# Patient Record
Sex: MALE | Race: WHITE | NOT HISPANIC OR LATINO | Employment: OTHER | URBAN - NONMETROPOLITAN AREA
[De-identification: names, ages, dates, MRNs, and addresses within clinical notes are randomized per-mention and may not be internally consistent; named-entity substitution may affect disease eponyms.]

---

## 2017-01-26 DIAGNOSIS — M15.9 PRIMARY OSTEOARTHRITIS INVOLVING MULTIPLE JOINTS: Chronic | ICD-10-CM

## 2017-01-26 RX ORDER — HYDROCODONE BITARTRATE AND ACETAMINOPHEN 7.5; 325 MG/1; MG/1
1 TABLET ORAL 2 TIMES DAILY
Qty: 60 TABLET | Refills: 0 | Status: SHIPPED | OUTPATIENT
Start: 2017-01-26 | End: 2017-02-24 | Stop reason: SDUPTHER

## 2017-01-27 ENCOUNTER — OFFICE VISIT (OUTPATIENT)
Dept: FAMILY MEDICINE CLINIC | Facility: CLINIC | Age: 57
End: 2017-01-27

## 2017-01-27 VITALS
HEIGHT: 70 IN | TEMPERATURE: 97.7 F | BODY MASS INDEX: 20.62 KG/M2 | SYSTOLIC BLOOD PRESSURE: 120 MMHG | HEART RATE: 74 BPM | WEIGHT: 144 LBS | DIASTOLIC BLOOD PRESSURE: 60 MMHG

## 2017-01-27 DIAGNOSIS — K40.90 LEFT INGUINAL HERNIA: Primary | ICD-10-CM

## 2017-01-27 PROCEDURE — 99213 OFFICE O/P EST LOW 20 MIN: CPT | Performed by: INTERNAL MEDICINE

## 2017-01-27 NOTE — PROGRESS NOTES
Nguyễn Rinaldi is a 56 y.o. male who presents to the office complaining of pain and swelling in the left inguinal area.  He recently had a cold and was coughing quite a bit.  He noticed the herniation approximately 3 weeks ago.  It has been reducible, but has been causing quite a bit of discomfort.  History of Present Illness     The following portions of the patient's history were reviewed and updated as appropriate: allergies, current medications, past family history, past medical history, past social history, past surgical history and problem list.    Review of Systems   Constitutional: Negative for chills, fatigue and fever.   HENT: Negative for congestion, sneezing, sore throat and trouble swallowing.    Eyes: Negative for visual disturbance.   Respiratory: Negative for cough, chest tightness, shortness of breath and wheezing.    Cardiovascular: Negative for chest pain, palpitations and leg swelling.   Gastrointestinal: Negative for abdominal pain, constipation, diarrhea, nausea and vomiting.   Genitourinary: Negative for dysuria, frequency and urgency.   Musculoskeletal: Positive for arthralgias and back pain. Negative for neck pain.   Skin: Negative for rash.   Neurological: Positive for numbness. Negative for dizziness, weakness and headaches.   Psychiatric/Behavioral:        Patient denies any feelings of depression and has not felt down, hopeless or lost interest in any activities.       Objective   Physical Exam   Constitutional: He is oriented to person, place, and time. He appears well-developed and well-nourished. No distress.   HENT:   Head: Normocephalic and atraumatic.   Nose: Nose normal.   Mouth/Throat: Oropharynx is clear and moist. No oropharyngeal exudate.   Eyes: Conjunctivae and EOM are normal. Pupils are equal, round, and reactive to light. No scleral icterus.   Neck: Normal range of motion. Neck supple.   Cardiovascular: Normal rate, regular rhythm and normal heart sounds.  Exam  reveals no gallop and no friction rub.    No murmur heard.  Pulmonary/Chest: Effort normal and breath sounds normal. No respiratory distress. He has no wheezes. He has no rales.   Abdominal: Soft. Bowel sounds are normal. He exhibits no distension. There is no tenderness. There is no rebound and no guarding. A hernia is present. Hernia confirmed positive in the left inguinal area.   Genitourinary:         Musculoskeletal: He exhibits no edema.        Lumbar back: He exhibits decreased range of motion and pain.   Lymphadenopathy:     He has no cervical adenopathy.   Neurological: He is alert and oriented to person, place, and time. No cranial nerve deficit.   Skin: Skin is warm and dry. No rash noted.   Psychiatric: He has a normal mood and affect. His behavior is normal. Judgment and thought content normal.   Nursing note and vitals reviewed.      Assessment/Plan   Mitch was seen today for req referral for hernia left groin.    Diagnoses and all orders for this visit:    Left inguinal hernia  Comments:  New Onset - 3 weeks ago while coughing  Orders:  -     Ambulatory Referral to General Surgery      I will refer him for surgical evaluation for treatment of the herniation.  He will avoid any lifting until the appointment.  If he has worsening of pain or if the hernia becomes unable to be reduced, he will seek more immediate attention.       PHQ-9 Depression Screening 1/27/2017   Little interest or pleasure in doing things 0   Feeling down, depressed, or hopeless 0   Trouble falling or staying asleep, or sleeping too much 0   Feeling tired or having little energy 0   Poor appetite or overeating 0   Feeling bad about yourself - or that you are a failure or have let yourself or your family down 0   Trouble concentrating on things, such as reading the newspaper or watching television 0   Moving or speaking so slowly that other people could have noticed. Or the opposite - being so fidgety or restless that you have been  moving around a lot more than usual 0   Thoughts that you would be better off dead, or of hurting yourself in some way 0   PHQ-9 Total Score 0

## 2017-01-27 NOTE — MR AVS SNAPSHOT
Mitch Rinaldi   1/27/2017 2:00 PM   Office Visit    Dept Phone:  [phone]   Encounter #:  [id number]    Provider:  Carmelo Farmer MD   Department:  [facility]                Your Full Care Plan              Your Updated Medication List          This list is accurate as of: 1/27/17  3:03 PM.  Always use your most recent med list.                aspirin 81 MG chewable tablet       cetirizine 10 MG tablet   Commonly known as:  zyrTEC       citalopram 10 MG tablet   Commonly known as:  CeleXA   Take 1 tablet by mouth Daily.       esomeprazole 20 MG capsule   Commonly known as:  nexIUM       gabapentin 300 MG capsule   Commonly known as:  NEURONTIN       HYDROcodone-acetaminophen 7.5-325 MG per tablet   Commonly known as:  NORCO   Take 1 tablet by mouth 2 (Two) Times a Day.       ibuprofen 200 MG tablet   Commonly known as:  ADVIL,MOTRIN       promethazine 25 MG tablet   Commonly known as:  PHENERGAN   Take 1 tablet by mouth Every 6 (Six) Hours As Needed for nausea or vomiting.       tiZANidine 4 MG tablet   Commonly known as:  ZANAFLEX               We Performed the Following     Ambulatory Referral to General Surgery       You Were Diagnosed With        Codes Comments    Left inguinal hernia    -  Primary ICD-10-CM: K40.90  ICD-9-CM: 550.90 New Onset - 3 weeks ago while coughing      Instructions     None    Patient Instructions History      Upcoming Appointments     Visit Type Date Time Department    OFFICE VISIT 1/27/2017  2:00 PM [facility]    FOLLOW UP 2/24/2017  8:45 AM [facility]      [employer] Signup     Our records indicate that you have an active [employer] account.    You can view your After Visit Summary by going to [employer] and logging in with your [employer] username and password.  If you don't have a [employer] username and password but a parent or guardian has access to your record, the parent or guardian  "should login with their own [employer] username and password and access your record to view the After Visit Summary.    If you have questions, you can email [email or web address] or call [phone] to talk to our [employer] staff.  Remember, [employer] is NOT to be used for urgent needs.  For medical emergencies, dial 911.               Other Info from Your Visit           Your Appointments     Feb 24, 2017  8:45 AM CST   Follow Up with Carmelo Farmer MD   [facility] (--)    [address] [place] [address] [address] [place] [address] [phone]           Arrive 15 minutes prior to appointment.              Allergies     Other      darvocet-n 100      Reason for Visit     req referral for hernia left groin           Vital Signs     Blood Pressure Pulse Temperature Height Weight Body Mass Index    120/60 (BP Location: Left arm, Patient Position: Sitting, Cuff Size: Adult) 74 97.7 °F (36.5 °C) (Oral) 70\" (177.8 cm) 144 lb (65.3 kg) 20.66 kg/m2    Smoking Status                   Current Every Day Smoker           Problems and Diagnoses Noted     Left inguinal hernia    -  Primary        "

## 2017-02-02 ENCOUNTER — CONSULT (OUTPATIENT)
Dept: SURGERY | Facility: CLINIC | Age: 57
End: 2017-02-02

## 2017-02-02 VITALS
DIASTOLIC BLOOD PRESSURE: 74 MMHG | SYSTOLIC BLOOD PRESSURE: 132 MMHG | BODY MASS INDEX: 20.33 KG/M2 | WEIGHT: 142 LBS | HEIGHT: 70 IN

## 2017-02-02 DIAGNOSIS — K40.90 UNILATERAL INGUINAL HERNIA WITHOUT OBSTRUCTION OR GANGRENE, RECURRENCE NOT SPECIFIED: Primary | ICD-10-CM

## 2017-02-02 PROCEDURE — 99203 OFFICE O/P NEW LOW 30 MIN: CPT | Performed by: SURGERY

## 2017-02-02 RX ORDER — SODIUM CHLORIDE 9 MG/ML
100 INJECTION, SOLUTION INTRAVENOUS CONTINUOUS
Status: CANCELLED | OUTPATIENT
Start: 2017-02-02

## 2017-02-02 RX ORDER — SODIUM CHLORIDE 0.9 % (FLUSH) 0.9 %
1-10 SYRINGE (ML) INJECTION AS NEEDED
Status: CANCELLED | OUTPATIENT
Start: 2017-02-02

## 2017-02-02 NOTE — H&P
CHIEF COMPLAINT:    Left Groin bulge with pain    HISTORY OF PRESENT ILLNESS:    Mitch Rinaldi is a 56 y.o. male who presents with worsening left groin pain and an enlarging bulge which has been present since he had significant coughing with a URI in January.  He had previously noticed a small bulge in the region which is now larger and associated with burning pain which becomes sharp with straining.  There is no radiation of the pain.  He has no other symptoms associated with the hernia.  He has had no prior hernia repairs.    Past Medical History   Diagnosis Date   • Acute gastroenteritis    • Diverticulitis of colon    • Emphysema lung    • Gastroesophageal reflux disease    • Generalized anxiety disorder    • Hyperlipidemia    • Impaired glucose tolerance    • Pain, lumbar region      Pain radiating to lumbar region of back      • Peripheral nervous system disease      Disorder of the peripheral nervous system      • Pleuritic chest pain    • Primary osteoarthritis involving multiple joints 8/29/2016   • Shoulder pain, left        Past Surgical History   Procedure Laterality Date   • Cholecystectomy     • Endoscopy and colonoscopy     • Injection of medication       Depo Medrol (Methylprednisone) 80mg (8)      09/01/2015       • Injection of medication       Kenalog (1)      12/11/2012       • Lung surgery        Pneumothorax 1980       • Epididymis surgery Right      cyst         Current Outpatient Prescriptions:   •  aspirin 81 MG chewable tablet, Chew 81 mg daily., Disp: , Rfl:   •  cetirizine (ZyrTEC) 10 MG tablet, Take 10 mg by mouth daily as needed for allergies., Disp: , Rfl:   •  citalopram (CeleXA) 10 MG tablet, Take 1 tablet by mouth Daily., Disp: 30 tablet, Rfl: 5  •  esomeprazole (NexIUM) 20 MG capsule, Take 20 mg by mouth daily., Disp: , Rfl:   •  HYDROcodone-acetaminophen (NORCO) 7.5-325 MG per tablet, Take 1 tablet by mouth 2 (Two) Times a Day., Disp: 60 tablet, Rfl: 0  •  tiZANidine (ZANAFLEX) 4 MG  tablet, Take 4 mg by mouth every 8 (eight) hours as needed for muscle spasms., Disp: , Rfl:   •  gabapentin (NEURONTIN) 300 MG capsule, Take 300 mg by mouth daily as needed., Disp: , Rfl:   •  ibuprofen (ADVIL,MOTRIN) 200 MG tablet, Take 400 mg by mouth every 6 (six) hours as needed for mild pain (1-3)., Disp: , Rfl:   •  promethazine (PHENERGAN) 25 MG tablet, Take 1 tablet by mouth Every 6 (Six) Hours As Needed for nausea or vomiting., Disp: 30 tablet, Rfl: 2    Allergies   Allergen Reactions   • Other      darvocet-n 100       Family History   Problem Relation Age of Onset   • Dementia Mother    • Hypertension Mother    • Thyroid disease Mother    • Diabetes Father    • Stomach cancer Father    • Thyroid disease Brother    • Down syndrome Brother        Social History     Social History   • Marital status:      Spouse name: N/A   • Number of children: N/A   • Years of education: N/A     Occupational History   • Not on file.     Social History Main Topics   • Smoking status: Current Every Day Smoker     Packs/day: 1.00     Types: Cigarettes   • Smokeless tobacco: Former User     Types: Snuff     Quit date: 2014      Comment: has tried chantix    • Alcohol use No   • Drug use: No   • Sexual activity: Defer     Other Topics Concern   • Not on file     Social History Narrative   • No narrative on file       Review of Systems   Constitutional: Negative for activity change, appetite change, chills and fever.   HENT: Negative for hearing loss, nosebleeds and trouble swallowing.    Respiratory: Positive for choking and wheezing.    Cardiovascular: Negative for chest pain, palpitations and leg swelling.   Gastrointestinal: Positive for abdominal pain. Negative for abdominal distention, anal bleeding, blood in stool, constipation, diarrhea, nausea, rectal pain and vomiting.   Endocrine: Negative for cold intolerance, heat intolerance, polydipsia and polyuria.   Genitourinary: Negative for decreased urine volume,  "difficulty urinating, dysuria, enuresis, frequency, hematuria and urgency.   Musculoskeletal: Positive for arthralgias and back pain. Negative for gait problem, myalgias and neck pain.   Skin: Negative for pallor, rash and wound.   Allergic/Immunologic: Negative for immunocompromised state.   Neurological: Negative for dizziness, seizures, weakness, light-headedness, numbness and headaches.   Psychiatric/Behavioral: Negative for agitation and behavioral problems. The patient is not nervous/anxious.        Objective     Visit Vitals   • /74   • Ht 70\" (177.8 cm)   • Wt 142 lb (64.4 kg)   • BMI 20.37 kg/m2       Physical Exam   Constitutional: He is oriented to person, place, and time. He appears well-developed and well-nourished.   HENT:   Head: Normocephalic and atraumatic.   Nose: Nose normal.   Eyes: Conjunctivae and EOM are normal. Right eye exhibits no discharge. Left eye exhibits no discharge.   Neck: Trachea normal, normal range of motion and phonation normal. Neck supple. No JVD present. No tracheal deviation and no edema present. No thyromegaly present.   Cardiovascular: Normal rate, regular rhythm and normal heart sounds.  Exam reveals no gallop and no friction rub.    No murmur heard.  Pulmonary/Chest: Effort normal and breath sounds normal. No accessory muscle usage. No respiratory distress. He has no decreased breath sounds. He has no wheezes. He has no rales. He exhibits no tenderness.   Abdominal: Soft. He exhibits no distension, no fluid wave, no ascites, no pulsatile midline mass and no mass. There is no tenderness. There is no rebound and no guarding. A hernia is present. Hernia confirmed positive in the left inguinal area.       Musculoskeletal: Normal range of motion. He exhibits no edema, tenderness or deformity.   Lymphadenopathy:     He has no cervical adenopathy.        Left: No supraclavicular adenopathy present.   Neurological: He is alert and oriented to person, place, and time. He " has normal strength. No cranial nerve deficit.   Skin: Skin is warm and dry. No rash noted. He is not diaphoretic. No erythema. No pallor.   Psychiatric: He has a normal mood and affect. His speech is normal and behavior is normal. Judgment and thought content normal. Cognition and memory are normal.   Vitals reviewed.      DIAGNOSTIC DATA:    Preop labs ordered    ASSESSMENT:    Left Inguinal Hernia    PLAN:    Risks and benefits of open left inguinal hernia repair with mesh were discussed and he is agreeable with proceeding.  Will plan for OR 2/6/17.            This document has been electronically signed by Miles Gregorio MD on February 2, 2017 2:51 PM

## 2017-02-03 ENCOUNTER — APPOINTMENT (OUTPATIENT)
Dept: PREADMISSION TESTING | Facility: HOSPITAL | Age: 57
End: 2017-02-03

## 2017-02-03 ENCOUNTER — ANESTHESIA EVENT (OUTPATIENT)
Dept: PERIOP | Facility: HOSPITAL | Age: 57
End: 2017-02-03

## 2017-02-03 VITALS
HEART RATE: 97 BPM | WEIGHT: 141 LBS | BODY MASS INDEX: 20.19 KG/M2 | SYSTOLIC BLOOD PRESSURE: 128 MMHG | DIASTOLIC BLOOD PRESSURE: 82 MMHG | HEIGHT: 70 IN | RESPIRATION RATE: 16 BRPM | OXYGEN SATURATION: 99 %

## 2017-02-03 DIAGNOSIS — K40.90 UNILATERAL INGUINAL HERNIA WITHOUT OBSTRUCTION OR GANGRENE, RECURRENCE NOT SPECIFIED: ICD-10-CM

## 2017-02-03 LAB
ANION GAP SERPL CALCULATED.3IONS-SCNC: 11 MMOL/L (ref 5–15)
BUN BLD-MCNC: 14 MG/DL (ref 7–21)
BUN/CREAT SERPL: 15.6 (ref 7–25)
CALCIUM SPEC-SCNC: 9.6 MG/DL (ref 8.4–10.2)
CHLORIDE SERPL-SCNC: 99 MMOL/L (ref 95–110)
CO2 SERPL-SCNC: 28 MMOL/L (ref 22–31)
CREAT BLD-MCNC: 0.9 MG/DL (ref 0.7–1.3)
DEPRECATED RDW RBC AUTO: 41.3 FL (ref 35.1–43.9)
ERYTHROCYTE [DISTWIDTH] IN BLOOD BY AUTOMATED COUNT: 12.7 % (ref 11.5–14.5)
GFR SERPL CREATININE-BSD FRML MDRD: 87 ML/MIN/1.73 (ref 60–130)
GLUCOSE BLD-MCNC: 108 MG/DL (ref 60–100)
HCT VFR BLD AUTO: 44.9 % (ref 39–49)
HGB BLD-MCNC: 16.3 G/DL (ref 13.7–17.3)
MCH RBC QN AUTO: 31.8 PG (ref 26.5–34)
MCHC RBC AUTO-ENTMCNC: 36.3 G/DL (ref 31.5–36.3)
MCV RBC AUTO: 87.7 FL (ref 80–98)
MRSA DNA SPEC QL NAA+PROBE: NEGATIVE
PLATELET # BLD AUTO: 153 10*3/MM3 (ref 150–450)
PMV BLD AUTO: 11.3 FL (ref 8–12)
POTASSIUM BLD-SCNC: 4.3 MMOL/L (ref 3.5–5.1)
RBC # BLD AUTO: 5.12 10*6/MM3 (ref 4.37–5.74)
SODIUM BLD-SCNC: 138 MMOL/L (ref 137–145)
WBC NRBC COR # BLD: 10.11 10*3/MM3 (ref 3.2–9.8)

## 2017-02-03 PROCEDURE — 87641 MR-STAPH DNA AMP PROBE: CPT | Performed by: SURGERY

## 2017-02-03 PROCEDURE — 80048 BASIC METABOLIC PNL TOTAL CA: CPT | Performed by: SURGERY

## 2017-02-03 PROCEDURE — 85027 COMPLETE CBC AUTOMATED: CPT | Performed by: SURGERY

## 2017-02-03 PROCEDURE — 36415 COLL VENOUS BLD VENIPUNCTURE: CPT

## 2017-02-03 RX ORDER — SODIUM CHLORIDE, SODIUM GLUCONATE, SODIUM ACETATE, POTASSIUM CHLORIDE, AND MAGNESIUM CHLORIDE 526; 502; 368; 37; 30 MG/100ML; MG/100ML; MG/100ML; MG/100ML; MG/100ML
1000 INJECTION, SOLUTION INTRAVENOUS CONTINUOUS PRN
Status: CANCELLED | OUTPATIENT
Start: 2017-02-03

## 2017-02-06 ENCOUNTER — ANESTHESIA (OUTPATIENT)
Dept: PERIOP | Facility: HOSPITAL | Age: 57
End: 2017-02-06

## 2017-02-06 ENCOUNTER — HOSPITAL ENCOUNTER (OUTPATIENT)
Facility: HOSPITAL | Age: 57
Setting detail: HOSPITAL OUTPATIENT SURGERY
Discharge: HOME | End: 2017-02-06
Attending: SURGERY | Admitting: SURGERY

## 2017-02-06 VITALS
DIASTOLIC BLOOD PRESSURE: 71 MMHG | WEIGHT: 139.99 LBS | BODY MASS INDEX: 20.04 KG/M2 | SYSTOLIC BLOOD PRESSURE: 154 MMHG | OXYGEN SATURATION: 100 % | HEIGHT: 70 IN | RESPIRATION RATE: 20 BRPM | HEART RATE: 97 BPM | TEMPERATURE: 97.7 F

## 2017-02-06 DIAGNOSIS — K40.90 UNILATERAL INGUINAL HERNIA WITHOUT OBSTRUCTION OR GANGRENE, RECURRENCE NOT SPECIFIED: ICD-10-CM

## 2017-02-06 PROCEDURE — 25010000002 MIDAZOLAM PER 1 MG: Performed by: NURSE ANESTHETIST, CERTIFIED REGISTERED

## 2017-02-06 PROCEDURE — 49505 PRP I/HERN INIT REDUC >5 YR: CPT | Performed by: SURGERY

## 2017-02-06 PROCEDURE — 25010000002 FENTANYL CITRATE (PF) 100 MCG/2ML SOLUTION: Performed by: NURSE ANESTHETIST, CERTIFIED REGISTERED

## 2017-02-06 PROCEDURE — 25010000002 KETOROLAC TROMETHAMINE PER 15 MG: Performed by: NURSE ANESTHETIST, CERTIFIED REGISTERED

## 2017-02-06 PROCEDURE — 25010000002 HYDROMORPHONE PER 4 MG: Performed by: NURSE ANESTHETIST, CERTIFIED REGISTERED

## 2017-02-06 PROCEDURE — 25010000002 ONDANSETRON PER 1 MG: Performed by: NURSE ANESTHETIST, CERTIFIED REGISTERED

## 2017-02-06 PROCEDURE — 25010000002 PROPOFOL 10 MG/ML EMULSION: Performed by: NURSE ANESTHETIST, CERTIFIED REGISTERED

## 2017-02-06 PROCEDURE — C1781 MESH (IMPLANTABLE): HCPCS | Performed by: SURGERY

## 2017-02-06 PROCEDURE — 25010000003 CEFAZOLIN PER 500 MG: Performed by: SURGERY

## 2017-02-06 DEVICE — MESH FLUT SHT 3X6IN: Type: IMPLANTABLE DEVICE | Site: GROIN | Status: FUNCTIONAL

## 2017-02-06 RX ORDER — ONDANSETRON 2 MG/ML
INJECTION INTRAMUSCULAR; INTRAVENOUS AS NEEDED
Status: DISCONTINUED | OUTPATIENT
Start: 2017-02-06 | End: 2017-02-06 | Stop reason: SURG

## 2017-02-06 RX ORDER — BUPIVACAINE HYDROCHLORIDE AND EPINEPHRINE 5; 5 MG/ML; UG/ML
INJECTION, SOLUTION EPIDURAL; INTRACAUDAL; PERINEURAL AS NEEDED
Status: DISCONTINUED | OUTPATIENT
Start: 2017-02-06 | End: 2017-02-06 | Stop reason: HOSPADM

## 2017-02-06 RX ORDER — HYDROCODONE BITARTRATE AND ACETAMINOPHEN 7.5; 325 MG/1; MG/1
1 TABLET ORAL EVERY 6 HOURS PRN
Status: DISCONTINUED | OUTPATIENT
Start: 2017-02-06 | End: 2017-02-06 | Stop reason: HOSPADM

## 2017-02-06 RX ORDER — FENTANYL CITRATE 50 UG/ML
INJECTION, SOLUTION INTRAMUSCULAR; INTRAVENOUS AS NEEDED
Status: DISCONTINUED | OUTPATIENT
Start: 2017-02-06 | End: 2017-02-06 | Stop reason: SURG

## 2017-02-06 RX ORDER — SODIUM CHLORIDE, SODIUM GLUCONATE, SODIUM ACETATE, POTASSIUM CHLORIDE, AND MAGNESIUM CHLORIDE 526; 502; 368; 37; 30 MG/100ML; MG/100ML; MG/100ML; MG/100ML; MG/100ML
1000 INJECTION, SOLUTION INTRAVENOUS CONTINUOUS PRN
Status: DISCONTINUED | OUTPATIENT
Start: 2017-02-06 | End: 2017-02-06 | Stop reason: HOSPADM

## 2017-02-06 RX ORDER — HYDROCODONE BITARTRATE AND ACETAMINOPHEN 7.5; 325 MG/1; MG/1
1-2 TABLET ORAL EVERY 4 HOURS PRN
Qty: 30 TABLET | Refills: 0 | Status: SHIPPED | OUTPATIENT
Start: 2017-02-06 | End: 2017-02-24 | Stop reason: SDUPTHER

## 2017-02-06 RX ORDER — PROPOFOL 10 MG/ML
VIAL (ML) INTRAVENOUS AS NEEDED
Status: DISCONTINUED | OUTPATIENT
Start: 2017-02-06 | End: 2017-02-06 | Stop reason: SURG

## 2017-02-06 RX ORDER — KETOROLAC TROMETHAMINE 30 MG/ML
INJECTION, SOLUTION INTRAMUSCULAR; INTRAVENOUS AS NEEDED
Status: DISCONTINUED | OUTPATIENT
Start: 2017-02-06 | End: 2017-02-06 | Stop reason: SURG

## 2017-02-06 RX ORDER — SODIUM CHLORIDE 0.9 % (FLUSH) 0.9 %
1-10 SYRINGE (ML) INJECTION AS NEEDED
Status: DISCONTINUED | OUTPATIENT
Start: 2017-02-06 | End: 2017-02-06 | Stop reason: HOSPADM

## 2017-02-06 RX ORDER — BUPIVACAINE HYDROCHLORIDE AND EPINEPHRINE 5; 5 MG/ML; UG/ML
INJECTION, SOLUTION EPIDURAL; INTRACAUDAL; PERINEURAL
Status: DISCONTINUED
Start: 2017-02-06 | End: 2017-02-06 | Stop reason: HOSPADM

## 2017-02-06 RX ORDER — SODIUM CHLORIDE 9 MG/ML
100 INJECTION, SOLUTION INTRAVENOUS CONTINUOUS
Status: DISCONTINUED | OUTPATIENT
Start: 2017-02-06 | End: 2017-02-06 | Stop reason: HOSPADM

## 2017-02-06 RX ORDER — MIDAZOLAM HYDROCHLORIDE 1 MG/ML
INJECTION INTRAMUSCULAR; INTRAVENOUS AS NEEDED
Status: DISCONTINUED | OUTPATIENT
Start: 2017-02-06 | End: 2017-02-06 | Stop reason: SURG

## 2017-02-06 RX ORDER — LIDOCAINE HYDROCHLORIDE 20 MG/ML
INJECTION, SOLUTION INFILTRATION; PERINEURAL AS NEEDED
Status: DISCONTINUED | OUTPATIENT
Start: 2017-02-06 | End: 2017-02-06 | Stop reason: SURG

## 2017-02-06 RX ADMIN — HYDROMORPHONE HYDROCHLORIDE 0.5 MG: 1 INJECTION, SOLUTION INTRAMUSCULAR; INTRAVENOUS; SUBCUTANEOUS at 15:55

## 2017-02-06 RX ADMIN — ONDANSETRON 4 MG: 2 INJECTION INTRAMUSCULAR; INTRAVENOUS at 14:59

## 2017-02-06 RX ADMIN — LIDOCAINE HYDROCHLORIDE 60 MG: 20 INJECTION, SOLUTION INFILTRATION; PERINEURAL at 14:48

## 2017-02-06 RX ADMIN — SODIUM CHLORIDE, SODIUM GLUCONATE, SODIUM ACETATE, POTASSIUM CHLORIDE, AND MAGNESIUM CHLORIDE 1000 ML: 526; 502; 368; 37; 30 INJECTION, SOLUTION INTRAVENOUS at 13:32

## 2017-02-06 RX ADMIN — KETOROLAC TROMETHAMINE 30 MG: 30 INJECTION, SOLUTION INTRAMUSCULAR; INTRAVENOUS at 15:31

## 2017-02-06 RX ADMIN — CEFAZOLIN SODIUM 2 G: 1 INJECTION, POWDER, FOR SOLUTION INTRAMUSCULAR; INTRAVENOUS at 14:44

## 2017-02-06 RX ADMIN — FENTANYL CITRATE 50 MCG: 50 INJECTION, SOLUTION INTRAMUSCULAR; INTRAVENOUS at 15:15

## 2017-02-06 RX ADMIN — HYDROCODONE BITARTRATE AND ACETAMINOPHEN 1 TABLET: 7.5; 325 TABLET ORAL at 16:49

## 2017-02-06 RX ADMIN — HYDROMORPHONE HYDROCHLORIDE 0.5 MG: 1 INJECTION, SOLUTION INTRAMUSCULAR; INTRAVENOUS; SUBCUTANEOUS at 16:05

## 2017-02-06 RX ADMIN — MIDAZOLAM 2 MG: 1 INJECTION INTRAMUSCULAR; INTRAVENOUS at 14:38

## 2017-02-06 RX ADMIN — FENTANYL CITRATE 50 MCG: 50 INJECTION, SOLUTION INTRAMUSCULAR; INTRAVENOUS at 14:47

## 2017-02-06 RX ADMIN — PROPOFOL 150 MG: 10 INJECTION, EMULSION INTRAVENOUS at 14:48

## 2017-02-06 NOTE — INTERVAL H&P NOTE
H&P reviewed. The patient was examined and there are no changes to the H&P.           This document has been electronically signed by Miles Gregorio MD on February 6, 2017 1:38 PM

## 2017-02-06 NOTE — PLAN OF CARE
Problem: Patient Care Overview (Adult)  Goal: Plan of Care Review  Outcome: Ongoing (interventions implemented as appropriate)    02/06/17 1610   Coping/Psychosocial Response Interventions   Plan Of Care Reviewed With patient   Patient Care Overview   Progress improving   Outcome Evaluation   Outcome Summary/Follow up Plan all criteria met, pt ready for sds         Problem: Perioperative Period (Adult)  Goal: Signs and Symptoms of Listed Potential Problems Will be Absent or Manageable (Perioperative Period)  Outcome: Ongoing (interventions implemented as appropriate)

## 2017-02-06 NOTE — OP NOTE
INGUINAL HERNIA REPAIR  Procedure Note    Mitch Rinaldi  2/6/2017    Pre-op Diagnosis:   Unilateral inguinal hernia without obstruction or gangrene, recurrence not specified [K40.90]    Post-op Diagnosis:     Post-Op Diagnosis Codes:     * Unilateral inguinal hernia without obstruction or gangrene, recurrence not specified [K40.90]    Procedure/CPT® Codes:      Procedure(s):  OPEN LEFT INGUINAL HERNIA REPAIR WITH MESH    Surgeon(s):  Miles Gregorio MD    Anesthesia: General    Staff:   Circulator: Josephine Chavez RN  Scrub Person: Kymberly Meadows  Assistant: Adri Pate    Estimated Blood Loss: 5 mL    Specimens:                * No specimens in log *      Drains:         IVF: 400cc  Findings: small direct hernia, left sided    Complications: none    Indication: See H and P    Operative Note:    Patient was seen, marked and consented in same day surgery.  He was then transferred to the OR and general anesthetic was administered.  He was orotracheally intubated without incident.  A preop briefing was performed and the left groin prepped and draped in normal sterile fashion.  Following this a timeout was performed.    Following timeout the right ASIS and pubic tubercle were palpated and marked.  A line approximating the inguinal ligament was drawn on the skin.  Local anesthetic was injected and a skin incision made, carried down through the subcutaneous tissues with electrocautery. A single vein was encountered and clamped, ligated and tied.    Farhan's fascia was then identified and opened for the full length of the incision.  The external oblique fascia was identified and cleared down to the external ring.  A scalpel was used to make a small incision in this fascia parallel to its fibers.  Metzenbaum scissors were then used to clear the posterior aspect of the fascia which was then opened for the full length of the incision.  Once this was done the cord structures were isolated and bluntly  dissected from the surrounding structures at the level of the pubic tubercle.  A small reducible direct hernia was seen.    A penrose was place around the spermatic cord structures elevating it upwards into the wound.  There was no evidence of indirect hernia.  The muscle fibers overlying the cord were stripped away and divided.  A small cord lipoma was identified and dissected away from the cord structures which were identified and verified to be intact.  The lipoma was excised and its base ligated.  It was not sent as specimen.     A polypropylene mesh was brought onto the field and trimmed to size and shape, with a keyhole incision made for passage of the cord.  It was secured in interrupted fashion to the pubic tubercle, shelving edge of the inguinal ligament and the internal oblique fascia using 0 PDS.  The two tails were brought around the cord structures and secured to themselves and the muscular fascia.  Once the mesh was verified to be in good position the wound was irrigated.  The counts were verified to be correct.  Additional local was then injected deeply in the wound.  The external oblique fascia was closed down to the level of the external ring using 2-0 vicryl.  Farhan's fascia was closed using 3-0 vicryl, skin was closed with 4-0 vicryl.  Skin affix was placed over the wound.  The patient was awakened and returned to recovery in good condition.          This document has been electronically signed by Miles Gregorio MD on February 6, 2017 4:50 PM        Miles Gregorio MD     Date: 2/6/2017  Time: 4:48 PM

## 2017-02-06 NOTE — ANESTHESIA POSTPROCEDURE EVALUATION
Patient: Mitch Rinaldi    Procedure Summary     Date Anesthesia Start Anesthesia Stop Room / Location    02/06/17 1443 1545  [facility] OR [facility] / [facility]       Procedure Diagnosis Surgeon Provider    OPEN LEFT INGUINAL HERNIA REPAIR WITH MESH (Left Abdomen) Unilateral inguinal hernia without obstruction or gangrene, recurrence not specified  (Unilateral inguinal hernia without obstruction or gangrene, recurrence not specified [K40.90]) MD Bry Cyr MD          Anesthesia Type: general  Last vitals  BP      Temp      Pulse     Resp      SpO2        Post Anesthesia Care and Evaluation    Patient location during evaluation: PACU  Patient participation: complete - patient participated  Level of consciousness: awake and alert  Pain management: adequate  Airway patency: patent  Anesthetic complications: No anesthetic complications  PONV Status: none  Cardiovascular status: acceptable  Respiratory status: acceptable  Hydration status: acceptable

## 2017-02-06 NOTE — ANESTHESIA PREPROCEDURE EVALUATION
Anesthesia Evaluation     Patient summary reviewed    No history of anesthetic complications   Airway   no difficulty expected  Dental    (+) upper dentures    Pulmonary - normal exam   (+) hx of smoking, COPD,   (-) asthma, sleep apnea  Cardiovascular - negative cardio ROS and normal exam  (-) hypertension, valvular problems/murmurs, past MI, CAD, dysrhythmias, angina, BUSBY    Neuro/Psych  (+) psychiatric history Anxiety,    (-) CVA, syncope  GI/Hepatic/Renal/Endo    (+)  GERD,   (-) renal disease    Musculoskeletal     Abdominal    Substance History   (-) drug use     OB/GYN          Other       (-) blood dyscrasia                        Anesthesia Plan    ASA 3     general     Anesthetic plan and risks discussed with patient.

## 2017-02-06 NOTE — ANESTHESIA PROCEDURE NOTES
Airway  Urgency: elective    Airway not difficult    General Information and Staff    Patient location during procedure: OR  CRNA: EDUARDO MARAVILLA    Indications and Patient Condition  Indications for airway management: airway protection    Preoxygenated: yes  Mask difficulty assessment: 0 - not attempted    Final Airway Details  Final airway type: supraglottic airway      Successful airway: classic  Size 4    Number of attempts at approach: 1

## 2017-02-06 NOTE — ADDENDUM NOTE
Addendum  created 02/06/17 7498 by Lamine Osullivan, MARY    Anesthesia Event edited, Anesthesia Intra Flowsheets edited, Anesthesia Intra Meds edited, Flowsheet data copied forward, Order sets accessed, Patient device removed, Procedure Event Log accessed, Sign clinical note

## 2017-02-23 ENCOUNTER — OFFICE VISIT (OUTPATIENT)
Dept: SURGERY | Facility: CLINIC | Age: 57
End: 2017-02-23

## 2017-02-23 VITALS
WEIGHT: 139 LBS | BODY MASS INDEX: 19.9 KG/M2 | SYSTOLIC BLOOD PRESSURE: 124 MMHG | HEIGHT: 70 IN | DIASTOLIC BLOOD PRESSURE: 70 MMHG

## 2017-02-23 DIAGNOSIS — Z09 FOLLOW UP: Primary | ICD-10-CM

## 2017-02-23 PROCEDURE — 99024 POSTOP FOLLOW-UP VISIT: CPT | Performed by: SURGERY

## 2017-02-24 ENCOUNTER — OFFICE VISIT (OUTPATIENT)
Dept: FAMILY MEDICINE CLINIC | Facility: CLINIC | Age: 57
End: 2017-02-24

## 2017-02-24 VITALS
DIASTOLIC BLOOD PRESSURE: 64 MMHG | HEART RATE: 80 BPM | BODY MASS INDEX: 20.19 KG/M2 | HEIGHT: 70 IN | SYSTOLIC BLOOD PRESSURE: 112 MMHG | WEIGHT: 141 LBS | TEMPERATURE: 97.6 F

## 2017-02-24 DIAGNOSIS — F41.1 GENERALIZED ANXIETY DISORDER: Chronic | ICD-10-CM

## 2017-02-24 DIAGNOSIS — M15.9 PRIMARY OSTEOARTHRITIS INVOLVING MULTIPLE JOINTS: Primary | Chronic | ICD-10-CM

## 2017-02-24 DIAGNOSIS — G59 MONONEUROPATHY DUE TO UNDERLYING DISEASE: Chronic | ICD-10-CM

## 2017-02-24 PROCEDURE — 99214 OFFICE O/P EST MOD 30 MIN: CPT | Performed by: INTERNAL MEDICINE

## 2017-02-24 RX ORDER — HYDROCODONE BITARTRATE AND ACETAMINOPHEN 7.5; 325 MG/1; MG/1
1 TABLET ORAL 2 TIMES DAILY
Qty: 60 TABLET | Refills: 0 | Status: SHIPPED | OUTPATIENT
Start: 2017-02-24 | End: 2017-03-23 | Stop reason: SDUPTHER

## 2017-02-24 NOTE — PROGRESS NOTES
[facility]#[id number].

## 2017-02-24 NOTE — PATIENT INSTRUCTIONS
Steps to Quit Smoking   Smoking tobacco can be harmful to your health and can affect almost every organ in your body. Smoking puts you, and those around you, at risk for developing many serious chronic diseases. Quitting smoking is difficult, but it is one of the best things that you can do for your health. It is never too late to quit.  WHAT ARE THE BENEFITS OF QUITTING SMOKING?  When you quit smoking, you lower your risk of developing serious diseases and conditions, such as:  · Lung cancer or lung disease, such as COPD.  · Heart disease.  · Stroke.  · Heart attack.  · Infertility.  · Osteoporosis and bone fractures.  Additionally, symptoms such as coughing, wheezing, and shortness of breath may get better when you quit. You may also find that you get sick less often because your body is stronger at fighting off colds and infections. If you are pregnant, quitting smoking can help to reduce your chances of having a baby of low birth weight.  HOW DO I GET READY TO QUIT?  When you decide to quit smoking, create a plan to make sure that you are successful. Before you quit:  · Pick a date to quit. Set a date within the next two weeks to give you time to prepare.  · Write down the reasons why you are quitting. Keep this list in places where you will see it often, such as on your bathroom mirror or in your car or wallet.  · Identify the people, places, things, and activities that make you want to smoke (triggers) and avoid them. Make sure to take these actions:    Throw away all cigarettes at home, at work, and in your car.    Throw away smoking accessories, such as ashtrays and lighters.    Clean your car and make sure to empty the ashtray.    Clean your home, including curtains and carpets.  · Tell your family, friends, and coworkers that you are quitting. Support from your loved ones can make quitting easier.  · Talk with your health care provider about your options for quitting smoking.  · Find out what treatment  "options are covered by your health insurance.  WHAT STRATEGIES CAN I USE TO QUIT SMOKING?   Talk with your healthcare provider about different strategies to quit smoking. Some strategies include:  · Quitting smoking altogether instead of gradually lessening how much you smoke over a period of time. Research shows that quitting \"cold turkey\" is more successful than gradually quitting.  · Attending in-person counseling to help you build problem-solving skills. You are more likely to have success in quitting if you attend several counseling sessions. Even short sessions of 10 minutes can be effective.  · Finding resources and support systems that can help you to quit smoking and remain smoke-free after you quit. These resources are most helpful when you use them often. They can include:    Online chats with a counselor.    Telephone quitlines.    Printed self-help materials.    Support groups or group counseling.    Text messaging programs.    Mobile phone applications.  · Taking medicines to help you quit smoking. (If you are pregnant or breastfeeding, talk with your health care provider first.) Some medicines contain nicotine and some do not. Both types of medicines help with cravings, but the medicines that include nicotine help to relieve withdrawal symptoms. Your health care provider may recommend:    Nicotine patches, gum, or lozenges.    Nicotine inhalers or sprays.    Non-nicotine medicine that is taken by mouth.  Talk with your health care provider about combining strategies, such as taking medicines while you are also receiving in-person counseling. Using these two strategies together makes you more likely to succeed in quitting than if you used either strategy on its own.  If you are pregnant or breastfeeding, talk with your health care provider about finding counseling or other support strategies to quit smoking. Do not take medicine to help you quit smoking unless told to do so by your health care " provider.  WHAT THINGS CAN I DO TO MAKE IT EASIER TO QUIT?  Quitting smoking might feel overwhelming at first, but there is a lot that you can do to make it easier. Take these important actions:  · Reach out to your family and friends and ask that they support and encourage you during this time. Call telephone quitlines, reach out to support groups, or work with a counselor for support.  · Ask people who smoke to avoid smoking around you.  · Avoid places that trigger you to smoke, such as bars, parties, or smoke-break areas at work.  · Spend time around people who do not smoke.  · Lessen stress in your life, because stress can be a smoking trigger for some people. To lessen stress, try:    Exercising regularly.    Deep-breathing exercises.    Yoga.    Meditating.    Performing a body scan. This involves closing your eyes, scanning your body from head to toe, and noticing which parts of your body are particularly tense. Purposefully relax the muscles in those areas.  · Download or purchase mobile phone or tablet apps (applications) that can help you stick to your quit plan by providing reminders, tips, and encouragement. There are many free apps, such as QuitGuide from the CDC (Centers for Disease Control and Prevention). You can find other support for quitting smoking (smoking cessation) through smokefree.gov and other websites.  HOW WILL I FEEL WHEN I QUIT SMOKING?  Within the first 24 hours of quitting smoking, you may start to feel some withdrawal symptoms. These symptoms are usually most noticeable 2-3 days after quitting, but they usually do not last beyond 2-3 weeks. Changes or symptoms that you might experience include:  · Mood swings.  · Restlessness, anxiety, or irritation.  · Difficulty concentrating.  · Dizziness.  · Strong cravings for sugary foods in addition to nicotine.  · Mild weight gain.  · Constipation.  · Nausea.  · Coughing or a sore throat.  · Changes in how your medicines work in your  body.  · A depressed mood.  · Difficulty sleeping (insomnia).  After the first 2-3 weeks of quitting, you may start to notice more positive results, such as:  · Improved sense of smell and taste.  · Decreased coughing and sore throat.  · Slower heart rate.  · Lower blood pressure.  · Clearer skin.  · The ability to breathe more easily.  · Fewer sick days.  Quitting smoking is very challenging for most people. Do not get discouraged if you are not successful the first time. Some people need to make many attempts to quit before they achieve long-term success. Do your best to stick to your quit plan, and talk with your health care provider if you have any questions or concerns.     This information is not intended to replace advice given to you by your health care provider. Make sure you discuss any questions you have with your health care provider.     Document Released: 12/12/2002 Document Revised: 05/03/2016 Document Reviewed: 05/03/2016  [employer] Interactive Patient Education ©2016 [employer]

## 2017-02-24 NOTE — PROGRESS NOTES
Nguyễn Rinaldi is a 56 y.o. male who presents to the office for follow-up. He has osteoarthritis which causes chronic back and joint pain.  This mainly affects his low back.  He takes Norco for treatment of the back pain, and this is keeping it at a tolerable level.  He has a history of neuropathic pain, and has taken gabapentin in the past.  He discontinued the gabapentin and is currently doing okay without it.  He also has muscle spasm in his back and takes Zanaflex as needed.  He takes Celexa for treatment of anxiety, and his anxiety has been doing well.      He recently had surgery for inguinal hernia repair.  He is recovering well from that and has been released from surgical care.        History of Present Illness     The following portions of the patient's history were reviewed and updated as appropriate: allergies, current medications, past family history, past medical history, past social history, past surgical history and problem list.    Review of Systems   Constitutional: Negative for chills, fatigue and fever.   HENT: Negative for congestion, sneezing, sore throat and trouble swallowing.    Eyes: Negative for visual disturbance.   Respiratory: Negative for cough, chest tightness, shortness of breath and wheezing.    Cardiovascular: Negative for chest pain, palpitations and leg swelling.   Gastrointestinal: Negative for abdominal pain, constipation, diarrhea, nausea and vomiting.   Genitourinary: Negative for dysuria, frequency and urgency.   Musculoskeletal: Positive for arthralgias and back pain. Negative for neck pain.   Skin: Negative for rash.   Neurological: Negative for dizziness, weakness and headaches.   Psychiatric/Behavioral:        Patient denies any feelings of depression and has not felt down, hopeless or lost interest in any activities.       Objective   Physical Exam   Constitutional: He is oriented to person, place, and time. He appears well-developed and well-nourished. No  distress.   HENT:   Head: Normocephalic and atraumatic.   Nose: Nose normal.   Mouth/Throat: Oropharynx is clear and moist. No oropharyngeal exudate.   Eyes: Conjunctivae and EOM are normal. Pupils are equal, round, and reactive to light. No scleral icterus.   Neck: Normal range of motion. Neck supple.   Cardiovascular: Normal rate, regular rhythm and normal heart sounds.  Exam reveals no gallop and no friction rub.    No murmur heard.  Pulmonary/Chest: Effort normal and breath sounds normal. No respiratory distress. He has no wheezes. He has no rales.   Abdominal: Soft. Bowel sounds are normal. He exhibits no distension. There is no tenderness. There is no rebound and no guarding.   Musculoskeletal: He exhibits no edema.        Lumbar back: He exhibits decreased range of motion and pain.   Lymphadenopathy:     He has no cervical adenopathy.   Neurological: He is alert and oriented to person, place, and time. No cranial nerve deficit.   Skin: Skin is warm and dry. No rash noted.   Psychiatric: He has a normal mood and affect. His behavior is normal. Judgment and thought content normal.   Nursing note and vitals reviewed.      Assessment/Plan   Mitch was seen today for pain.    Diagnoses and all orders for this visit:    Primary osteoarthritis involving multiple joints  -     HYDROcodone-acetaminophen (NORCO) 7.5-325 MG per tablet; Take 1 tablet by mouth 2 (Two) Times a Day.    Mononeuropathy due to underlying disease    Generalized anxiety disorder         He will continue with Celexa for treatment of the anxiety. He will continue to use ibuprofen for the arthritic inflammation, and the Zanaflex as needed for the muscle spasm.  He will continue with Norco twice a day for treatment of the musculoskeletal pain.    Patient understands the risks associated with this controlled medication, including tolerance and addiction.  Patient also agrees to only obtain this medication from me, and not from a another provider,  unless that provider is covering for me in my absence.  Patient also agrees to be compliant in dosing, and not self adjust the dose of medication.  A signed controlled substance agreement is on file, and the patient has received a controlled substance education sheet at this a previous visit.  The patient has also signed a consent for treatment with a controlled substance as per [facility] policy. SOFI was obtained.    I advised the patient of the risks in continuing to use tobacco, and I provided this patient with smoking cessation educational materials.  I also discussed how to quit smoking and the patient has expressed the willingness to quit.      PHQ-9 Depression Screening 2/24/2017   Little interest or pleasure in doing things 0   Feeling down, depressed, or hopeless 0   Trouble falling or staying asleep, or sleeping too much 0   Feeling tired or having little energy 0   Poor appetite or overeating 0   Feeling bad about yourself - or that you are a failure or have let yourself or your family down 0   Trouble concentrating on things, such as reading the newspaper or watching television 0   Moving or speaking so slowly that other people could have noticed. Or the opposite - being so fidgety or restless that you have been moving around a lot more than usual 0   Thoughts that you would be better off dead, or of hurting yourself in some way 0   PHQ-9 Total Score 0

## 2017-02-27 NOTE — PROGRESS NOTES
CHIEF COMPLAINT:    Chief Complaint   Patient presents with   • Post-op     Open left inguinal hernia repair with mesh on 2/6/17.       HISTORY OF PRESENT ILLNESS:    Mitch Rinaldi is a 56 y.o. male who underwent open LIHR on 2/6/17.  He returns for follow up today.  He has no complaints today.      EXAM:  Vitals:    02/23/17 1338   BP: 124/70         Well healed left groin incision. No palpable hernia     ASSESSMENT:    S/p LIHR    PLAN:    Overall doing well.  Will see back prn.  Gradually increase activity.  No lifting more than 15 pounds until at least one month post op.          This document has been electronically signed by Miles Gregorio MD on February 27, 2017 2:52 PM

## 2017-03-23 DIAGNOSIS — M15.9 PRIMARY OSTEOARTHRITIS INVOLVING MULTIPLE JOINTS: Chronic | ICD-10-CM

## 2017-03-23 RX ORDER — HYDROCODONE BITARTRATE AND ACETAMINOPHEN 7.5; 325 MG/1; MG/1
1 TABLET ORAL 2 TIMES DAILY
Qty: 60 TABLET | Refills: 0 | Status: SHIPPED | OUTPATIENT
Start: 2017-03-23 | End: 2017-04-20 | Stop reason: SDUPTHER

## 2017-03-23 NOTE — TELEPHONE ENCOUNTER
Patient is up to date on controlled medication consent, Long report, and apt from 02/24/2017. Picking up Friday.

## 2017-04-20 DIAGNOSIS — M15.9 PRIMARY OSTEOARTHRITIS INVOLVING MULTIPLE JOINTS: Chronic | ICD-10-CM

## 2017-04-20 RX ORDER — HYDROCODONE BITARTRATE AND ACETAMINOPHEN 7.5; 325 MG/1; MG/1
1 TABLET ORAL 2 TIMES DAILY
Qty: 60 TABLET | Refills: 0 | Status: SHIPPED | OUTPATIENT
Start: 2017-04-20 | End: 2017-05-23 | Stop reason: SDUPTHER

## 2017-04-20 NOTE — TELEPHONE ENCOUNTER
Patient is up to date on controlled medication consent, Long report, and apt from 02/24/2017. Picking up Friday at 3pm.

## 2017-04-21 DIAGNOSIS — F41.1 GENERALIZED ANXIETY DISORDER: Chronic | ICD-10-CM

## 2017-04-21 RX ORDER — CITALOPRAM 10 MG/1
TABLET ORAL
Qty: 30 TABLET | Refills: 5 | Status: SHIPPED | OUTPATIENT
Start: 2017-04-21 | End: 2017-05-23

## 2017-05-19 ENCOUNTER — LAB (OUTPATIENT)
Dept: LAB | Facility: OTHER | Age: 57
End: 2017-05-19

## 2017-05-19 DIAGNOSIS — Z79.899 DRUG THERAPY: ICD-10-CM

## 2017-05-19 DIAGNOSIS — E78.2 MIXED HYPERLIPIDEMIA: ICD-10-CM

## 2017-05-19 DIAGNOSIS — R73.02 IMPAIRED GLUCOSE TOLERANCE: Chronic | ICD-10-CM

## 2017-05-19 LAB
ALBUMIN SERPL-MCNC: 4.5 G/DL (ref 3.2–5.5)
ALBUMIN/GLOB SERPL: 1.6 G/DL (ref 1–3)
ALP SERPL-CCNC: 79 U/L (ref 15–121)
ALT SERPL W P-5'-P-CCNC: 12 U/L (ref 10–60)
ANION GAP SERPL CALCULATED.3IONS-SCNC: 9 MMOL/L (ref 5–15)
AST SERPL-CCNC: 19 U/L (ref 10–60)
BACTERIA UR QL AUTO: ABNORMAL /HPF
BASOPHILS # BLD AUTO: 0.05 10*3/MM3 (ref 0–0.2)
BASOPHILS NFR BLD AUTO: 0.7 % (ref 0–2)
BILIRUB SERPL-MCNC: 0.9 MG/DL (ref 0.2–1)
BILIRUB UR QL STRIP: NEGATIVE
BUN BLD-MCNC: 18 MG/DL (ref 8–25)
BUN/CREAT SERPL: 18 (ref 7–25)
CALCIUM SPEC-SCNC: 9.2 MG/DL (ref 8.4–10.8)
CHLORIDE SERPL-SCNC: 102 MMOL/L (ref 100–112)
CHOLEST SERPL-MCNC: 184 MG/DL (ref 150–200)
CLARITY UR: ABNORMAL
CO2 SERPL-SCNC: 28 MMOL/L (ref 20–32)
COLOR UR: ABNORMAL
CREAT BLD-MCNC: 1 MG/DL (ref 0.4–1.3)
DEPRECATED RDW RBC AUTO: 43.9 FL (ref 35.1–43.9)
EOSINOPHIL # BLD AUTO: 0.17 10*3/MM3 (ref 0–0.7)
EOSINOPHIL NFR BLD AUTO: 2.4 % (ref 0–7)
ERYTHROCYTE [DISTWIDTH] IN BLOOD BY AUTOMATED COUNT: 13.5 % (ref 11.5–14.5)
GFR SERPL CREATININE-BSD FRML MDRD: 77 ML/MIN/1.73 (ref 56–130)
GLOBULIN UR ELPH-MCNC: 2.9 GM/DL (ref 2.5–4.6)
GLUCOSE BLD-MCNC: 118 MG/DL (ref 70–100)
GLUCOSE UR STRIP-MCNC: NEGATIVE MG/DL
HCT VFR BLD AUTO: 45.9 % (ref 39–49)
HDLC SERPL-MCNC: 38 MG/DL (ref 35–100)
HGB BLD-MCNC: 16 G/DL (ref 13.7–17.3)
HGB UR QL STRIP.AUTO: NEGATIVE
HYALINE CASTS UR QL AUTO: ABNORMAL /LPF
KETONES UR QL STRIP: NEGATIVE
LDLC SERPL CALC-MCNC: 127 MG/DL
LDLC/HDLC SERPL: 3.33 {RATIO}
LEUKOCYTE ESTERASE UR QL STRIP.AUTO: NEGATIVE
LYMPHOCYTES # BLD AUTO: 2.29 10*3/MM3 (ref 0.6–4.2)
LYMPHOCYTES NFR BLD AUTO: 32.6 % (ref 10–50)
MCH RBC QN AUTO: 31.3 PG (ref 26.5–34)
MCHC RBC AUTO-ENTMCNC: 34.9 G/DL (ref 31.5–36.3)
MCV RBC AUTO: 89.8 FL (ref 80–98)
MONOCYTES # BLD AUTO: 0.5 10*3/MM3 (ref 0–0.9)
MONOCYTES NFR BLD AUTO: 7.1 % (ref 0–12)
MUCOUS THREADS URNS QL MICRO: ABNORMAL /HPF
NEUTROPHILS # BLD AUTO: 4.02 10*3/MM3 (ref 2–8.6)
NEUTROPHILS NFR BLD AUTO: 57.2 % (ref 37–80)
NITRITE UR QL STRIP: NEGATIVE
PH UR STRIP.AUTO: 6 [PH] (ref 5.5–8)
PLATELET # BLD AUTO: 164 10*3/MM3 (ref 150–450)
PMV BLD AUTO: 11.1 FL (ref 8–12)
POTASSIUM BLD-SCNC: 3.9 MMOL/L (ref 3.4–5.4)
PROT SERPL-MCNC: 7.4 G/DL (ref 6.7–8.2)
PROT UR QL STRIP: ABNORMAL
RBC # BLD AUTO: 5.11 10*6/MM3 (ref 4.37–5.74)
RBC # UR: ABNORMAL /HPF
REF LAB TEST METHOD: ABNORMAL
SODIUM BLD-SCNC: 139 MMOL/L (ref 134–146)
SP GR UR STRIP: 1.02 (ref 1–1.03)
SQUAMOUS #/AREA URNS HPF: ABNORMAL /HPF
T4 FREE SERPL-MCNC: 1.07 NG/DL (ref 0.78–2.19)
TRIGL SERPL-MCNC: 97 MG/DL (ref 35–160)
TSH SERPL DL<=0.05 MIU/L-ACNC: 1.83 MIU/ML (ref 0.46–4.68)
UROBILINOGEN UR QL STRIP: ABNORMAL
VLDLC SERPL-MCNC: 19.4 MG/DL
WBC NRBC COR # BLD: 7.03 10*3/MM3 (ref 3.2–9.8)
WBC UR QL AUTO: ABNORMAL /HPF

## 2017-05-19 PROCEDURE — 80307 DRUG TEST PRSMV CHEM ANLYZR: CPT | Performed by: INTERNAL MEDICINE

## 2017-05-19 PROCEDURE — 36415 COLL VENOUS BLD VENIPUNCTURE: CPT | Performed by: INTERNAL MEDICINE

## 2017-05-19 PROCEDURE — 80053 COMPREHEN METABOLIC PANEL: CPT | Performed by: INTERNAL MEDICINE

## 2017-05-19 PROCEDURE — G0480 DRUG TEST DEF 1-7 CLASSES: HCPCS

## 2017-05-19 PROCEDURE — 85025 COMPLETE CBC W/AUTO DIFF WBC: CPT | Performed by: INTERNAL MEDICINE

## 2017-05-19 PROCEDURE — 84443 ASSAY THYROID STIM HORMONE: CPT | Performed by: INTERNAL MEDICINE

## 2017-05-19 PROCEDURE — 81001 URINALYSIS AUTO W/SCOPE: CPT | Performed by: INTERNAL MEDICINE

## 2017-05-19 PROCEDURE — 80061 LIPID PANEL: CPT | Performed by: INTERNAL MEDICINE

## 2017-05-19 PROCEDURE — 84439 ASSAY OF FREE THYROXINE: CPT | Performed by: INTERNAL MEDICINE

## 2017-05-23 ENCOUNTER — OFFICE VISIT (OUTPATIENT)
Dept: FAMILY MEDICINE CLINIC | Facility: CLINIC | Age: 57
End: 2017-05-23

## 2017-05-23 VITALS
DIASTOLIC BLOOD PRESSURE: 60 MMHG | SYSTOLIC BLOOD PRESSURE: 114 MMHG | HEART RATE: 76 BPM | HEIGHT: 70 IN | WEIGHT: 144 LBS | BODY MASS INDEX: 20.62 KG/M2

## 2017-05-23 DIAGNOSIS — M15.9 PRIMARY OSTEOARTHRITIS INVOLVING MULTIPLE JOINTS: Chronic | ICD-10-CM

## 2017-05-23 DIAGNOSIS — E78.2 MIXED HYPERLIPIDEMIA: Chronic | ICD-10-CM

## 2017-05-23 DIAGNOSIS — F41.1 GENERALIZED ANXIETY DISORDER: Chronic | ICD-10-CM

## 2017-05-23 DIAGNOSIS — Z11.59 NEED FOR HEPATITIS C SCREENING TEST: ICD-10-CM

## 2017-05-23 DIAGNOSIS — R73.02 IMPAIRED GLUCOSE TOLERANCE: Primary | Chronic | ICD-10-CM

## 2017-05-23 DIAGNOSIS — Z12.5 SCREENING FOR PROSTATE CANCER: ICD-10-CM

## 2017-05-23 PROCEDURE — 99214 OFFICE O/P EST MOD 30 MIN: CPT | Performed by: INTERNAL MEDICINE

## 2017-05-23 RX ORDER — HYDROCODONE BITARTRATE AND ACETAMINOPHEN 7.5; 325 MG/1; MG/1
1 TABLET ORAL 2 TIMES DAILY
Qty: 60 TABLET | Refills: 0 | Status: SHIPPED | OUTPATIENT
Start: 2017-05-23 | End: 2017-06-20 | Stop reason: SDUPTHER

## 2017-05-26 LAB
AMPHETAMINES UR QL SCN: NEGATIVE NG/ML
BARBITURATES UR QL SCN: NEGATIVE NG/ML
BENZODIAZ UR QL SCN: NEGATIVE NG/ML
BZE UR QL SCN: NEGATIVE NG/ML
CANNABINOIDS UR QL SCN: NEGATIVE NG/ML
CREAT 24H UR-MCNC: 224.8 MG/DL (ref 20–300)
FENTANYL+NORFENTANYL UR QL SCN: NEGATIVE PG/ML
Lab: ABNORMAL
MEPERIDINE UR CFM-MCNC: NEGATIVE NG/ML
METHADONE UR QL SCN: NEGATIVE NG/ML
OPIATES TESTED UR SCN: POSITIVE NG/ML
OXYCODONE/OXYMORPHONE, URINE: NEGATIVE NG/ML
PCP UR QL: NEGATIVE NG/ML
PH UR STRIP.AUTO: 6 [PH] (ref 4.5–8.9)
PROPOXYPH UR QL SCN: NEGATIVE NG/ML
SP GR UR: 1.02
TRAMADOL UR QL SCN: NEGATIVE NG/ML

## 2017-06-20 DIAGNOSIS — M15.9 PRIMARY OSTEOARTHRITIS INVOLVING MULTIPLE JOINTS: Chronic | ICD-10-CM

## 2017-06-20 RX ORDER — HYDROCODONE BITARTRATE AND ACETAMINOPHEN 7.5; 325 MG/1; MG/1
1 TABLET ORAL 2 TIMES DAILY
Qty: 60 TABLET | Refills: 0 | Status: SHIPPED | OUTPATIENT
Start: 2017-06-20 | End: 2017-07-20 | Stop reason: SDUPTHER

## 2017-06-20 NOTE — TELEPHONE ENCOUNTER
Patient is up to date on controlled medication consent, Long report, and apt from 05/23/2017. Picking up tomorrow at the Front Information Window.

## 2017-07-20 DIAGNOSIS — M15.9 PRIMARY OSTEOARTHRITIS INVOLVING MULTIPLE JOINTS: Chronic | ICD-10-CM

## 2017-07-20 NOTE — TELEPHONE ENCOUNTER
Patient is up to date on controlled medication consent, Long report, and apt from 05/23/20017. Picking up Monday(24th) Morning@Dr. Farmer's office.

## 2017-07-24 RX ORDER — HYDROCODONE BITARTRATE AND ACETAMINOPHEN 7.5; 325 MG/1; MG/1
1 TABLET ORAL 2 TIMES DAILY
Qty: 60 TABLET | Refills: 0 | Status: SHIPPED | OUTPATIENT
Start: 2017-07-24 | End: 2017-08-18 | Stop reason: SDUPTHER

## 2017-08-18 ENCOUNTER — OFFICE VISIT (OUTPATIENT)
Dept: FAMILY MEDICINE CLINIC | Facility: CLINIC | Age: 57
End: 2017-08-18

## 2017-08-18 VITALS
HEIGHT: 70 IN | BODY MASS INDEX: 21.26 KG/M2 | SYSTOLIC BLOOD PRESSURE: 130 MMHG | DIASTOLIC BLOOD PRESSURE: 60 MMHG | WEIGHT: 148.5 LBS | HEART RATE: 88 BPM

## 2017-08-18 DIAGNOSIS — F41.1 GENERALIZED ANXIETY DISORDER: Chronic | ICD-10-CM

## 2017-08-18 DIAGNOSIS — M15.9 PRIMARY OSTEOARTHRITIS INVOLVING MULTIPLE JOINTS: Primary | Chronic | ICD-10-CM

## 2017-08-18 DIAGNOSIS — K21.9 GASTROESOPHAGEAL REFLUX DISEASE WITHOUT ESOPHAGITIS: Chronic | ICD-10-CM

## 2017-08-18 DIAGNOSIS — G59 MONONEUROPATHY DUE TO UNDERLYING DISEASE: Chronic | ICD-10-CM

## 2017-08-18 PROCEDURE — 99214 OFFICE O/P EST MOD 30 MIN: CPT | Performed by: INTERNAL MEDICINE

## 2017-08-18 RX ORDER — HYDROCODONE BITARTRATE AND ACETAMINOPHEN 7.5; 325 MG/1; MG/1
1 TABLET ORAL 2 TIMES DAILY
Qty: 60 TABLET | Refills: 0 | Status: SHIPPED | OUTPATIENT
Start: 2017-08-18 | End: 2017-09-19 | Stop reason: SDUPTHER

## 2017-08-18 NOTE — PROGRESS NOTES
Nguyễn Rinaldi is a 57 y.o. male who presents to the office for follow-up. He has osteoarthritis which causes chronic back and joint pain.  This mainly affects his low back.  He takes Norco for treatment of the back pain, and this is keeping it at a tolerable level.  He has a history of neuropathic pain, but does not currently require medication for this.  He also has muscle spasm in his back and takes Zanaflex as needed.  He has anxiety and discontinued Celexa at the last visit.  He reports that his anxiety has been doing well without the medication.  He has GERD and takes over-the-counter Nexium as needed.  He does not take this on a daily basis.  History of Present Illness     The following portions of the patient's history were reviewed and updated as appropriate: allergies, current medications, past family history, past medical history, past social history, past surgical history and problem list.    Review of Systems   Constitutional: Negative for chills, fatigue and fever.   HENT: Negative for congestion, sneezing, sore throat and trouble swallowing.    Eyes: Negative for visual disturbance.   Respiratory: Negative for cough, chest tightness, shortness of breath and wheezing.    Cardiovascular: Negative for chest pain, palpitations and leg swelling.   Gastrointestinal: Negative for abdominal pain, constipation, diarrhea, nausea and vomiting.   Genitourinary: Negative for dysuria, frequency and urgency.   Musculoskeletal: Positive for arthralgias and back pain. Negative for neck pain.   Skin: Negative for rash.   Neurological: Negative for dizziness, weakness and headaches.   Psychiatric/Behavioral:        Patient denies any feelings of depression and has not felt down, hopeless or lost interest in any activities.       Objective   Physical Exam   Constitutional: He is oriented to person, place, and time. He appears well-developed and well-nourished. No distress.   HENT:   Head: Normocephalic and  atraumatic.   Nose: Nose normal.   Mouth/Throat: Oropharynx is clear and moist. No oropharyngeal exudate.   Eyes: Conjunctivae and EOM are normal. Pupils are equal, round, and reactive to light. No scleral icterus.   Neck: Normal range of motion. Neck supple.   Cardiovascular: Normal rate, regular rhythm and normal heart sounds.  Exam reveals no gallop and no friction rub.    No murmur heard.  Pulmonary/Chest: Effort normal and breath sounds normal. No respiratory distress. He has no wheezes. He has no rales.   Abdominal: Soft. Bowel sounds are normal. He exhibits no distension. There is no tenderness. There is no rebound and no guarding.   Musculoskeletal: He exhibits no edema.        Lumbar back: He exhibits decreased range of motion and pain.   Lymphadenopathy:     He has no cervical adenopathy.   Neurological: He is alert and oriented to person, place, and time. No cranial nerve deficit.   Skin: Skin is warm and dry. No rash noted.   Psychiatric: He has a normal mood and affect. His behavior is normal. Judgment and thought content normal.   Nursing note and vitals reviewed.      Assessment/Plan   Mitch was seen today for pain.    Diagnoses and all orders for this visit:    Primary osteoarthritis involving multiple joints  -     HYDROcodone-acetaminophen (NORCO) 7.5-325 MG per tablet; Take 1 tablet by mouth 2 (Two) Times a Day.    Mononeuropathy due to underlying disease    Generalized anxiety disorder    Gastroesophageal reflux disease without esophagitis           He will continue to use ibuprofen for the arthritic inflammation, and the Zanaflex as needed for the muscle spasm.  He will continue with Norco twice a day for treatment of the musculoskeletal pain.    Patient understands the risks associated with this controlled medication, including tolerance and addiction.  Patient also agrees to only obtain this medication from me, and not from a another provider, unless that provider is covering for me in my  absence.  Patient also agrees to be compliant in dosing, and not self adjust the dose of medication.  A signed controlled substance agreement is on file, and the patient has received a controlled substance education sheet at this a previous visit.  The patient has also signed a consent for treatment with a controlled substance as per [facility] policy. SOFI was obtained.    PHQ-9 Depression Screening 8/18/2017   Little interest or pleasure in doing things 0   Feeling down, depressed, or hopeless 0   Trouble falling or staying asleep, or sleeping too much 0   Feeling tired or having little energy 0   Poor appetite or overeating 0   Feeling bad about yourself - or that you are a failure or have let yourself or your family down 0   Trouble concentrating on things, such as reading the newspaper or watching television 0   Moving or speaking so slowly that other people could have noticed. Or the opposite - being so fidgety or restless that you have been moving around a lot more than usual 0   Thoughts that you would be better off dead, or of hurting yourself in some way 0   PHQ-9 Total Score 0

## 2017-08-18 NOTE — PROGRESS NOTES
[facility]#[id number]                           .

## 2017-09-19 DIAGNOSIS — M15.9 PRIMARY OSTEOARTHRITIS INVOLVING MULTIPLE JOINTS: Chronic | ICD-10-CM

## 2017-09-19 RX ORDER — HYDROCODONE BITARTRATE AND ACETAMINOPHEN 7.5; 325 MG/1; MG/1
1 TABLET ORAL 2 TIMES DAILY
Qty: 60 TABLET | Refills: 0 | Status: SHIPPED | OUTPATIENT
Start: 2017-09-19 | End: 2017-10-17 | Stop reason: SDUPTHER

## 2017-09-19 NOTE — TELEPHONE ENCOUNTER
Patient is up to date on controlled medication consent, Long report, and appt from 08/18/2017. Picking up today at 3pm.

## 2017-10-17 DIAGNOSIS — M15.9 PRIMARY OSTEOARTHRITIS INVOLVING MULTIPLE JOINTS: Chronic | ICD-10-CM

## 2017-10-17 RX ORDER — HYDROCODONE BITARTRATE AND ACETAMINOPHEN 7.5; 325 MG/1; MG/1
1 TABLET ORAL 2 TIMES DAILY
Qty: 60 TABLET | Refills: 0 | Status: SHIPPED | OUTPATIENT
Start: 2017-10-17 | End: 2017-11-17 | Stop reason: SDUPTHER

## 2017-10-17 NOTE — TELEPHONE ENCOUNTER
Patient is up to date on controlled medication consent, Long report, and appt from 08/18/2017. Picking up Thursday morning.

## 2017-11-09 ENCOUNTER — LAB (OUTPATIENT)
Dept: LAB | Facility: OTHER | Age: 57
End: 2017-11-09

## 2017-11-09 DIAGNOSIS — R73.02 IMPAIRED GLUCOSE TOLERANCE: Chronic | ICD-10-CM

## 2017-11-09 DIAGNOSIS — E78.2 MIXED HYPERLIPIDEMIA: ICD-10-CM

## 2017-11-09 DIAGNOSIS — Z11.59 NEED FOR HEPATITIS C SCREENING TEST: ICD-10-CM

## 2017-11-09 LAB
ALBUMIN SERPL-MCNC: 4.6 G/DL (ref 3.2–5.5)
ALBUMIN/GLOB SERPL: 1.5 G/DL (ref 1–3)
ALP SERPL-CCNC: 78 U/L (ref 15–121)
ALT SERPL W P-5'-P-CCNC: 13 U/L (ref 10–60)
ANION GAP SERPL CALCULATED.3IONS-SCNC: 10 MMOL/L (ref 5–15)
AST SERPL-CCNC: 20 U/L (ref 10–60)
BASOPHILS # BLD AUTO: 0.09 10*3/MM3 (ref 0–0.2)
BASOPHILS NFR BLD AUTO: 1.3 % (ref 0–2)
BILIRUB SERPL-MCNC: 0.8 MG/DL (ref 0.2–1)
BILIRUB UR QL STRIP: NEGATIVE
BUN BLD-MCNC: 16 MG/DL (ref 8–25)
BUN/CREAT SERPL: 14.5 (ref 7–25)
CALCIUM SPEC-SCNC: 9.2 MG/DL (ref 8.4–10.8)
CHLORIDE SERPL-SCNC: 100 MMOL/L (ref 100–112)
CHOLEST SERPL-MCNC: 183 MG/DL (ref 150–200)
CLARITY UR: CLEAR
CO2 SERPL-SCNC: 27 MMOL/L (ref 20–32)
COLOR UR: YELLOW
CREAT BLD-MCNC: 1.1 MG/DL (ref 0.4–1.3)
DEPRECATED RDW RBC AUTO: 41.8 FL (ref 35.1–43.9)
EOSINOPHIL # BLD AUTO: 0.26 10*3/MM3 (ref 0–0.7)
EOSINOPHIL NFR BLD AUTO: 3.8 % (ref 0–7)
ERYTHROCYTE [DISTWIDTH] IN BLOOD BY AUTOMATED COUNT: 12.7 % (ref 11.5–14.5)
GFR SERPL CREATININE-BSD FRML MDRD: 69 ML/MIN/1.73 (ref 56–130)
GLOBULIN UR ELPH-MCNC: 3.1 GM/DL (ref 2.5–4.6)
GLUCOSE BLD-MCNC: 110 MG/DL (ref 70–100)
GLUCOSE UR STRIP-MCNC: NEGATIVE MG/DL
HBA1C MFR BLD: 5.6 % (ref 4–5.6)
HCT VFR BLD AUTO: 46.9 % (ref 39–49)
HDLC SERPL-MCNC: 33 MG/DL (ref 35–100)
HGB BLD-MCNC: 16.1 G/DL (ref 13.7–17.3)
HGB UR QL STRIP.AUTO: NEGATIVE
KETONES UR QL STRIP: NEGATIVE
LDLC SERPL CALC-MCNC: 126 MG/DL
LDLC/HDLC SERPL: 3.81 {RATIO}
LEUKOCYTE ESTERASE UR QL STRIP.AUTO: NEGATIVE
LYMPHOCYTES # BLD AUTO: 2.17 10*3/MM3 (ref 0.6–4.2)
LYMPHOCYTES NFR BLD AUTO: 32 % (ref 10–50)
MCH RBC QN AUTO: 30.8 PG (ref 26.5–34)
MCHC RBC AUTO-ENTMCNC: 34.3 G/DL (ref 31.5–36.3)
MCV RBC AUTO: 89.7 FL (ref 80–98)
MONOCYTES # BLD AUTO: 0.47 10*3/MM3 (ref 0–0.9)
MONOCYTES NFR BLD AUTO: 6.9 % (ref 0–12)
NEUTROPHILS # BLD AUTO: 3.79 10*3/MM3 (ref 2–8.6)
NEUTROPHILS NFR BLD AUTO: 56 % (ref 37–80)
NITRITE UR QL STRIP: NEGATIVE
PH UR STRIP.AUTO: 5.5 [PH] (ref 5.5–8)
PLATELET # BLD AUTO: 193 10*3/MM3 (ref 150–450)
PMV BLD AUTO: 10.5 FL (ref 8–12)
POTASSIUM BLD-SCNC: 3.7 MMOL/L (ref 3.4–5.4)
PROT SERPL-MCNC: 7.7 G/DL (ref 6.7–8.2)
PROT UR QL STRIP: NEGATIVE
RBC # BLD AUTO: 5.23 10*6/MM3 (ref 4.37–5.74)
SODIUM BLD-SCNC: 137 MMOL/L (ref 134–146)
SP GR UR STRIP: 1.01 (ref 1–1.03)
T4 FREE SERPL-MCNC: 1.34 NG/DL (ref 0.78–2.19)
TRIGL SERPL-MCNC: 122 MG/DL (ref 35–160)
TSH SERPL DL<=0.05 MIU/L-ACNC: 2.21 MIU/ML (ref 0.46–4.68)
UROBILINOGEN UR QL STRIP: NORMAL
VLDLC SERPL-MCNC: 24.4 MG/DL
WBC NRBC COR # BLD: 6.78 10*3/MM3 (ref 3.2–9.8)

## 2017-11-09 PROCEDURE — 80074 ACUTE HEPATITIS PANEL: CPT | Performed by: INTERNAL MEDICINE

## 2017-11-09 PROCEDURE — 81003 URINALYSIS AUTO W/O SCOPE: CPT | Performed by: INTERNAL MEDICINE

## 2017-11-09 PROCEDURE — 80061 LIPID PANEL: CPT | Performed by: INTERNAL MEDICINE

## 2017-11-09 PROCEDURE — 84443 ASSAY THYROID STIM HORMONE: CPT | Performed by: INTERNAL MEDICINE

## 2017-11-09 PROCEDURE — 84439 ASSAY OF FREE THYROXINE: CPT | Performed by: INTERNAL MEDICINE

## 2017-11-09 PROCEDURE — 85025 COMPLETE CBC W/AUTO DIFF WBC: CPT | Performed by: INTERNAL MEDICINE

## 2017-11-09 PROCEDURE — 80053 COMPREHEN METABOLIC PANEL: CPT | Performed by: INTERNAL MEDICINE

## 2017-11-09 PROCEDURE — 83036 HEMOGLOBIN GLYCOSYLATED A1C: CPT | Performed by: INTERNAL MEDICINE

## 2017-11-09 PROCEDURE — 36415 COLL VENOUS BLD VENIPUNCTURE: CPT | Performed by: INTERNAL MEDICINE

## 2017-11-10 LAB
HAV IGM SERPL QL IA: NEGATIVE
HBV CORE IGM SERPL QL IA: NEGATIVE
HBV SURFACE AG SERPL QL IA: NEGATIVE
HCV AB SER DONR QL: NEGATIVE

## 2017-11-17 ENCOUNTER — OFFICE VISIT (OUTPATIENT)
Dept: FAMILY MEDICINE CLINIC | Facility: CLINIC | Age: 57
End: 2017-11-17

## 2017-11-17 VITALS
WEIGHT: 152 LBS | DIASTOLIC BLOOD PRESSURE: 58 MMHG | HEART RATE: 74 BPM | HEIGHT: 70 IN | BODY MASS INDEX: 21.76 KG/M2 | SYSTOLIC BLOOD PRESSURE: 108 MMHG

## 2017-11-17 DIAGNOSIS — Z72.0 TOBACCO ABUSE: ICD-10-CM

## 2017-11-17 DIAGNOSIS — E78.2 MIXED HYPERLIPIDEMIA: Chronic | ICD-10-CM

## 2017-11-17 DIAGNOSIS — M15.9 PRIMARY OSTEOARTHRITIS INVOLVING MULTIPLE JOINTS: Chronic | ICD-10-CM

## 2017-11-17 DIAGNOSIS — Z87.891 PERSONAL HISTORY OF TOBACCO USE, PRESENTING HAZARDS TO HEALTH: ICD-10-CM

## 2017-11-17 DIAGNOSIS — Z79.899 DRUG THERAPY: ICD-10-CM

## 2017-11-17 DIAGNOSIS — R73.02 IMPAIRED GLUCOSE TOLERANCE: Primary | Chronic | ICD-10-CM

## 2017-11-17 DIAGNOSIS — K21.9 GASTROESOPHAGEAL REFLUX DISEASE WITHOUT ESOPHAGITIS: Chronic | ICD-10-CM

## 2017-11-17 PROCEDURE — 99214 OFFICE O/P EST MOD 30 MIN: CPT | Performed by: INTERNAL MEDICINE

## 2017-11-17 RX ORDER — HYDROCODONE BITARTRATE AND ACETAMINOPHEN 7.5; 325 MG/1; MG/1
1 TABLET ORAL 2 TIMES DAILY
Qty: 60 TABLET | Refills: 0 | Status: SHIPPED | OUTPATIENT
Start: 2017-11-17 | End: 2017-12-08 | Stop reason: SDUPTHER

## 2017-11-17 RX ORDER — PROMETHAZINE HYDROCHLORIDE 25 MG/1
TABLET ORAL
Qty: 30 TABLET | Refills: 2 | Status: SHIPPED | OUTPATIENT
Start: 2017-11-17 | End: 2019-04-19 | Stop reason: SDUPTHER

## 2017-11-17 NOTE — PROGRESS NOTES
Chief Complaint   Patient presents with   • Hyperlipidemia   • Pain     chronic     Subjective   Mitch Rinaldi is a 57 y.o. male who presents to the office for follow-up and review of labs. He has impaired glucose tolerance and has been monitoring his dietary intake of sugars and carbohydrates.  He has hyperlipidemia and has been controlling this with diet.  He has GERD which is well controlled with Nexium.  He has osteoarthritis which causes chronic back and joint pain.  He takes Norco as needed for the pain.  He also takes ibuprofen to help with inflammation.  He uses Zanaflex as needed for muscle spasm.    He also received information about low dose CT screening for lung cancer.  He is interested in having this done.  He has smoked one pack of cigarettes per day for 40 years.  Overall his breathing has been doing well.  He has not had any symptoms.  He has received tobacco cessation counseling in the past.  He has tried Chantix in the past with no success.  There is a strong family history of lung cancers.    Hyperlipidemia   Associated symptoms include leg pain. Pertinent negatives include no chest pain or shortness of breath.   Pain   Associated symptoms include arthralgias. Pertinent negatives include no abdominal pain, chest pain, chills, congestion, coughing, fatigue, fever, headaches, nausea, neck pain, rash, sore throat, vomiting or weakness.   Back Pain   This is a chronic problem. The current episode started more than 1 year ago. The problem occurs daily. The problem has been waxing and waning since onset. The pain is present in the lumbar spine. The quality of the pain is described as aching, burning, shooting and stabbing. The pain radiates to the right thigh. The pain is at a severity of 6/10. The pain is worse during the night. The symptoms are aggravated by bending, position, standing and twisting. Stiffness is present at night. Associated symptoms include leg pain. Pertinent negatives include no  "abdominal pain, bowel incontinence, chest pain, dysuria, fever, headaches, paresis, paresthesias, pelvic pain, perianal numbness, tingling, weakness or weight loss.     The following portions of the patient's history were reviewed and updated as appropriate: allergies, current medications, past family history, past medical history, past social history, past surgical history and problem list.    Review of Systems   Constitutional: Negative for chills, fatigue, fever and weight loss.   HENT: Negative for congestion, sneezing, sore throat and trouble swallowing.    Eyes: Negative for visual disturbance.   Respiratory: Negative for cough, chest tightness, shortness of breath and wheezing.    Cardiovascular: Negative for chest pain, palpitations and leg swelling.   Gastrointestinal: Negative for abdominal pain, bowel incontinence, constipation, diarrhea, nausea and vomiting.   Genitourinary: Negative for dysuria, frequency, pelvic pain and urgency.   Musculoskeletal: Positive for arthralgias and back pain. Negative for neck pain.   Skin: Negative for rash.   Neurological: Negative for dizziness, tingling, weakness, headaches and paresthesias.   Psychiatric/Behavioral:        Patient denies any feelings of depression and has not felt down, hopeless or lost interest in any activities.       Objective   Vitals:    11/17/17 0849   BP: 108/58   BP Location: Right arm   Patient Position: Sitting   Cuff Size: Adult   Pulse: 74   Weight: 152 lb (68.9 kg)   Height: 70\" (177.8 cm)   PainSc:   5   PainLoc: Back     Physical Exam   Constitutional: He is oriented to person, place, and time. He appears well-developed and well-nourished. No distress.   HENT:   Head: Normocephalic and atraumatic.   Nose: Nose normal.   Mouth/Throat: Oropharynx is clear and moist. No oropharyngeal exudate.   Eyes: Conjunctivae and EOM are normal. Pupils are equal, round, and reactive to light. No scleral icterus.   Neck: Normal range of motion. Neck " supple.   Cardiovascular: Normal rate, regular rhythm and normal heart sounds.  Exam reveals no gallop and no friction rub.    No murmur heard.  Pulmonary/Chest: Effort normal and breath sounds normal. No respiratory distress. He has no wheezes. He has no rales.   Abdominal: Soft. Bowel sounds are normal. He exhibits no distension. There is no tenderness. There is no rebound and no guarding.   Musculoskeletal: He exhibits no edema.        Lumbar back: He exhibits decreased range of motion and pain.   Lymphadenopathy:     He has no cervical adenopathy.   Neurological: He is alert and oriented to person, place, and time. No cranial nerve deficit.   Skin: Skin is warm and dry. No rash noted.   Psychiatric: He has a normal mood and affect. His behavior is normal. Judgment and thought content normal.   Nursing note and vitals reviewed.      Assessment/Plan   Mitch was seen today for hyperlipidemia and pain.    Diagnoses and all orders for this visit:    Impaired glucose tolerance    Mixed hyperlipidemia  -     CBC & Differential; Future  -     Comprehensive Metabolic Panel; Future  -     Lipid Panel; Future  -     T4, Free; Future  -     TSH; Future  -     Urinalysis With / Culture If Indicated - Urine, Clean Catch; Future    Gastroesophageal reflux disease without esophagitis    Primary osteoarthritis involving multiple joints  -     HYDROcodone-acetaminophen (NORCO) 7.5-325 MG per tablet; Take 1 tablet by mouth 2 (Two) Times a Day.    Drug therapy  -     ToxASSURE Select 13 Discrete -; Future    Tobacco abuse  -     CT Chest Low Dose Wo; Future    Personal history of tobacco use, presenting hazards to health  -     CT Chest Low Dose Wo; Future         Labs are reviewed with patient. Patient's glucose is slightly elevated.  Patient will continue to watch diet to help maintain control of the glucose.  Patient understands that there is an increased risk of developing diabetes in the future.  His lipids are controlled and  he will continue with dietary management of the hyperlipidemia.  He will continue with Nexium for treatment of GERD.  He will continue with ibuprofen for treatment of the arthritic pain.  He will also continue with Norco up to twice a day as needed for flareups in the pain.    Patient understands the risks associated with this controlled medication, including tolerance and addiction.  Patient also agrees to only obtain this medication from me, and not from a another provider, unless that provider is covering for me in my absence.  Patient also agrees to be compliant in dosing, and not self adjust the dose of medication.  A signed controlled substance agreement is on file, and the patient has received a controlled substance education sheet at this a previous visit.  The patient has also signed a consent for treatment with a controlled substance as per [facility] policy. SOFI was obtained.    Low-Dose CT: Lung Cancer Screening  Criteria:  • Age 55-77 years of age (CMS) , 55-80 years of age (Commercial) and;  o Patient Age: 57 y.o.  • 30 pack-year smoking history (# yrs smoked X avg #ppd = pack/yrs); if  pt has quit smoking it must be within <15yrs and;  • Asymptomatic for lung cancer  ICD-10 Codes:  • History of smoking  • Tobacco abuse/addiction  ? Z72.0 (current smoker)  ? Z87.891 (personal history of smoking/nicotine dependence) use with CMS   • Patient Smoking History  History   Smoking Status   • Current Every Day Smoker   • Packs/day: 1.00   • Years: 40.00   • Types: Cigarettes     Comment: has tried chantix -side effects     CPT Codes:  • 25462 low dose (must say low dose otherwise is CT without contrast)  / (for patients with [facility] and CMS)    PHQ-2/PHQ-9 Depression Screening 11/17/2017   Little interest or pleasure in doing things 0   Feeling down, depressed, or hopeless 0   Trouble falling or staying asleep, or sleeping too much 0   Feeling tired or having little energy 0   Poor  appetite or overeating 0   Feeling bad about yourself - or that you are a failure or have let yourself or your family down 0   Trouble concentrating on things, such as reading the newspaper or watching television 0   Moving or speaking so slowly that other people could have noticed. Or the opposite - being so fidgety or restless that you have been moving around a lot more than usual 0   Thoughts that you would be better off dead, or of hurting yourself in some way 0   Total Score 0         Lab on 11/09/2017   Component Date Value Ref Range Status   • Glucose 11/09/2017 110* 70 - 100 mg/dL Final   • BUN 11/09/2017 16  8 - 25 mg/dL Final   • Creatinine 11/09/2017 1.10  0.40 - 1.30 mg/dL Final   • Sodium 11/09/2017 137  134 - 146 mmol/L Final   • Potassium 11/09/2017 3.7  3.4 - 5.4 mmol/L Final   • Chloride 11/09/2017 100  100 - 112 mmol/L Final   • CO2 11/09/2017 27.0  20.0 - 32.0 mmol/L Final   • Calcium 11/09/2017 9.2  8.4 - 10.8 mg/dL Final   • Total Protein 11/09/2017 7.7  6.7 - 8.2 g/dL Final   • Albumin 11/09/2017 4.60  3.20 - 5.50 g/dL Final   • ALT (SGPT) 11/09/2017 13  10 - 60 U/L Final   • AST (SGOT) 11/09/2017 20  10 - 60 U/L Final   • Alkaline Phosphatase 11/09/2017 78  15 - 121 U/L Final   • Total Bilirubin 11/09/2017 0.8  0.2 - 1.0 mg/dL Final   • eGFR Non African Amer 11/09/2017 69  56 - 130 mL/min/1.73 Final   • Globulin 11/09/2017 3.1  2.5 - 4.6 gm/dL Final   • A/G Ratio 11/09/2017 1.5  1.0 - 3.0 g/dL Final   • BUN/Creatinine Ratio 11/09/2017 14.5  7.0 - 25.0 Final   • Anion Gap 11/09/2017 10.0  5.0 - 15.0 mmol/L Final   • Total Cholesterol 11/09/2017 183  150 - 200 mg/dL Final   • Triglycerides 11/09/2017 122  35 - 160 mg/dL Final   • HDL Cholesterol 11/09/2017 33* 35 - 100 mg/dL Final   • LDL Cholesterol  11/09/2017 126  mg/dL Final   • VLDL Cholesterol 11/09/2017 24.4  mg/dL Final   • LDL/HDL Ratio 11/09/2017 3.81   Final   • Free T4 11/09/2017 1.34  0.78 - 2.19 ng/dL Final   • TSH 11/09/2017 2.210   0.460 - 4.680 mIU/mL Final   • Color, UA 11/09/2017 Yellow  Yellow, Straw Final   • Appearance, UA 11/09/2017 Clear  Clear Final   • pH, UA 11/09/2017 5.5  5.5 - 8.0 Final   • Specific Gravity, UA 11/09/2017 1.010  1.005 - 1.030 Final   • Glucose, UA 11/09/2017 Negative  Negative Final   • Ketones, UA 11/09/2017 Negative  Negative Final   • Bilirubin, UA 11/09/2017 Negative  Negative Final   • Blood, UA 11/09/2017 Negative  Negative Final   • Protein, UA 11/09/2017 Negative  Negative Final   • Leuk Esterase, UA 11/09/2017 Negative  Negative Final   • Nitrite, UA 11/09/2017 Negative  Negative Final   • Urobilinogen, UA 11/09/2017 0.2 E.U./dL  0.2 - 1.0 E.U./dL Final   • Hepatitis C Ab 11/09/2017 Negative  Negative Final   • Hep A IgM 11/09/2017 Negative  Negative Final   • Hep B C IgM 11/09/2017 Negative  Negative Final   • Hepatitis B Surface Ag 11/09/2017 Negative  Negative Final   • Hemoglobin A1C 11/09/2017 5.6  4 - 5.6 % Final   • WBC 11/09/2017 6.78  3.20 - 9.80 10*3/mm3 Final   • RBC 11/09/2017 5.23  4.37 - 5.74 10*6/mm3 Final   • Hemoglobin 11/09/2017 16.1  13.7 - 17.3 g/dL Final   • Hematocrit 11/09/2017 46.9  39.0 - 49.0 % Final   • MCV 11/09/2017 89.7  80.0 - 98.0 fL Final   • MCH 11/09/2017 30.8  26.5 - 34.0 pg Final   • MCHC 11/09/2017 34.3  31.5 - 36.3 g/dL Final   • RDW 11/09/2017 12.7  11.5 - 14.5 % Final   • RDW-SD 11/09/2017 41.8  35.1 - 43.9 fl Final   • MPV 11/09/2017 10.5  8.0 - 12.0 fL Final   • Platelets 11/09/2017 193  150 - 450 10*3/mm3 Final   • Neutrophil % 11/09/2017 56.0  37.0 - 80.0 % Final   • Lymphocyte % 11/09/2017 32.0  10.0 - 50.0 % Final   • Monocyte % 11/09/2017 6.9  0.0 - 12.0 % Final   • Eosinophil % 11/09/2017 3.8  0.0 - 7.0 % Final   • Basophil % 11/09/2017 1.3  0.0 - 2.0 % Final   • Neutrophils, Absolute 11/09/2017 3.79  2.00 - 8.60 10*3/mm3 Final   • Lymphocytes, Absolute 11/09/2017 2.17  0.60 - 4.20 10*3/mm3 Final   • Monocytes, Absolute 11/09/2017 0.47  0.00 - 0.90  10*3/mm3 Final   • Eosinophils, Absolute 11/09/2017 0.26  0.00 - 0.70 10*3/mm3 Final   • Basophils, Absolute 11/09/2017 0.09  0.00 - 0.20 10*3/mm3 Final   ]

## 2017-11-17 NOTE — PROGRESS NOTES
SOFI#[id number]                           .

## 2017-11-30 ENCOUNTER — LAB (OUTPATIENT)
Dept: LAB | Facility: OTHER | Age: 57
End: 2017-11-30

## 2017-11-30 DIAGNOSIS — Z12.5 SCREENING FOR PROSTATE CANCER: ICD-10-CM

## 2017-11-30 PROCEDURE — 36415 COLL VENOUS BLD VENIPUNCTURE: CPT | Performed by: INTERNAL MEDICINE

## 2017-11-30 PROCEDURE — G0103 PSA SCREENING: HCPCS | Performed by: INTERNAL MEDICINE

## 2017-12-01 LAB — PSA SERPL-MCNC: 0.36 NG/ML (ref 0–4)

## 2017-12-08 ENCOUNTER — OFFICE VISIT (OUTPATIENT)
Dept: FAMILY MEDICINE CLINIC | Facility: CLINIC | Age: 57
End: 2017-12-08

## 2017-12-08 VITALS
SYSTOLIC BLOOD PRESSURE: 120 MMHG | WEIGHT: 150 LBS | DIASTOLIC BLOOD PRESSURE: 70 MMHG | HEIGHT: 70 IN | BODY MASS INDEX: 21.47 KG/M2 | TEMPERATURE: 97.8 F | HEART RATE: 88 BPM

## 2017-12-08 DIAGNOSIS — M15.9 PRIMARY OSTEOARTHRITIS INVOLVING MULTIPLE JOINTS: Chronic | ICD-10-CM

## 2017-12-08 DIAGNOSIS — N50.3 CYST OF EPIDIDYMIS DETERMINED BY ULTRASOUND: ICD-10-CM

## 2017-12-08 DIAGNOSIS — N50.89 SCROTAL MASS: Primary | ICD-10-CM

## 2017-12-08 DIAGNOSIS — N50.82 SCROTAL PAIN: ICD-10-CM

## 2017-12-08 LAB
BILIRUB UR QL STRIP: NEGATIVE
CLARITY UR: CLEAR
COLOR UR: YELLOW
GLUCOSE UR STRIP-MCNC: NEGATIVE MG/DL
HGB UR QL STRIP.AUTO: NEGATIVE
KETONES UR QL STRIP: NEGATIVE
LEUKOCYTE ESTERASE UR QL STRIP.AUTO: NEGATIVE
NITRITE UR QL STRIP: NEGATIVE
PH UR STRIP.AUTO: 6 [PH] (ref 5.5–8)
PROT UR QL STRIP: NEGATIVE
SP GR UR STRIP: 1.02 (ref 1–1.03)
UROBILINOGEN UR QL STRIP: NORMAL

## 2017-12-08 PROCEDURE — 81003 URINALYSIS AUTO W/O SCOPE: CPT | Performed by: INTERNAL MEDICINE

## 2017-12-08 PROCEDURE — 99214 OFFICE O/P EST MOD 30 MIN: CPT | Performed by: INTERNAL MEDICINE

## 2017-12-08 RX ORDER — CIPROFLOXACIN 500 MG/1
500 TABLET, FILM COATED ORAL 2 TIMES DAILY
Qty: 20 TABLET | Refills: 0 | Status: SHIPPED | OUTPATIENT
Start: 2017-12-08 | End: 2017-12-18

## 2017-12-08 RX ORDER — HYDROCODONE BITARTRATE AND ACETAMINOPHEN 7.5; 325 MG/1; MG/1
1 TABLET ORAL 2 TIMES DAILY
Qty: 60 TABLET | Refills: 0 | Status: SHIPPED | OUTPATIENT
Start: 2017-12-08 | End: 2018-01-11 | Stop reason: SDUPTHER

## 2017-12-08 NOTE — PROGRESS NOTES
Chief Complaint   Patient presents with   • knot left scrotum     Subjective   Mitch Rinaldi is a 57 y.o. male who presents to the office complaining of A mass and pain on the left side of his scrotum.  He had a vasectomy years ago, and he has always noticed a small spot on the top of his left testicle, however this has never bothered him.  One week ago, he was having sexual intercourse and at the time of ejaculation he felt something pop on the left side.  He states that it feels as if the fluid went into the scrotal area.  He has been having some pain and discomfort since that time.  However, over the past 24 hours he reports that the knot has doubled in size and has become extremely painful.  He rates the pain as 5 on a scale of 1-10.  Over the past week the pain has only been at a 2 or 3.  He states that the scrotum is extremely tender to touch.  He denies any dysuria or hematuria.    The following portions of the patient's history were reviewed and updated as appropriate: allergies, current medications, past family history, past medical history, past social history, past surgical history and problem list.    Review of Systems   Constitutional: Negative for chills, fatigue and fever.   HENT: Negative for congestion, sneezing, sore throat and trouble swallowing.    Eyes: Negative for visual disturbance.   Respiratory: Negative for cough, chest tightness, shortness of breath and wheezing.    Cardiovascular: Negative for chest pain, palpitations and leg swelling.   Gastrointestinal: Negative for abdominal pain, constipation, diarrhea, nausea and vomiting.   Genitourinary: Positive for scrotal swelling and testicular pain. Negative for dysuria, frequency and urgency.   Musculoskeletal: Positive for arthralgias and back pain. Negative for neck pain.   Skin: Negative for rash.   Neurological: Negative for dizziness, weakness and headaches.   Psychiatric/Behavioral:        Patient denies any feelings of depression and has  "not felt down, hopeless or lost interest in any activities.       Objective   Vitals:    12/08/17 0820   BP: 120/70   BP Location: Left arm   Patient Position: Sitting   Cuff Size: Adult   Pulse: 88   Temp: 97.8 °F (36.6 °C)   TempSrc: Oral   Weight: 68 kg (150 lb)   Height: 177.8 cm (70\")   PainSc:   5   PainLoc: Scrotum     Physical Exam   Constitutional: He is oriented to person, place, and time. He appears well-developed and well-nourished. No distress.   HENT:   Head: Normocephalic and atraumatic.   Nose: Nose normal.   Mouth/Throat: Oropharynx is clear and moist. No oropharyngeal exudate.   Eyes: Conjunctivae and EOM are normal. Pupils are equal, round, and reactive to light. No scleral icterus.   Neck: Normal range of motion. Neck supple.   Cardiovascular: Normal rate, regular rhythm and normal heart sounds.  Exam reveals no gallop and no friction rub.    No murmur heard.  Pulmonary/Chest: Effort normal and breath sounds normal. No respiratory distress. He has no wheezes. He has no rales.   Abdominal: Soft. Bowel sounds are normal. He exhibits no distension. There is no tenderness. There is no rebound and no guarding.   Genitourinary: Left testis shows mass, swelling and tenderness.   Genitourinary Comments: The skin temperature is normal, and there is no increased warmth noted.  There is a palpable pea-sized lesion present within the scrotum, and this is superior to the left testicle.  It is tender to palpation.   Musculoskeletal: He exhibits no edema.        Lumbar back: He exhibits decreased range of motion and pain.   Lymphadenopathy:     He has no cervical adenopathy.   Neurological: He is alert and oriented to person, place, and time. No cranial nerve deficit.   Skin: Skin is warm and dry. No rash noted.   Psychiatric: He has a normal mood and affect. His behavior is normal. Judgment and thought content normal.   Nursing note and vitals reviewed.      Assessment/Plan   Mitch was seen today for knot left " scrotum.    Diagnoses and all orders for this visit:    Scrotal mass  -     US Scrotum & Testicles  -     US Testicular or Ovarian Vascular Limited    Scrotal pain  -     US Scrotum & Testicles  -     US Testicular or Ovarian Vascular Limited  -     Urinalysis With / Culture If Indicated - Urine, Clean Catch    Cyst of epididymis determined by ultrasound  -     ciprofloxacin (CIPRO) 500 MG tablet; Take 1 tablet by mouth 2 (Two) Times a Day for 10 days.    Primary osteoarthritis involving multiple joints  -     HYDROcodone-acetaminophen (NORCO) 7.5-325 MG per tablet; Take 1 tablet by mouth 2 (Two) Times a Day.    I will obtain a urinalysis and ultrasound of the scrotum and testicles for further evaluation of his symptoms.    ADDENDUM: I saw him back after completion of the ultrasound.  The ultrasound shows the presence of multiple left epididymal cysts.  He has most likely had one of the cysts to rupture which is causing the pain and discomfort.  He is given Cipro for treatment.  If he has not noticed any improvement within a couple of days, he will let me know and I will then refer him to urology.         PHQ-2/PHQ-9 Depression Screening 12/8/2017   Little interest or pleasure in doing things 0   Feeling down, depressed, or hopeless 0   Trouble falling or staying asleep, or sleeping too much 0   Feeling tired or having little energy 0   Poor appetite or overeating 0   Feeling bad about yourself - or that you are a failure or have let yourself or your family down 0   Trouble concentrating on things, such as reading the newspaper or watching television 0   Moving or speaking so slowly that other people could have noticed. Or the opposite - being so fidgety or restless that you have been moving around a lot more than usual 0   Thoughts that you would be better off dead, or of hurting yourself in some way 0   Total Score 0

## 2018-01-04 ENCOUNTER — TELEPHONE (OUTPATIENT)
Dept: FAMILY MEDICINE CLINIC | Facility: CLINIC | Age: 58
End: 2018-01-04

## 2018-01-04 DIAGNOSIS — N50.82 SCROTAL PAIN: ICD-10-CM

## 2018-01-04 DIAGNOSIS — N50.3 CYST OF EPIDIDYMIS DETERMINED BY ULTRASOUND: Primary | ICD-10-CM

## 2018-01-04 DIAGNOSIS — N50.89 SCROTAL MASS: ICD-10-CM

## 2018-01-04 NOTE — TELEPHONE ENCOUNTER
Referral has been placed, Referral form, office note/imaging, and patient demographics have been faxed to Dr. Dewitt's office, they will review and call patient to schedule appt. Patient is aware.

## 2018-01-04 NOTE — TELEPHONE ENCOUNTER
----- Message from Lenora Leos sent at 1/4/2018  8:46 AM CST -----  Regarding: NEEDS REFERRAL  Contact: [phone]   Wants a referral to Dr Groves, (Urologist) due to scrotal pain/discomfort not improving from office visit from 12/08/2017.

## 2018-01-11 DIAGNOSIS — M15.9 PRIMARY OSTEOARTHRITIS INVOLVING MULTIPLE JOINTS: Chronic | ICD-10-CM

## 2018-01-11 RX ORDER — HYDROCODONE BITARTRATE AND ACETAMINOPHEN 7.5; 325 MG/1; MG/1
1 TABLET ORAL
Qty: 60 TABLET | Refills: 0 | Status: SHIPPED | OUTPATIENT
Start: 2018-01-11 | End: 2018-02-13 | Stop reason: SDUPTHER

## 2018-01-11 NOTE — TELEPHONE ENCOUNTER
Patient is up to date on controlled medication consent, Long report, and appt from 12/08/2017. Picking up today at 3pm.

## 2018-02-13 ENCOUNTER — OFFICE VISIT (OUTPATIENT)
Dept: FAMILY MEDICINE CLINIC | Facility: CLINIC | Age: 58
End: 2018-02-13

## 2018-02-13 VITALS
BODY MASS INDEX: 21.9 KG/M2 | HEIGHT: 70 IN | HEART RATE: 86 BPM | WEIGHT: 153 LBS | DIASTOLIC BLOOD PRESSURE: 64 MMHG | SYSTOLIC BLOOD PRESSURE: 120 MMHG | TEMPERATURE: 97.6 F

## 2018-02-13 DIAGNOSIS — F41.1 GENERALIZED ANXIETY DISORDER: Chronic | ICD-10-CM

## 2018-02-13 DIAGNOSIS — M15.9 PRIMARY OSTEOARTHRITIS INVOLVING MULTIPLE JOINTS: Primary | Chronic | ICD-10-CM

## 2018-02-13 DIAGNOSIS — J06.9 ACUTE URI: ICD-10-CM

## 2018-02-13 DIAGNOSIS — G59 MONONEUROPATHY DUE TO UNDERLYING DISEASE: Chronic | ICD-10-CM

## 2018-02-13 PROCEDURE — 99214 OFFICE O/P EST MOD 30 MIN: CPT | Performed by: INTERNAL MEDICINE

## 2018-02-13 PROCEDURE — 96372 THER/PROPH/DIAG INJ SC/IM: CPT | Performed by: INTERNAL MEDICINE

## 2018-02-13 RX ORDER — HYDROCODONE BITARTRATE AND ACETAMINOPHEN 7.5; 325 MG/1; MG/1
1 TABLET ORAL
Qty: 60 TABLET | Refills: 0 | Status: SHIPPED | OUTPATIENT
Start: 2018-02-13 | End: 2018-03-15 | Stop reason: SDUPTHER

## 2018-02-13 RX ORDER — METHYLPREDNISOLONE ACETATE 80 MG/ML
80 INJECTION, SUSPENSION INTRA-ARTICULAR; INTRALESIONAL; INTRAMUSCULAR; SOFT TISSUE ONCE
Status: COMPLETED | OUTPATIENT
Start: 2018-02-13 | End: 2018-02-13

## 2018-02-13 RX ADMIN — METHYLPREDNISOLONE ACETATE 80 MG: 80 INJECTION, SUSPENSION INTRA-ARTICULAR; INTRALESIONAL; INTRAMUSCULAR; SOFT TISSUE at 09:33

## 2018-02-13 NOTE — PROGRESS NOTES
Chief Complaint   Patient presents with   • Pain     chronic   • Cough   • URI     Subjective   Mitch Rinaldi is a 57 y.o. male who presents to the office for follow-up. He has osteoarthritis which causes chronic back and joint pain.  This mainly affects his low back.  He takes Norco for treatment of the back pain, and this is keeping it at a tolerable level.  He has a history of neuropathic pain, but does not currently require medication for this.  He also has muscle spasm in his back and takes Zanaflex as needed.  He has anxiety and Has been doing well since discontinuing Celexa approximately 6 months ago.    He complains of nasal congestion, sore throat, and sneezing which has been present for the past several days. He has also been having some episodes of ringing in his ears for the past couple of weeks.    The following portions of the patient's history were reviewed and updated as appropriate: allergies, current medications, past family history, past medical history, past social history, past surgical history and problem list.    Review of Systems   Constitutional: Negative for chills, fatigue and fever.   HENT: Positive for congestion, sinus pressure, sneezing, sore throat and tinnitus. Negative for trouble swallowing.    Eyes: Negative for visual disturbance.   Respiratory: Negative for cough, chest tightness, shortness of breath and wheezing.    Cardiovascular: Negative for chest pain, palpitations and leg swelling.   Gastrointestinal: Negative for abdominal pain, constipation, diarrhea, nausea and vomiting.   Genitourinary: Negative for dysuria, frequency and urgency.   Musculoskeletal: Positive for arthralgias and back pain. Negative for neck pain.   Skin: Negative for rash.   Neurological: Negative for dizziness, weakness and headaches.   Psychiatric/Behavioral:        Patient denies any feelings of depression and has not felt down, hopeless or lost interest in any activities.   All other systems reviewed  "and are negative.      Objective   Vitals:    02/13/18 0843   BP: 120/64   BP Location: Left arm   Patient Position: Sitting   Cuff Size: Adult   Pulse: 86   Temp: 97.6 °F (36.4 °C)   TempSrc: Oral   Weight: 69.4 kg (153 lb)   Height: 177.8 cm (70\")   PainSc:   4   PainLoc: Back     Physical Exam   Constitutional: He is oriented to person, place, and time. He appears well-developed and well-nourished. No distress.   HENT:   Head: Normocephalic and atraumatic.   Nose: Nose normal.   Mouth/Throat: Posterior oropharyngeal erythema present. No oropharyngeal exudate.   Nose is congested.  Oropharynx erythematous with posterior drainage.   Eyes: Conjunctivae and EOM are normal. Pupils are equal, round, and reactive to light. No scleral icterus.   Neck: Normal range of motion. Neck supple.   Cardiovascular: Normal rate, regular rhythm and normal heart sounds.  Exam reveals no gallop and no friction rub.    No murmur heard.  Pulmonary/Chest: Effort normal and breath sounds normal. No respiratory distress. He has no wheezes. He has no rales.   Abdominal: Soft. Bowel sounds are normal. He exhibits no distension. There is no tenderness. There is no rebound and no guarding.   Musculoskeletal: He exhibits no edema.        Lumbar back: He exhibits decreased range of motion and pain.   Lymphadenopathy:     He has no cervical adenopathy.   Neurological: He is alert and oriented to person, place, and time. No cranial nerve deficit.   Skin: Skin is warm and dry. No rash noted.   Psychiatric: He has a normal mood and affect. His behavior is normal. Judgment and thought content normal.   Nursing note and vitals reviewed.      Assessment/Plan   Mitch was seen today for pain, cough and uri.    Diagnoses and all orders for this visit:    Primary osteoarthritis involving multiple joints  -     HYDROcodone-acetaminophen (NORCO) 7.5-325 MG per tablet; Take 1 tablet by mouth 2 (Two) Times a Day.    Generalized anxiety " disorder    Mononeuropathy due to underlying disease    Acute URI  -     methylPREDNISolone acetate (DEPO-medrol) injection 80 mg; Inject 1 mL into the shoulder, thigh, or buttocks 1 (One) Time.         He will continue to use ibuprofen for the arthritic inflammation, and the Zanaflex as needed for the muscle spasm.  He will continue with Norco twice a day for treatment of the musculoskeletal pain.    He will continue with Zyrtec for treatment of the allergy symptoms.  He is given Depo-Medrol 80 mg IM for the upper respiratory symptoms.  No antibiotics are currently indicated.  He will let me know symptoms worsen.    Patient understands the risks associated with this controlled medication, including tolerance and addiction.  Patient also agrees to only obtain this medication from me, and not from a another provider, unless that provider is covering for me in my absence.  Patient also agrees to be compliant in dosing, and not self adjust the dose of medication.  A signed controlled substance agreement is on file, and the patient has received a controlled substance education sheet at this a previous visit.  The patient has also signed a consent for treatment with a controlled substance as per [facility] policy. SOFI was obtained.    PHQ-2/PHQ-9 Depression Screening 2/13/2018   Little interest or pleasure in doing things 0   Feeling down, depressed, or hopeless 0   Trouble falling or staying asleep, or sleeping too much 0   Feeling tired or having little energy 0   Poor appetite or overeating 0   Feeling bad about yourself - or that you are a failure or have let yourself or your family down 0   Trouble concentrating on things, such as reading the newspaper or watching television 0   Moving or speaking so slowly that other people could have noticed. Or the opposite - being so fidgety or restless that you have been moving around a lot more than usual 0   Thoughts that you would be better off dead, or of hurting  yourself in some way 0   Total Score 0

## 2018-02-13 NOTE — PROGRESS NOTES
SOFI# [id number].                        .

## 2018-03-13 DIAGNOSIS — M15.9 PRIMARY OSTEOARTHRITIS INVOLVING MULTIPLE JOINTS: Chronic | ICD-10-CM

## 2018-03-13 NOTE — TELEPHONE ENCOUNTER
Patient is up to date on controlled medication consent, Long report, and appt from 02/13/2018. Refill will be sent to [facility] in [place] per patient request. For Thursday .

## 2018-03-15 RX ORDER — HYDROCODONE BITARTRATE AND ACETAMINOPHEN 7.5; 325 MG/1; MG/1
1 TABLET ORAL
Qty: 60 TABLET | Refills: 0 | Status: SHIPPED | OUTPATIENT
Start: 2018-03-15 | End: 2018-04-12 | Stop reason: SDUPTHER

## 2018-04-12 DIAGNOSIS — M15.9 PRIMARY OSTEOARTHRITIS INVOLVING MULTIPLE JOINTS: Chronic | ICD-10-CM

## 2018-04-12 RX ORDER — HYDROCODONE BITARTRATE AND ACETAMINOPHEN 7.5; 325 MG/1; MG/1
1 TABLET ORAL
Qty: 60 TABLET | Refills: 0 | Status: SHIPPED | OUTPATIENT
Start: 2018-04-12 | End: 2018-05-11 | Stop reason: SDUPTHER

## 2018-04-12 NOTE — TELEPHONE ENCOUNTER
Patient is up to date on controlled medication consent, Long report, and appt from 02/13/2018. Will pick Rector 7.5/325mg Friday at the [facility] in [place], patient verbalized understanding.

## 2018-04-25 ENCOUNTER — PREP FOR SURGERY (OUTPATIENT)
Dept: OTHER | Facility: HOSPITAL | Age: 58
End: 2018-04-25

## 2018-04-25 DIAGNOSIS — N43.40 SPERMATOCELE: Primary | ICD-10-CM

## 2018-04-25 RX ORDER — LEVOFLOXACIN 5 MG/ML
500 INJECTION, SOLUTION INTRAVENOUS ONCE
Status: CANCELLED | OUTPATIENT
Start: 2018-05-11 | End: 2018-05-11

## 2018-05-03 ENCOUNTER — LAB (OUTPATIENT)
Dept: LAB | Facility: OTHER | Age: 58
End: 2018-05-03

## 2018-05-03 DIAGNOSIS — Z79.899 DRUG THERAPY: ICD-10-CM

## 2018-05-03 DIAGNOSIS — E78.2 MIXED HYPERLIPIDEMIA: ICD-10-CM

## 2018-05-03 LAB
ALBUMIN SERPL-MCNC: 4.4 G/DL (ref 3.2–5.5)
ALBUMIN/GLOB SERPL: 1.4 G/DL (ref 1–3)
ALP SERPL-CCNC: 76 U/L (ref 15–121)
ALT SERPL W P-5'-P-CCNC: 11 U/L (ref 10–60)
ANION GAP SERPL CALCULATED.3IONS-SCNC: 8 MMOL/L (ref 5–15)
AST SERPL-CCNC: 18 U/L (ref 10–60)
BASOPHILS # BLD AUTO: 0.05 10*3/MM3 (ref 0–0.2)
BASOPHILS NFR BLD AUTO: 0.8 % (ref 0–2)
BILIRUB SERPL-MCNC: 0.7 MG/DL (ref 0.2–1)
BILIRUB UR QL STRIP: NEGATIVE
BUN BLD-MCNC: 19 MG/DL (ref 8–25)
BUN/CREAT SERPL: 21.1 (ref 7–25)
CALCIUM SPEC-SCNC: 9 MG/DL (ref 8.4–10.8)
CHLORIDE SERPL-SCNC: 101 MMOL/L (ref 100–112)
CHOLEST SERPL-MCNC: 190 MG/DL (ref 150–200)
CLARITY UR: CLEAR
CO2 SERPL-SCNC: 28 MMOL/L (ref 20–32)
COLOR UR: YELLOW
CREAT BLD-MCNC: 0.9 MG/DL (ref 0.4–1.3)
DEPRECATED RDW RBC AUTO: 42.9 FL (ref 35.1–43.9)
EOSINOPHIL # BLD AUTO: 0.22 10*3/MM3 (ref 0–0.7)
EOSINOPHIL NFR BLD AUTO: 3.4 % (ref 0–7)
ERYTHROCYTE [DISTWIDTH] IN BLOOD BY AUTOMATED COUNT: 13.2 % (ref 11.5–14.5)
GFR SERPL CREATININE-BSD FRML MDRD: 86 ML/MIN/1.73 (ref 56–130)
GLOBULIN UR ELPH-MCNC: 3.1 GM/DL (ref 2.5–4.6)
GLUCOSE BLD-MCNC: 108 MG/DL (ref 70–100)
GLUCOSE UR STRIP-MCNC: NEGATIVE MG/DL
HCT VFR BLD AUTO: 46.1 % (ref 39–49)
HDLC SERPL-MCNC: 37 MG/DL (ref 35–100)
HGB BLD-MCNC: 15.6 G/DL (ref 13.7–17.3)
HGB UR QL STRIP.AUTO: NEGATIVE
KETONES UR QL STRIP: NEGATIVE
LDLC SERPL CALC-MCNC: 133 MG/DL
LDLC/HDLC SERPL: 3.6 {RATIO}
LEUKOCYTE ESTERASE UR QL STRIP.AUTO: NEGATIVE
LYMPHOCYTES # BLD AUTO: 2.17 10*3/MM3 (ref 0.6–4.2)
LYMPHOCYTES NFR BLD AUTO: 33.2 % (ref 10–50)
MCH RBC QN AUTO: 30.7 PG (ref 26.5–34)
MCHC RBC AUTO-ENTMCNC: 33.8 G/DL (ref 31.5–36.3)
MCV RBC AUTO: 90.7 FL (ref 80–98)
MONOCYTES # BLD AUTO: 0.54 10*3/MM3 (ref 0–0.9)
MONOCYTES NFR BLD AUTO: 8.3 % (ref 0–12)
NEUTROPHILS # BLD AUTO: 3.55 10*3/MM3 (ref 2–8.6)
NEUTROPHILS NFR BLD AUTO: 54.3 % (ref 37–80)
NITRITE UR QL STRIP: NEGATIVE
PH UR STRIP.AUTO: 5.5 [PH] (ref 5.5–8)
PLATELET # BLD AUTO: 185 10*3/MM3 (ref 150–450)
PMV BLD AUTO: 10.6 FL (ref 8–12)
POTASSIUM BLD-SCNC: 3.6 MMOL/L (ref 3.4–5.4)
PROT SERPL-MCNC: 7.5 G/DL (ref 6.7–8.2)
PROT UR QL STRIP: NEGATIVE
RBC # BLD AUTO: 5.08 10*6/MM3 (ref 4.37–5.74)
SODIUM BLD-SCNC: 137 MMOL/L (ref 134–146)
SP GR UR STRIP: 1.02 (ref 1–1.03)
T4 FREE SERPL-MCNC: 1.18 NG/DL (ref 0.78–2.19)
TRIGL SERPL-MCNC: 99 MG/DL (ref 35–160)
TSH SERPL DL<=0.05 MIU/L-ACNC: 1.92 MIU/ML (ref 0.46–4.68)
UROBILINOGEN UR QL STRIP: NORMAL
VLDLC SERPL-MCNC: 19.8 MG/DL
WBC NRBC COR # BLD: 6.53 10*3/MM3 (ref 3.2–9.8)

## 2018-05-03 PROCEDURE — 84439 ASSAY OF FREE THYROXINE: CPT | Performed by: INTERNAL MEDICINE

## 2018-05-03 PROCEDURE — 85025 COMPLETE CBC W/AUTO DIFF WBC: CPT | Performed by: INTERNAL MEDICINE

## 2018-05-03 PROCEDURE — G0481 DRUG TEST DEF 8-14 CLASSES: HCPCS | Performed by: INTERNAL MEDICINE

## 2018-05-03 PROCEDURE — 80307 DRUG TEST PRSMV CHEM ANLYZR: CPT | Performed by: INTERNAL MEDICINE

## 2018-05-03 PROCEDURE — 36415 COLL VENOUS BLD VENIPUNCTURE: CPT | Performed by: INTERNAL MEDICINE

## 2018-05-03 PROCEDURE — 81003 URINALYSIS AUTO W/O SCOPE: CPT | Performed by: INTERNAL MEDICINE

## 2018-05-03 PROCEDURE — 80053 COMPREHEN METABOLIC PANEL: CPT | Performed by: INTERNAL MEDICINE

## 2018-05-03 PROCEDURE — 80061 LIPID PANEL: CPT | Performed by: INTERNAL MEDICINE

## 2018-05-03 PROCEDURE — 84443 ASSAY THYROID STIM HORMONE: CPT | Performed by: INTERNAL MEDICINE

## 2018-05-04 ENCOUNTER — ANESTHESIA EVENT (OUTPATIENT)
Dept: PERIOP | Facility: HOSPITAL | Age: 58
End: 2018-05-04

## 2018-05-07 ENCOUNTER — APPOINTMENT (OUTPATIENT)
Dept: PREADMISSION TESTING | Facility: HOSPITAL | Age: 58
End: 2018-05-07

## 2018-05-07 VITALS
OXYGEN SATURATION: 98 % | RESPIRATION RATE: 12 BRPM | DIASTOLIC BLOOD PRESSURE: 64 MMHG | BODY MASS INDEX: 22.12 KG/M2 | WEIGHT: 154.5 LBS | HEIGHT: 70 IN | SYSTOLIC BLOOD PRESSURE: 126 MMHG | HEART RATE: 86 BPM

## 2018-05-07 RX ORDER — SODIUM CHLORIDE, SODIUM GLUCONATE, SODIUM ACETATE, POTASSIUM CHLORIDE, AND MAGNESIUM CHLORIDE 526; 502; 368; 37; 30 MG/100ML; MG/100ML; MG/100ML; MG/100ML; MG/100ML
1000 INJECTION, SOLUTION INTRAVENOUS CONTINUOUS
Status: CANCELLED | OUTPATIENT
Start: 2018-05-11

## 2018-05-08 LAB
7-AMINOCLONAZEPAM UR: NOT DETECTED NG/MG CREAT
A-OH ALPRAZ/CREAT UR: NOT DETECTED NG/MG CREAT
ALFENTANIL UR QL: NOT DETECTED NG/MG CREAT
ALPHA-HYDROXYTRIAZOLAM, URINE: NOT DETECTED NG/MG CREAT
AMOBARBITAL UR: NOT DETECTED
AMPHET UR QL CFM: NOT DETECTED NG/MG CREAT
AMPHETAMINES UR QL SCN: NEGATIVE
BARBITAL UR QL CFM: NOT DETECTED
BARBITURATES UR QL SCN: NEGATIVE
BENZODIAZ UR QL SCN: NEGATIVE
BENZOYLECGONINE UR: NOT DETECTED NG/MG CREAT
BUPRENORPHINE UR QL: NEGATIVE
BUPRENORPHINE UR QL: NOT DETECTED NG/MG CREAT
BUTABARBITAL UR QL: NOT DETECTED
BUTALBITAL UR QL: NOT DETECTED
CANNABINOIDS UR QL CFM: NEGATIVE
CLONAZEPAM UR QL: NOT DETECTED NG/MG CREAT
COCAETHYLENE UR QL CFM: NOT DETECTED NG/MG CREAT
COCAINE UR QL CFM: NEGATIVE
CODEINE UR QL: NOT DETECTED NG/MG CREAT
CONV COCAINE, UR: NOT DETECTED NG/MG CREAT
CONV REPORT SUMMARY: NORMAL
CREAT 24H UR-MCNC: 105 MG/DL
DESALKYLFLURAZ/CREAT UR: NOT DETECTED NG/MG CREAT
DIAZEPAM UR-MCNC: NOT DETECTED NG/MG CREAT
DIHYDROCODEINE UR: 49 NG/MG CREAT
EDDP SERPL QL: NOT DETECTED NG/MG CREAT
ETHANOL SCREEN URINE (REF): NEGATIVE
ETHANOL UR-MCNC: NOT DETECTED G/DL
FENTANYL UR QL: NEGATIVE
FENTANYL+NORFENTANYL UR QL SCN: NOT DETECTED NG/MG CREAT
HX OF MEDICATION USE: NORMAL
HYDROCODONE UR QL: 232 NG/MG CREAT
HYDROMORPHONE UR QL: 69 NG/MG CREAT
LEVEL OF DETECTION:: NORMAL
LORAZEPAM UR-MCNC: NOT DETECTED NG/MG CREAT
LORAZEPAM/CREAT UR: NOT DETECTED NG/MG CREAT
MDA SERPLBLD-MCNC: NOT DETECTED NG/MG CREAT
MDMA UR QL SCN: NOT DETECTED NG/MG CREAT
MEPHOBARBITAL UR QL CFM: NOT DETECTED
METHADONE BLD QL SCN: NEGATIVE
METHADONE, URINE: NOT DETECTED NG/MG CREAT
METHAMPHETAMINE UR: NOT DETECTED NG/MG CREAT
MIDAZOLAM UR-MCNC: NOT DETECTED NG/MG CREAT
MORPHINE UR QL: NOT DETECTED NG/MG CREAT
N-DESMETHYLTRAMADOL, U: NOT DETECTED
NARCOTICS UR: NEGATIVE
NORBUPRENORPHINE SERPLBLD-MCNC: NOT DETECTED NG/MG CREAT
NORCODEINE UR-MCNC: NOT DETECTED NG/MG CREAT
NORDIAZEPAM SERPL CFM-MCNC: NOT DETECTED NG/MG CREAT
NORFENTANYL UR: NOT DETECTED NG/MG CREAT
NOROXYMORPHONE: NOT DETECTED NG/MG CREAT
O-DESMETHYLTRAMADOL, UR: NOT DETECTED
OPIATES UR QL CFM: NORMAL
OPIATES, URINE, NORHYDROCODONE: 348 NG/MG CREAT
OPIATES, URINE, NOROXYCODONE: NOT DETECTED NG/MG CREAT
OXAZEPAM UR QL: NOT DETECTED NG/MG CREAT
OXYCODONE UR QL: NEGATIVE
OXYCODONE UR: NOT DETECTED NG/MG CREAT
OXYMORPHONE UR: NOT DETECTED NG/MG CREAT
PENTOBARBITAL UR: NOT DETECTED
PHENOBARB UR QL: NOT DETECTED
SECOBARBITAL UR QL: NOT DETECTED
SUFENTANIL UR QL: NOT DETECTED NG/MG CREAT
TAPENTADOL SERPLBLD-MCNC: NEGATIVE NG/ML
TAPENTADOL UR-MCNC: NOT DETECTED NG/MG CREAT
TEMAZEPAM UR QL CFM: NOT DETECTED NG/MG CREAT
THC UR CFM-MCNC: NOT DETECTED NG/MG CREAT
THIOPENTAL UR QL CFM: NOT DETECTED
TRAMADOL UR: NOT DETECTED

## 2018-05-09 NOTE — H&P
UROLOGY [facility] History and Physical  HIRA VERDIN  57-year-old; :July 3, 1960;single;male  [address] ;[place] [address]  Ins: 1) [facility] [employer] AND [facility] FUNDS  Employer: [employer]  MRN:[id number]  JOSSY PAZ MD  UROLOGY [facility]  2018 10:15 AM  Allergies  No Known Drug Allergies Unknown  Current Medications  tiZANidine 4 mg oral capsule  Norco 7.5 mg-325 mg oral tablet  ZyrTEC 10 mg oral tablet  Nexium 40 mg Cap 1 Select Frequency Oral  * Medications Reconciled  Problem List  Spermatocele  Medical History  None  Patient reports having had a colonoscopy within  the past 9 years.  Surgical History  Cyst removed  HERNIA REPAIR  Psychiatric History  Patient is generally satisfied with life. No  depression, anxiety or thoughts of suicide.  Family History  FH: Stomach cancer  Essential hypertension  Mother  Father  Brother  Alive Brother Alive  Social History  Pt currently somkes a pack of cigarettes a day  and doesn't drink. He is single.  Imm/Inj/Office Meds History Comments  Patient has had influenza vaccination for this season.  Has not had pneumonia vaccine.  Chief Complaint  LEFT SPERMATOCELE  History of Present Illness  HIRA VERDIN is a 57-year-old male here for evaluation. He has a history of LEFT  SPERMATOCELE. PATIENT STATES THAT PAIN IS WORSE THAN BEFORE.  Location: SCROTUM.  Severity: MILD-MODERATE.  Onset / Duration: > 6 MONTHS.  Associated signs and symptoms: NO FEVER. VOIDS OK.  Review of Systems  Eyes: ; Denies: blurry vision, pain in the eyes and double vision  ENMT: ; Denies: ear pain, sore throat and sinus problems  Cardiovascular: ; Denies: chest pain, varicose veins, angina and syncope  Respiratory: Reports: shortness of breath; Denies: wheezing and frequent cough  Gastrointestinal: ; Denies: abdominal pain, nausea, vomiting, indigestion and  heartburn  Genitourinary: ; Denies: other symptoms (see HPI)  Musculoskeletal: ; Denies:  joint pain, neck pain and back pain  Integumentary: ; Denies: skin rash, boils and persistent itch  Neurological: ; Denies: tremors, dizzy spells and numbness / tingling  Psychiatric: Reports: generally satisfied with life; Denies: depression, suicidal  ideation and anxiety  Endocrine: ; Denies: Excessive thirst, cold intolerance, heat intolerance and  tired/sluggish  Hematologic/Lymphatic: ; Denies: swollen glands and blood clotting problem  Allergic/Immunologic: ; Denies: hayfever  Constitutional: ; Denies: fever, chills and weight loss  Vital Signs  4/25/2018 10:15:00 AM  BP: 110/81 (mmHg) Heart Rate: 94 (/min)  Weight: 152 (lb) Resp Rate: 16 (/min)  Height: 70 (in) BMI: 21.81  Current every day smoker  Exam  General appearance: The patient appears well developed and well nourished, in no  apparent acute distress.  Examination of pupils and irises: No redness or drainage noted.  Assessment of hearing: Responds to verbal command.  Examination of neck: Neck appears supple. No masses noted.  Assessment of respiratory effort: Respiratory effort appears normal. No apparent  distress.  Examination of the scrotal contents: LEFT SPERMATOCELE.  Examination of gait and station: Normal gait and stature.  Head and neck (Assessment of range of motion): Adequate ROM noted in all  extremities.  Head and neck (Assessment of muscle strength and tone): Muscle strength and tone  normal for age.  Inspection of skin and subcutaneous tissue: Exam of the skin is within normal limits.  The color and skin turgor are normal. No lesions, bruises, rashes, or jaundice.  Orientation to time, place and person: Oriented to person, place, and time.  Mood and affect: Normal mood and affect.  Data Review  Medications and chart reviewed. History and physical form/ROS reviewed and no  changes noted. Previous encounter reviewed. Last form done 01/30/18. UA  ORDERED AND REVIEWED BY PROVIDER.  Assessment - Spermatocele  DX:  Spermatocele  SNO:  [id number], ICD-9: 608.1, ICD-10: N43.40  Assessment Notes:  LEFT SPERMATOCELE  Plan  Plan Notes:  Left spermatocelectomy.  Education:  Testicular Disorders  Discussion:  Discussed my findings and plan of action and reasoning behind decision making. All  questions were answered. FINDINGS WERE DISCUSSED WITH PATIENT. RISKS  AND BENEFITS EXPLAINED. PATIENT WISHES TO PROCEED.  Instructions:  Patient is instructed to call with any problems. Patient is instructed to call if the  condition worsens. Patient is instructed to call with any changes in condition.

## 2018-05-11 ENCOUNTER — HOSPITAL ENCOUNTER (OUTPATIENT)
Facility: HOSPITAL | Age: 58
Setting detail: HOSPITAL OUTPATIENT SURGERY
Discharge: HOME | End: 2018-05-11
Attending: UROLOGY | Admitting: UROLOGY

## 2018-05-11 ENCOUNTER — OFFICE VISIT (OUTPATIENT)
Dept: FAMILY MEDICINE CLINIC | Facility: CLINIC | Age: 58
End: 2018-05-11

## 2018-05-11 ENCOUNTER — ANESTHESIA (OUTPATIENT)
Dept: PERIOP | Facility: HOSPITAL | Age: 58
End: 2018-05-11

## 2018-05-11 VITALS
HEIGHT: 70 IN | RESPIRATION RATE: 18 BRPM | WEIGHT: 148.15 LBS | HEART RATE: 73 BPM | OXYGEN SATURATION: 100 % | DIASTOLIC BLOOD PRESSURE: 88 MMHG | TEMPERATURE: 97.4 F | BODY MASS INDEX: 21.21 KG/M2 | SYSTOLIC BLOOD PRESSURE: 186 MMHG

## 2018-05-11 VITALS
WEIGHT: 152 LBS | BODY MASS INDEX: 21.76 KG/M2 | SYSTOLIC BLOOD PRESSURE: 118 MMHG | DIASTOLIC BLOOD PRESSURE: 72 MMHG | HEART RATE: 78 BPM | HEIGHT: 70 IN

## 2018-05-11 DIAGNOSIS — E78.2 MIXED HYPERLIPIDEMIA: Chronic | ICD-10-CM

## 2018-05-11 DIAGNOSIS — K21.9 GASTROESOPHAGEAL REFLUX DISEASE WITHOUT ESOPHAGITIS: Chronic | ICD-10-CM

## 2018-05-11 DIAGNOSIS — R73.02 IMPAIRED GLUCOSE TOLERANCE: Primary | Chronic | ICD-10-CM

## 2018-05-11 DIAGNOSIS — N43.40 SPERMATOCELE: ICD-10-CM

## 2018-05-11 DIAGNOSIS — M15.9 PRIMARY OSTEOARTHRITIS INVOLVING MULTIPLE JOINTS: Chronic | ICD-10-CM

## 2018-05-11 DIAGNOSIS — Z12.5 SCREENING FOR PROSTATE CANCER: ICD-10-CM

## 2018-05-11 PROCEDURE — 25010000002 MIDAZOLAM PER 1 MG: Performed by: NURSE ANESTHETIST, CERTIFIED REGISTERED

## 2018-05-11 PROCEDURE — 25010000002 FENTANYL CITRATE (PF) 100 MCG/2ML SOLUTION: Performed by: NURSE ANESTHETIST, CERTIFIED REGISTERED

## 2018-05-11 PROCEDURE — 25010000002 DEXAMETHASONE PER 1 MG: Performed by: NURSE ANESTHETIST, CERTIFIED REGISTERED

## 2018-05-11 PROCEDURE — 88304 TISSUE EXAM BY PATHOLOGIST: CPT | Performed by: UROLOGY

## 2018-05-11 PROCEDURE — 88304 TISSUE EXAM BY PATHOLOGIST: CPT | Performed by: PATHOLOGY

## 2018-05-11 PROCEDURE — 25010000002 LEVOFLOXACIN PER 250 MG: Performed by: UROLOGY

## 2018-05-11 PROCEDURE — 99214 OFFICE O/P EST MOD 30 MIN: CPT | Performed by: INTERNAL MEDICINE

## 2018-05-11 PROCEDURE — 25010000002 ONDANSETRON PER 1 MG: Performed by: NURSE ANESTHETIST, CERTIFIED REGISTERED

## 2018-05-11 PROCEDURE — 25010000002 HYDROMORPHONE PER 4 MG: Performed by: NURSE ANESTHETIST, CERTIFIED REGISTERED

## 2018-05-11 PROCEDURE — 25010000002 PROPOFOL 10 MG/ML EMULSION: Performed by: NURSE ANESTHETIST, CERTIFIED REGISTERED

## 2018-05-11 RX ORDER — ONDANSETRON 2 MG/ML
INJECTION INTRAMUSCULAR; INTRAVENOUS AS NEEDED
Status: DISCONTINUED | OUTPATIENT
Start: 2018-05-11 | End: 2018-05-11 | Stop reason: SURG

## 2018-05-11 RX ORDER — ONDANSETRON 2 MG/ML
4 INJECTION INTRAMUSCULAR; INTRAVENOUS ONCE AS NEEDED
Status: DISCONTINUED | OUTPATIENT
Start: 2018-05-11 | End: 2018-05-11 | Stop reason: HOSPADM

## 2018-05-11 RX ORDER — MEPERIDINE HYDROCHLORIDE 50 MG/ML
12.5 INJECTION INTRAMUSCULAR; INTRAVENOUS; SUBCUTANEOUS
Status: DISCONTINUED | OUTPATIENT
Start: 2018-05-11 | End: 2018-05-11 | Stop reason: HOSPADM

## 2018-05-11 RX ORDER — FLUMAZENIL 0.1 MG/ML
0.2 INJECTION INTRAVENOUS AS NEEDED
Status: DISCONTINUED | OUTPATIENT
Start: 2018-05-11 | End: 2018-05-11 | Stop reason: HOSPADM

## 2018-05-11 RX ORDER — SODIUM CHLORIDE, SODIUM GLUCONATE, SODIUM ACETATE, POTASSIUM CHLORIDE, AND MAGNESIUM CHLORIDE 526; 502; 368; 37; 30 MG/100ML; MG/100ML; MG/100ML; MG/100ML; MG/100ML
1000 INJECTION, SOLUTION INTRAVENOUS CONTINUOUS
Status: DISCONTINUED | OUTPATIENT
Start: 2018-05-11 | End: 2018-05-11 | Stop reason: HOSPADM

## 2018-05-11 RX ORDER — LEVOFLOXACIN 5 MG/ML
500 INJECTION, SOLUTION INTRAVENOUS ONCE
Status: COMPLETED | OUTPATIENT
Start: 2018-05-11 | End: 2018-05-11

## 2018-05-11 RX ORDER — PROPOFOL 10 MG/ML
VIAL (ML) INTRAVENOUS AS NEEDED
Status: DISCONTINUED | OUTPATIENT
Start: 2018-05-11 | End: 2018-05-11 | Stop reason: SURG

## 2018-05-11 RX ORDER — DEXAMETHASONE SODIUM PHOSPHATE 4 MG/ML
INJECTION, SOLUTION INTRA-ARTICULAR; INTRALESIONAL; INTRAMUSCULAR; INTRAVENOUS; SOFT TISSUE AS NEEDED
Status: DISCONTINUED | OUTPATIENT
Start: 2018-05-11 | End: 2018-05-11 | Stop reason: SURG

## 2018-05-11 RX ORDER — EPHEDRINE SULFATE 50 MG/ML
5 INJECTION, SOLUTION INTRAVENOUS ONCE AS NEEDED
Status: DISCONTINUED | OUTPATIENT
Start: 2018-05-11 | End: 2018-05-11 | Stop reason: HOSPADM

## 2018-05-11 RX ORDER — HYDROCODONE BITARTRATE AND ACETAMINOPHEN 7.5; 325 MG/1; MG/1
1 TABLET ORAL 2 TIMES DAILY PRN
Qty: 60 TABLET | Refills: 0 | Status: SHIPPED | OUTPATIENT
Start: 2018-05-11 | End: 2018-06-07 | Stop reason: SDUPTHER

## 2018-05-11 RX ORDER — MIDAZOLAM HYDROCHLORIDE 1 MG/ML
INJECTION INTRAMUSCULAR; INTRAVENOUS AS NEEDED
Status: DISCONTINUED | OUTPATIENT
Start: 2018-05-11 | End: 2018-05-11 | Stop reason: SURG

## 2018-05-11 RX ORDER — DIPHENHYDRAMINE HYDROCHLORIDE 50 MG/ML
12.5 INJECTION INTRAMUSCULAR; INTRAVENOUS
Status: DISCONTINUED | OUTPATIENT
Start: 2018-05-11 | End: 2018-05-11 | Stop reason: HOSPADM

## 2018-05-11 RX ORDER — ACETAMINOPHEN 650 MG/1
650 SUPPOSITORY RECTAL ONCE AS NEEDED
Status: DISCONTINUED | OUTPATIENT
Start: 2018-05-11 | End: 2018-05-11 | Stop reason: HOSPADM

## 2018-05-11 RX ORDER — LIDOCAINE HYDROCHLORIDE 20 MG/ML
INJECTION, SOLUTION INFILTRATION; PERINEURAL AS NEEDED
Status: DISCONTINUED | OUTPATIENT
Start: 2018-05-11 | End: 2018-05-11 | Stop reason: SURG

## 2018-05-11 RX ORDER — FENTANYL CITRATE 50 UG/ML
INJECTION, SOLUTION INTRAMUSCULAR; INTRAVENOUS AS NEEDED
Status: DISCONTINUED | OUTPATIENT
Start: 2018-05-11 | End: 2018-05-11 | Stop reason: SURG

## 2018-05-11 RX ORDER — NALOXONE HCL 0.4 MG/ML
0.2 VIAL (ML) INJECTION AS NEEDED
Status: DISCONTINUED | OUTPATIENT
Start: 2018-05-11 | End: 2018-05-11 | Stop reason: HOSPADM

## 2018-05-11 RX ORDER — OXYCODONE AND ACETAMINOPHEN 7.5; 325 MG/1; MG/1
1-2 TABLET ORAL EVERY 4 HOURS PRN
Qty: 10 TABLET | Refills: 0 | Status: SHIPPED | OUTPATIENT
Start: 2018-05-11 | End: 2018-06-07 | Stop reason: SINTOL

## 2018-05-11 RX ORDER — LABETALOL HYDROCHLORIDE 5 MG/ML
5 INJECTION, SOLUTION INTRAVENOUS
Status: DISCONTINUED | OUTPATIENT
Start: 2018-05-11 | End: 2018-05-11 | Stop reason: HOSPADM

## 2018-05-11 RX ORDER — ACETAMINOPHEN 325 MG/1
650 TABLET ORAL ONCE AS NEEDED
Status: DISCONTINUED | OUTPATIENT
Start: 2018-05-11 | End: 2018-05-11 | Stop reason: HOSPADM

## 2018-05-11 RX ADMIN — FENTANYL CITRATE 25 MCG: 50 INJECTION, SOLUTION INTRAMUSCULAR; INTRAVENOUS at 17:18

## 2018-05-11 RX ADMIN — FENTANYL CITRATE 25 MCG: 50 INJECTION, SOLUTION INTRAMUSCULAR; INTRAVENOUS at 17:40

## 2018-05-11 RX ADMIN — SODIUM CHLORIDE, SODIUM GLUCONATE, SODIUM ACETATE, POTASSIUM CHLORIDE, AND MAGNESIUM CHLORIDE 1000 ML: 526; 502; 368; 37; 30 INJECTION, SOLUTION INTRAVENOUS at 14:14

## 2018-05-11 RX ADMIN — DEXAMETHASONE SODIUM PHOSPHATE 4 MG: 4 INJECTION, SOLUTION INTRAMUSCULAR; INTRAVENOUS at 17:40

## 2018-05-11 RX ADMIN — FENTANYL CITRATE 25 MCG: 50 INJECTION, SOLUTION INTRAMUSCULAR; INTRAVENOUS at 17:36

## 2018-05-11 RX ADMIN — PROPOFOL 150 MG: 10 INJECTION, EMULSION INTRAVENOUS at 17:13

## 2018-05-11 RX ADMIN — HYDROMORPHONE HYDROCHLORIDE 0.5 MG: 1 INJECTION, SOLUTION INTRAMUSCULAR; INTRAVENOUS; SUBCUTANEOUS at 18:17

## 2018-05-11 RX ADMIN — LEVOFLOXACIN 500 MG: 5 INJECTION, SOLUTION INTRAVENOUS at 17:16

## 2018-05-11 RX ADMIN — HYDROMORPHONE HYDROCHLORIDE 0.5 MG: 1 INJECTION, SOLUTION INTRAMUSCULAR; INTRAVENOUS; SUBCUTANEOUS at 18:11

## 2018-05-11 RX ADMIN — MIDAZOLAM 2 MG: 1 INJECTION INTRAMUSCULAR; INTRAVENOUS at 17:06

## 2018-05-11 RX ADMIN — ONDANSETRON 4 MG: 2 INJECTION INTRAMUSCULAR; INTRAVENOUS at 17:40

## 2018-05-11 RX ADMIN — LIDOCAINE HYDROCHLORIDE 80 MG: 20 INJECTION, SOLUTION INFILTRATION; PERINEURAL at 17:13

## 2018-05-11 NOTE — ANESTHESIA PREPROCEDURE EVALUATION
Anesthesia Evaluation     Patient summary reviewed and Nursing notes reviewed   NPO Solid Status: > 8 hours  NPO Liquid Status: > 8 hours           Airway   Mallampati: II  TM distance: >3 FB  Neck ROM: full  possible difficult intubation  Dental    (+) poor dentation    Pulmonary - normal exam    breath sounds clear to auscultation  (+) a smoker Current Smoked day of surgery, COPD severe, asthma, decreased breath sounds,   Cardiovascular - normal exam    Rhythm: regular  Rate: normal    (+) hyperlipidemia,   (-) murmur, carotid bruits      Neuro/Psych  (+) psychiatric history Anxiety,     GI/Hepatic/Renal/Endo    (+)  GERD well controlled,      Musculoskeletal     Abdominal    Substance History - negative use     OB/GYN negative ob/gyn ROS         Other   (+) arthritis                     Anesthesia Plan    ASA 3     general     intravenous induction   Anesthetic plan and risks discussed with patient and spouse/significant other.

## 2018-05-11 NOTE — PROGRESS NOTES
[facility]# [id number].

## 2018-05-11 NOTE — PROGRESS NOTES
Chief Complaint   Patient presents with   • Hyperlipidemia   • Osteoarthritis     Subjective   Mitch Rinaldi is a 57 y.o. male who presents to the office for follow-up and review of labs. He has impaired glucose tolerance and has been monitoring his dietary intake of sugars and carbohydrates.  He has hyperlipidemia and has been controlling this with diet.  He has GERD which is well controlled with Nexium.  He has osteoarthritis which causes chronic back and joint pain.  He takes Norco as needed for the pain.  He also takes ibuprofen to help with inflammation.  He uses Zanaflex as needed for muscle spasm.    At a previous visit I referred him to urology for a scrotal mass.  He was diagnosed with a spermatocele.  He is having this removed this afternoon by Dr. Groves.      Hyperlipidemia   Associated symptoms include leg pain. Pertinent negatives include no chest pain or shortness of breath.   Pain   Associated symptoms include arthralgias. Pertinent negatives include no abdominal pain, chest pain, chills, congestion, coughing, fatigue, fever, headaches, nausea, neck pain, rash, sore throat, vomiting or weakness.   Back Pain   This is a chronic problem. The current episode started more than 1 year ago. The problem occurs daily. The problem has been waxing and waning since onset. The pain is present in the lumbar spine. The quality of the pain is described as aching, burning, shooting and stabbing. The pain radiates to the right thigh. The pain is at a severity of 6/10. The pain is worse during the night. The symptoms are aggravated by bending, position, standing and twisting. Stiffness is present at night. Associated symptoms include leg pain. Pertinent negatives include no abdominal pain, bowel incontinence, chest pain, dysuria, fever, headaches, paresis, paresthesias, pelvic pain, perianal numbness, tingling, weakness or weight loss.     The following portions of the patient's history were reviewed and updated  "as appropriate: allergies, current medications, past family history, past medical history, past social history, past surgical history and problem list.    Review of Systems   Constitutional: Negative for chills, fatigue, fever and weight loss.   HENT: Negative for congestion, sneezing, sore throat and trouble swallowing.    Eyes: Negative for visual disturbance.   Respiratory: Negative for cough, chest tightness, shortness of breath and wheezing.    Cardiovascular: Negative for chest pain, palpitations and leg swelling.   Gastrointestinal: Negative for abdominal pain, bowel incontinence, constipation, diarrhea, nausea and vomiting.   Genitourinary: Negative for dysuria, frequency, pelvic pain and urgency.   Musculoskeletal: Positive for arthralgias and back pain. Negative for neck pain.   Skin: Negative for rash.   Neurological: Negative for dizziness, tingling, weakness, headaches and paresthesias.   Psychiatric/Behavioral:        Patient denies any feelings of depression and has not felt down, hopeless or lost interest in any activities.       Objective   Vitals:    05/11/18 0842   BP: 118/72   BP Location: Left arm   Patient Position: Sitting   Cuff Size: Adult   Pulse: 78   Weight: 68.9 kg (152 lb)   Height: 177.8 cm (70\")   PainSc:   4   PainLoc: Back     Physical Exam   Constitutional: He is oriented to person, place, and time. He appears well-developed and well-nourished. No distress.   HENT:   Head: Normocephalic and atraumatic.   Nose: Nose normal.   Mouth/Throat: Oropharynx is clear and moist. No oropharyngeal exudate.   Eyes: Conjunctivae and EOM are normal. Pupils are equal, round, and reactive to light. No scleral icterus.   Neck: Normal range of motion. Neck supple.   Cardiovascular: Normal rate, regular rhythm and normal heart sounds.  Exam reveals no gallop and no friction rub.    No murmur heard.  Pulmonary/Chest: Effort normal and breath sounds normal. No respiratory distress. He has no wheezes. He " has no rales.   Abdominal: Soft. Bowel sounds are normal. He exhibits no distension. There is no tenderness. There is no rebound and no guarding.   Musculoskeletal: He exhibits no edema.        Lumbar back: He exhibits decreased range of motion and pain.   Lymphadenopathy:     He has no cervical adenopathy.   Neurological: He is alert and oriented to person, place, and time. No cranial nerve deficit.   Skin: Skin is warm and dry. No rash noted.   Psychiatric: He has a normal mood and affect. His behavior is normal. Judgment and thought content normal.   Nursing note and vitals reviewed.      Assessment/Plan   Mitch was seen today for hyperlipidemia and osteoarthritis.    Diagnoses and all orders for this visit:    Impaired glucose tolerance    Mixed hyperlipidemia  -     CBC & Differential; Future  -     Comprehensive Metabolic Panel; Future  -     Lipid Panel; Future  -     T4, Free; Future  -     TSH; Future  -     Urinalysis With / Culture If Indicated - Urine, Clean Catch; Future    Primary osteoarthritis involving multiple joints  -     HYDROcodone-acetaminophen (NORCO) 7.5-325 MG per tablet; Take 1 tablet by mouth 2 (Two) Times a Day As Needed for Mild Pain  or Moderate Pain .    Gastroesophageal reflux disease without esophagitis    Screening for prostate cancer  -     PSA Screen; Future         Labs are reviewed with patient. Patient's glucose is slightly elevated.  Patient will continue to watch diet to help maintain control of the glucose.  Patient understands that there is an increased risk of developing diabetes in the future.  His lipids are controlled and he will continue with dietary management of the hyperlipidemia.  He will continue with Nexium for treatment of GERD.  He will continue with ibuprofen for treatment of the arthritic pain.  He will also continue with Norco up to twice a day as needed for flareups in the pain.    He will proceed with the surgery for removal of the spermatocele as  scheduled this afternoon.  He was asking about postoperative pain medication.  I informed him that if he is given any pain medication by the surgeon, to notify us and we will have him to hold the [place].  If the surgeon prefers him to take his Norco but with an increased frequency, he will notify us and may need a refill earlier than 30 days.    Patient understands the risks associated with this controlled medication, including tolerance and addiction.  Patient also agrees to only obtain this medication from me, and not from a another provider, unless that provider is covering for me in my absence.  Patient also agrees to be compliant in dosing, and not self adjust the dose of medication.  A signed controlled substance agreement is on file, and the patient has received a controlled substance education sheet at this a previous visit.  The patient has also signed a consent for treatment with a controlled substance as per [facility] policy. SOFI was obtained.        PHQ-2/PHQ-9 Depression Screening 5/11/2018   Little interest or pleasure in doing things 0   Feeling down, depressed, or hopeless 0   Trouble falling or staying asleep, or sleeping too much -   Feeling tired or having little energy -   Poor appetite or overeating -   Feeling bad about yourself - or that you are a failure or have let yourself or your family down -   Trouble concentrating on things, such as reading the newspaper or watching television -   Moving or speaking so slowly that other people could have noticed. Or the opposite - being so fidgety or restless that you have been moving around a lot more than usual -   Thoughts that you would be better off dead, or of hurting yourself in some way -   Total Score 0         Lab on 05/03/2018   Component Date Value Ref Range Status   • Glucose 05/03/2018 108* 70 - 100 mg/dL Final   • BUN 05/03/2018 19  8 - 25 mg/dL Final   • Creatinine 05/03/2018 0.90  0.40 - 1.30 mg/dL Final   • Sodium 05/03/2018 137   134 - 146 mmol/L Final   • Potassium 05/03/2018 3.6  3.4 - 5.4 mmol/L Final   • Chloride 05/03/2018 101  100 - 112 mmol/L Final   • CO2 05/03/2018 28.0  20.0 - 32.0 mmol/L Final   • Calcium 05/03/2018 9.0  8.4 - 10.8 mg/dL Final   • Total Protein 05/03/2018 7.5  6.7 - 8.2 g/dL Final   • Albumin 05/03/2018 4.40  3.20 - 5.50 g/dL Final   • ALT (SGPT) 05/03/2018 11  10 - 60 U/L Final   • AST (SGOT) 05/03/2018 18  10 - 60 U/L Final   • Alkaline Phosphatase 05/03/2018 76  15 - 121 U/L Final   • Total Bilirubin 05/03/2018 0.7  0.2 - 1.0 mg/dL Final   • eGFR Non African Amer 05/03/2018 86  56 - 130 mL/min/1.73 Final   • Globulin 05/03/2018 3.1  2.5 - 4.6 gm/dL Final   • A/G Ratio 05/03/2018 1.4  1.0 - 3.0 g/dL Final   • BUN/Creatinine Ratio 05/03/2018 21.1  7.0 - 25.0 Final   • Anion Gap 05/03/2018 8.0  5.0 - 15.0 mmol/L Final   • Total Cholesterol 05/03/2018 190  150 - 200 mg/dL Final   • Triglycerides 05/03/2018 99  35 - 160 mg/dL Final   • HDL Cholesterol 05/03/2018 37  35 - 100 mg/dL Final   • LDL Cholesterol  05/03/2018 133  mg/dL Final   • VLDL Cholesterol 05/03/2018 19.8  mg/dL Final   • LDL/HDL Ratio 05/03/2018 3.60   Final   • Free T4 05/03/2018 1.18  0.78 - 2.19 ng/dL Final   • TSH 05/03/2018 1.920  0.460 - 4.680 mIU/mL Final   • Color, UA 05/03/2018 Yellow  Yellow, Straw Final   • Appearance, UA 05/03/2018 Clear  Clear Final   • pH, UA 05/03/2018 5.5  5.5 - 8.0 Final   • Specific Gravity, UA 05/03/2018 1.020  1.005 - 1.030 Final   • Glucose, UA 05/03/2018 Negative  Negative Final   • Ketones, UA 05/03/2018 Negative  Negative Final   • Bilirubin, UA 05/03/2018 Negative  Negative Final   • Blood, UA 05/03/2018 Negative  Negative Final   • Protein, UA 05/03/2018 Negative  Negative Final   • Leuk Esterase, UA 05/03/2018 Negative  Negative Final   • Nitrite, UA 05/03/2018 Negative  Negative Final   • Urobilinogen, UA 05/03/2018 0.2 E.U./dL  0.2 - 1.0 E.U./dL Final   • Report Summary 05/08/2018 FINAL   Final     Comment: ====================================================================  [facility] SELECT 13 [facility]ANCH[facility]  ====================================================================  Test                             Result       Flag       Units  Drug Present    Hydrocodone                    232                     ng/mg creat    Hydromorphone                  69                      ng/mg creat    Dihydrocodeine                 49                      ng/mg creat    Norhydrocodone                 348                     ng/mg creat     Sources of hydrocodone include scheduled prescription     medications. Hydromorphone, dihydrocodeine and norhydrocodone are     expected metabolites of hydrocodone. Hydromorphone and     dihydrocodeine are also available as scheduled prescription     medications.  ====================================================================  Test                      Result    Flag   Units      Ref Range    Creatinine              105              mg/dL                                 >=20  ====================================================================  Declared Medications:   Medication list was not provided.  ====================================================================  For clinical consultation, please call [phone].  ====================================================================   • Ethanol Screen Urine (Ref) 05/08/2018 Negative   Final   • Ethanol, Urine 05/08/2018 Not Detected  g/dL Final   • Amphetamine, Urine Qual 05/08/2018 Negative   Final   • Methamphetamine, Urine 05/08/2018 Not Detected  ng/mg creat Final   • Amphetamine, Urine 05/08/2018 Not Detected  ng/mg creat Final   • MDMA URINE 05/08/2018 Not Detected  ng/mg creat Final   • MDA 05/08/2018 Not Detected  ng/mg creat Final   • Benzodiazepine Screen, Urine 05/08/2018 Negative   Final   • DIAZEPAM URINE QUANT (REF) 05/08/2018 Not Detected  ng/mg creat Final   • Desmethyldiazepam 05/08/2018 Not Detected   ng/mg creat Final   • Oxazepam, urine 05/08/2018 Not Detected  ng/mg creat Final   • Temazepam, urine 05/08/2018 Not Detected  ng/mg creat Final   • ALPRAZOLAM 05/08/2018 Not Detected  ng/mg creat Final   • ALPHA-HYDROXYALPRAZOLAM UR, QT (RE* 05/08/2018 Not Detected  ng/mg creat Final   • DESALKLFLURAZEPAM UR QUANT (REF) 05/08/2018 Not Detected  ng/mg creat Final   • LORAZEPAM URINE QUANT (REF) 05/08/2018 Not Detected  ng/mg creat Final   • Alpha-hydroxytriazolam, Urine 05/08/2018 Not Detected  ng/mg creat Final   • Clonazepam Urine 05/08/2018 Not Detected  ng/mg creat Final   • 7-Aminoclonazepam Urine 05/08/2018 Not Detected  ng/mg creat Final   • MIDAZOLAM UR, QUANT (REF) 05/08/2018 Not Detected  ng/mg creat Final   • Cocaine & Metabolite 05/08/2018 Negative   Final   • Cocaine Screen, Urine 05/08/2018 Not Detected  ng/mg creat Final   • Benzoylecgonine, Urine 05/08/2018 Not Detected  ng/mg creat Final   • Cocaethylene Ur 05/08/2018 Not Detected  ng/mg creat Final   • THC, Urine 05/08/2018 Negative   Final   • Carboxy THC, Urine 05/08/2018 Not Detected  ng/mg creat Final   • Opiate Class 05/08/2018 +POSITIVE+   Final   • Codeine, Urine 05/08/2018 Not Detected  ng/mg creat Final   • Morphine, Urine 05/08/2018 Not Detected  ng/mg creat Final   • Norcodeine Ur 05/08/2018 Not Detected  ng/mg creat Final   • Hydrocodone UR 05/08/2018 232  ng/mg creat Final   • Hydromorphone Urine 05/08/2018 69  ng/mg creat Final   • Dihydrocodeine, Urine 05/08/2018 49  ng/mg creat Final   • Opiates, Norhydrocodone, Urine 05/08/2018 348  ng/mg creat Final   • Oxycodone Class Ur 05/08/2018 Negative   Final   • Oxycodone, Confirmation, Urine 05/08/2018 Not Detected  ng/mg creat Final   • Oxymorphone UR 05/08/2018 Not Detected  ng/mg creat Final   • Opiates, Noroxycodone, Urine 05/08/2018 Not Detected  ng/mg creat Final   • Noroxymorphone 05/08/2018 Not Detected  ng/mg creat Final   • Methadone 05/08/2018 Negative   Final   • Methadone,  Quantitative 05/08/2018 Not Detected  ng/mg creat Final   • EDDP 05/08/2018 Not Detected  ng/mg creat Final   • Fentanyl and Analogues, Ur 05/08/2018 Negative   Final   • Fentanyl, Urine 05/08/2018 Not Detected  ng/mg creat Final   • Norfentanyl Urine 05/08/2018 Not Detected  ng/mg creat Final   • Sufentanil, Ur 05/08/2018 Not Detected  ng/mg creat Final   • Alfentanil, Ur 05/08/2018 Not Detected  ng/mg creat Final   • BUPRENORPHINE 05/08/2018 Negative   Final   • Buprenorphine, Urine 05/08/2018 Not Detected  ng/mg creat Final   • Norbuprenorphine 05/08/2018 Not Detected  ng/mg creat Final   • BARBITURATES, URINE 05/08/2018 Negative   Final   • Amobarbital, Urine 05/08/2018 Not Detected   Final   • Barbital 05/08/2018 Not Detected   Final   • Butabarbital, Ur 05/08/2018 Not Detected   Final   • Butalbital Screen, Urine 05/08/2018 Not Detected   Final   • Mephobarbital Urine 05/08/2018 Not Detected   Final   • Pentobarbital UR 05/08/2018 Not Detected   Final   • Phenobarbital 05/08/2018 Not Detected   Final   • Secobarbital, urine 05/08/2018 Not Detected   Final   • Thiopental, Ur 05/08/2018 Not Detected   Final   • Tapentadol 05/08/2018 Negative   Final   • Tapentadol Ur 05/08/2018 Not Detected  ng/mg creat Final   • Opoidss other, UR 05/08/2018 Negative   Final   • Tramadol, Urine 05/08/2018 Not Detected   Final   • O-desmethyltramadol, Ur 05/08/2018 Not Detected   Final   • N-Desmethyltramadol, U 05/08/2018 Not Detected   Final   • Creatinine, Urine 05/08/2018 105  mg/dL Final   • Declared Medications: 05/08/2018 Comment   Final   • Level of Detection: 05/08/2018 Comment   Final   • WBC 05/03/2018 6.53  3.20 - 9.80 10*3/mm3 Final   • RBC 05/03/2018 5.08  4.37 - 5.74 10*6/mm3 Final   • Hemoglobin 05/03/2018 15.6  13.7 - 17.3 g/dL Final   • Hematocrit 05/03/2018 46.1  39.0 - 49.0 % Final   • MCV 05/03/2018 90.7  80.0 - 98.0 fL Final   • MCH 05/03/2018 30.7  26.5 - 34.0 pg Final   • MCHC 05/03/2018 33.8  31.5 - 36.3  g/dL Final   • RDW 05/03/2018 13.2  11.5 - 14.5 % Final   • RDW-SD 05/03/2018 42.9  35.1 - 43.9 fl Final   • MPV 05/03/2018 10.6  8.0 - 12.0 fL Final   • Platelets 05/03/2018 185  150 - 450 10*3/mm3 Final   • Neutrophil % 05/03/2018 54.3  37.0 - 80.0 % Final   • Lymphocyte % 05/03/2018 33.2  10.0 - 50.0 % Final   • Monocyte % 05/03/2018 8.3  0.0 - 12.0 % Final   • Eosinophil % 05/03/2018 3.4  0.0 - 7.0 % Final   • Basophil % 05/03/2018 0.8  0.0 - 2.0 % Final   • Neutrophils, Absolute 05/03/2018 3.55  2.00 - 8.60 10*3/mm3 Final   • Lymphocytes, Absolute 05/03/2018 2.17  0.60 - 4.20 10*3/mm3 Final   • Monocytes, Absolute 05/03/2018 0.54  0.00 - 0.90 10*3/mm3 Final   • Eosinophils, Absolute 05/03/2018 0.22  0.00 - 0.70 10*3/mm3 Final   • Basophils, Absolute 05/03/2018 0.05  0.00 - 0.20 10*3/mm3 Final   ]

## 2018-05-11 NOTE — ANESTHESIA POSTPROCEDURE EVALUATION
Patient: Mitch Rinaldi    Procedure Summary     Date:  05/11/18 Room / Location:  [facility] OR 08 / [facility] OR    Anesthesia Start:  1708 Anesthesia Stop:  1758    Procedure:  LEFT SPERMATOCELECTOMY (Left ) Diagnosis:       Spermatocele      (Spermatocele [N43.40])    Surgeon:  Miles Groves MD Provider:  Oscar Mancera MD    Anesthesia Type:  general ASA Status:  3          Anesthesia Type: general  Last vitals  BP   110/75 (05/11/18 1401)   Temp   97.6 °F (36.4 °C) (05/11/18 1401)   Pulse   88 (05/11/18 1401)   Resp   18 (05/11/18 1401)     SpO2   95 % (05/11/18 1401)     Post Anesthesia Care and Evaluation    Patient location during evaluation: bedside  Patient participation: waiting for patient participation  Pain management: adequate  Airway patency: patent  Anesthetic complications: No anesthetic complications    Cardiovascular status: acceptable  Respiratory status: acceptable, nonlabored ventilation and unassisted  Hydration status: acceptable    Comments: LMA in place

## 2018-05-11 NOTE — OP NOTE
SPERMATOCELECTOMY  Procedure Note    Mitch Rinaldi  5/11/2018    Pre-op Diagnosis:   Spermatocele [N43.40]    Post-op Diagnosis:     Post-Op Diagnosis Codes:     * Spermatocele [N43.40]      Procedure(s):  LEFT SPERMATOCELECTOMY    Surgeon(s):  Miles Groves MD    Anesthesia: General    Staff:   Circulator: Azul Garibay RN; Rena Albright RN  Scrub Person: Leah D Sydpascale    Estimated Blood Loss: minimal    Specimens:                ID Type Source Tests Collected by Time   A : left spermatocele Tissue Scrotum TISSUE PATHOLOGY EXAM Miles Groves MD 5/11/2018 1749         Drains:      Findings: Left spermatocele    Complications: None    Indications: Discomfort    Description of Procedure: Patient brought to operating room placed in supine position prepped and draped in routine fashion of the genitalia.  First incision made over the testicle blunt sharp dissection brought the testicle and spermatocele out.  We opened the small hydrocele trimmed the edges with electrocautery and dissected out the spermatocele distal end of the epididymis placed proximal and distal 2-0 Vicryl ties and with electrocautery dissected away from the testicle itself.  Testicle good coloration we did a bottle procedure on the hydrocele remaining with interrupted 2-0 Vicryl suture and dropped the testicle back into the scrotum and did a running interlocking 2-0 Vicryl suture closed the skin edges and Dermabond its skin affix was applied.  Should be noted there was no active bleeding at the time of termination procedure    Miles Groves MD     Date: 5/11/2018  Time: 5:52 PM

## 2018-05-11 NOTE — DISCHARGE INSTRUCTIONS
Minor Surgery  Outpatient Instructions    General Information  You have had a minor surgical procedure and are not expected to require extensive treatment or a long recovery period.  However, the following information/instructions that are listed serve as guidelines to help you recover at home.    Activity:  No driving for 24hrs or while taking narcotic pain medication, do not lift/push/or pull over 10lbs until cleared by Doctor    Hygiene:  May shower, no tub baths until cleared by doctor    Dressing/Wound Care:  Change dressing daily    Diet:  Regular diet    Important Points:  -Call your doctor to report any of the following:   -unusual or excessive bleeding/drainage   -pain not reduced/controlled by medication   -elevated temperature consistently greater that 100 degrees F   -increased swelling, redness, bruising, or tenderness in surgical area  -Take medications as prescribed by your physician  -If you have any questions, please contact your doctor or the Same Day Surgery Unit at   [phone]  -If a post-operative emergency occurs, report to your nearest ER

## 2018-05-11 NOTE — ANESTHESIA PROCEDURE NOTES
Airway  Urgency: elective    Airway not difficult    General Information and Staff    Patient location during procedure: OR  CRNA: OSCAR CAREY    Indications and Patient Condition  Indications for airway management: airway protection    Preoxygenated: yes  MILS maintained throughout  Mask difficulty assessment: 0 - not attempted    Final Airway Details  Final airway type: supraglottic airway      Successful airway: I-gel  Size 4    Number of attempts at approach: 1

## 2018-05-15 LAB
LAB AP CASE REPORT: NORMAL
Lab: NORMAL
PATH REPORT.FINAL DX SPEC: NORMAL
PATH REPORT.GROSS SPEC: NORMAL

## 2018-06-07 DIAGNOSIS — M15.9 PRIMARY OSTEOARTHRITIS INVOLVING MULTIPLE JOINTS: Chronic | ICD-10-CM

## 2018-06-07 NOTE — TELEPHONE ENCOUNTER
"Patient is up to date on controlled medication consent, Long report, and appt from 05/11/2018. Will  Hydrocodone 7.5mg/325mg at [facility] Friday at 3pm.   Patient received Percocet from  for surgical pain on 05/11/2018 stated \"only took 2 tabs and it made my heart rate increase and flutter\".   "

## 2018-06-08 RX ORDER — HYDROCODONE BITARTRATE AND ACETAMINOPHEN 7.5; 325 MG/1; MG/1
1 TABLET ORAL 2 TIMES DAILY PRN
Qty: 60 TABLET | Refills: 0 | Status: SHIPPED | OUTPATIENT
Start: 2018-06-08 | End: 2018-07-06 | Stop reason: SDUPTHER

## 2018-07-03 DIAGNOSIS — M15.9 PRIMARY OSTEOARTHRITIS INVOLVING MULTIPLE JOINTS: Chronic | ICD-10-CM

## 2018-07-03 NOTE — TELEPHONE ENCOUNTER
Patient is up to date on controlled medication consent, Long report, and appt from 05/11/2018. Will  at [facility] Friday.

## 2018-07-06 RX ORDER — HYDROCODONE BITARTRATE AND ACETAMINOPHEN 7.5; 325 MG/1; MG/1
1 TABLET ORAL 2 TIMES DAILY PRN
Qty: 60 TABLET | Refills: 0 | Status: SHIPPED | OUTPATIENT
Start: 2018-07-06 | End: 2018-08-07 | Stop reason: SDUPTHER

## 2018-08-07 ENCOUNTER — OFFICE VISIT (OUTPATIENT)
Dept: FAMILY MEDICINE CLINIC | Facility: CLINIC | Age: 58
End: 2018-08-07

## 2018-08-07 VITALS
SYSTOLIC BLOOD PRESSURE: 120 MMHG | WEIGHT: 155 LBS | HEIGHT: 70 IN | HEART RATE: 88 BPM | DIASTOLIC BLOOD PRESSURE: 72 MMHG | BODY MASS INDEX: 22.19 KG/M2

## 2018-08-07 DIAGNOSIS — M15.9 PRIMARY OSTEOARTHRITIS INVOLVING MULTIPLE JOINTS: Primary | Chronic | ICD-10-CM

## 2018-08-07 DIAGNOSIS — G59 MONONEUROPATHY DUE TO UNDERLYING DISEASE: Chronic | ICD-10-CM

## 2018-08-07 DIAGNOSIS — K21.9 GASTROESOPHAGEAL REFLUX DISEASE WITHOUT ESOPHAGITIS: Chronic | ICD-10-CM

## 2018-08-07 PROCEDURE — 99214 OFFICE O/P EST MOD 30 MIN: CPT | Performed by: INTERNAL MEDICINE

## 2018-08-07 RX ORDER — HYDROCODONE BITARTRATE AND ACETAMINOPHEN 7.5; 325 MG/1; MG/1
1 TABLET ORAL 2 TIMES DAILY PRN
Qty: 60 TABLET | Refills: 0 | Status: SHIPPED | OUTPATIENT
Start: 2018-08-07 | End: 2018-09-04 | Stop reason: SDUPTHER

## 2018-08-07 NOTE — PROGRESS NOTES
Chief Complaint   Patient presents with   • Osteoarthritis   • Pain   • Med Refill     Subjective   Mitch Rinaldi is a 58 y.o. male who presents to the office for follow-up. He has osteoarthritis which causes chronic back and joint pain.  This mainly affects his low back.  He takes Norco for treatment of the back pain, and this is keeping it at a tolerable level.  He has a history of neuropathic pain, but does not currently require medication for this.  He also has muscle spasm in his back and takes Zanaflex as needed. He has GERD and takes Nexium as needed.  He occasionally has nausea which requires the use of Phenergan.    Since his last visit with me, he had surgery for removal of a spermatocele.  He has done well with this and denies any urinary problems.    The following portions of the patient's history were reviewed and updated as appropriate: allergies, current medications, past family history, past medical history, past social history, past surgical history and problem list.    Review of Systems   Constitutional: Negative for chills, fatigue and fever.   HENT: Negative for congestion, sinus pressure, sneezing, sore throat, tinnitus and trouble swallowing.    Eyes: Negative for visual disturbance.   Respiratory: Negative for cough, chest tightness, shortness of breath and wheezing.    Cardiovascular: Negative for chest pain, palpitations and leg swelling.   Gastrointestinal: Negative for abdominal pain, constipation, diarrhea, nausea and vomiting.   Genitourinary: Negative for dysuria, frequency and urgency.   Musculoskeletal: Positive for arthralgias and back pain. Negative for neck pain.   Skin: Negative for rash.   Neurological: Negative for dizziness, weakness and headaches.   Psychiatric/Behavioral:        Patient denies any feelings of depression and has not felt down, hopeless or lost interest in any activities.   All other systems reviewed and are negative.      Objective   Vitals:    08/07/18  "0810   BP: 120/72   BP Location: Left arm   Patient Position: Sitting   Cuff Size: Adult   Pulse: 88   Weight: 70.3 kg (155 lb)   Height: 177.8 cm (70\")   PainSc:   4   PainLoc: Back     Physical Exam   Constitutional: He is oriented to person, place, and time. He appears well-developed and well-nourished. No distress.   HENT:   Head: Normocephalic and atraumatic.   Nose: Nose normal.   Mouth/Throat: No oropharyngeal exudate.   Eyes: Pupils are equal, round, and reactive to light. Conjunctivae and EOM are normal. No scleral icterus.   Neck: Normal range of motion. Neck supple.   Cardiovascular: Normal rate, regular rhythm and normal heart sounds.  Exam reveals no gallop and no friction rub.    No murmur heard.  Pulmonary/Chest: Effort normal and breath sounds normal. No respiratory distress. He has no wheezes. He has no rales.   Abdominal: Soft. Bowel sounds are normal. He exhibits no distension. There is no tenderness. There is no rebound and no guarding.   Musculoskeletal: He exhibits no edema.        Lumbar back: He exhibits decreased range of motion and pain.   Lymphadenopathy:     He has no cervical adenopathy.   Neurological: He is alert and oriented to person, place, and time. No cranial nerve deficit.   Skin: Skin is warm and dry. No rash noted.   Psychiatric: He has a normal mood and affect. His behavior is normal. Judgment and thought content normal.   Nursing note and vitals reviewed.      Assessment/Plan   Mitch was seen today for osteoarthritis, pain and med refill.    Diagnoses and all orders for this visit:    Primary osteoarthritis involving multiple joints  -     HYDROcodone-acetaminophen (NORCO) 7.5-325 MG per tablet; Take 1 tablet by mouth 2 (Two) Times a Day As Needed for Mild Pain  or Moderate Pain .    Mononeuropathy due to underlying disease    Gastroesophageal reflux disease without esophagitis         He will continue with Nexium as needed for treatment of his GERD.  He will continue to use " ibuprofen for the arthritic inflammation, and the Zanaflex as needed for the muscle spasm.  He will continue with Norco twice a day for treatment of the musculoskeletal pain.    Patient understands the risks associated with this controlled medication, including tolerance and addiction.  Patient also agrees to only obtain this medication from me, and not from a another provider, unless that provider is covering for me in my absence.  Patient also agrees to be compliant in dosing, and not self adjust the dose of medication.  A signed controlled substance agreement is on file, and the patient has received a controlled substance education sheet at this a previous visit.  The patient has also signed a consent for treatment with a controlled substance as per [facility] policy. SOFI was obtained.    He is due for screening colonoscopy.  He wishes to hold off for now, but will plan to have this done within the next 4-6 months.  He will let me know when he is ready to schedule this.    PHQ-2/PHQ-9 Depression Screening 8/7/2018   Little interest or pleasure in doing things 0   Feeling down, depressed, or hopeless 0   Trouble falling or staying asleep, or sleeping too much -   Feeling tired or having little energy -   Poor appetite or overeating -   Feeling bad about yourself - or that you are a failure or have let yourself or your family down -   Trouble concentrating on things, such as reading the newspaper or watching television -   Moving or speaking so slowly that other people could have noticed. Or the opposite - being so fidgety or restless that you have been moving around a lot more than usual -   Thoughts that you would be better off dead, or of hurting yourself in some way -   Total Score 0

## 2018-08-09 NOTE — PROGRESS NOTES
SOFI# [id number].

## 2018-09-04 DIAGNOSIS — M15.9 PRIMARY OSTEOARTHRITIS INVOLVING MULTIPLE JOINTS: Chronic | ICD-10-CM

## 2018-09-04 RX ORDER — HYDROCODONE BITARTRATE AND ACETAMINOPHEN 7.5; 325 MG/1; MG/1
1 TABLET ORAL 2 TIMES DAILY PRN
Qty: 60 TABLET | Refills: 0 | Status: SHIPPED | OUTPATIENT
Start: 2018-09-04 | End: 2018-10-04 | Stop reason: SDUPTHER

## 2018-10-04 DIAGNOSIS — M15.9 PRIMARY OSTEOARTHRITIS INVOLVING MULTIPLE JOINTS: Chronic | ICD-10-CM

## 2018-10-04 NOTE — TELEPHONE ENCOUNTER
Patient is up to date on controlled medication consent, Long report, and appt from 08/07/2018. To [facility] Friday.

## 2018-10-05 RX ORDER — HYDROCODONE BITARTRATE AND ACETAMINOPHEN 7.5; 325 MG/1; MG/1
1 TABLET ORAL 2 TIMES DAILY PRN
Qty: 60 TABLET | Refills: 0 | Status: SHIPPED | OUTPATIENT
Start: 2018-10-05 | End: 2018-11-02 | Stop reason: SDUPTHER

## 2018-10-26 ENCOUNTER — LAB (OUTPATIENT)
Dept: LAB | Facility: OTHER | Age: 58
End: 2018-10-26

## 2018-10-26 DIAGNOSIS — E78.2 MIXED HYPERLIPIDEMIA: ICD-10-CM

## 2018-10-26 LAB
ALBUMIN SERPL-MCNC: 4.4 G/DL (ref 3.5–5)
ALBUMIN/GLOB SERPL: 1.2 G/DL (ref 1.1–1.8)
ALP SERPL-CCNC: 95 U/L (ref 38–126)
ALT SERPL W P-5'-P-CCNC: 12 U/L
ANION GAP SERPL CALCULATED.3IONS-SCNC: 10 MMOL/L (ref 5–15)
AST SERPL-CCNC: 25 U/L (ref 17–59)
BACTERIA UR QL AUTO: ABNORMAL /HPF
BASOPHILS # BLD MANUAL: 0.07 10*3/MM3 (ref 0–0.2)
BASOPHILS NFR BLD AUTO: 1 % (ref 0–2)
BILIRUB SERPL-MCNC: 0.8 MG/DL (ref 0.2–1.3)
BILIRUB UR QL STRIP: NEGATIVE
BUN BLD-MCNC: 20 MG/DL (ref 9–20)
BUN/CREAT SERPL: 19.8 (ref 7–25)
CALCIUM SPEC-SCNC: 9.4 MG/DL (ref 8.4–10.2)
CHLORIDE SERPL-SCNC: 104 MMOL/L (ref 98–107)
CHOLEST SERPL-MCNC: 176 MG/DL (ref 150–200)
CLARITY UR: CLEAR
CO2 SERPL-SCNC: 26 MMOL/L (ref 22–30)
COLOR UR: YELLOW
CREAT BLD-MCNC: 1.01 MG/DL (ref 0.66–1.25)
DEPRECATED RDW RBC AUTO: 41.7 FL (ref 35.1–43.9)
EOSINOPHIL # BLD MANUAL: 0.14 10*3/MM3 (ref 0–0.7)
EOSINOPHIL NFR BLD MANUAL: 2 % (ref 0–7)
ERYTHROCYTE [DISTWIDTH] IN BLOOD BY AUTOMATED COUNT: 12.9 % (ref 11.5–14.5)
GFR SERPL CREATININE-BSD FRML MDRD: 75 ML/MIN/1.73 (ref 49–113)
GLOBULIN UR ELPH-MCNC: 3.6 GM/DL (ref 2.3–3.5)
GLUCOSE BLD-MCNC: 108 MG/DL (ref 74–99)
GLUCOSE UR STRIP-MCNC: NEGATIVE MG/DL
HCT VFR BLD AUTO: 45.1 % (ref 39–49)
HDLC SERPL-MCNC: 38 MG/DL (ref 40–59)
HGB BLD-MCNC: 15.5 G/DL (ref 13.7–17.3)
HGB UR QL STRIP.AUTO: ABNORMAL
HYALINE CASTS UR QL AUTO: ABNORMAL /LPF
KETONES UR QL STRIP: NEGATIVE
LDLC SERPL CALC-MCNC: 118 MG/DL
LDLC/HDLC SERPL: 3.12 {RATIO} (ref 0–3.55)
LEUKOCYTE ESTERASE UR QL STRIP.AUTO: NEGATIVE
LYMPHOCYTES # BLD MANUAL: 1.94 10*3/MM3 (ref 0.6–4.2)
LYMPHOCYTES NFR BLD MANUAL: 10 % (ref 0–12)
LYMPHOCYTES NFR BLD MANUAL: 28 % (ref 10–50)
MCH RBC QN AUTO: 30.8 PG (ref 26.5–34)
MCHC RBC AUTO-ENTMCNC: 34.4 G/DL (ref 31.5–36.3)
MCV RBC AUTO: 89.5 FL (ref 80–98)
MONOCYTES # BLD AUTO: 0.69 10*3/MM3 (ref 0–0.9)
MUCOUS THREADS URNS QL MICRO: ABNORMAL /HPF
NEUTROPHILS # BLD AUTO: 4.09 10*3/MM3 (ref 2–8.6)
NEUTROPHILS NFR BLD MANUAL: 59 % (ref 37–80)
NITRITE UR QL STRIP: NEGATIVE
PH UR STRIP.AUTO: 6 [PH] (ref 5.5–8)
PLATELET # BLD AUTO: 167 10*3/MM3 (ref 150–450)
PMV BLD AUTO: 10.3 FL (ref 8–12)
POTASSIUM BLD-SCNC: 4.1 MMOL/L (ref 3.4–5)
PROT SERPL-MCNC: 8 G/DL (ref 6.3–8.2)
PROT UR QL STRIP: NEGATIVE
RBC # BLD AUTO: 5.04 10*6/MM3 (ref 4.37–5.74)
RBC # UR: ABNORMAL /HPF
RBC MORPH BLD: NORMAL
REF LAB TEST METHOD: ABNORMAL
SMALL PLATELETS BLD QL SMEAR: ADEQUATE
SODIUM BLD-SCNC: 140 MMOL/L (ref 137–145)
SP GR UR STRIP: 1.02 (ref 1–1.03)
SQUAMOUS #/AREA URNS HPF: ABNORMAL /HPF
T4 FREE SERPL-MCNC: 1.46 NG/DL (ref 0.78–2.19)
TRIGL SERPL-MCNC: 98 MG/DL
TSH SERPL DL<=0.05 MIU/L-ACNC: 1.32 MIU/ML (ref 0.46–4.68)
UROBILINOGEN UR QL STRIP: ABNORMAL
VLDLC SERPL-MCNC: 19.6 MG/DL
WBC MORPH BLD: NORMAL
WBC NRBC COR # BLD: 6.93 10*3/MM3 (ref 3.2–9.8)
WBC UR QL AUTO: ABNORMAL /HPF

## 2018-10-26 PROCEDURE — 84439 ASSAY OF FREE THYROXINE: CPT | Performed by: INTERNAL MEDICINE

## 2018-10-26 PROCEDURE — 80061 LIPID PANEL: CPT | Performed by: INTERNAL MEDICINE

## 2018-10-26 PROCEDURE — 81001 URINALYSIS AUTO W/SCOPE: CPT | Performed by: INTERNAL MEDICINE

## 2018-10-26 PROCEDURE — 84443 ASSAY THYROID STIM HORMONE: CPT | Performed by: INTERNAL MEDICINE

## 2018-10-26 PROCEDURE — 85025 COMPLETE CBC W/AUTO DIFF WBC: CPT | Performed by: INTERNAL MEDICINE

## 2018-10-26 PROCEDURE — 36415 COLL VENOUS BLD VENIPUNCTURE: CPT | Performed by: INTERNAL MEDICINE

## 2018-10-26 PROCEDURE — 80053 COMPREHEN METABOLIC PANEL: CPT | Performed by: INTERNAL MEDICINE

## 2018-11-02 ENCOUNTER — OFFICE VISIT (OUTPATIENT)
Dept: FAMILY MEDICINE CLINIC | Facility: CLINIC | Age: 58
End: 2018-11-02

## 2018-11-02 VITALS
SYSTOLIC BLOOD PRESSURE: 122 MMHG | HEART RATE: 94 BPM | DIASTOLIC BLOOD PRESSURE: 80 MMHG | TEMPERATURE: 97.4 F | HEIGHT: 70 IN | BODY MASS INDEX: 22.76 KG/M2 | WEIGHT: 159 LBS

## 2018-11-02 DIAGNOSIS — Z12.5 SCREENING FOR PROSTATE CANCER: ICD-10-CM

## 2018-11-02 DIAGNOSIS — Z79.899 DRUG THERAPY: ICD-10-CM

## 2018-11-02 DIAGNOSIS — K21.9 GASTROESOPHAGEAL REFLUX DISEASE WITHOUT ESOPHAGITIS: Chronic | ICD-10-CM

## 2018-11-02 DIAGNOSIS — R73.02 IMPAIRED GLUCOSE TOLERANCE: Primary | Chronic | ICD-10-CM

## 2018-11-02 DIAGNOSIS — M15.9 PRIMARY OSTEOARTHRITIS INVOLVING MULTIPLE JOINTS: Chronic | ICD-10-CM

## 2018-11-02 DIAGNOSIS — E78.2 MIXED HYPERLIPIDEMIA: Chronic | ICD-10-CM

## 2018-11-02 DIAGNOSIS — Z23 PNEUMOCOCCAL VACCINATION GIVEN: ICD-10-CM

## 2018-11-02 PROBLEM — N43.40 SPERMATOCELE: Status: RESOLVED | Noted: 2018-04-25 | Resolved: 2018-11-02

## 2018-11-02 PROCEDURE — 90471 IMMUNIZATION ADMIN: CPT | Performed by: INTERNAL MEDICINE

## 2018-11-02 PROCEDURE — 99214 OFFICE O/P EST MOD 30 MIN: CPT | Performed by: INTERNAL MEDICINE

## 2018-11-02 PROCEDURE — 90732 PPSV23 VACC 2 YRS+ SUBQ/IM: CPT | Performed by: INTERNAL MEDICINE

## 2018-11-02 RX ORDER — HYDROCODONE BITARTRATE AND ACETAMINOPHEN 7.5; 325 MG/1; MG/1
1 TABLET ORAL 2 TIMES DAILY PRN
Qty: 60 TABLET | Refills: 0 | Status: SHIPPED | OUTPATIENT
Start: 2018-11-02 | End: 2018-11-29 | Stop reason: SDUPTHER

## 2018-11-02 NOTE — PROGRESS NOTES
"Chief Complaint   Patient presents with   • Hyperlipidemia   • Osteoarthritis     Subjective   Mitch Rinaldi is a 58 y.o. male who presents to the office for follow-up and review of labs. He has impaired glucose tolerance and has been monitoring his dietary intake of sugars and carbohydrates.  He has hyperlipidemia and has been controlling this with diet.  He has GERD which is well controlled with Nexium.  He has osteoarthritis which causes chronic back and joint pain.  He takes Norco as needed for the pain.  He also takes ibuprofen to help with inflammation.  He uses Zanaflex as needed for muscle spasm.    HPI  The following portions of the patient's history were reviewed and updated as appropriate: allergies, current medications, past family history, past medical history, past social history, past surgical history and problem list.    Review of Systems   Constitutional: Negative for chills, fatigue and fever.   HENT: Negative for congestion, sneezing, sore throat and trouble swallowing.    Eyes: Negative for visual disturbance.   Respiratory: Negative for cough, chest tightness, shortness of breath and wheezing.    Cardiovascular: Negative for chest pain, palpitations and leg swelling.   Gastrointestinal: Negative for abdominal pain, constipation, diarrhea, nausea and vomiting.   Genitourinary: Negative for dysuria, frequency and urgency.   Musculoskeletal: Negative for neck pain.   Skin: Negative for rash.   Neurological: Negative for dizziness, weakness and headaches.   Psychiatric/Behavioral:        Patient denies any feelings of depression and has not felt down, hopeless or lost interest in any activities.   All other systems reviewed and are negative.      Objective   Vitals:    11/02/18 0806   BP: 122/80   BP Location: Left arm   Patient Position: Sitting   Cuff Size: Adult   Pulse: 94   Temp: 97.4 °F (36.3 °C)   TempSrc: Oral   Weight: 72.1 kg (159 lb)   Height: 177.8 cm (70\")   PainSc: 4  Comment: Lower " back   PainLoc: Back     Physical Exam   Constitutional: He is oriented to person, place, and time. He appears well-developed and well-nourished. No distress.   HENT:   Head: Normocephalic and atraumatic.   Nose: Nose normal.   Mouth/Throat: Oropharynx is clear and moist. No oropharyngeal exudate.   Eyes: Pupils are equal, round, and reactive to light. Conjunctivae and EOM are normal. No scleral icterus.   Neck: Normal range of motion. Neck supple.   Cardiovascular: Normal rate, regular rhythm and normal heart sounds.  Exam reveals no gallop and no friction rub.    No murmur heard.  Pulmonary/Chest: Effort normal and breath sounds normal. No respiratory distress. He has no wheezes. He has no rales.   Abdominal: Soft. Bowel sounds are normal. He exhibits no distension. There is no tenderness. There is no rebound and no guarding.   Musculoskeletal: He exhibits no edema.        Lumbar back: He exhibits decreased range of motion and pain.   Lymphadenopathy:     He has no cervical adenopathy.   Neurological: He is alert and oriented to person, place, and time. No cranial nerve deficit.   Skin: Skin is warm and dry. No rash noted.   Psychiatric: He has a normal mood and affect. His behavior is normal. Judgment and thought content normal.   Nursing note and vitals reviewed.      Assessment/Plan   Mitch was seen today for hyperlipidemia and osteoarthritis.    Diagnoses and all orders for this visit:    Impaired glucose tolerance    Mixed hyperlipidemia  -     CBC & Differential; Future  -     Comprehensive Metabolic Panel; Future  -     Lipid Panel; Future  -     T4, Free; Future  -     TSH; Future  -     Urinalysis With Culture If Indicated - Urine, Clean Catch; Future    Gastroesophageal reflux disease without esophagitis    Primary osteoarthritis involving multiple joints  -     HYDROcodone-acetaminophen (NORCO) 7.5-325 MG per tablet; Take 1 tablet by mouth 2 (Two) Times a Day As Needed for Mild Pain  or Moderate Pain  .    Screening for prostate cancer  -     PSA Screen; Future    Drug therapy  -     ToxASSURE Select 13 Discrete -; Future    Pneumococcal vaccination given  -     Pneumococcal Polysaccharide Vaccine 23-Valent Greater Than or Equal To 1yo Subcutaneous / IM         Labs are reviewed with patient. His glucose is slightly elevated.  He will continue to watch diet to help maintain control of the glucose.  He understands that there is an increased risk of developing diabetes in the future.  His lipids are controlled and he will continue with dietary management of the hyperlipidemia.  He will continue with Nexium for treatment of GERD.  He will continue with ibuprofen for treatment of his arthritic pain.  He will also continue with Norco up to twice a day as needed for flareups in the pain.  He has an appointment scheduled with Dr. Rodriguez for a colonoscopy, and will have this done within the next few months.    Patient understands the risks associated with this controlled medication, including tolerance and addiction.  Patient also agrees to only obtain this medication from me, and not from a another provider, unless that provider is covering for me in my absence.  Patient also agrees to be compliant in dosing, and not self adjust the dose of medication.  A signed controlled substance agreement is on file, and the patient has received a controlled substance education sheet at this a previous visit.  The patient has also signed a consent for treatment with a controlled substance as per [facility] policy. SOFI was obtained.    He is given a Pneumovax 23 today.        PHQ-2/PHQ-9 Depression Screening 11/2/2018   Little interest or pleasure in doing things 0   Feeling down, depressed, or hopeless 0   Trouble falling or staying asleep, or sleeping too much -   Feeling tired or having little energy -   Poor appetite or overeating -   Feeling bad about yourself - or that you are a failure or have let yourself or your  family down -   Trouble concentrating on things, such as reading the newspaper or watching television -   Moving or speaking so slowly that other people could have noticed. Or the opposite - being so fidgety or restless that you have been moving around a lot more than usual -   Thoughts that you would be better off dead, or of hurting yourself in some way -   Total Score 0         Lab on 10/26/2018   Component Date Value Ref Range Status   • Color, UA 10/26/2018 Yellow  Yellow, Straw Final   • Appearance, UA 10/26/2018 Clear  Clear Final   • pH, UA 10/26/2018 6.0  5.5 - 8.0 Final   • Specific Gravity, UA 10/26/2018 1.020  1.005 - 1.030 Final   • Glucose, UA 10/26/2018 Negative  Negative Final   • Ketones, UA 10/26/2018 Negative  Negative Final   • Bilirubin, UA 10/26/2018 Negative  Negative Final   • Blood, UA 10/26/2018 Trace* Negative Final   • Protein, UA 10/26/2018 Negative  Negative Final   • Leuk Esterase, UA 10/26/2018 Negative  Negative Final   • Nitrite, UA 10/26/2018 Negative  Negative Final   • Urobilinogen, UA 10/26/2018 0.2 E.U./dL  0.2 - 1.0 E.U./dL Final   • Glucose 10/26/2018 108* 74 - 99 mg/dL Final   • BUN 10/26/2018 20  9 - 20 mg/dL Final   • Creatinine 10/26/2018 1.01  0.66 - 1.25 mg/dL Final   • Sodium 10/26/2018 140  137 - 145 mmol/L Final   • Potassium 10/26/2018 4.1  3.4 - 5.0 mmol/L Final   • Chloride 10/26/2018 104  98 - 107 mmol/L Final   • CO2 10/26/2018 26.0  22.0 - 30.0 mmol/L Final   • Calcium 10/26/2018 9.4  8.4 - 10.2 mg/dL Final   • Total Protein 10/26/2018 8.0  6.3 - 8.2 g/dL Final   • Albumin 10/26/2018 4.40  3.50 - 5.00 g/dL Final   • ALT (SGPT) 10/26/2018 12  <=50 U/L Final   • AST (SGOT) 10/26/2018 25  17 - 59 U/L Final   • Alkaline Phosphatase 10/26/2018 95  38 - 126 U/L Final   • Total Bilirubin 10/26/2018 0.8  0.2 - 1.3 mg/dL Final   • eGFR Non African Amer 10/26/2018 75  49 - 113 mL/min/1.73 Final   • Globulin 10/26/2018 3.6* 2.3 - 3.5 gm/dL Final   • A/G Ratio 10/26/2018  1.2  1.1 - 1.8 g/dL Final   • BUN/Creatinine Ratio 10/26/2018 19.8  7.0 - 25.0 Final   • Anion Gap 10/26/2018 10.0  5.0 - 15.0 mmol/L Final   • Total Cholesterol 10/26/2018 176  150 - 200 mg/dL Final   • Triglycerides 10/26/2018 98  <=150 mg/dL Final   • HDL Cholesterol 10/26/2018 38* 40 - 59 mg/dL Final   • LDL Cholesterol  10/26/2018 118* <=100 mg/dL Final   • VLDL Cholesterol 10/26/2018 19.6  mg/dL Final   • LDL/HDL Ratio 10/26/2018 3.12  0.00 - 3.55 Final   • Free T4 10/26/2018 1.46  0.78 - 2.19 ng/dL Final   • TSH 10/26/2018 1.320  0.460 - 4.680 mIU/mL Final   • WBC 10/26/2018 6.93  3.20 - 9.80 10*3/mm3 Final   • RBC 10/26/2018 5.04  4.37 - 5.74 10*6/mm3 Final   • Hemoglobin 10/26/2018 15.5  13.7 - 17.3 g/dL Final   • Hematocrit 10/26/2018 45.1  39.0 - 49.0 % Final   • MCV 10/26/2018 89.5  80.0 - 98.0 fL Final   • MCH 10/26/2018 30.8  26.5 - 34.0 pg Final   • MCHC 10/26/2018 34.4  31.5 - 36.3 g/dL Final   • RDW 10/26/2018 12.9  11.5 - 14.5 % Final   • RDW-SD 10/26/2018 41.7  35.1 - 43.9 fl Final   • MPV 10/26/2018 10.3  8.0 - 12.0 fL Final   • Platelets 10/26/2018 167  150 - 450 10*3/mm3 Final   • RBC, UA 10/26/2018 0-2* None Seen /HPF Final   • WBC, UA 10/26/2018 None Seen  None Seen /HPF Final   • Bacteria, UA 10/26/2018 None Seen  None Seen /HPF Final   • Squamous Epithelial Cells, UA 10/26/2018 0-2  None Seen, 0-2 /HPF Final   • Hyaline Casts, UA 10/26/2018 None Seen  None Seen /LPF Final   • Mucus, UA 10/26/2018 Small/1+* None Seen, Trace /HPF Final   • Methodology 10/26/2018 Manual Light Microscopy   Final   • Neutrophil % 10/26/2018 59.0  37.0 - 80.0 % Final   • Lymphocyte % 10/26/2018 28.0  10.0 - 50.0 % Final   • Monocyte % 10/26/2018 10.0  0.0 - 12.0 % Final   • Eosinophil % 10/26/2018 2.0  0.0 - 7.0 % Final   • Basophil % 10/26/2018 1.0  0.0 - 2.0 % Final   • Neutrophils Absolute 10/26/2018 4.09  2.00 - 8.60 10*3/mm3 Final   • Lymphocytes Absolute 10/26/2018 1.94  0.60 - 4.20 10*3/mm3 Final   •  Monocytes Absolute 10/26/2018 0.69  0.00 - 0.90 10*3/mm3 Final   • Eosinophils Absolute 10/26/2018 0.14  0.00 - 0.70 10*3/mm3 Final   • Basophils Absolute 10/26/2018 0.07  0.00 - 0.20 10*3/mm3 Final   • RBC Morphology 10/26/2018 Normal  Normal Final   • WBC Morphology 10/26/2018 Normal  Normal Final   • Platelet Estimate 10/26/2018 Adequate  Normal Final   ].

## 2018-11-02 NOTE — PROGRESS NOTES
[facility]#[id number].

## 2018-11-29 DIAGNOSIS — M15.9 PRIMARY OSTEOARTHRITIS INVOLVING MULTIPLE JOINTS: Chronic | ICD-10-CM

## 2018-11-29 NOTE — TELEPHONE ENCOUNTER
Patient is up to date on controlled medication consent, Long report, and appt from 11/02/2018. Refill on Hydrocodone to [facility] Friday at 3pm.

## 2018-11-30 RX ORDER — HYDROCODONE BITARTRATE AND ACETAMINOPHEN 7.5; 325 MG/1; MG/1
1 TABLET ORAL 2 TIMES DAILY PRN
Qty: 60 TABLET | Refills: 0 | Status: SHIPPED | OUTPATIENT
Start: 2018-11-30 | End: 2018-12-27 | Stop reason: SDUPTHER

## 2018-12-27 DIAGNOSIS — M15.9 PRIMARY OSTEOARTHRITIS INVOLVING MULTIPLE JOINTS: Chronic | ICD-10-CM

## 2018-12-27 RX ORDER — HYDROCODONE BITARTRATE AND ACETAMINOPHEN 7.5; 325 MG/1; MG/1
1 TABLET ORAL 2 TIMES DAILY PRN
Qty: 60 TABLET | Refills: 0 | Status: SHIPPED | OUTPATIENT
Start: 2018-12-27 | End: 2019-01-24 | Stop reason: SDUPTHER

## 2018-12-27 NOTE — TELEPHONE ENCOUNTER
Patient is up to date on controlled medication consent, Long report, and appt from 11/02/2018. Refill on [facility] pharmacy Friday at 3pm.

## 2019-01-14 ENCOUNTER — ANESTHESIA (OUTPATIENT)
Dept: GASTROENTEROLOGY | Facility: HOSPITAL | Age: 59
End: 2019-01-14

## 2019-01-14 ENCOUNTER — ANESTHESIA EVENT (OUTPATIENT)
Dept: GASTROENTEROLOGY | Facility: HOSPITAL | Age: 59
End: 2019-01-14

## 2019-01-14 ENCOUNTER — HOSPITAL ENCOUNTER (OUTPATIENT)
Facility: HOSPITAL | Age: 59
Setting detail: HOSPITAL OUTPATIENT SURGERY
Discharge: HOME | End: 2019-01-14
Attending: INTERNAL MEDICINE | Admitting: INTERNAL MEDICINE

## 2019-01-14 VITALS
RESPIRATION RATE: 16 BRPM | BODY MASS INDEX: 21.47 KG/M2 | OXYGEN SATURATION: 99 % | WEIGHT: 150 LBS | DIASTOLIC BLOOD PRESSURE: 71 MMHG | TEMPERATURE: 97.8 F | SYSTOLIC BLOOD PRESSURE: 123 MMHG | HEART RATE: 71 BPM | HEIGHT: 70 IN

## 2019-01-14 DIAGNOSIS — Z12.11 SCREEN FOR COLON CANCER: ICD-10-CM

## 2019-01-14 PROCEDURE — 25010000002 PROPOFOL 10 MG/ML EMULSION: Performed by: NURSE ANESTHETIST, CERTIFIED REGISTERED

## 2019-01-14 PROCEDURE — 88305 TISSUE EXAM BY PATHOLOGIST: CPT | Performed by: PATHOLOGY

## 2019-01-14 PROCEDURE — 88305 TISSUE EXAM BY PATHOLOGIST: CPT | Performed by: INTERNAL MEDICINE

## 2019-01-14 RX ORDER — DEXAMETHASONE SODIUM PHOSPHATE 4 MG/ML
8 INJECTION, SOLUTION INTRA-ARTICULAR; INTRALESIONAL; INTRAMUSCULAR; INTRAVENOUS; SOFT TISSUE ONCE AS NEEDED
Status: DISCONTINUED | OUTPATIENT
Start: 2019-01-14 | End: 2019-01-14 | Stop reason: HOSPADM

## 2019-01-14 RX ORDER — ONDANSETRON 2 MG/ML
4 INJECTION INTRAMUSCULAR; INTRAVENOUS ONCE AS NEEDED
Status: DISCONTINUED | OUTPATIENT
Start: 2019-01-14 | End: 2019-01-14 | Stop reason: HOSPADM

## 2019-01-14 RX ORDER — DEXTROSE AND SODIUM CHLORIDE 5; .45 G/100ML; G/100ML
20 INJECTION, SOLUTION INTRAVENOUS CONTINUOUS
Status: DISCONTINUED | OUTPATIENT
Start: 2019-01-14 | End: 2019-01-14 | Stop reason: HOSPADM

## 2019-01-14 RX ORDER — PROMETHAZINE HYDROCHLORIDE 25 MG/ML
12.5 INJECTION, SOLUTION INTRAMUSCULAR; INTRAVENOUS ONCE AS NEEDED
Status: DISCONTINUED | OUTPATIENT
Start: 2019-01-14 | End: 2019-01-14 | Stop reason: HOSPADM

## 2019-01-14 RX ORDER — PROMETHAZINE HYDROCHLORIDE 25 MG/1
25 TABLET ORAL ONCE AS NEEDED
Status: DISCONTINUED | OUTPATIENT
Start: 2019-01-14 | End: 2019-01-14 | Stop reason: HOSPADM

## 2019-01-14 RX ORDER — PROMETHAZINE HYDROCHLORIDE 25 MG/1
25 SUPPOSITORY RECTAL ONCE AS NEEDED
Status: DISCONTINUED | OUTPATIENT
Start: 2019-01-14 | End: 2019-01-14 | Stop reason: HOSPADM

## 2019-01-14 RX ORDER — LIDOCAINE HYDROCHLORIDE 10 MG/ML
INJECTION, SOLUTION INFILTRATION; PERINEURAL AS NEEDED
Status: DISCONTINUED | OUTPATIENT
Start: 2019-01-14 | End: 2019-01-14 | Stop reason: SURG

## 2019-01-14 RX ORDER — PROPOFOL 10 MG/ML
VIAL (ML) INTRAVENOUS AS NEEDED
Status: DISCONTINUED | OUTPATIENT
Start: 2019-01-14 | End: 2019-01-14 | Stop reason: SURG

## 2019-01-14 RX ADMIN — PROPOFOL 100 MG: 10 INJECTION, EMULSION INTRAVENOUS at 14:18

## 2019-01-14 RX ADMIN — PROPOFOL 50 MG: 10 INJECTION, EMULSION INTRAVENOUS at 14:40

## 2019-01-14 RX ADMIN — PROPOFOL 50 MG: 10 INJECTION, EMULSION INTRAVENOUS at 14:25

## 2019-01-14 RX ADMIN — PROPOFOL 50 MG: 10 INJECTION, EMULSION INTRAVENOUS at 14:20

## 2019-01-14 RX ADMIN — LIDOCAINE HYDROCHLORIDE 100 MG: 10 INJECTION, SOLUTION INFILTRATION; PERINEURAL at 14:18

## 2019-01-14 RX ADMIN — PROPOFOL 50 MG: 10 INJECTION, EMULSION INTRAVENOUS at 14:30

## 2019-01-14 RX ADMIN — DEXTROSE AND SODIUM CHLORIDE 20 ML/HR: 5; 450 INJECTION, SOLUTION INTRAVENOUS at 13:09

## 2019-01-14 RX ADMIN — PROPOFOL 50 MG: 10 INJECTION, EMULSION INTRAVENOUS at 14:36

## 2019-01-14 NOTE — H&P
Deon Bernabe DO,[facility]  Gastroenterology  Hepatology  Endoscopy  Board Certified in Internal Medicine and gastroenterology  [address], [address]  [place]. [address]  - [phone]   F - [phone]     GASTROENTEROLOGY HISTORY AND PHYSICAL  NOTE   DEON BERNABE DO.         SUBJECTIVE:   1/14/2019    Name: Mitch Rinaldi  DOD: 1960        Chief Complaint:       Subjective : Screening for colon cancer    Patient is 58 y.o. male presents with desire for elective colonoscopy.      ROS/HISTORY/ CURRENT MEDICATIONS/OBJECTIVE/VS/PE:   Review of Systems:  All systems unremarkable unless specified below.  Constitutional   HENT  Eyes   Respiratory    Cardiovascular  Gastrointestinal   Endocrine  Genitourinary    Musculoskeletal   Skin  Allergic/Immunologic    Neurological    Hematological  Psychiatric/Behavioral    History:     Past Medical History:   Diagnosis Date   • Acute gastroenteritis    • Allergic    • Anxiety    • Asthma     as a  child   • Diverticulitis of colon    • Emphysema lung (CMS/HCC)    • Epididymal cyst    • Gastroesophageal reflux disease    • Generalized anxiety disorder    • Hyperlipidemia    • Impaired glucose tolerance    • Pain, lumbar region     Pain radiating to lumbar region of back      • Peripheral nervous system disease     Disorder of the peripheral nervous system      • Pleuritic chest pain    • Pneumonia    • Primary osteoarthritis involving multiple joints 8/29/2016   • Shoulder pain, left    • Spermatocele 4/25/2018    Added automatically from request for surgery [id number]   • Tinnitus    • Wears glasses    • Wears partial dentures      Past Surgical History:   Procedure Laterality Date   • CHOLECYSTECTOMY     • DENTAL PROCEDURE     • ENDOSCOPY AND COLONOSCOPY     • EPIDIDYMIS SURGERY Right     cyst   • HERNIA REPAIR  Feb 2017   • INGUINAL HERNIA REPAIR Left 2/6/2017    Procedure: OPEN LEFT INGUINAL HERNIA REPAIR WITH MESH;  Surgeon: Miles Gregorio,  MD;  Location: [facility] OR;  Service:    • INJECTION OF MEDICATION      Depo Medrol (Methylprednisone) 80mg (8)      09/01/2015       • INJECTION OF MEDICATION      Kenalog (1)      12/11/2012       • LUNG SURGERY       Pneumothorax 1980       • SPERMATOCELECTOMY Left 5/11/2018    Procedure: LEFT SPERMATOCELECTOMY;  Surgeon: Miles Groves MD;  Location: [facility];  Service: Urology     Family History   Problem Relation Age of Onset   • Dementia Mother    • Hypertension Mother    • Thyroid disease Mother    • Arthritis Mother    • Diabetes Father    • Stomach cancer Father    • Cancer Father         stomach   • Thyroid disease Brother    • Down syndrome Brother    • COPD Maternal Aunt    • Alcohol abuse Paternal Uncle    • Macular degeneration Brother    • Alcohol abuse Brother    • Alcohol abuse Brother    • Thyroid disease Brother    • Alcohol abuse Paternal Uncle    • Birth defects Brother    • COPD Maternal Aunt    • Hearing loss Brother      Social History     Tobacco Use   • Smoking status: Current Every Day Smoker     Packs/day: 1.00     Years: 40.00     Pack years: 40.00     Types: Cigarettes   • Smokeless tobacco: Former User     Quit date: 2014   • Tobacco comment: has tried chantix -side effects   Substance Use Topics   • Alcohol use: No   • Drug use: No     Medications Prior to Admission   Medication Sig Dispense Refill Last Dose   • cetirizine (ZyrTEC) 10 MG tablet Take 10 mg by mouth daily as needed for allergies.   1/14/2019 at Unknown time   • esomeprazole (NexIUM) 20 MG capsule Take 20 mg by mouth Daily As Needed.   1/14/2019 at Unknown time   • promethazine (PHENERGAN) 25 MG tablet TAKE 1 TABLET BY MOUTH EVERY 6 (SIX) HOURS AS NEEDED FOR NAUSEA OR VOMITING. 30 tablet 2 Past Month at Unknown time   • HYDROcodone-acetaminophen (NORCO) 7.5-325 MG per tablet Take 1 tablet by mouth 2 (Two) Times a Day As Needed for Mild Pain  or Moderate Pain . 60 tablet 0 1/11/2019   • tiZANidine (ZANAFLEX) 4 MG tablet  Take 4 mg by mouth Every 8 (Eight) Hours As Needed for Muscle Spasms. 0.5 - 1 tab as needed   1/10/2019     Allergies:  Darvon [propoxyphene] and Percocet [oxycodone-acetaminophen]    I have reviewed the patients medical history, surgical history and family history in the available medical record system.     Current Medications:     Current Facility-Administered Medications   Medication Dose Route Frequency Provider Last Rate Last Dose   • dextrose 5 % and sodium chloride 0.45 % infusion  20 mL/hr Intravenous Continuous Ronald Rodriguez DO 20 mL/hr at 01/14/19 1309 20 mL/hr at 01/14/19 1309       Objective     Physical Exam:   Temp:  [97.5 °F (36.4 °C)] 97.5 °F (36.4 °C)  Heart Rate:  [94] 94  Resp:  [18] 18  BP: (113)/(74) 113/74    Physical Exam:  General Appearance:    Alert, cooperative, in no acute distress   Head:    Normocephalic, without obvious abnormality, atraumatic   Eyes:            Lids and lashes normal, conjunctivae and sclerae normal, no   icterus, no pallor, corneas clear, PERRLA   Ears:    Ears appear intact with no abnormalities noted   Throat:   No oral lesions, no thrush, oral mucosa moist   Neck:   No adenopathy, supple, trachea midline, no thyromegaly, no     carotid bruit, no JVD   Back:     No kyphosis present, no scoliosis present, no skin lesions,       erythema or scars, no tenderness to percussion or                   palpation,   range of motion normal   Lungs:     Clear to auscultation,respirations regular, even and                   unlabored    Heart:    Regular rhythm and normal rate, normal S1 and S2, no            murmur, no gallop, no rub, no click   Breast Exam:    Deferred   Abdomen:     Normal bowel sounds, no masses, no organomegaly, soft        non-tender, non-distended, no guarding, no rebound                 tenderness   Genitalia:    Deferred   Extremities:   Moves all extremities well, no edema, no cyanosis, no              redness   Pulses:   Pulses palpable and  equal bilaterally   Skin:   No bleeding, bruising or rash   Lymph nodes:   No palpable adenopathy   Neurologic:   Cranial nerves 2 - 12 grossly intact, sensation intact, DTR        present and equal bilaterally      Results Review:     Lab Results   Component Value Date    WBC 6.93 10/26/2018    WBC 6.53 05/03/2018    WBC 6.78 11/09/2017    HGB 15.5 10/26/2018    HGB 15.6 05/03/2018    HGB 16.1 11/09/2017    HCT 45.1 10/26/2018    HCT 46.1 05/03/2018    HCT 46.9 11/09/2017     10/26/2018     05/03/2018     11/09/2017             No results found for: LIPASE  No results found for: INR       Radiology Review:  Imaging Results (last 72 hours)     ** No results found for the last 72 hours. **           I reviewed the patient's new clinical results.  I reviewed the patient's new imaging results and agree with the interpretation.     ASSESSMENT/PLAN:   ASSESSMENT:   1.  Screening for colon cancer    PLAN:   1.  Colonoscopy    Risk and benefits associated with the procedure are reviewed with the patient.  The patient wishes to proceed      Ronald Rodriguez DO  01/14/19  1:35 PM

## 2019-01-14 NOTE — ANESTHESIA PREPROCEDURE EVALUATION
Anesthesia Evaluation     Patient summary reviewed and Nursing notes reviewed   NPO Solid Status: > 8 hours  NPO Liquid Status: > 8 hours           Airway   No difficulty expected  Dental      Pulmonary - normal exam   (+) pneumonia , COPD moderate,   Cardiovascular - normal exam    (+) hyperlipidemia,       Neuro/Psych  (+) numbness,     GI/Hepatic/Renal/Endo    (+)  GERD,      Musculoskeletal     Abdominal  - normal exam   Substance History      OB/GYN          Other   (+) arthritis                     Anesthesia Plan    ASA 2     MAC     intravenous induction   Anesthetic plan, all risks, benefits, and alternatives have been provided, discussed and informed consent has been obtained with: patient.    Plan discussed with CRNA.

## 2019-01-14 NOTE — DISCHARGE INSTR - APPOINTMENTS
Ronald Rodriguez  [address]  [place] [address]  #[phone]      Follow up as needed

## 2019-01-14 NOTE — ANESTHESIA POSTPROCEDURE EVALUATION
Patient: Mitch Rinaldi    Procedure Summary     Date:  01/14/19 Room / Location:  [facility] ENDOSCOPY 2 / [facility] ENDOSCOPY    Anesthesia Start:  1416 Anesthesia Stop:  1450    Procedure:  COLONOSCOPY (N/A ) Diagnosis:       Screen for colon cancer      (Screen for colon cancer [Z12.11])    Surgeon:  Ronald Rodriguez DO Provider:  Miles Briceño CRNA    Anesthesia Type:  MAC ASA Status:  2          Anesthesia Type: MAC  Last vitals  BP   113/74 (01/14/19 1258)   Temp   97.5 °F (36.4 °C) (01/14/19 1258)   Pulse   94 (01/14/19 1258)   Resp   18 (01/14/19 1258)     SpO2   94 % (01/14/19 1258)     Post Anesthesia Care and Evaluation    Patient location during evaluation: bedside  Patient participation: complete - patient participated  Level of consciousness: awake and alert  Pain score: 1  Pain management: adequate  Airway patency: patent  Anesthetic complications: No anesthetic complications  PONV Status: none  Cardiovascular status: acceptable  Respiratory status: acceptable  Hydration status: acceptable

## 2019-01-16 LAB
LAB AP CASE REPORT: NORMAL
PATH REPORT.FINAL DX SPEC: NORMAL
PATH REPORT.GROSS SPEC: NORMAL

## 2019-01-21 ENCOUNTER — OFFICE VISIT (OUTPATIENT)
Dept: FAMILY MEDICINE CLINIC | Facility: CLINIC | Age: 59
End: 2019-01-21

## 2019-01-21 VITALS
BODY MASS INDEX: 21.76 KG/M2 | SYSTOLIC BLOOD PRESSURE: 118 MMHG | HEIGHT: 70 IN | DIASTOLIC BLOOD PRESSURE: 70 MMHG | HEART RATE: 102 BPM | TEMPERATURE: 97.8 F | WEIGHT: 152 LBS

## 2019-01-21 DIAGNOSIS — M15.9 PRIMARY OSTEOARTHRITIS INVOLVING MULTIPLE JOINTS: Primary | Chronic | ICD-10-CM

## 2019-01-21 DIAGNOSIS — K21.9 GASTROESOPHAGEAL REFLUX DISEASE WITHOUT ESOPHAGITIS: Chronic | ICD-10-CM

## 2019-01-21 DIAGNOSIS — Z72.0 TOBACCO ABUSE: ICD-10-CM

## 2019-01-21 DIAGNOSIS — G59 MONONEUROPATHY DUE TO UNDERLYING DISEASE: Chronic | ICD-10-CM

## 2019-01-21 DIAGNOSIS — Z23 NEED FOR SHINGLES VACCINE: ICD-10-CM

## 2019-01-21 DIAGNOSIS — Z87.891 PERSONAL HISTORY OF TOBACCO USE, PRESENTING HAZARDS TO HEALTH: ICD-10-CM

## 2019-01-21 PROCEDURE — 99214 OFFICE O/P EST MOD 30 MIN: CPT | Performed by: INTERNAL MEDICINE

## 2019-01-21 NOTE — PROGRESS NOTES
Chief Complaint   Patient presents with   • Osteoarthritis     3 mo med refill     Subjective   Mitch Rinaldi is a 58 y.o. male who presents to the office for follow-up. He has osteoarthritis which causes chronic back and joint pain.  This mainly affects his low back.  He takes Norco 7.5 mg twice daily for treatment of the back pain.  This is keeping it at a tolerable level.  He has a history of neuropathic pain, but does not currently require medication for this.  He also has muscle spasm in his back and takes Zanaflex as needed. He has GERD and takes Nexium as needed.  He had a colonoscopy last week, and this showed the presence of a few polyps.    He had low dose CT lung cancer screening in November 2017.  He is now due for a one-year repeat scan.  He has smoked one pack of cigarettes per day for 41 years.     The following portions of the patient's history were reviewed and updated as appropriate: allergies, current medications, past family history, past medical history, past social history, past surgical history and problem list.    Review of Systems   Constitutional: Negative for chills, fatigue and fever.   HENT: Negative for congestion, sinus pressure, sneezing, sore throat, tinnitus and trouble swallowing.    Eyes: Negative for visual disturbance.   Respiratory: Negative for cough, chest tightness, shortness of breath and wheezing.    Cardiovascular: Negative for chest pain, palpitations and leg swelling.   Gastrointestinal: Negative for abdominal pain, constipation, diarrhea, nausea and vomiting.   Genitourinary: Negative for dysuria, frequency and urgency.   Musculoskeletal: Positive for arthralgias and back pain. Negative for neck pain.   Skin: Negative for rash.   Neurological: Negative for dizziness, weakness and headaches.   Psychiatric/Behavioral:        Patient denies any feelings of depression and has not felt down, hopeless or lost interest in any activities.   All other systems reviewed and  "are negative.      Objective   Vitals:    01/21/19 0857   BP: 118/70   BP Location: Left arm   Patient Position: Sitting   Cuff Size: Adult   Pulse: 102   Temp: 97.8 °F (36.6 °C)   TempSrc: Oral   Weight: 68.9 kg (152 lb)   Height: 177.8 cm (70\")   PainSc:   7   PainLoc: Back     Physical Exam   Constitutional: He is oriented to person, place, and time. He appears well-developed and well-nourished. No distress.   HENT:   Head: Normocephalic and atraumatic.   Nose: Nose normal.   Mouth/Throat: No oropharyngeal exudate.   Eyes: Conjunctivae and EOM are normal. Pupils are equal, round, and reactive to light. No scleral icterus.   Neck: Normal range of motion. Neck supple.   Cardiovascular: Normal rate, regular rhythm and normal heart sounds. Exam reveals no gallop and no friction rub.   No murmur heard.  Pulmonary/Chest: Effort normal and breath sounds normal. No respiratory distress. He has no wheezes. He has no rales.   Abdominal: Soft. Bowel sounds are normal. He exhibits no distension. There is no tenderness. There is no rebound and no guarding.   Musculoskeletal: He exhibits no edema.        Lumbar back: He exhibits decreased range of motion and pain.   Lymphadenopathy:     He has no cervical adenopathy.   Neurological: He is alert and oriented to person, place, and time. No cranial nerve deficit.   Skin: Skin is warm and dry. No rash noted.   Psychiatric: He has a normal mood and affect. His behavior is normal. Judgment and thought content normal.   Nursing note and vitals reviewed.      Assessment/Plan   Mitch was seen today for osteoarthritis.    Diagnoses and all orders for this visit:    Primary osteoarthritis involving multiple joints    Mononeuropathy due to underlying disease    Gastroesophageal reflux disease without esophagitis    Tobacco abuse  -     CT Chest Low Dose Wo; Future    Personal history of tobacco use, presenting hazards to health  -     CT Chest Low Dose Wo; Future    Need for shingles " vaccine  -     Zoster Vac Recomb Adjuvanted 50 MCG/0.5ML reconstituted suspension; Inject 0.5 mL into the appropriate muscle as directed by prescriber 1 (One) Time for 1 dose. Repeat dose in 2-6 months.         He will continue with Nexium as needed for treatment of his GERD.  He will continue to use ibuprofen for the arthritic inflammation, and the Zanaflex as needed for the muscle spasm.  He will continue with Norco twice a day for treatment of the musculoskeletal pain.  I will order the one-year follow-up low-dose CT scan of the chest for lung cancer screening.    Patient understands the risks associated with this controlled medication, including tolerance and addiction.  Patient also agrees to only obtain this medication from me, and not from a another provider, unless that provider is covering for me in my absence.  Patient also agrees to be compliant in dosing, and not self adjust the dose of medication.  A signed controlled substance agreement is on file, and the patient has received a controlled substance education sheet at this a previous visit.  The patient has also signed a consent for treatment with a controlled substance as per [facility] policy. SOFI was obtained.    Low-Dose CT: Lung Cancer Screening  Criteria:  • Age 55-77 years of age (CMS) , 55-80 years of age (Commercial) and;  o Patient Age: 58 y.o.  • 30 pack-year smoking history (# yrs smoked X avg #ppd = pack/yrs); if  pt has quit smoking it must be within <15yrs and;  • Asymptomatic for lung cancer  ICD-10 Codes:  • History of smoking  • Tobacco abuse/addiction  ? Z72.0 (current smoker)  ? Z87.891 (personal history of smoking/nicotine dependence) use with CMS   • Patient Smoking History  Social History     Tobacco Use   Smoking Status Current Every Day Smoker   • Packs/day: 1.00   • Years: 40.00   • Pack years: 40.00   • Types: Cigarettes   Tobacco Comment    has tried chantix -side effects     CPT Codes:  • 47685 low dose (must say low  dose otherwise is CT without contrast)  / (for patients with [facility] and CMS)    PHQ-2/PHQ-9 Depression Screening 1/21/2019   Little interest or pleasure in doing things 0   Feeling down, depressed, or hopeless 0   Trouble falling or staying asleep, or sleeping too much -   Feeling tired or having little energy -   Poor appetite or overeating -   Feeling bad about yourself - or that you are a failure or have let yourself or your family down -   Trouble concentrating on things, such as reading the newspaper or watching television -   Moving or speaking so slowly that other people could have noticed. Or the opposite - being so fidgety or restless that you have been moving around a lot more than usual -   Thoughts that you would be better off dead, or of hurting yourself in some way -   Total Score 0   .

## 2019-01-21 NOTE — PROGRESS NOTES
[facility]#  [id number].

## 2019-01-24 DIAGNOSIS — M15.9 PRIMARY OSTEOARTHRITIS INVOLVING MULTIPLE JOINTS: Primary | Chronic | ICD-10-CM

## 2019-01-24 RX ORDER — HYDROCODONE BITARTRATE AND ACETAMINOPHEN 7.5; 325 MG/1; MG/1
1 TABLET ORAL 2 TIMES DAILY PRN
Qty: 60 TABLET | Refills: 0 | Status: SHIPPED | OUTPATIENT
Start: 2019-01-24 | End: 2019-02-21 | Stop reason: SDUPTHER

## 2019-01-24 NOTE — TELEPHONE ENCOUNTER
Your Child's Health  [age]-Year-Old Visit      Luis Alfredo Holguin  December 18, 2019    Visit Vitals  BP 94/62   Pulse 120   Ht 3' 7.7\" (1.11 m)   Wt (!) 22.8 kg   SpO2 95%   BMI 18.50 kg/m²     Weight: 50.26 lbs    NUTRITION:  Encourage Luis Alfredo to eat fruits and vegetables. These can be used as snacks as well as a part of meals.  They provide minerals, vitamins, and fiber. Eating fruits and vegetables is associated with lower rates of heart disease and cancer as your child grows older.  Be a good role model and eat healthy foods yourself. A multivitamin with 400 units of vitamin D should be given to all children who drink less than 32 ounces of milk per day.    Obesity and being overweight are serious problems in the [place]. You can help your child avoid these problems by following these guidelines:  · Limit TV and video total time to 2 hours per day or less.  · Do not encourage your children to eat if they tell you they are full. Healthy children will not starve themselves.  · Do not reward your children for cleaning their plates. If they always leave food, reduce the size of the portions and tell them to ask for more if they are still hungry.  · Do not allow children to dish out their own portions. They will imitate adults or imitate what they have seen on TV; in both cases, the amount will be too large. This results in increased calories and waste. At least for children above 5 years of age and for adults, people eat more when given larger portions.    DEVELOPMENT/BEHAVIOR:  Four-year-old children can generally jump and hop, and they have much better balance than 3-year-olds. Most can dress and undress themselves and brush their own teeth. They begin to imitate adults and are expert at embarrassing their parents. They are much more likely to misbehave at home than in public, and they are likely to prefer their 4-year-old friends to family at times. They have a huge fraction of their adult vocabulary and are  Patient is up to date on controlled medication consent, Long report, and appt from 01/21/2019.   experimenting with words that carry emotions. Luis Alfredo will like to see your reaction when he talks about body parts, body functions, and death. He will ask a lot of questions but seem bored by long answers.    ACCIDENT PREVENTION (accidents account for 40% of deaths at this age):    · Car Safety:  When Luis Alfredo outgrows the car seat (and weighs at least 40 pounds), you should use an approved booster seat. You may use your car's lap/shoulder belt, but do not let the shoulder belt run across the neck. The seat belt should be across the hips and not across the stomach.  Luis Alfredo should ride in the back seat. The center seat is the safest if it has a shoulder harness. Many local police departments, hospitals, and fire departments have a program to check the installation of your car seat or booster seat. Occasionally, car dealers will do the same. These inspections are by appointment.  When you call the police or fire department for an appointment, do not use the emergency number.     · Water Safety:  Consider swimming lessons and emphasize water safety.  Remind Luis Alfredo never to swim without adult supervision and not to dive head-first into a pool.  · Strangers:  Begin to teach Luis Alfredo about strangers. This is hard for children to understand and will require much repetition and many examples.  · Sharp Objects:  Keep sharp knives, scissors, and tools away from children at this age. They cannot be used even with supervision because the children are too fast.  · Burns/Fire:  Teach Luis Alfredo about hot and cold. Remind him about matches, hot grills, and hot mufflers on yard equipment. Have a family fire safety plan, including regular smoke detector battery checks, working fire extinguishers, and two preplanned escape routes.  · Lawn Mowing:  Keep Luis Alfredo far away when you are mowing the lawn. There have been many serious injuries from thrown rocks.  · Bike Helmets:  Luis Alfredo should wear a helmet when riding on bicycles or tricycles or when roller  skating.  · ID Bracelets:  If Luis Alfredo attends school or , consider purchasing an ID bracelet with his initials (not name), address, and phone number listed.    SUN EXPOSURE:  If you plan to have Luis Alfredo outside for more than 30 minutes, please follow the guidelines in our Sun Exposure handout.    SMOKING:  Children exposed to tobacco smoke have more ear infections and pneumonia.  Cold symptoms last longer. If you smoke, please quit.  If you cannot quit, smoke outside. Do not smoke near Luis Alfredo, and do not let others smoke near him.    TUBERCULOSIS:  There is such a low rate of tuberculosis in our area that frequent skin tests are no longer recommended. However, certain situations do increase Luis Alfredo's risk, including contact with AIDS patients, nursing home residents, prisoners, immigrants, or homeless persons. Please let us know if Luis Alfredo has contacts with such persons, or if he has contact with anyone known to have or suspected of having tuberculosis.    TELEVISION/VIDEO:  · Limit viewing to a maximum of two hours per day.  · Excessive TV is associated with obesity, aggressive behavior, and negative moods.  · Do not allow TV shows or videos that promote bad attitudes.  Many videos consistently portray women or minorities as victims.  · Children cannot distinguish commercials from programs until 6-8 years of age. When the  says, \"Kids, tell your mom to buy...,\" children often think this is an order from an adult.  · Teach Luis Alfredo not to eat while watching TV by not doing it yourself.  · Encourage other forms of learning and entertainment, such as books, story-telling, and trips to museums, farms, zoos, etc.    MEDICATION FOR FEVER OR PAIN:   Acetaminophen liquid (for example, Tylenol or Tempra) may be given every four hours as needed for pain or fever.  Acetaminophen liquid is less concentrated than the infant dropper bottle type.     INFANT Tylenol/Acetaminophen  Drops (160 mg/5 mL)    Child’s Weight:  Dose:  24 -  35 pounds:    160 mg (5.0 mL (1 Teaspoon))  36 - 47 pounds:    240 mg (7.5 mL (1-1/2 Teaspoons))    CHILDREN’S Tylenol/Acetaminophen  (160 mg/5 mL)    Child’s Weight:  Dose:  24 - 35 pounds:    160 mg (5.0 mL (1 Teaspoon))  36 - 47 pounds:    240 mg (7.5 mL (1 1/2 Teaspoons))    CHILDREN’S Tylenol/Acetaminophen MELTAWAYS ( 80 mg tablets)    Child’s Weight:  Dose:  24 - 35 pounds:    160 mg (2 meltaway tablets)  36 - 47 pounds:    240 mg (3 meltaway tablets)    CHILDREN'S Ibuprofen (100 mg/5 mL) liquid (for example, Advil or Motrin) may be given every six hours as needed for pain or fever.    Child’s Weight:  Dose:  24 - 35 pounds:    100  - 150 mg (5.0 - 7.5 mL(1 - 1 1/2 Teaspoons))  36 - 47 pounds:    150 - 200 mg (7.5  - 10.0 mL (1 1/2 - 2 Teaspoons))      Most Recent Immunizations   Administered Date(s) Administered   • DTaP (INFANRIX) 03/31/2015   • DTaP/HIB/IPV 06/16/2014   • HIB, Unspecified Formulation 03/31/2015   • Hep A, ped/adol, 2 dose 12/23/2015   • Hep B, adolescent or pediatric 09/18/2014   • Influenza, injectable, quadrivalent 01/03/2017   • Influenza, injectable, quadrivalent, preservative-free 12/23/2015   • Influenza, seasonal, injectable, preservative free 12/29/2014   • MMR 12/29/2014   • Pneumococcal Conjugate 13 valent 12/29/2014   • Rotavirus - pentavalent 06/16/2014   • Varicella 12/29/2014       If Luis Alfredo develops any of the following reactions within 72 hours after an immunization, notify your pediatrician by calling the pediatric phone nurse:  · A temperature of 105 degrees or above.  · More than 3 hours of continuous crying.  · A shrill, high-pitched cry.  · A pale, limp spell.  · A seizure or fainting spell.  In this case, you should call 911 or go immediately to the emergency room.      NEXT VISIT:  3 YEARS OF AGE  Thank you for entrusting your care to [facility].

## 2019-01-24 NOTE — TELEPHONE ENCOUNTER
Carmelo Farmer MD to Say Sparks LPN Refill Norco to [facility] To  Friday.

## 2019-02-21 DIAGNOSIS — M15.9 PRIMARY OSTEOARTHRITIS INVOLVING MULTIPLE JOINTS: Primary | Chronic | ICD-10-CM

## 2019-02-21 RX ORDER — HYDROCODONE BITARTRATE AND ACETAMINOPHEN 7.5; 325 MG/1; MG/1
1 TABLET ORAL 2 TIMES DAILY PRN
Qty: 60 TABLET | Refills: 0 | Status: SHIPPED | OUTPATIENT
Start: 2019-02-21 | End: 2019-03-21 | Stop reason: SDUPTHER

## 2019-02-21 NOTE — TELEPHONE ENCOUNTER
Lenora Leos Quin'Dee Marie, LPN  Phone Number: [phone]          Needs refill on HYDROcodone-acetaminophen (NORCO) 7.5-325 MG. Due tomorrow to [facility].

## 2019-03-04 ENCOUNTER — OFFICE VISIT (OUTPATIENT)
Dept: FAMILY MEDICINE CLINIC | Facility: CLINIC | Age: 59
End: 2019-03-04

## 2019-03-04 VITALS
DIASTOLIC BLOOD PRESSURE: 78 MMHG | HEIGHT: 70 IN | TEMPERATURE: 97 F | BODY MASS INDEX: 21.76 KG/M2 | WEIGHT: 152 LBS | SYSTOLIC BLOOD PRESSURE: 122 MMHG | HEART RATE: 102 BPM

## 2019-03-04 DIAGNOSIS — H92.01 RIGHT EAR PAIN: Primary | ICD-10-CM

## 2019-03-04 DIAGNOSIS — H69.81 DYSFUNCTION OF RIGHT EUSTACHIAN TUBE: ICD-10-CM

## 2019-03-04 PROCEDURE — 99213 OFFICE O/P EST LOW 20 MIN: CPT | Performed by: INTERNAL MEDICINE

## 2019-03-04 RX ORDER — PSEUDOEPHEDRINE HCL 120 MG/1
120 TABLET, FILM COATED, EXTENDED RELEASE ORAL EVERY 12 HOURS
COMMUNITY
End: 2019-07-23

## 2019-03-04 NOTE — PROGRESS NOTES
Chief Complaint   Patient presents with   • Earache     wants a refferal      Subjective   Mitch Rinaldi is a 58 y.o. male who presents to the office for an ER follow-up.  He was seen at the emergency room in [place] on 1/28/19.  He was having bleeding from his right ear.  At the time, he was taking amoxicillin for treatment of an upper respiratory infection.  He started noticing increased pressure in the right ear and felt a pop.  He then noticed the blood drainage.  He also had a slight decrease in hearing on the right side.  He was instructed to finish out the amoxicillin.  He was given a Medrol Dosepak.  It was also recommended that he see ENT for further evaluation of the right ear.    He continues to have pressure and popping in the right ear.  He has been taking Sudafed and Zyrtec.    The following portions of the patient's history were reviewed and updated as appropriate: allergies, current medications, past family history, past medical history, past social history, past surgical history and problem list.    Review of Systems   Constitutional: Negative for chills, fatigue and fever.   HENT: Positive for congestion, ear discharge and ear pain. Negative for sinus pressure, sneezing, sore throat, tinnitus and trouble swallowing.    Eyes: Negative for visual disturbance.   Respiratory: Negative for cough, chest tightness, shortness of breath and wheezing.    Cardiovascular: Negative for chest pain, palpitations and leg swelling.   Gastrointestinal: Negative for abdominal pain, constipation, diarrhea, nausea and vomiting.   Genitourinary: Negative for dysuria, frequency and urgency.   Musculoskeletal: Positive for arthralgias and back pain. Negative for neck pain.   Skin: Negative for rash.   Neurological: Negative for dizziness, weakness and headaches.   Psychiatric/Behavioral:        Patient denies any feelings of depression and has not felt down, hopeless or lost interest in any activities.   All other  "systems reviewed and are negative.      Objective   Vitals:    03/04/19 0824   BP: 122/78   Pulse: 102   Temp: 97 °F (36.1 °C)   Weight: 68.9 kg (152 lb)   Height: 177.8 cm (70\")   PainSc: 0-No pain     Physical Exam   Constitutional: He is oriented to person, place, and time. He appears well-developed and well-nourished. No distress.   HENT:   Head: Normocephalic and atraumatic.   Nose: Nose normal.   Mouth/Throat: No oropharyngeal exudate.   The right ear has some cerumen and dried blood present.  The TM is retracted.  I am unable to visualize the lower third of the eardrum, but there is no rupture of the upper two thirds noted.   Eyes: Conjunctivae and EOM are normal. Pupils are equal, round, and reactive to light. No scleral icterus.   Neck: Normal range of motion. Neck supple.   Cardiovascular: Normal rate, regular rhythm and normal heart sounds. Exam reveals no gallop and no friction rub.   No murmur heard.  Pulmonary/Chest: Effort normal and breath sounds normal. No respiratory distress. He has no wheezes. He has no rales.   Abdominal: Soft. Bowel sounds are normal. He exhibits no distension. There is no tenderness. There is no rebound and no guarding.   Musculoskeletal: He exhibits no edema.        Lumbar back: He exhibits decreased range of motion and pain.   Lymphadenopathy:     He has no cervical adenopathy.   Neurological: He is alert and oriented to person, place, and time. No cranial nerve deficit.   Skin: Skin is warm and dry. No rash noted.   Psychiatric: He has a normal mood and affect. His behavior is normal. Judgment and thought content normal.   Nursing note and vitals reviewed.      Assessment/Plan   Mitch was seen today for earache.    Diagnoses and all orders for this visit:    Right ear pain  -     Ambulatory Referral to ENT (Otolaryngology)    Dysfunction of right eustachian tube  -     Ambulatory Referral to ENT (Otolaryngology)      I obtained records from his ER visit.  These are reviewed " and discussed above in the history of present illness.  No further antibiotics are indicated.  He will continue with Sudafed.  I have recommended also taking Mucinex but holding Zyrtec since this can make the eustachian tube dysfunction worse.  I will refer him to ENT for consult since he is still having a reduction in his hearing and had the episode of bleeding.         PHQ-2/PHQ-9 Depression Screening 1/21/2019   Little interest or pleasure in doing things 0   Feeling down, depressed, or hopeless 0   Trouble falling or staying asleep, or sleeping too much -   Feeling tired or having little energy -   Poor appetite or overeating -   Feeling bad about yourself - or that you are a failure or have let yourself or your family down -   Trouble concentrating on things, such as reading the newspaper or watching television -   Moving or speaking so slowly that other people could have noticed. Or the opposite - being so fidgety or restless that you have been moving around a lot more than usual -   Thoughts that you would be better off dead, or of hurting yourself in some way -   Total Score 0

## 2019-03-21 DIAGNOSIS — M15.9 PRIMARY OSTEOARTHRITIS INVOLVING MULTIPLE JOINTS: Chronic | ICD-10-CM

## 2019-03-21 RX ORDER — HYDROCODONE BITARTRATE AND ACETAMINOPHEN 7.5; 325 MG/1; MG/1
1 TABLET ORAL 2 TIMES DAILY PRN
Qty: 60 TABLET | Refills: 0 | Status: SHIPPED | OUTPATIENT
Start: 2019-03-21 | End: 2019-04-26 | Stop reason: SDUPTHER

## 2019-03-21 NOTE — TELEPHONE ENCOUNTER
Patient is up to date on controlled medication consent, Long report, and appt from 01/21/2019. Refill on [place] to [facility] Friday at 3pm.

## 2019-04-17 ENCOUNTER — LAB (OUTPATIENT)
Dept: LAB | Facility: OTHER | Age: 59
End: 2019-04-17

## 2019-04-17 DIAGNOSIS — Z79.899 DRUG THERAPY: ICD-10-CM

## 2019-04-17 DIAGNOSIS — Z12.5 SCREENING FOR PROSTATE CANCER: ICD-10-CM

## 2019-04-17 DIAGNOSIS — E78.2 MIXED HYPERLIPIDEMIA: ICD-10-CM

## 2019-04-17 LAB
ALBUMIN SERPL-MCNC: 4.6 G/DL (ref 3.5–5)
ALBUMIN/GLOB SERPL: 1.4 G/DL (ref 1.1–1.8)
ALP SERPL-CCNC: 93 U/L (ref 38–126)
ALT SERPL W P-5'-P-CCNC: 19 U/L
ANION GAP SERPL CALCULATED.3IONS-SCNC: 11 MMOL/L (ref 5–15)
AST SERPL-CCNC: 22 U/L (ref 17–59)
BASOPHILS # BLD AUTO: 0.06 10*3/MM3 (ref 0–0.2)
BASOPHILS NFR BLD AUTO: 0.9 % (ref 0–1.5)
BILIRUB SERPL-MCNC: 0.7 MG/DL (ref 0.2–1.3)
BILIRUB UR QL STRIP: NEGATIVE
BUN BLD-MCNC: 11 MG/DL (ref 7–23)
BUN/CREAT SERPL: 10.6 (ref 7–25)
CALCIUM SPEC-SCNC: 9.4 MG/DL (ref 8.4–10.2)
CHLORIDE SERPL-SCNC: 97 MMOL/L (ref 101–112)
CHOLEST SERPL-MCNC: 161 MG/DL (ref 150–200)
CLARITY UR: CLEAR
CO2 SERPL-SCNC: 29 MMOL/L (ref 22–30)
COLOR UR: ABNORMAL
CREAT BLD-MCNC: 1.04 MG/DL (ref 0.7–1.3)
DEPRECATED RDW RBC AUTO: 41.1 FL (ref 37–54)
EOSINOPHIL # BLD AUTO: 0.32 10*3/MM3 (ref 0–0.4)
EOSINOPHIL NFR BLD AUTO: 4.6 % (ref 0.3–6.2)
ERYTHROCYTE [DISTWIDTH] IN BLOOD BY AUTOMATED COUNT: 12.8 % (ref 12.3–15.4)
GFR SERPL CREATININE-BSD FRML MDRD: 73 ML/MIN/1.73 (ref 49–113)
GLOBULIN UR ELPH-MCNC: 3.4 GM/DL (ref 2.3–3.5)
GLUCOSE BLD-MCNC: 114 MG/DL (ref 70–99)
GLUCOSE UR STRIP-MCNC: NEGATIVE MG/DL
HCT VFR BLD AUTO: 45.7 % (ref 37.5–51)
HDLC SERPL-MCNC: 38 MG/DL (ref 40–59)
HGB BLD-MCNC: 16.2 G/DL (ref 13–17.7)
HGB UR QL STRIP.AUTO: NEGATIVE
KETONES UR QL STRIP: NEGATIVE
LDLC SERPL CALC-MCNC: 95 MG/DL
LDLC/HDLC SERPL: 2.51 {RATIO} (ref 0–3.55)
LEUKOCYTE ESTERASE UR QL STRIP.AUTO: NEGATIVE
LYMPHOCYTES # BLD AUTO: 2.12 10*3/MM3 (ref 0.7–3.1)
LYMPHOCYTES NFR BLD AUTO: 30.4 % (ref 19.6–45.3)
MCH RBC QN AUTO: 31.3 PG (ref 26.6–33)
MCHC RBC AUTO-ENTMCNC: 35.4 G/DL (ref 31.5–35.7)
MCV RBC AUTO: 88.2 FL (ref 79–97)
MONOCYTES # BLD AUTO: 0.61 10*3/MM3 (ref 0.1–0.9)
MONOCYTES NFR BLD AUTO: 8.8 % (ref 5–12)
NEUTROPHILS # BLD AUTO: 3.86 10*3/MM3 (ref 1.4–7)
NEUTROPHILS NFR BLD AUTO: 55.3 % (ref 42.7–76)
NITRITE UR QL STRIP: NEGATIVE
PH UR STRIP.AUTO: 7 [PH] (ref 5.5–8)
PLATELET # BLD AUTO: 171 10*3/MM3 (ref 140–450)
PMV BLD AUTO: 10.9 FL (ref 6–12)
POTASSIUM BLD-SCNC: 3.2 MMOL/L (ref 3.4–5)
PROT SERPL-MCNC: 8 G/DL (ref 6.3–8.6)
PROT UR QL STRIP: NEGATIVE
PSA SERPL-MCNC: 0.37 NG/ML (ref 0–4)
RBC # BLD AUTO: 5.18 10*6/MM3 (ref 4.14–5.8)
SODIUM BLD-SCNC: 137 MMOL/L (ref 137–145)
SP GR UR STRIP: 1.01 (ref 1–1.03)
T4 FREE SERPL-MCNC: 1.5 NG/DL (ref 0.93–1.7)
TRIGL SERPL-MCNC: 139 MG/DL
TSH SERPL DL<=0.05 MIU/L-ACNC: 2.76 MIU/ML (ref 0.27–4.2)
UROBILINOGEN UR QL STRIP: ABNORMAL
VLDLC SERPL-MCNC: 27.8 MG/DL
WBC NRBC COR # BLD: 6.97 10*3/MM3 (ref 3.4–10.8)

## 2019-04-17 PROCEDURE — 80061 LIPID PANEL: CPT | Performed by: INTERNAL MEDICINE

## 2019-04-17 PROCEDURE — 80053 COMPREHEN METABOLIC PANEL: CPT | Performed by: INTERNAL MEDICINE

## 2019-04-17 PROCEDURE — 36415 COLL VENOUS BLD VENIPUNCTURE: CPT | Performed by: INTERNAL MEDICINE

## 2019-04-17 PROCEDURE — 81003 URINALYSIS AUTO W/O SCOPE: CPT | Performed by: INTERNAL MEDICINE

## 2019-04-17 PROCEDURE — 84443 ASSAY THYROID STIM HORMONE: CPT | Performed by: INTERNAL MEDICINE

## 2019-04-17 PROCEDURE — G0481 DRUG TEST DEF 8-14 CLASSES: HCPCS | Performed by: INTERNAL MEDICINE

## 2019-04-17 PROCEDURE — 85025 COMPLETE CBC W/AUTO DIFF WBC: CPT | Performed by: INTERNAL MEDICINE

## 2019-04-17 PROCEDURE — 80307 DRUG TEST PRSMV CHEM ANLYZR: CPT | Performed by: INTERNAL MEDICINE

## 2019-04-17 PROCEDURE — 84439 ASSAY OF FREE THYROXINE: CPT | Performed by: INTERNAL MEDICINE

## 2019-04-17 PROCEDURE — G0103 PSA SCREENING: HCPCS | Performed by: INTERNAL MEDICINE

## 2019-04-19 ENCOUNTER — OFFICE VISIT (OUTPATIENT)
Dept: OTOLARYNGOLOGY | Facility: CLINIC | Age: 59
End: 2019-04-19

## 2019-04-19 ENCOUNTER — CLINICAL SUPPORT (OUTPATIENT)
Dept: AUDIOLOGY | Facility: CLINIC | Age: 59
End: 2019-04-19

## 2019-04-19 VITALS — BODY MASS INDEX: 20.9 KG/M2 | OXYGEN SATURATION: 98 % | HEIGHT: 70 IN | WEIGHT: 146 LBS

## 2019-04-19 DIAGNOSIS — H69.83 EUSTACHIAN TUBE DYSFUNCTION, BILATERAL: Primary | ICD-10-CM

## 2019-04-19 DIAGNOSIS — H69.83 ETD (EUSTACHIAN TUBE DYSFUNCTION), BILATERAL: Primary | ICD-10-CM

## 2019-04-19 DIAGNOSIS — K21.9 GASTROESOPHAGEAL REFLUX DISEASE WITHOUT ESOPHAGITIS: Chronic | ICD-10-CM

## 2019-04-19 DIAGNOSIS — J31.0 CHRONIC RHINITIS: ICD-10-CM

## 2019-04-19 DIAGNOSIS — H90.0 CONDUCTIVE HEARING LOSS, BILATERAL: ICD-10-CM

## 2019-04-19 DIAGNOSIS — Z01.118 ENCOUNTER FOR EXAMINATION OF HEARING WITH ABNORMAL FINDINGS: ICD-10-CM

## 2019-04-19 PROCEDURE — 99203 OFFICE O/P NEW LOW 30 MIN: CPT | Performed by: OTOLARYNGOLOGY

## 2019-04-19 RX ORDER — PROMETHAZINE HYDROCHLORIDE 25 MG/1
TABLET ORAL
Qty: 30 TABLET | Refills: 2 | Status: SHIPPED | OUTPATIENT
Start: 2019-04-19 | End: 2019-05-14 | Stop reason: SDUPTHER

## 2019-04-19 NOTE — PROGRESS NOTES
Subjective   Mitch Rinaldi is a 58 y.o. male.     History of Present Illness   Patient states a little over 2 months ago he had an upper respiratory infection with rhinorrhea postnasal drainage and watery eyes and cough.  Was placed on an antibiotic but 2 days later had bloody otorrhea and severe ear pain on the right.  Was diagnosed with an ear infection, was not given eardrops.  Completed his course of antibiotics.  Ear pain and otorrhea eventually resolved.  States it took about 2 months for his hearing to come back but it has finally started coming back in the last couple of weeks.  No previous otologic surgery.  Is currently using Flonase and Sudafed as well as cetirizine.  Does have chronic daily symptoms of nasal congestion and intermittent rhinorrhea and postnasal drainage that is typically nonpurulent.      The following portions of the patient's history were reviewed and updated as appropriate: allergies, current medications, past family history, past medical history, past social history, past surgical history and problem list.      Mitch Rinaldi reports that he has been smoking cigarettes.  He has a 40.00 pack-year smoking history. He quit smokeless tobacco use about 5 years ago. He reports that he does not drink alcohol or use drugs.  Patient is a tobacco user and has been counseled for use of tobacco products (patient states he has no intention of quitting)    Family History   Problem Relation Age of Onset   • Dementia Mother    • Hypertension Mother    • Thyroid disease Mother    • Arthritis Mother    • Diabetes Father    • Stomach cancer Father    • Cancer Father         stomach   • Thyroid disease Brother    • Down syndrome Brother    • COPD Maternal Aunt    • Alcohol abuse Paternal Uncle    • Macular degeneration Brother    • Alcohol abuse Brother    • Alcohol abuse Brother    • Thyroid disease Brother    • Alcohol abuse Paternal Uncle    • Birth defects Brother    • COPD Maternal Aunt    •  Hearing loss Brother        Allergies   Allergen Reactions   • Darvon [Propoxyphene] Nausea And Vomiting     Actual product is darvocet, darvon was the nearest alternative I could find   • Percocet [Oxycodone-Acetaminophen] Palpitations         Current Outpatient Medications:   •  cetirizine (ZyrTEC) 10 MG tablet, Take 10 mg by mouth daily as needed for allergies., Disp: , Rfl:   •  esomeprazole (NexIUM) 20 MG capsule, Take 20 mg by mouth Daily As Needed., Disp: , Rfl:   •  HYDROcodone-acetaminophen (NORCO) 7.5-325 MG per tablet, Take 1 tablet by mouth 2 (Two) Times a Day As Needed for Mild Pain  or Moderate Pain ., Disp: 60 tablet, Rfl: 0  •  promethazine (PHENERGAN) 25 MG tablet, TAKE 1 TABLET BY MOUTH EVERY 6 (SIX) HOURS AS NEEDED FOR NAUSEA OR VOMITING., Disp: 30 tablet, Rfl: 2  •  pseudoephedrine (SUDAFED) 120 MG 12 hr tablet, Take 120 mg by mouth Every 12 (Twelve) Hours., Disp: , Rfl:   •  tiZANidine (ZANAFLEX) 4 MG tablet, Take 4 mg by mouth Every 8 (Eight) Hours As Needed for Muscle Spasms. 0.5 - 1 tab as needed, Disp: , Rfl:     Past Medical History:   Diagnosis Date   • Acute gastroenteritis    • Allergic    • Anxiety    • Asthma     as a  child   • Diverticulitis of colon    • Emphysema lung (CMS/HCC)    • Epididymal cyst    • Gastroesophageal reflux disease    • Generalized anxiety disorder    • Hyperlipidemia    • Impaired glucose tolerance    • Pain, lumbar region     Pain radiating to lumbar region of back      • Peripheral nervous system disease     Disorder of the peripheral nervous system      • Pleuritic chest pain    • Pneumonia    • Primary osteoarthritis involving multiple joints 8/29/2016   • Shoulder pain, left    • Spermatocele 4/25/2018    Added automatically from request for surgery [id number]   • Tinnitus    • Wears glasses    • Wears partial dentures          Review of Systems   HENT: Positive for ear discharge, ear pain and hearing loss.    Gastrointestinal: Positive for nausea.    Musculoskeletal: Positive for arthralgias and back pain.   Hematological: Bruises/bleeds easily.   All other systems reviewed and are negative.          Objective   Physical Exam  General: Well-developed well-nourished male in no acute distress.  Alert and oriented x3. Head: Normocephalic. Face: Symmetrical strength and appearance. PERRL. EOMI. Voice:Strong. Speech:Fluent  Ears: External ears no deformity, left ear canal shows no discharge, right ear canal shows dried blood that is removed under the microscope using instrumentation.  Beyond this, tympanic membranes intact retracted, but with no evidence of infection or effusion bilaterally.  Nose: Nares show no discharge mass polyp or purulence.  Boggy mucosa is present.  No gross external deformity.  Septum: Midline  Oral cavity: Lips and gums without lesions.  Tongue and floor of mouth without lesions.  Parotid and submandibular ducts unobstructed.  No mucosal lesions on the buccal mucosa or vestibule of the mouth.  Pharynx: No erythema exudate mass or ulcer  Neck: No lymphadenopathy.  No thyromegaly.  Trachea and larynx midline.  No masses in the parotid or submandibular glands.  Audiogram is obtained and reviewed and shows a bilateral mild hearing loss that is primarily conductive with type C tympanograms bilaterally.  Discrimination scores are 100% bilaterally.      Assessment/Plan   Mitch was seen today for ear problem.    Diagnoses and all orders for this visit:    ETD (Eustachian tube dysfunction), bilateral    Conductive hearing loss, bilateral    Chronic rhinitis      Plan: Ear cleaned as described above.  Explained to the patient that his clinical course is consistent with an acute otitis media with rupture that is healed and the effusion has now resolved but he has ongoing eustachian tube dysfunction which I explained is likely due to his cigarette smoking and chronic rhinitis.  He is encouraged to continue his Flonase daily.  Use Sudafed as needed  when he feels congestion.  If symptoms remain well controlled return in 1 year but call sooner for problems.

## 2019-04-19 NOTE — PROGRESS NOTES
STANDARD AUDIOMETRIC EVALUATION      Name:  Mitch Rinaldi  :  1960  Age:  58 y.o.  Date of Evaluation:  2019      HISTORY    Reason for visit:  Mitch Rinaldi is seen today for a hearing test at the request of Dr. Chencho Pyle.  Patient reports on 2019 his right ear started hurting and then bleeding.  Reportedly, he was told he had a spontaneous rupture of his right ear drum.  He states for 2 months afterwards, his right ear felt full and he had hearing loss.  He states the fullness and the hearing loss have gotten better in the last couple weeks.       EVALUATION    See Audiogram    RESULTS        Otoscopy and Tympanometry 226 Hz :  Right Ear:  Otoscopy:  Visible ear drum          Tympanometry:  Negative middle ear pressure    Left Ear:   Otoscopy:  Clear ear canal        Tympanometry:  Negative middle ear pressure    Test technique:  Standard Audiometry     Pure Tone Audiometry:   Patient responded to pure tones at 5-40 dB for 250-8000 Hz in both ears.      Speech Audiometry:        Right Ear:  Speech Reception Threshold (SRT) was obtained at 15 dBHL                 Speech Discrimination scores were 100% in quiet when words were presented at 55 dBHL       Left Ear:  Speech Reception Threshold (SRT) was obtained at 10 dBHL                 Speech Discrimination scores were 100% in quiet when words were presented at 50 dBHL    Reliability:   good    IMPRESSIONS:  1.  Tympanometry results are consistent with Negative middle ear pressure in both ears.  2.  Pure tone results are consistent with within normal limits to mild reverse cookie bite indeterminant hearing loss for both ears.       RECOMMENDATIONS:  Patient is seeing the Ear Nose and Throat physician immediately following this examination.  It was a pleasure seeing Mitch Rinaldi in Audiology today.  We would be happy to do further testing or discuss these test as necessary.          This document has been electronically signed  by Roxi Anthony MS CCC-A on April 19, 2019 11:51 AM       Roxi Anthony MS CCC-A  Licensed Audiologist

## 2019-04-22 LAB
7-AMINOCLONAZEPAM UR: NOT DETECTED NG/MG CREAT
A-OH ALPRAZ/CREAT UR: NOT DETECTED NG/MG CREAT
ALFENTANIL UR QL: NOT DETECTED NG/MG CREAT
ALPHA-HYDROXYTRIAZOLAM, URINE: NOT DETECTED NG/MG CREAT
AMOBARBITAL UR: NOT DETECTED
AMPHET UR QL CFM: NOT DETECTED NG/MG CREAT
AMPHETAMINES UR QL SCN: NEGATIVE
BARBITAL UR QL CFM: NOT DETECTED
BARBITURATES UR QL SCN: NEGATIVE
BENZODIAZ UR QL SCN: NEGATIVE
BENZOYLECGONINE UR: NOT DETECTED NG/MG CREAT
BUPRENORPHINE UR QL: NEGATIVE
BUPRENORPHINE UR QL: NOT DETECTED NG/MG CREAT
BUTABARBITAL UR QL: NOT DETECTED
BUTALBITAL UR QL: NOT DETECTED
CANNABINOIDS UR QL CFM: NEGATIVE
CLONAZEPAM UR QL: NOT DETECTED NG/MG CREAT
COCAETHYLENE UR QL CFM: NOT DETECTED NG/MG CREAT
COCAINE UR QL CFM: NEGATIVE
CODEINE UR QL: NOT DETECTED NG/MG CREAT
CONV COCAINE, UR: NOT DETECTED NG/MG CREAT
CONV REPORT SUMMARY: NORMAL
CREAT 24H UR-MCNC: 147 MG/DL
DESALKYLFLURAZ/CREAT UR: NOT DETECTED NG/MG CREAT
DIAZEPAM UR-MCNC: NOT DETECTED NG/MG CREAT
DIHYDROCODEINE UR: NOT DETECTED NG/MG CREAT
EDDP SERPL QL: NOT DETECTED NG/MG CREAT
ETHANOL SCREEN URINE (REF): NEGATIVE
ETHANOL UR-MCNC: NOT DETECTED G/DL
FENTANYL UR QL: NEGATIVE
FENTANYL+NORFENTANYL UR QL SCN: NOT DETECTED NG/MG CREAT
HX OF MEDICATION USE: NORMAL
HYDROCODONE UR QL: 312 NG/MG CREAT
HYDROMORPHONE UR QL: 97 NG/MG CREAT
LEVEL OF DETECTION:: NORMAL
LORAZEPAM UR-MCNC: NOT DETECTED NG/MG CREAT
LORAZEPAM/CREAT UR: NOT DETECTED NG/MG CREAT
MDA SERPLBLD-MCNC: NOT DETECTED NG/MG CREAT
MDMA UR QL SCN: NOT DETECTED NG/MG CREAT
MEPHOBARBITAL UR QL CFM: NOT DETECTED
METHADONE BLD QL SCN: NEGATIVE
METHADONE, URINE: NOT DETECTED NG/MG CREAT
METHAMPHETAMINE UR: NOT DETECTED NG/MG CREAT
MIDAZOLAM UR-MCNC: NOT DETECTED NG/MG CREAT
MORPHINE UR QL: NOT DETECTED NG/MG CREAT
N-DESMETHYLTRAMADOL, U: NOT DETECTED NG/MG CREAT
NARCOTICS UR: NEGATIVE
NORBUPRENORPHINE SERPLBLD-MCNC: NOT DETECTED NG/MG CREAT
NORCODEINE UR-MCNC: NOT DETECTED NG/MG CREAT
NORDIAZEPAM SERPL CFM-MCNC: NOT DETECTED NG/MG CREAT
NORFENTANYL UR: NOT DETECTED NG/MG CREAT
NOROXYMORPHONE: NOT DETECTED NG/MG CREAT
O-DESMETHYLTRAMADOL, UR: NOT DETECTED NG/MG CREAT
OPIATES UR QL CFM: NORMAL
OPIATES, URINE, NORHYDROCODONE: 554 NG/MG CREAT
OPIATES, URINE, NOROXYCODONE: NOT DETECTED NG/MG CREAT
OXAZEPAM UR QL: NOT DETECTED NG/MG CREAT
OXYCODONE UR QL: NEGATIVE
OXYCODONE UR: NOT DETECTED NG/MG CREAT
OXYMORPHONE UR: NOT DETECTED NG/MG CREAT
PENTOBARBITAL UR: NOT DETECTED
PHENOBARB UR QL: NOT DETECTED
SECOBARBITAL UR QL: NOT DETECTED
SUFENTANIL UR QL: NOT DETECTED NG/MG CREAT
TAPENTADOL SERPLBLD-MCNC: NEGATIVE NG/ML
TAPENTADOL UR-MCNC: NOT DETECTED NG/MG CREAT
TEMAZEPAM UR QL CFM: NOT DETECTED NG/MG CREAT
THC UR CFM-MCNC: NOT DETECTED NG/MG CREAT
THIOPENTAL UR QL CFM: NOT DETECTED
TRAMADOL UR: NOT DETECTED NG/MG CREAT

## 2019-04-26 ENCOUNTER — OFFICE VISIT (OUTPATIENT)
Dept: FAMILY MEDICINE CLINIC | Facility: CLINIC | Age: 59
End: 2019-04-26

## 2019-04-26 VITALS
DIASTOLIC BLOOD PRESSURE: 80 MMHG | TEMPERATURE: 97.3 F | WEIGHT: 149 LBS | SYSTOLIC BLOOD PRESSURE: 148 MMHG | BODY MASS INDEX: 21.33 KG/M2 | HEIGHT: 70 IN | HEART RATE: 98 BPM

## 2019-04-26 DIAGNOSIS — F41.1 GENERALIZED ANXIETY DISORDER: Chronic | ICD-10-CM

## 2019-04-26 DIAGNOSIS — M15.9 PRIMARY OSTEOARTHRITIS INVOLVING MULTIPLE JOINTS: Chronic | ICD-10-CM

## 2019-04-26 DIAGNOSIS — G59 MONONEUROPATHY DUE TO UNDERLYING DISEASE: Chronic | ICD-10-CM

## 2019-04-26 DIAGNOSIS — R73.02 IMPAIRED GLUCOSE TOLERANCE: Primary | Chronic | ICD-10-CM

## 2019-04-26 DIAGNOSIS — H69.81 DYSFUNCTION OF RIGHT EUSTACHIAN TUBE: ICD-10-CM

## 2019-04-26 DIAGNOSIS — E78.2 MIXED HYPERLIPIDEMIA: Chronic | ICD-10-CM

## 2019-04-26 DIAGNOSIS — K21.9 GASTROESOPHAGEAL REFLUX DISEASE WITHOUT ESOPHAGITIS: Chronic | ICD-10-CM

## 2019-04-26 PROCEDURE — 99214 OFFICE O/P EST MOD 30 MIN: CPT | Performed by: INTERNAL MEDICINE

## 2019-04-26 RX ORDER — CITALOPRAM 10 MG/1
10 TABLET ORAL DAILY
Qty: 30 TABLET | Refills: 5 | Status: SHIPPED | OUTPATIENT
Start: 2019-04-26 | End: 2019-05-14 | Stop reason: SDUPTHER

## 2019-04-26 RX ORDER — FLUTICASONE PROPIONATE 50 MCG
2 SPRAY, SUSPENSION (ML) NASAL DAILY
Qty: 1 BOTTLE | Refills: 3 | Status: SHIPPED | OUTPATIENT
Start: 2019-04-26 | End: 2020-06-02

## 2019-04-26 RX ORDER — HYDROCODONE BITARTRATE AND ACETAMINOPHEN 7.5; 325 MG/1; MG/1
1 TABLET ORAL 2 TIMES DAILY PRN
Qty: 60 TABLET | Refills: 0 | Status: SHIPPED | OUTPATIENT
Start: 2019-04-26 | End: 2019-05-23 | Stop reason: SDUPTHER

## 2019-04-26 NOTE — PROGRESS NOTES
Chief Complaint   Patient presents with   • Osteoarthritis     3 mo f/u   • Hyperlipidemia     f/u with labs     Subjective   Mitch Rinaldi is a 58 y.o. male who presents to the office for follow-up and review of labs. He has impaired glucose tolerance and has been monitoring his dietary intake of sugars and carbohydrates.  He has hyperlipidemia and controls this with diet.  He has GERD which is well controlled with Nexium.  He has osteoarthritis which causes chronic back and joint pain.  He takes Norco as needed for the pain.  He also takes ibuprofen to help with inflammation.  He uses Zanaflex as needed for muscle spasm.  He has neuropathy secondary to the arthritis in his low back.  This is currently well managed with his medication regimen.  He has a history of anxiety and depression.  He took Celexa for this a couple of years ago.  His symptoms then went into full remission, and he discontinued the medication.  He has been doing well up until a few weeks ago.  He states that he has now been under increased stress due to his ex-wife and his brother.  He had some Celexa left at home, and restarted this a couple of weeks ago.  He is starting to feel better, and denies any suicidal or homicidal thoughts.    He recently saw ENT secondary to hearing loss in his right ear.  He had eustachian tube dysfunction and started taking Flonase.  His hearing has improved significantly.  He is needing a refill on Flonase nasal spray today.    The following portions of the patient's history were reviewed and updated as appropriate: allergies, current medications, past family history, past medical history, past social history, past surgical history and problem list.    Review of Systems   Constitutional: Negative for chills, fatigue and fever.   HENT: Negative for congestion, sneezing, sore throat and trouble swallowing.    Eyes: Negative for visual disturbance.   Respiratory: Negative for cough, chest tightness, shortness of  "breath and wheezing.    Cardiovascular: Negative for chest pain, palpitations and leg swelling.   Gastrointestinal: Positive for nausea. Negative for abdominal pain, constipation, diarrhea and vomiting.   Genitourinary: Negative for dysuria, frequency and urgency.   Musculoskeletal: Negative for neck pain.   Skin: Negative for rash.   Neurological: Negative for dizziness, weakness and headaches.   Psychiatric/Behavioral: The patient is nervous/anxious.    All other systems reviewed and are negative.      Objective   Vitals:    04/26/19 0809   BP: 148/80   BP Location: Left arm   Patient Position: Sitting   Cuff Size: Adult   Pulse: 98   Temp: 97.3 °F (36.3 °C)   TempSrc: Oral   Weight: 67.6 kg (149 lb)   Height: 177.8 cm (70\")   PainSc: 0-No pain     Physical Exam   Constitutional: He is oriented to person, place, and time. He appears well-developed and well-nourished. No distress.   HENT:   Head: Normocephalic and atraumatic.   Nose: Nose normal.   Mouth/Throat: Oropharynx is clear and moist. No oropharyngeal exudate.   Eyes: Conjunctivae and EOM are normal. Pupils are equal, round, and reactive to light. No scleral icterus.   Neck: Normal range of motion. Neck supple.   Cardiovascular: Normal rate, regular rhythm and normal heart sounds. Exam reveals no gallop and no friction rub.   No murmur heard.  Pulmonary/Chest: Effort normal and breath sounds normal. No respiratory distress. He has no wheezes. He has no rales.   Abdominal: Soft. Bowel sounds are normal. He exhibits no distension. There is no tenderness. There is no rebound and no guarding.   Musculoskeletal: He exhibits no edema.        Lumbar back: He exhibits decreased range of motion and pain.   Lymphadenopathy:     He has no cervical adenopathy.   Neurological: He is alert and oriented to person, place, and time. No cranial nerve deficit.   Skin: Skin is warm and dry. No rash noted.   Psychiatric: He has a normal mood and affect. His behavior is normal. " Judgment and thought content normal.   Nursing note and vitals reviewed.      Assessment/Plan   Mitch was seen today for osteoarthritis and hyperlipidemia.    Diagnoses and all orders for this visit:    Impaired glucose tolerance  -     Hemoglobin A1c; Future    Mixed hyperlipidemia  -     CBC & Differential; Future  -     Comprehensive Metabolic Panel; Future  -     T4, Free; Future  -     TSH; Future  -     Urinalysis With Culture If Indicated - Urine, Clean Catch; Future  -     Lipid Panel; Future    Gastroesophageal reflux disease without esophagitis    Primary osteoarthritis involving multiple joints  -     HYDROcodone-acetaminophen (NORCO) 7.5-325 MG per tablet; Take 1 tablet by mouth 2 (Two) Times a Day As Needed for Mild Pain  or Moderate Pain .    Mononeuropathy due to underlying disease    Generalized anxiety disorder  -     citalopram (CeleXA) 10 MG tablet; Take 1 tablet by mouth Daily.    Dysfunction of right eustachian tube  -     fluticasone (FLONASE) 50 MCG/ACT nasal spray; 2 sprays into the nostril(s) as directed by provider Daily. Administer 2 sprays in each nostril for each dose.         Labs are reviewed with patient. His glucose is slightly elevated at 114.  He will continue to watch diet to help maintain control of the glucose.  He understands that there is an increased risk of developing diabetes in the future.  His lipids are at goal, and he will continue with dietary management of the hyperlipidemia.  He will continue with Nexium for treatment of GERD.  He will continue with ibuprofen for treatment of his arthritic pain.  He will also continue with Norco up to twice a day as needed for flareups in the pain.  His neuropathic pain is controlled, and he does not require additional medication for this.  Urine drug screen was performed and results are consistent with his current medications.      He will continue with Celexa 10 mg daily (which he started a couple of weeks ago).  I reminded him  that this will take 4 to 6 weeks to achieve maximal effect.  His blood pressure is elevated today, and this is likely secondary to the anxiety.    He will continue with Flonase nasal spray for the eustachian tube dysfunction.    Patient understands the risks associated with this controlled medication, including tolerance and addiction.  Patient also agrees to only obtain this medication from me, and not from a another provider, unless that provider is covering for me in my absence.  Patient also agrees to be compliant in dosing, and not self adjust the dose of medication.  A signed controlled substance agreement is on file, and the patient has received a controlled substance education sheet at this a previous visit.  The patient has also signed a consent for treatment with a controlled substance as per [facility] policy. SOFI was obtained.          PHQ-2/PHQ-9 Depression Screening 4/26/2019   Little interest or pleasure in doing things 0   Feeling down, depressed, or hopeless 0   Trouble falling or staying asleep, or sleeping too much -   Feeling tired or having little energy -   Poor appetite or overeating -   Feeling bad about yourself - or that you are a failure or have let yourself or your family down -   Trouble concentrating on things, such as reading the newspaper or watching television -   Moving or speaking so slowly that other people could have noticed. Or the opposite - being so fidgety or restless that you have been moving around a lot more than usual -   Thoughts that you would be better off dead, or of hurting yourself in some way -   Total Score 0         Lab on 04/17/2019   Component Date Value Ref Range Status   • Glucose 04/17/2019 114* 70 - 99 mg/dL Final   • BUN 04/17/2019 11  7 - 23 mg/dL Final   • Creatinine 04/17/2019 1.04  0.70 - 1.30 mg/dL Final   • Sodium 04/17/2019 137  137 - 145 mmol/L Final   • Potassium 04/17/2019 3.2* 3.4 - 5.0 mmol/L Final   • Chloride 04/17/2019 97* 101 - 112  mmol/L Final   • CO2 04/17/2019 29.0  22.0 - 30.0 mmol/L Final   • Calcium 04/17/2019 9.4  8.4 - 10.2 mg/dL Final   • Total Protein 04/17/2019 8.0  6.3 - 8.6 g/dL Final   • Albumin 04/17/2019 4.60  3.50 - 5.00 g/dL Final   • ALT (SGPT) 04/17/2019 19  <=50 U/L Final   • AST (SGOT) 04/17/2019 22  17 - 59 U/L Final   • Alkaline Phosphatase 04/17/2019 93  38 - 126 U/L Final   • Total Bilirubin 04/17/2019 0.7  0.2 - 1.3 mg/dL Final   • eGFR Non African Amer 04/17/2019 73  49 - 113 mL/min/1.73 Final   • Globulin 04/17/2019 3.4  2.3 - 3.5 gm/dL Final   • A/G Ratio 04/17/2019 1.4  1.1 - 1.8 g/dL Final   • BUN/Creatinine Ratio 04/17/2019 10.6  7.0 - 25.0 Final   • Anion Gap 04/17/2019 11.0  5.0 - 15.0 mmol/L Final   • Total Cholesterol 04/17/2019 161  150 - 200 mg/dL Final   • Triglycerides 04/17/2019 139  <=150 mg/dL Final   • HDL Cholesterol 04/17/2019 38* 40 - 59 mg/dL Final   • LDL Cholesterol  04/17/2019 95  <=100 mg/dL Final   • VLDL Cholesterol 04/17/2019 27.8  mg/dL Final   • LDL/HDL Ratio 04/17/2019 2.51  0.00 - 3.55 Final   • PSA 04/17/2019 0.368  0.000 - 4.000 ng/mL Final   • Free T4 04/17/2019 1.50  0.93 - 1.70 ng/dL Final   • TSH 04/17/2019 2.760  0.270 - 4.200 mIU/mL Final   • Color, UA 04/17/2019 Dark Yellow* Yellow, Straw Final   • Appearance, UA 04/17/2019 Clear  Clear Final   • pH, UA 04/17/2019 7.0  5.5 - 8.0 Final   • Specific Gravity, UA 04/17/2019 1.015  1.005 - 1.030 Final   • Glucose, UA 04/17/2019 Negative  Negative Final   • Ketones, UA 04/17/2019 Negative  Negative Final   • Bilirubin, UA 04/17/2019 Negative  Negative Final   • Blood, UA 04/17/2019 Negative  Negative Final   • Protein, UA 04/17/2019 Negative  Negative Final   • Leuk Esterase, UA 04/17/2019 Negative  Negative Final   • Nitrite, UA 04/17/2019 Negative  Negative Final   • Urobilinogen, UA 04/17/2019 0.2 E.U./dL  0.2 - 1.0 E.U./dL Final   • Report Summary 04/17/2019 FINAL   Final    Comment:  ====================================================================  [facility] SELECT 13 [facility]  ====================================================================  Test                             Result       Flag       Units  Drug Present    Hydrocodone                    312                     ng/mg creat    Hydromorphone                  97                      ng/mg creat    Norhydrocodone                 554                     ng/mg creat     Sources of hydrocodone include scheduled prescription     medications. Hydromorphone and norhydrocodone are expected     metabolites of hydrocodone. Hydromorphone is also available as a     scheduled prescription medication.  ====================================================================  Test                      Result    Flag   Units      Ref Range    Creatinine              147              mg/dL      >=20  ====================================================================  Declared Medications:   Medication list was not                            provided.  ====================================================================  For clinical consultation, please call [phone].  ====================================================================   • Ethanol Screen Urine (Ref) 04/17/2019 Negative   Final   • Ethanol, Urine 04/17/2019 Not Detected  g/dL Final   • Amphetamine, Urine Qual 04/17/2019 Negative   Final   • Methamphetamine, Urine 04/17/2019 Not Detected  ng/mg creat Final   • Amphetamine, Urine 04/17/2019 Not Detected  ng/mg creat Final   • MDMA URINE 04/17/2019 Not Detected  ng/mg creat Final   • MDA 04/17/2019 Not Detected  ng/mg creat Final   • Benzodiazepine Screen, Urine 04/17/2019 Negative   Final   • DIAZEPAM URINE QUANT (REF) 04/17/2019 Not Detected  ng/mg creat Final   • Desmethyldiazepam 04/17/2019 Not Detected  ng/mg creat Final   • Oxazepam, urine 04/17/2019 Not Detected  ng/mg creat Final   • Temazepam, urine 04/17/2019 Not  Detected  ng/mg creat Final    Expected metabolism of benzodiazepine class drugs:   Parent Drug       Detected Metabolites   -----------       --------------------   Diazepam:         Desmethyldiazepam, Temazepam, Oxazepam   Chlordiazepoxide: Desmethyldiazepam, Oxazepam   Clorazepate:      Desmethyldiazepam, Oxazepam   Halazepam:        Desmethyldiazepam, Oxazepam   Temazepam:        Oxazepam   Oxazepam:         None   • ALPRAZOLAM 04/17/2019 Not Detected  ng/mg creat Final   • ALPHA-HYDROXYALPRAZOLAM UR, QT (RE* 04/17/2019 Not Detected  ng/mg creat Final   • DESALKLFLURAZEPAM UR QUANT (REF) 04/17/2019 Not Detected  ng/mg creat Final   • LORAZEPAM URINE QUANT (REF) 04/17/2019 Not Detected  ng/mg creat Final   • Alpha-hydroxytriazolam, Urine 04/17/2019 Not Detected  ng/mg creat Final   • Clonazepam Urine 04/17/2019 Not Detected  ng/mg creat Final   • 7-Aminoclonazepam Urine 04/17/2019 Not Detected  ng/mg creat Final   • MIDAZOLAM UR, QUANT (REF) 04/17/2019 Not Detected  ng/mg creat Final   • Cocaine & Metabolite 04/17/2019 Negative   Final   • Cocaine Screen, Urine 04/17/2019 Not Detected  ng/mg creat Final   • Benzoylecgonine, Urine 04/17/2019 Not Detected  ng/mg creat Final   • Cocaethylene Ur 04/17/2019 Not Detected  ng/mg creat Final   • THC, Urine 04/17/2019 Negative   Final   • Carboxy THC, Urine 04/17/2019 Not Detected  ng/mg creat Final   • Opiate Class 04/17/2019 +POSITIVE+   Final   • Codeine, Urine 04/17/2019 Not Detected  ng/mg creat Final   • Morphine, Urine 04/17/2019 Not Detected  ng/mg creat Final   • Norcodeine Ur 04/17/2019 Not Detected  ng/mg creat Final   • Hydrocodone UR 04/17/2019 312  ng/mg creat Final   • Hydromorphone Urine 04/17/2019 97  ng/mg creat Final   • Dihydrocodeine, Urine 04/17/2019 Not Detected  ng/mg creat Final   • Opiates, Norhydrocodone, Urine 04/17/2019 554  ng/mg creat Final    Expected metabolism of opiate class drugs:  Parent Drug       Detected Metabolites   -----------        --------------------   Codeine:          Major:  Morphine, Norcodeine                     Minor:  Hydrocodone, Hydromorphone,                             Dihydrocodeine, Norhydrocodone,                             Normorphine   Morphine:         Major:  Normorphine                     Minor:  Hydromorphone   Hydrocodone:      Hydromorphone, Dihydrocodeine,                     Norhydrocodone   Hydromorphone:    None   Dihydrocodeine:   None   Heroin:           6-Acetylmorphine (if included),                     Morphine, Normorphine                     Codeine, in small amounts in comparison                      to morphine, is often detected when                      heroin is the source drug.   • Oxycodone Class Ur 04/17/2019 Negative   Final   • Oxycodone, Confirmation, Urine 04/17/2019 Not Detected  ng/mg creat Final   • Oxymorphone UR 04/17/2019 Not Detected  ng/mg creat Final   • Opiates, Noroxycodone, Urine 04/17/2019 Not Detected  ng/mg creat Final   • Noroxymorphone 04/17/2019 Not Detected  ng/mg creat Final    Expected metabolism of oxycodone class drugs:   Parent Drug       Detected Metabolites   -----------       --------------------   Oxycodone:        Oxymorphone, Noroxycodone, Noroxymorphone   Oxymorphone:      Noroxymorphone   • Methadone 04/17/2019 Negative   Final   • Methadone, Quantitative 04/17/2019 Not Detected  ng/mg creat Final   • EDDP 04/17/2019 Not Detected  ng/mg creat Final   • Fentanyl and Analogues, Ur 04/17/2019 Negative   Final   • Fentanyl, Urine 04/17/2019 Not Detected  ng/mg creat Final   • Norfentanyl Urine 04/17/2019 Not Detected  ng/mg creat Final   • Sufentanil, Ur 04/17/2019 Not Detected  ng/mg creat Final   • Alfentanil, Ur 04/17/2019 Not Detected  ng/mg creat Final   • BUPRENORPHINE 04/17/2019 Negative   Final   • Buprenorphine, Urine 04/17/2019 Not Detected  ng/mg creat Final   • Norbuprenorphine 04/17/2019 Not Detected  ng/mg creat Final   • BARBITURATES, URINE  04/17/2019 Negative   Final   • Amobarbital, Urine 04/17/2019 Not Detected   Final   • Barbital 04/17/2019 Not Detected   Final   • Butabarbital, Ur 04/17/2019 Not Detected   Final   • Butalbital Screen, Urine 04/17/2019 Not Detected   Final   • Mephobarbital Urine 04/17/2019 Not Detected   Final   • Pentobarbital UR 04/17/2019 Not Detected   Final   • Phenobarbital 04/17/2019 Not Detected   Final   • Secobarbital, urine 04/17/2019 Not Detected   Final   • Thiopental, Ur 04/17/2019 Not Detected   Final   • Tapentadol 04/17/2019 Negative   Final   • Tapentadol Ur 04/17/2019 Not Detected  ng/mg creat Final   • Opoidss other, UR 04/17/2019 Negative   Final   • Tramadol, Urine 04/17/2019 Not Detected  ng/mg creat Final   • O-desmethyltramadol, Ur 04/17/2019 Not Detected  ng/mg creat Final   • N-Desmethyltramadol, U 04/17/2019 Not Detected  ng/mg creat Final   • Creatinine, Urine 04/17/2019 147  mg/dL Final    REFERENCE RANGE: Ref Range>=20   • Declared Medications: 04/17/2019 Comment   Final    Not Provided  Medication list was not provided.   • Level of Detection: 04/17/2019 Comment   Final    Level of detection:  TESTING THRESHOLDS ARE AS FOLLOWS:  AMPHETAMINES: 50 ng/mL  BENZODIAZEPINES: 20 ng/mL  COCAINE / METABOLITE: 50 ng/mL  ALCOHOL, ETHYL: 0.020 gm/dL  FENTANYL / ANALOGUES: fentanyl-1.0 ng/mL, others-5.0 ng/mL  BUPRENORPHINE: buprenorphine-1.0 ng/mL,                 norbuprenorphine-5 ng/mL  TAPENTADOL: 50 ng/mL  CANNABINOIDS: total-20 ng/mL, carboxy-THC-2 ng/mL  METHADONE: 50 ng/mL  OPIATE CLASS: 50 ng/mL  OXYCODONE CLASS: 50 ng/mL  BARBITURATES: 200 ng/mL  TRAMADOL: 50 ng/mL  This test was developed and its performance characteristics  determined by [facility].  It has not been cleared or approved  by the Food and Drug Administration.   • WBC 04/17/2019 6.97  3.40 - 10.80 10*3/mm3 Final   • RBC 04/17/2019 5.18  4.14 - 5.80 10*6/mm3 Final   • Hemoglobin 04/17/2019 16.2  13.0 - 17.7 g/dL Final   • Hematocrit  04/17/2019 45.7  37.5 - 51.0 % Final   • MCV 04/17/2019 88.2  79.0 - 97.0 fL Final   • MCH 04/17/2019 31.3  26.6 - 33.0 pg Final   • MCHC 04/17/2019 35.4  31.5 - 35.7 g/dL Final   • RDW 04/17/2019 12.8  12.3 - 15.4 % Final   • RDW-SD 04/17/2019 41.1  37.0 - 54.0 fl Final   • MPV 04/17/2019 10.9  6.0 - 12.0 fL Final   • Platelets 04/17/2019 171  140 - 450 10*3/mm3 Final   • Neutrophil % 04/17/2019 55.3  42.7 - 76.0 % Final   • Lymphocyte % 04/17/2019 30.4  19.6 - 45.3 % Final   • Monocyte % 04/17/2019 8.8  5.0 - 12.0 % Final   • Eosinophil % 04/17/2019 4.6  0.3 - 6.2 % Final   • Basophil % 04/17/2019 0.9  0.0 - 1.5 % Final   • Neutrophils, Absolute 04/17/2019 3.86  1.40 - 7.00 10*3/mm3 Final   • Lymphocytes, Absolute 04/17/2019 2.12  0.70 - 3.10 10*3/mm3 Final   • Monocytes, Absolute 04/17/2019 0.61  0.10 - 0.90 10*3/mm3 Final   • Eosinophils, Absolute 04/17/2019 0.32  0.00 - 0.40 10*3/mm3 Final   • Basophils, Absolute 04/17/2019 0.06  0.00 - 0.20 10*3/mm3 Final   ].  .

## 2019-05-13 DIAGNOSIS — K21.9 GASTROESOPHAGEAL REFLUX DISEASE WITHOUT ESOPHAGITIS: Chronic | ICD-10-CM

## 2019-05-13 RX ORDER — PROMETHAZINE HYDROCHLORIDE 25 MG/1
TABLET ORAL
Qty: 30 TABLET | Refills: 2 | Status: CANCELLED | OUTPATIENT
Start: 2019-05-13

## 2019-05-14 ENCOUNTER — OFFICE VISIT (OUTPATIENT)
Dept: FAMILY MEDICINE CLINIC | Facility: CLINIC | Age: 59
End: 2019-05-14

## 2019-05-14 VITALS
BODY MASS INDEX: 21.33 KG/M2 | HEIGHT: 70 IN | DIASTOLIC BLOOD PRESSURE: 84 MMHG | TEMPERATURE: 97.6 F | WEIGHT: 149 LBS | HEART RATE: 123 BPM | SYSTOLIC BLOOD PRESSURE: 178 MMHG

## 2019-05-14 DIAGNOSIS — K21.9 GASTROESOPHAGEAL REFLUX DISEASE WITHOUT ESOPHAGITIS: Chronic | ICD-10-CM

## 2019-05-14 DIAGNOSIS — F41.1 GENERALIZED ANXIETY DISORDER: Chronic | ICD-10-CM

## 2019-05-14 PROCEDURE — 99213 OFFICE O/P EST LOW 20 MIN: CPT | Performed by: INTERNAL MEDICINE

## 2019-05-14 RX ORDER — CITALOPRAM 20 MG/1
20 TABLET ORAL DAILY
Qty: 30 TABLET | Refills: 5 | Status: SHIPPED | OUTPATIENT
Start: 2019-05-14 | End: 2019-07-23

## 2019-05-14 RX ORDER — PROMETHAZINE HYDROCHLORIDE 25 MG/1
25 TABLET ORAL EVERY 6 HOURS PRN
Qty: 60 TABLET | Refills: 2 | Status: SHIPPED | OUTPATIENT
Start: 2019-05-14 | End: 2019-07-10 | Stop reason: SDUPTHER

## 2019-05-14 RX ORDER — SUCRALFATE 1 G/1
1 TABLET ORAL 4 TIMES DAILY
Qty: 120 TABLET | Refills: 5 | Status: SHIPPED | OUTPATIENT
Start: 2019-05-14 | End: 2020-06-02

## 2019-05-14 RX ORDER — DEXLANSOPRAZOLE 60 MG/1
60 CAPSULE, DELAYED RELEASE ORAL DAILY
COMMUNITY
End: 2019-07-23

## 2019-05-14 NOTE — PROGRESS NOTES
"Chief Complaint   Patient presents with   • Nausea     x 5 weeks, requesting refill on phenergan     Subjective   Mitch Rinaldi is a 58 y.o. male who presents to the office complaining of increased nausea.  He has been taking Phenergan 3 and 4 times daily.  He is under a lot of stress and is taking Celexa 10 mg daily.  He is having problems at home dealing with his ex-wife.  He feels like the stress is causing his GI symptoms.  He recently took his brother to a follow-up appointment with Dr. Rodriguez in [place].  Dr. Rodriguez gave him some Dexilant samples to try, and he has been taking this for the past week.    The following portions of the patient's history were reviewed and updated as appropriate: allergies, current medications, past family history, past medical history, past social history, past surgical history and problem list.    Review of Systems   Constitutional: Negative for chills, fatigue and fever.   HENT: Negative for congestion, sneezing, sore throat and trouble swallowing.    Eyes: Negative for visual disturbance.   Respiratory: Negative for cough, chest tightness, shortness of breath and wheezing.    Cardiovascular: Negative for chest pain, palpitations and leg swelling.   Gastrointestinal: Positive for nausea. Negative for abdominal pain, constipation, diarrhea and vomiting.   Genitourinary: Negative for dysuria, frequency and urgency.   Musculoskeletal: Negative for neck pain.   Skin: Negative for rash.   Neurological: Negative for dizziness, weakness and headaches.   Psychiatric/Behavioral: The patient is nervous/anxious.    All other systems reviewed and are negative.      Objective   Vitals:    05/14/19 0821   BP: 178/84   BP Location: Left arm   Patient Position: Sitting   Cuff Size: Adult   Pulse: (!) 123   Temp: 97.6 °F (36.4 °C)   TempSrc: Oral   Weight: 67.6 kg (149 lb)   Height: 177.8 cm (70\")   PainSc: 0-No pain     Physical Exam   Constitutional: He is oriented to person, place, " and time. He appears well-developed and well-nourished. No distress.   HENT:   Head: Normocephalic and atraumatic.   Nose: Nose normal.   Mouth/Throat: Oropharynx is clear and moist. No oropharyngeal exudate.   Eyes: Conjunctivae and EOM are normal. Pupils are equal, round, and reactive to light. No scleral icterus.   Neck: Normal range of motion. Neck supple.   Cardiovascular: Normal rate, regular rhythm and normal heart sounds. Exam reveals no gallop and no friction rub.   No murmur heard.  Pulmonary/Chest: Effort normal and breath sounds normal. No respiratory distress. He has no wheezes. He has no rales.   Abdominal: Soft. Bowel sounds are normal. He exhibits no distension. There is no tenderness. There is no rebound and no guarding.   Musculoskeletal: He exhibits no edema.        Lumbar back: He exhibits decreased range of motion and pain.   Lymphadenopathy:     He has no cervical adenopathy.   Neurological: He is alert and oriented to person, place, and time. No cranial nerve deficit.   Skin: Skin is warm and dry. No rash noted.   Psychiatric: He has a normal mood and affect. His behavior is normal. Judgment and thought content normal.   Nursing note and vitals reviewed.      Assessment/Plan   Mitch was seen today for nausea.    Diagnoses and all orders for this visit:    Generalized anxiety disorder  -     citalopram (CeleXA) 20 MG tablet; Take 1 tablet by mouth Daily.    Gastroesophageal reflux disease without esophagitis  -     sucralfate (CARAFATE) 1 g tablet; Take 1 tablet by mouth 4 (Four) Times a Day.  -     promethazine (PHENERGAN) 25 MG tablet; Take 1 tablet by mouth Every 6 (Six) Hours As Needed for Nausea or Vomiting.      He will continue with Celexa, and I will increase this to 20 mg daily.  He will continue with the Dexilant 60 mg daily.  I will add Carafate 1 g 4 times a day, to be taken prior to meals and at bedtime.  He is given Phenergan to use as needed for the nausea.  If he has not had any  improvement within the next couple of weeks, he will let me know.  At that point he may need referral to gastroenterology for further evaluation.         PHQ-2/PHQ-9 Depression Screening 5/14/2019   Little interest or pleasure in doing things 0   Feeling down, depressed, or hopeless 0   Trouble falling or staying asleep, or sleeping too much -   Feeling tired or having little energy -   Poor appetite or overeating -   Feeling bad about yourself - or that you are a failure or have let yourself or your family down -   Trouble concentrating on things, such as reading the newspaper or watching television -   Moving or speaking so slowly that other people could have noticed. Or the opposite - being so fidgety or restless that you have been moving around a lot more than usual -   Thoughts that you would be better off dead, or of hurting yourself in some way -   Total Score 0

## 2019-05-23 DIAGNOSIS — M15.9 PRIMARY OSTEOARTHRITIS INVOLVING MULTIPLE JOINTS: Primary | Chronic | ICD-10-CM

## 2019-05-23 RX ORDER — HYDROCODONE BITARTRATE AND ACETAMINOPHEN 7.5; 325 MG/1; MG/1
1 TABLET ORAL 2 TIMES DAILY PRN
Qty: 60 TABLET | Refills: 0 | Status: SHIPPED | OUTPATIENT
Start: 2019-05-23 | End: 2019-06-21 | Stop reason: SDUPTHER

## 2019-05-23 NOTE — TELEPHONE ENCOUNTER
Patient is up to date on controlled medication consent, Long report, and appt from 05/14/2019. Refill on [place] to [facility].

## 2019-06-21 DIAGNOSIS — M15.9 PRIMARY OSTEOARTHRITIS INVOLVING MULTIPLE JOINTS: Chronic | ICD-10-CM

## 2019-06-21 RX ORDER — HYDROCODONE BITARTRATE AND ACETAMINOPHEN 7.5; 325 MG/1; MG/1
1 TABLET ORAL 2 TIMES DAILY PRN
Qty: 60 TABLET | Refills: 0 | Status: SHIPPED | OUTPATIENT
Start: 2019-06-21 | End: 2019-07-23 | Stop reason: SDUPTHER

## 2019-06-21 NOTE — TELEPHONE ENCOUNTER
Patient is UTD from office visit, SOFI and controlled consent on 4-26-19. Next office visit is scheduled for 7-23-19. Last refill on Hydrocodone was on 5-23-19.

## 2019-06-21 NOTE — TELEPHONE ENCOUNTER
----- Message from Lenora Leos sent at 6/20/2019 10:10 AM CDT -----  Regarding: MED REFILL  Contact: [phone]   Needs refill on HYDROcodone-acetaminophen (NORCO) 7.5-325 MG, due tomorrow to [facility].

## 2019-07-10 DIAGNOSIS — K21.9 GASTROESOPHAGEAL REFLUX DISEASE WITHOUT ESOPHAGITIS: Chronic | ICD-10-CM

## 2019-07-10 RX ORDER — PROMETHAZINE HYDROCHLORIDE 25 MG/1
25 TABLET ORAL EVERY 6 HOURS PRN
Qty: 60 TABLET | Refills: 2 | Status: SHIPPED | OUTPATIENT
Start: 2019-07-10 | End: 2019-11-12 | Stop reason: SDUPTHER

## 2019-07-22 NOTE — PROGRESS NOTES
Chief Complaint   Patient presents with   • Osteoarthritis     3 mo f/u     Subjective   Mitch Rinaldi is a 59 y.o. male who presents to the office for follow-up. He has osteoarthritis which causes chronic back and joint pain.  This mainly affects his low back.  He takes Norco 7.5/325 mg twice daily for treatment of the back pain.  This is keeping it at a tolerable level.  He has neuropathic pain, but does not currently require medication for this.  He also has muscle spasm in his back and takes Zanaflex as needed. He has GERD and takes Nexium 20 mg daily.  He also takes Carafate 3-4 times daily.    He has anxiety and takes Celexa 20 mg daily.  He is still having quite a bit of anxiety and feels like the Celexa is not working well.  This is causing a lot of nausea and upset stomach.  He recently had some weight loss, but has now gained back to his previous weight.    The following portions of the patient's history were reviewed and updated as appropriate: allergies, current medications, past family history, past medical history, past social history, past surgical history and problem list.    Review of Systems   Constitutional: Negative for chills, fatigue and fever.   HENT: Negative for congestion, sinus pressure, sneezing, sore throat, tinnitus and trouble swallowing.    Eyes: Negative for visual disturbance.   Respiratory: Negative for cough, chest tightness, shortness of breath and wheezing.    Cardiovascular: Negative for chest pain, palpitations and leg swelling.   Gastrointestinal: Negative for abdominal pain, constipation, diarrhea, nausea and vomiting.   Genitourinary: Negative for dysuria, frequency and urgency.   Musculoskeletal: Positive for arthralgias and back pain. Negative for neck pain.   Skin: Negative for rash.   Neurological: Negative for dizziness, weakness and headaches.   Psychiatric/Behavioral: The patient is nervous/anxious.         Patient denies any feelings of depression and has not  "felt down, hopeless or lost interest in any activities.   All other systems reviewed and are negative.      Objective   Vitals:    07/23/19 0818   BP: 134/82   BP Location: Left arm   Patient Position: Sitting   Cuff Size: Adult   Pulse: (!) 126   Temp: 97.9 °F (36.6 °C)   TempSrc: Oral   SpO2: 98%   Weight: 68 kg (150 lb)   Height: 177.8 cm (70\")   PainSc:   6   PainLoc: Back  Comment: Lower back     Physical Exam   Constitutional: He is oriented to person, place, and time. He appears well-developed and well-nourished. No distress.   HENT:   Head: Normocephalic and atraumatic.   Nose: Nose normal.   Mouth/Throat: No oropharyngeal exudate.   Eyes: Conjunctivae and EOM are normal. Pupils are equal, round, and reactive to light. No scleral icterus.   Neck: Normal range of motion. Neck supple.   Cardiovascular: Normal rate, regular rhythm and normal heart sounds. Exam reveals no gallop and no friction rub.   No murmur heard.  Pulmonary/Chest: Effort normal and breath sounds normal. No respiratory distress. He has no wheezes. He has no rales.   Abdominal: Soft. Bowel sounds are normal. He exhibits no distension. There is no tenderness. There is no rebound and no guarding.   Musculoskeletal: He exhibits no edema.        Lumbar back: He exhibits decreased range of motion and pain.   Lymphadenopathy:     He has no cervical adenopathy.   Neurological: He is alert and oriented to person, place, and time. No cranial nerve deficit.   Skin: Skin is warm and dry. No rash noted.   Psychiatric: He has a normal mood and affect. His behavior is normal. Judgment and thought content normal.   Nursing note and vitals reviewed.      Assessment/Plan   Mitch was seen today for osteoarthritis.    Diagnoses and all orders for this visit:    Primary osteoarthritis involving multiple joints  -     HYDROcodone-acetaminophen (NORCO) 7.5-325 MG per tablet; Take 1 tablet by mouth 2 (Two) Times a Day As Needed for Mild Pain  or Moderate Pain .  -  "    tiZANidine (ZANAFLEX) 4 MG tablet; Take 1 tablet by mouth Every 8 (Eight) Hours As Needed for Muscle Spasms. 0.5 - 1 tab as needed    Mononeuropathy due to underlying disease    Gastroesophageal reflux disease without esophagitis    Generalized anxiety disorder  -     FLUoxetine (PROzac) 40 MG capsule; Take 1 capsule by mouth Daily.         He will continue with Nexium and Carafate for treatment of his GERD.  He will continue to use ibuprofen for the arthritic inflammation, and the Zanaflex as needed for the muscle spasm.  He will continue with Norco 7.5/325 mg twice a day for treatment of the musculoskeletal pain.  I will discontinue Celexa and start him on fluoxetine 40 mg daily for treatment of his anxiety.  If he continues to have nausea after getting his anxiety under control, he would benefit from GI referral and an EGD.    Patient understands the risks associated with this controlled medication, including tolerance and addiction.  Patient also agrees to only obtain this medication from me, and not from a another provider, unless that provider is covering for me in my absence.  Patient also agrees to be compliant in dosing, and not self adjust the dose of medication.  A signed controlled substance agreement is on file, and the patient has received a controlled substance education sheet at this a previous visit.  The patient has also signed a consent for treatment with a controlled substance as per [facility] policy. SOFI was obtained.    CONTROLLED SUBSTANCE TRACKING 7/23/2019   Last Sofi 7/23/2019   Report Number [id number]   Last UDS 4/17/2019   Last Controlled Substance Agreement 7/23/2019   Prior UDT result Expected         PHQ-2/PHQ-9 Depression Screening 5/14/2019   Little interest or pleasure in doing things 0   Feeling down, depressed, or hopeless 0   Trouble falling or staying asleep, or sleeping too much -   Feeling tired or having little energy -   Poor appetite or overeating -   Feeling  bad about yourself - or that you are a failure or have let yourself or your family down -   Trouble concentrating on things, such as reading the newspaper or watching television -   Moving or speaking so slowly that other people could have noticed. Or the opposite - being so fidgety or restless that you have been moving around a lot more than usual -   Thoughts that you would be better off dead, or of hurting yourself in some way -   Total Score 0   .

## 2019-07-23 ENCOUNTER — OFFICE VISIT (OUTPATIENT)
Dept: FAMILY MEDICINE CLINIC | Facility: CLINIC | Age: 59
End: 2019-07-23

## 2019-07-23 VITALS
HEART RATE: 126 BPM | SYSTOLIC BLOOD PRESSURE: 134 MMHG | HEIGHT: 70 IN | DIASTOLIC BLOOD PRESSURE: 82 MMHG | BODY MASS INDEX: 21.47 KG/M2 | OXYGEN SATURATION: 98 % | TEMPERATURE: 97.9 F | WEIGHT: 150 LBS

## 2019-07-23 DIAGNOSIS — M15.9 PRIMARY OSTEOARTHRITIS INVOLVING MULTIPLE JOINTS: Primary | Chronic | ICD-10-CM

## 2019-07-23 DIAGNOSIS — G59 MONONEUROPATHY DUE TO UNDERLYING DISEASE: Chronic | ICD-10-CM

## 2019-07-23 DIAGNOSIS — F41.1 GENERALIZED ANXIETY DISORDER: Chronic | ICD-10-CM

## 2019-07-23 DIAGNOSIS — K21.9 GASTROESOPHAGEAL REFLUX DISEASE WITHOUT ESOPHAGITIS: Chronic | ICD-10-CM

## 2019-07-23 PROCEDURE — 99214 OFFICE O/P EST MOD 30 MIN: CPT | Performed by: INTERNAL MEDICINE

## 2019-07-23 RX ORDER — TIZANIDINE 4 MG/1
4 TABLET ORAL EVERY 8 HOURS PRN
Qty: 90 TABLET | Refills: 0 | Status: SHIPPED | OUTPATIENT
Start: 2019-07-23 | End: 2020-01-17 | Stop reason: SDUPTHER

## 2019-07-23 RX ORDER — HYDROCODONE BITARTRATE AND ACETAMINOPHEN 7.5; 325 MG/1; MG/1
1 TABLET ORAL 2 TIMES DAILY PRN
Qty: 60 TABLET | Refills: 0 | Status: SHIPPED | OUTPATIENT
Start: 2019-07-23 | End: 2019-08-22 | Stop reason: SDUPTHER

## 2019-07-23 RX ORDER — FLUOXETINE HYDROCHLORIDE 40 MG/1
40 CAPSULE ORAL DAILY
Qty: 30 CAPSULE | Refills: 5 | Status: SHIPPED | OUTPATIENT
Start: 2019-07-23 | End: 2020-01-17 | Stop reason: SDUPTHER

## 2019-08-22 DIAGNOSIS — M15.9 PRIMARY OSTEOARTHRITIS INVOLVING MULTIPLE JOINTS: Chronic | ICD-10-CM

## 2019-08-22 RX ORDER — HYDROCODONE BITARTRATE AND ACETAMINOPHEN 7.5; 325 MG/1; MG/1
1 TABLET ORAL 2 TIMES DAILY PRN
Qty: 60 TABLET | Refills: 0 | Status: SHIPPED | OUTPATIENT
Start: 2019-08-22 | End: 2019-09-19 | Stop reason: SDUPTHER

## 2019-08-22 NOTE — TELEPHONE ENCOUNTER
----- Message from Alie Ramirez MA sent at 8/20/2019  2:25 PM CDT -----  Patient needing refill on Hydrocodone. Due Thursday.

## 2019-08-22 NOTE — TELEPHONE ENCOUNTER
Patient is current from office visit, SOFI and controlled consent on 7-23-19. Next office visit is scheduled for 10-21-19. Last refill on Hydrocodone was on 7-23-19.

## 2019-09-19 DIAGNOSIS — M15.9 PRIMARY OSTEOARTHRITIS INVOLVING MULTIPLE JOINTS: Chronic | ICD-10-CM

## 2019-09-19 RX ORDER — HYDROCODONE BITARTRATE AND ACETAMINOPHEN 7.5; 325 MG/1; MG/1
1 TABLET ORAL 2 TIMES DAILY PRN
Qty: 60 TABLET | Refills: 0 | Status: SHIPPED | OUTPATIENT
Start: 2019-09-19 | End: 2019-10-21 | Stop reason: SDUPTHER

## 2019-09-19 NOTE — TELEPHONE ENCOUNTER
----- Message from Lenora Leos sent at 9/19/2019 11:32 AM CDT -----  Regarding: MED REFILL  Contact: [phone]   Needs refill on HYDROcodone-acetaminophen (NORCO) 7.5-325 MG, due tomorrow to [facility].

## 2019-09-19 NOTE — TELEPHONE ENCOUNTER
Patient's is current from office visit, SOFI and controlled consent on 7-23-19. Next office visit is scheduled for 10-21-19. Last refill of Hydrocodone was on 8-22-19. Due tomorrow.

## 2019-10-16 ENCOUNTER — LAB (OUTPATIENT)
Dept: LAB | Facility: OTHER | Age: 59
End: 2019-10-16

## 2019-10-16 DIAGNOSIS — E78.2 MIXED HYPERLIPIDEMIA: ICD-10-CM

## 2019-10-16 DIAGNOSIS — R73.02 IMPAIRED GLUCOSE TOLERANCE: Chronic | ICD-10-CM

## 2019-10-16 LAB
ALBUMIN SERPL-MCNC: 4.5 G/DL (ref 3.5–5)
ALBUMIN/GLOB SERPL: 1.2 G/DL (ref 1.1–1.8)
ALP SERPL-CCNC: 90 U/L (ref 38–126)
ALT SERPL W P-5'-P-CCNC: 19 U/L
ANION GAP SERPL CALCULATED.3IONS-SCNC: 12 MMOL/L (ref 5–15)
AST SERPL-CCNC: 28 U/L (ref 17–59)
BILIRUB SERPL-MCNC: 0.4 MG/DL (ref 0.2–1.3)
BILIRUB UR QL STRIP: NEGATIVE
BUN BLD-MCNC: 14 MG/DL (ref 7–23)
BUN/CREAT SERPL: 14.6 (ref 7–25)
CALCIUM SPEC-SCNC: 9.4 MG/DL (ref 8.4–10.2)
CHLORIDE SERPL-SCNC: 105 MMOL/L (ref 101–112)
CHOLEST SERPL-MCNC: 186 MG/DL (ref 150–200)
CLARITY UR: CLEAR
CO2 SERPL-SCNC: 25 MMOL/L (ref 22–30)
COLOR UR: YELLOW
CREAT BLD-MCNC: 0.96 MG/DL (ref 0.7–1.3)
DEPRECATED RDW RBC AUTO: 43.5 FL (ref 37–54)
EOSINOPHIL # BLD MANUAL: 0.36 10*3/MM3 (ref 0–0.4)
EOSINOPHIL NFR BLD MANUAL: 5 % (ref 0.3–6.2)
ERYTHROCYTE [DISTWIDTH] IN BLOOD BY AUTOMATED COUNT: 13.2 % (ref 12.3–15.4)
GFR SERPL CREATININE-BSD FRML MDRD: 80 ML/MIN/1.73 (ref 49–113)
GLOBULIN UR ELPH-MCNC: 3.7 GM/DL (ref 2.3–3.5)
GLUCOSE BLD-MCNC: 111 MG/DL (ref 70–99)
GLUCOSE UR STRIP-MCNC: NEGATIVE MG/DL
HBA1C MFR BLD: 5.81 % (ref 4.8–5.6)
HCT VFR BLD AUTO: 46.9 % (ref 37.5–51)
HDLC SERPL-MCNC: 30 MG/DL (ref 40–59)
HGB BLD-MCNC: 16.4 G/DL (ref 13–17.7)
HGB UR QL STRIP.AUTO: NEGATIVE
KETONES UR QL STRIP: NEGATIVE
LDLC SERPL CALC-MCNC: 119 MG/DL
LDLC/HDLC SERPL: 3.96 {RATIO} (ref 0–3.55)
LEUKOCYTE ESTERASE UR QL STRIP.AUTO: NEGATIVE
LYMPHOCYTES # BLD MANUAL: 1.71 10*3/MM3 (ref 0.7–3.1)
LYMPHOCYTES NFR BLD MANUAL: 24 % (ref 19.6–45.3)
LYMPHOCYTES NFR BLD MANUAL: 7 % (ref 5–12)
MCH RBC QN AUTO: 31.6 PG (ref 26.6–33)
MCHC RBC AUTO-ENTMCNC: 35 G/DL (ref 31.5–35.7)
MCV RBC AUTO: 90.4 FL (ref 79–97)
MONOCYTES # BLD AUTO: 0.5 10*3/MM3 (ref 0.1–0.9)
NEUTROPHILS # BLD AUTO: 4.56 10*3/MM3 (ref 1.7–7)
NEUTROPHILS NFR BLD MANUAL: 64 % (ref 42.7–76)
NITRITE UR QL STRIP: NEGATIVE
PH UR STRIP.AUTO: 7 [PH] (ref 5.5–8)
PLATELET # BLD AUTO: 158 10*3/MM3 (ref 140–450)
PMV BLD AUTO: 11.1 FL (ref 6–12)
POTASSIUM BLD-SCNC: 3.6 MMOL/L (ref 3.4–5)
PROT SERPL-MCNC: 8.2 G/DL (ref 6.3–8.6)
PROT UR QL STRIP: NEGATIVE
RBC # BLD AUTO: 5.19 10*6/MM3 (ref 4.14–5.8)
RBC MORPH BLD: NORMAL
SMALL PLATELETS BLD QL SMEAR: ADEQUATE
SODIUM BLD-SCNC: 142 MMOL/L (ref 137–145)
SP GR UR STRIP: 1.01 (ref 1–1.03)
T4 FREE SERPL-MCNC: 1.23 NG/DL (ref 0.93–1.7)
TRIGL SERPL-MCNC: 186 MG/DL
TSH SERPL DL<=0.05 MIU/L-ACNC: 2.16 UIU/ML (ref 0.27–4.2)
UROBILINOGEN UR QL STRIP: NORMAL
VLDLC SERPL-MCNC: 37.2 MG/DL
WBC MORPH BLD: NORMAL
WBC NRBC COR # BLD: 7.12 10*3/MM3 (ref 3.4–10.8)

## 2019-10-16 PROCEDURE — 81003 URINALYSIS AUTO W/O SCOPE: CPT | Performed by: INTERNAL MEDICINE

## 2019-10-16 PROCEDURE — 83036 HEMOGLOBIN GLYCOSYLATED A1C: CPT | Performed by: INTERNAL MEDICINE

## 2019-10-16 PROCEDURE — 84439 ASSAY OF FREE THYROXINE: CPT | Performed by: INTERNAL MEDICINE

## 2019-10-16 PROCEDURE — 36415 COLL VENOUS BLD VENIPUNCTURE: CPT | Performed by: INTERNAL MEDICINE

## 2019-10-16 PROCEDURE — 80053 COMPREHEN METABOLIC PANEL: CPT | Performed by: INTERNAL MEDICINE

## 2019-10-16 PROCEDURE — 80061 LIPID PANEL: CPT | Performed by: INTERNAL MEDICINE

## 2019-10-16 PROCEDURE — 85025 COMPLETE CBC W/AUTO DIFF WBC: CPT | Performed by: INTERNAL MEDICINE

## 2019-10-16 PROCEDURE — 84443 ASSAY THYROID STIM HORMONE: CPT | Performed by: INTERNAL MEDICINE

## 2019-10-21 ENCOUNTER — OFFICE VISIT (OUTPATIENT)
Dept: FAMILY MEDICINE CLINIC | Facility: CLINIC | Age: 59
End: 2019-10-21

## 2019-10-21 VITALS
WEIGHT: 151 LBS | DIASTOLIC BLOOD PRESSURE: 72 MMHG | HEART RATE: 115 BPM | BODY MASS INDEX: 21.62 KG/M2 | HEIGHT: 70 IN | TEMPERATURE: 97.5 F | SYSTOLIC BLOOD PRESSURE: 136 MMHG

## 2019-10-21 DIAGNOSIS — R00.0 TACHYCARDIA: ICD-10-CM

## 2019-10-21 DIAGNOSIS — E78.2 MIXED HYPERLIPIDEMIA: Chronic | ICD-10-CM

## 2019-10-21 DIAGNOSIS — M15.9 PRIMARY OSTEOARTHRITIS INVOLVING MULTIPLE JOINTS: Chronic | ICD-10-CM

## 2019-10-21 DIAGNOSIS — K21.9 GASTROESOPHAGEAL REFLUX DISEASE WITHOUT ESOPHAGITIS: Chronic | ICD-10-CM

## 2019-10-21 DIAGNOSIS — R73.02 IMPAIRED GLUCOSE TOLERANCE: Primary | Chronic | ICD-10-CM

## 2019-10-21 DIAGNOSIS — Z79.899 DRUG THERAPY: ICD-10-CM

## 2019-10-21 DIAGNOSIS — G59 MONONEUROPATHY DUE TO UNDERLYING DISEASE: Chronic | ICD-10-CM

## 2019-10-21 DIAGNOSIS — Z12.5 SCREENING FOR PROSTATE CANCER: ICD-10-CM

## 2019-10-21 PROCEDURE — 99214 OFFICE O/P EST MOD 30 MIN: CPT | Performed by: INTERNAL MEDICINE

## 2019-10-21 RX ORDER — HYDROCODONE BITARTRATE AND ACETAMINOPHEN 7.5; 325 MG/1; MG/1
1 TABLET ORAL 2 TIMES DAILY PRN
Qty: 60 TABLET | Refills: 0 | Status: SHIPPED | OUTPATIENT
Start: 2019-10-21 | End: 2019-11-19 | Stop reason: SDUPTHER

## 2019-10-21 NOTE — PROGRESS NOTES
Chief Complaint   Patient presents with   • Osteoarthritis     3 mo f/u   • Hyperlipidemia     f/u with labs     Subjective   Mitch Rinaldi is a 59 y.o. male who presents to the office for follow-up and review of labs. He has impaired glucose tolerance and has been monitoring his dietary intake of sugars and carbohydrates.  He has hyperlipidemia and controls this with diet.  He has GERD which is well controlled with Nexium.  He has osteoarthritis which causes chronic back and joint pain.  He takes Norco 7.5/325 mg twice daily as needed for the pain.  He also takes ibuprofen to help with inflammation.  He uses Zanaflex as needed for muscle spasm.  He has neuropathy secondary to the arthritis in his low back, but does not require any additional medication for this. He has anxiety and depression, and takes fluoxetine daily.    He was seen in the urgent care couple days ago and diagnosed with pharyngitis.  He is currently taking amoxicillin.  He also notes that his heart rate has been running a little high for the past several months.  He does not have any symptoms associated with this.    The following portions of the patient's history were reviewed and updated as appropriate: allergies, current medications, past family history, past medical history, past social history, past surgical history and problem list.    Review of Systems   Constitutional: Negative for chills, fatigue and fever.   HENT: Negative for congestion, sneezing, sore throat and trouble swallowing.    Eyes: Negative for visual disturbance.   Respiratory: Negative for cough, chest tightness, shortness of breath and wheezing.    Cardiovascular: Negative for chest pain, palpitations and leg swelling.   Gastrointestinal: Positive for nausea. Negative for abdominal pain, constipation, diarrhea and vomiting.   Genitourinary: Negative for dysuria, frequency and urgency.   Musculoskeletal: Negative for neck pain.   Skin: Negative for rash.   Neurological:  "Negative for dizziness, weakness and headaches.   Psychiatric/Behavioral: The patient is nervous/anxious.    All other systems reviewed and are negative.      Objective   Vitals:    10/21/19 0810   BP: 136/72   BP Location: Left arm   Patient Position: Sitting   Cuff Size: Adult   Pulse: 115   Temp: 97.5 °F (36.4 °C)   TempSrc: Oral   Weight: 68.5 kg (151 lb)   Height: 177.8 cm (70\")   PainSc:   4   PainLoc: Back      Body mass index is 21.67 kg/m².    Physical Exam   Constitutional: He is oriented to person, place, and time. He appears well-developed and well-nourished. No distress.   HENT:   Head: Normocephalic and atraumatic.   Nose: Nose normal.   Mouth/Throat: Oropharynx is clear and moist. No oropharyngeal exudate.   Eyes: Conjunctivae and EOM are normal. Pupils are equal, round, and reactive to light. No scleral icterus.   Neck: Normal range of motion. Neck supple.   Cardiovascular: Regular rhythm and normal heart sounds. Tachycardia present. Exam reveals no gallop and no friction rub.   No murmur heard.  Pulmonary/Chest: Effort normal and breath sounds normal. No respiratory distress. He has no wheezes. He has no rales.   Abdominal: Soft. Bowel sounds are normal. He exhibits no distension. There is no tenderness. There is no rebound and no guarding.   Musculoskeletal: He exhibits no edema.        Lumbar back: He exhibits decreased range of motion and pain.   Lymphadenopathy:     He has no cervical adenopathy.   Neurological: He is alert and oriented to person, place, and time. No cranial nerve deficit.   Skin: Skin is warm and dry. No rash noted.   Psychiatric: He has a normal mood and affect. His behavior is normal. Judgment and thought content normal.   Nursing note and vitals reviewed.      Assessment/Plan   Mitch was seen today for osteoarthritis and hyperlipidemia.    Diagnoses and all orders for this visit:    Impaired glucose tolerance  -     Hemoglobin A1c; Future    Mixed hyperlipidemia  -     CBC & " Differential; Future  -     Comprehensive Metabolic Panel; Future  -     T4, Free; Future  -     TSH; Future  -     Urinalysis With Culture If Indicated -; Future  -     Lipid Panel; Future    Gastroesophageal reflux disease without esophagitis    Mononeuropathy due to underlying disease    Primary osteoarthritis involving multiple joints  -     HYDROcodone-acetaminophen (NORCO) 7.5-325 MG per tablet; Take 1 tablet by mouth 2 (Two) Times a Day As Needed for Mild Pain  or Moderate Pain .    Drug therapy  -     ToxASSURE Select 13 Discrete -; Future    Screening for prostate cancer  -     PSA Screen; Future    Tachycardia  -     Ambulatory Referral to Cardiology         Labs are reviewed with patient. His glucose is slightly elevated at 111.  His hemoglobin A1c shows a nondiabetic state at 5.81.  He will continue to watch diet to help maintain control of the glucose.  He understands that there is an increased risk of developing diabetes in the future.  His LDL is elevated at 119.  His triglycerides are elevated at 186.  He will continue with dietary management of the hyperlipidemia.  If this has not improved at the next visit, he may need medication for his hyperlipidemia.  He will continue with Nexium for treatment of GERD.  He will continue with ibuprofen for treatment of his arthritic pain.  He will continue with Norco 7.5/325 mg up to twice a day as needed for flareups in the pain.  He will continue with fluoxetine for treatment of the anxiety and depression.    I will refer him to cardiology for evaluation of the tachycardia.    Patient understands the risks associated with this controlled medication, including tolerance and addiction.  Patient also agrees to only obtain this medication from me, and not from a another provider, unless that provider is covering for me in my absence.  Patient also agrees to be compliant in dosing, and not self adjust the dose of medication.  A signed controlled substance agreement  is on file, and the patient has received a controlled substance education sheet at this a previous visit.  The patient has also signed a consent for treatment with a controlled substance as per [facility] policy. SOFI was obtained.    I will hold off on giving him a flu shot today since he is currently taking an antibiotic.  He will stop by the office to have this done once he completes the amoxicillin.    CONTROLLED SUBSTANCE TRACKING 7/23/2019 10/21/2019   Last Sofi 7/23/2019 10/21/2019   Report Number [id number]   Last UDS 4/17/2019 -   Last Controlled Substance Agreement 7/23/2019 10/21/2019   Prior UDT result Expected -         PHQ-2/PHQ-9 Depression Screening 10/21/2019   Little interest or pleasure in doing things 0   Feeling down, depressed, or hopeless 0   Trouble falling or staying asleep, or sleeping too much -   Feeling tired or having little energy -   Poor appetite or overeating -   Feeling bad about yourself - or that you are a failure or have let yourself or your family down -   Trouble concentrating on things, such as reading the newspaper or watching television -   Moving or speaking so slowly that other people could have noticed. Or the opposite - being so fidgety or restless that you have been moving around a lot more than usual -   Thoughts that you would be better off dead, or of hurting yourself in some way -   Total Score 0         Lab on 10/16/2019   Component Date Value Ref Range Status   • Color, UA 10/16/2019 Yellow  Yellow, Straw Final   • Appearance, UA 10/16/2019 Clear  Clear Final   • pH, UA 10/16/2019 7.0  5.5 - 8.0 Final   • Specific Gravity, UA 10/16/2019 1.015  1.005 - 1.030 Final   • Glucose, UA 10/16/2019 Negative  Negative Final   • Ketones, UA 10/16/2019 Negative  Negative Final   • Bilirubin, UA 10/16/2019 Negative  Negative Final   • Blood, UA 10/16/2019 Negative  Negative Final   • Protein, UA 10/16/2019 Negative  Negative Final   • Leuk Esterase, UA  10/16/2019 Negative  Negative Final   • Nitrite, UA 10/16/2019 Negative  Negative Final   • Urobilinogen, UA 10/16/2019 0.2 E.U./dL  0.2 - 1.0 E.U./dL Final   • Glucose 10/16/2019 111* 70 - 99 mg/dL Final   • BUN 10/16/2019 14  7 - 23 mg/dL Final   • Creatinine 10/16/2019 0.96  0.70 - 1.30 mg/dL Final   • Sodium 10/16/2019 142  137 - 145 mmol/L Final   • Potassium 10/16/2019 3.6  3.4 - 5.0 mmol/L Final   • Chloride 10/16/2019 105  101 - 112 mmol/L Final   • CO2 10/16/2019 25.0  22.0 - 30.0 mmol/L Final   • Calcium 10/16/2019 9.4  8.4 - 10.2 mg/dL Final   • Total Protein 10/16/2019 8.2  6.3 - 8.6 g/dL Final   • Albumin 10/16/2019 4.50  3.50 - 5.00 g/dL Final   • ALT (SGPT) 10/16/2019 19  <=50 U/L Final   • AST (SGOT) 10/16/2019 28  17 - 59 U/L Final   • Alkaline Phosphatase 10/16/2019 90  38 - 126 U/L Final   • Total Bilirubin 10/16/2019 0.4  0.2 - 1.3 mg/dL Final   • eGFR Non African Amer 10/16/2019 80  49 - 113 mL/min/1.73 Final   • Globulin 10/16/2019 3.7* 2.3 - 3.5 gm/dL Final   • A/G Ratio 10/16/2019 1.2  1.1 - 1.8 g/dL Final   • BUN/Creatinine Ratio 10/16/2019 14.6  7.0 - 25.0 Final   • Anion Gap 10/16/2019 12.0  5.0 - 15.0 mmol/L Final   • Free T4 10/16/2019 1.23  0.93 - 1.70 ng/dL Final   • TSH 10/16/2019 2.160  0.270 - 4.200 uIU/mL Final   • Hemoglobin A1C 10/16/2019 5.81* 4.80 - 5.60 % Final   • Total Cholesterol 10/16/2019 186  150 - 200 mg/dL Final   • Triglycerides 10/16/2019 186* <=150 mg/dL Final   • HDL Cholesterol 10/16/2019 30* 40 - 59 mg/dL Final   • LDL Cholesterol  10/16/2019 119* <=100 mg/dL Final   • VLDL Cholesterol 10/16/2019 37.2  mg/dL Final   • LDL/HDL Ratio 10/16/2019 3.96* 0.00 - 3.55 Final   • WBC 10/16/2019 7.12  3.40 - 10.80 10*3/mm3 Final   • RBC 10/16/2019 5.19  4.14 - 5.80 10*6/mm3 Final   • Hemoglobin 10/16/2019 16.4  13.0 - 17.7 g/dL Final   • Hematocrit 10/16/2019 46.9  37.5 - 51.0 % Final   • MCV 10/16/2019 90.4  79.0 - 97.0 fL Final   • MCH 10/16/2019 31.6  26.6 - 33.0 pg  Final   • MCHC 10/16/2019 35.0  31.5 - 35.7 g/dL Final   • RDW 10/16/2019 13.2  12.3 - 15.4 % Final   • RDW-SD 10/16/2019 43.5  37.0 - 54.0 fl Final   • MPV 10/16/2019 11.1  6.0 - 12.0 fL Final   • Platelets 10/16/2019 158  140 - 450 10*3/mm3 Final   • Neutrophil % 10/16/2019 64.0  42.7 - 76.0 % Final   • Lymphocyte % 10/16/2019 24.0  19.6 - 45.3 % Final   • Monocyte % 10/16/2019 7.0  5.0 - 12.0 % Final   • Eosinophil % 10/16/2019 5.0  0.3 - 6.2 % Final   • Neutrophils Absolute 10/16/2019 4.56  1.70 - 7.00 10*3/mm3 Final   • Lymphocytes Absolute 10/16/2019 1.71  0.70 - 3.10 10*3/mm3 Final   • Monocytes Absolute 10/16/2019 0.50  0.10 - 0.90 10*3/mm3 Final   • Eosinophils Absolute 10/16/2019 0.36  0.00 - 0.40 10*3/mm3 Final   • RBC Morphology 10/16/2019 Normal  Normal Final   • WBC Morphology 10/16/2019 Normal  Normal Final   • Platelet Estimate 10/16/2019 Adequate  Normal Final   ].  .

## 2019-11-12 DIAGNOSIS — K21.9 GASTROESOPHAGEAL REFLUX DISEASE WITHOUT ESOPHAGITIS: Chronic | ICD-10-CM

## 2019-11-12 RX ORDER — PROMETHAZINE HYDROCHLORIDE 25 MG/1
25 TABLET ORAL EVERY 6 HOURS PRN
Qty: 60 TABLET | Refills: 2 | Status: SHIPPED | OUTPATIENT
Start: 2019-11-12 | End: 2020-03-13

## 2019-11-19 DIAGNOSIS — M15.9 PRIMARY OSTEOARTHRITIS INVOLVING MULTIPLE JOINTS: Chronic | ICD-10-CM

## 2019-11-19 RX ORDER — HYDROCODONE BITARTRATE AND ACETAMINOPHEN 7.5; 325 MG/1; MG/1
1 TABLET ORAL 2 TIMES DAILY PRN
Qty: 60 TABLET | Refills: 0 | Status: SHIPPED | OUTPATIENT
Start: 2019-11-19 | End: 2019-12-17 | Stop reason: SDUPTHER

## 2019-11-19 NOTE — TELEPHONE ENCOUNTER
Patient is UTD from office visit, SOFI and controlled consent on 10-21-19. Next office visiti s scheduled for 1-17-20.

## 2019-11-21 DIAGNOSIS — R00.0 TACHYCARDIA: Primary | ICD-10-CM

## 2019-11-22 ENCOUNTER — OFFICE VISIT (OUTPATIENT)
Dept: CARDIOLOGY | Facility: CLINIC | Age: 59
End: 2019-11-22

## 2019-11-22 VITALS
HEART RATE: 92 BPM | DIASTOLIC BLOOD PRESSURE: 92 MMHG | OXYGEN SATURATION: 96 % | SYSTOLIC BLOOD PRESSURE: 160 MMHG | BODY MASS INDEX: 21.47 KG/M2 | WEIGHT: 150 LBS | HEIGHT: 70 IN

## 2019-11-22 DIAGNOSIS — R00.0 TACHYCARDIA: ICD-10-CM

## 2019-11-22 DIAGNOSIS — R06.02 SHORTNESS OF BREATH: ICD-10-CM

## 2019-11-22 DIAGNOSIS — R07.1 CHEST PAIN ON BREATHING: ICD-10-CM

## 2019-11-22 PROCEDURE — 93000 ELECTROCARDIOGRAM COMPLETE: CPT | Performed by: INTERNAL MEDICINE

## 2019-11-22 PROCEDURE — 99204 OFFICE O/P NEW MOD 45 MIN: CPT | Performed by: INTERNAL MEDICINE

## 2019-11-22 NOTE — PROGRESS NOTES
[facility] Cardiology  OFFICE CONSULT NOTE    Patient Name: Mitch Rinaldi  Age/Sex: 59 y.o. male  : 1960  MRN: [id number]      Referring Provider: Carmelo Farmer MD  [address] [place] [address] [place] [address]        Chief Complaint: Fast heart rate    History of Present Illness:  Mitch Rinaldi is a 59 y.o. male patient was referred to us due to fast heart rate.  He said he has a long history of COPD and has noticed his heart rate always to be fast.  Says when he exerts himself it even speeds up faster.  He has no syncope or near syncope from this.  He denies any change in his shortness of breath but he has baseline shortness of breath secondary COPD and previous pneumothorax multiple times.  He has chest pain and he says usually does chest with breathing.  He has had work-up in times past for this and he said these are similar.  This sharp worse with breathing not related to exertion.  No really related to the fast heart rate.  Does not really radiate.  There is no nausea vomiting or diaphoresis.    Last Echo:    Last Cath:      Past Medical History:  Past Medical History:   Diagnosis Date   • Acute gastroenteritis    • Allergic    • Anxiety    • Asthma     as a  child   • Diverticulitis of colon    • Emphysema lung (CMS/HCC)    • Epididymal cyst    • Gastroesophageal reflux disease    • Generalized anxiety disorder    • Hyperlipidemia    • Impaired glucose tolerance    • Pain, lumbar region     Pain radiating to lumbar region of back      • Peripheral nervous system disease     Disorder of the peripheral nervous system      • Pleuritic chest pain    • Pneumonia    • Primary osteoarthritis involving multiple joints 2016   • Shoulder pain, left    • Spermatocele 2018    Added automatically from request for surgery [id number]   • Tinnitus    • Wears glasses    • Wears partial dentures        PAST SURGERY   Past Surgical History:   Procedure Laterality Date   • CHOLECYSTECTOMY      • COLONOSCOPY N/A 1/14/2019    Procedure: COLONOSCOPY;  Surgeon: Ronald Rodriguez DO;  Location: [facility] ENDOSCOPY;  Service: Gastroenterology   • DENTAL PROCEDURE     • ENDOSCOPY AND COLONOSCOPY     • EPIDIDYMIS SURGERY Right     cyst   • HERNIA REPAIR  Feb 2017   • INGUINAL HERNIA REPAIR Left 2/6/2017    Procedure: OPEN LEFT INGUINAL HERNIA REPAIR WITH MESH;  Surgeon: Miles Gregorio MD;  Location: [facility] OR;  Service:    • INJECTION OF MEDICATION      Depo Medrol (Methylprednisone) 80mg (8)      09/01/2015       • INJECTION OF MEDICATION      Kenalog (1)      12/11/2012       • LUNG SURGERY       Pneumothorax 1980       • SPERMATOCELECTOMY Left 5/11/2018    Procedure: LEFT SPERMATOCELECTOMY;  Surgeon: Miles Groves MD;  Location: [facility] OR;  Service: Urology       Home Medications:      Current Outpatient Medications:   •  cetirizine (ZyrTEC) 10 MG tablet, Take 10 mg by mouth daily as needed for allergies., Disp: , Rfl:   •  esomeprazole (nexIUM) 20 MG capsule, Take 20 mg by mouth Every Morning Before Breakfast., Disp: , Rfl:   •  FLUoxetine (PROzac) 40 MG capsule, Take 1 capsule by mouth Daily., Disp: 30 capsule, Rfl: 5  •  fluticasone (FLONASE) 50 MCG/ACT nasal spray, 2 sprays into the nostril(s) as directed by provider Daily. Administer 2 sprays in each nostril for each dose., Disp: 1 bottle, Rfl: 3  •  HYDROcodone-acetaminophen (NORCO) 7.5-325 MG per tablet, Take 1 tablet by mouth 2 (Two) Times a Day As Needed for Mild Pain  or Moderate Pain ., Disp: 60 tablet, Rfl: 0  •  promethazine (PHENERGAN) 25 MG tablet, TAKE 1 TABLET BY MOUTH EVERY 6 (SIX) HOURS AS NEEDED FOR NAUSEA OR VOMITING., Disp: 60 tablet, Rfl: 2  •  tiZANidine (ZANAFLEX) 4 MG tablet, Take 1 tablet by mouth Every 8 (Eight) Hours As Needed for Muscle Spasms. 0.5 - 1 tab as needed, Disp: 90 tablet, Rfl: 0  •  sucralfate (CARAFATE) 1 g tablet, Take 1 tablet by mouth 4 (Four) Times a Day., Disp: 120 tablet, Rfl:  5    Allergies:  Allergies   Allergen Reactions   • Darvon [Propoxyphene] Nausea And Vomiting     Actual product is darvocet, darvon was the nearest alternative I could find   • Percocet [Oxycodone-Acetaminophen] Palpitations        Social History:  Social History     Socioeconomic History   • Marital status: Single     Spouse name: Not on file   • Number of children: Not on file   • Years of education: Not on file   • Highest education level: Not on file   Tobacco Use   • Smoking status: Current Every Day Smoker     Packs/day: 1.00     Years: 40.00     Pack years: 40.00     Types: Cigarettes   • Smokeless tobacco: Former User     Quit date: 2014   • Tobacco comment: has tried chantix -side effects   Substance and Sexual Activity   • Alcohol use: No   • Drug use: No   • Sexual activity: Yes     Partners: Female        Family History:  Family History   Problem Relation Age of Onset   • Dementia Mother    • Hypertension Mother    • Thyroid disease Mother    • Arthritis Mother    • Diabetes Father    • Stomach cancer Father    • Cancer Father         stomach   • Thyroid disease Brother    • Down syndrome Brother    • COPD Maternal Aunt    • Alcohol abuse Paternal Uncle    • Macular degeneration Brother    • Alcohol abuse Brother    • Alcohol abuse Brother    • Thyroid disease Brother    • Alcohol abuse Paternal Uncle    • Birth defects Brother    • COPD Maternal Aunt    • Hearing loss Brother          Review of Systems:   Constitution: No chills, no rigors, no unexplained weight loss or weight gain    Eyes:  No diplopia, no blurred vision, no loss of vision, conjunctiva is pink and sclera is anicteric    ENT:  No tinnitus, no otorrhea, no epistaxis, no sore throat   Respiratory: No cough, no hemoptysis    Cardiovascular:  As mentioned in HPI    Gastrointestinal: No nausea, no vomiting, no hematemesis, no diarrhea or constipation, no melena    Genitourinary: No frequency of dysuria no hematuria    Integument: positive  for  No pruritis and  no skin rash    Hematologic / Lymphatic: No excessive bleeding, easy bruising, fatigue, lymphadenopathy and petechiae    Musculoskeletal: No joint pain, joint stiffness, joint swelling, muscle pain, muscle weakness and neck pain    Neurological: No dizziness, headaches, light headedness, seizures and vertigo or stroke    Behavioral/Psych: No depression, or bipolar disorder    Endocrine: no h/o diabetes, hyperlipidemia or thyroid problems        Vitals:    11/22/19 1109   BP: 160/92   Pulse: 92   SpO2: 96%       Body mass index is 21.52 kg/m².          Physical Exam:   General Appearance:    Alert, oriented, cooperative, in no acute distress     Head:    Normocephalic, atraumatic, without obvious abnormality     Eyes:            Lids and lashes normal, conjunctivae and sclerae normal, no   icterus, no pallor     Ears:    Ears appear intact with no abnormalities noted     Throat:   Mucous membranes pink and moist     Neck:   Supple, trachea midline, no carotid bruit, no JVD     Lungs:     Air enty equal, rhonchi, respirations regular, Unlabored. No wheezes, rales,     Heart:    Regular rhythm and normal rate, normal S1 and S2, no            murmur, no gallop,      Abdomen:     Normal bowel sounds, no masses, liver and spleen nonpalpable, soft, non-tender, non-distended, no guarding, no rebound tenderness       Extremities:   Moves all extremities well, no edema, no cyanosis, no              Redness, no clubbing     Pulses:   Pulses palpable and equal bilaterally       Neurologic:   Alert and oriented to person, place, and time. No focal neurological deficits       Lab Review:     Lab on 10/16/2019   Component Date Value Ref Range Status   • Color,  10/16/2019 Yellow  Yellow, Straw Final   • Appearance, UA 10/16/2019 Clear  Clear Final   • pH, UA 10/16/2019 7.0  5.5 - 8.0 Final   • Specific Gravity,  10/16/2019 1.015  1.005 - 1.030 Final   • Glucose,  10/16/2019 Negative  Negative Final   •  Ketones, UA 10/16/2019 Negative  Negative Final   • Bilirubin, UA 10/16/2019 Negative  Negative Final   • Blood, UA 10/16/2019 Negative  Negative Final   • Protein, UA 10/16/2019 Negative  Negative Final   • Leuk Esterase, UA 10/16/2019 Negative  Negative Final   • Nitrite, UA 10/16/2019 Negative  Negative Final   • Urobilinogen, UA 10/16/2019 0.2 E.U./dL  0.2 - 1.0 E.U./dL Final   • Glucose 10/16/2019 111* 70 - 99 mg/dL Final   • BUN 10/16/2019 14  7 - 23 mg/dL Final   • Creatinine 10/16/2019 0.96  0.70 - 1.30 mg/dL Final   • Sodium 10/16/2019 142  137 - 145 mmol/L Final   • Potassium 10/16/2019 3.6  3.4 - 5.0 mmol/L Final   • Chloride 10/16/2019 105  101 - 112 mmol/L Final   • CO2 10/16/2019 25.0  22.0 - 30.0 mmol/L Final   • Calcium 10/16/2019 9.4  8.4 - 10.2 mg/dL Final   • Total Protein 10/16/2019 8.2  6.3 - 8.6 g/dL Final   • Albumin 10/16/2019 4.50  3.50 - 5.00 g/dL Final   • ALT (SGPT) 10/16/2019 19  <=50 U/L Final   • AST (SGOT) 10/16/2019 28  17 - 59 U/L Final   • Alkaline Phosphatase 10/16/2019 90  38 - 126 U/L Final   • Total Bilirubin 10/16/2019 0.4  0.2 - 1.3 mg/dL Final   • eGFR Non African Amer 10/16/2019 80  49 - 113 mL/min/1.73 Final   • Globulin 10/16/2019 3.7* 2.3 - 3.5 gm/dL Final   • A/G Ratio 10/16/2019 1.2  1.1 - 1.8 g/dL Final   • BUN/Creatinine Ratio 10/16/2019 14.6  7.0 - 25.0 Final   • Anion Gap 10/16/2019 12.0  5.0 - 15.0 mmol/L Final   • Free T4 10/16/2019 1.23  0.93 - 1.70 ng/dL Final   • TSH 10/16/2019 2.160  0.270 - 4.200 uIU/mL Final   • Hemoglobin A1C 10/16/2019 5.81* 4.80 - 5.60 % Final   • Total Cholesterol 10/16/2019 186  150 - 200 mg/dL Final   • Triglycerides 10/16/2019 186* <=150 mg/dL Final   • HDL Cholesterol 10/16/2019 30* 40 - 59 mg/dL Final   • LDL Cholesterol  10/16/2019 119* <=100 mg/dL Final   • VLDL Cholesterol 10/16/2019 37.2  mg/dL Final   • LDL/HDL Ratio 10/16/2019 3.96* 0.00 - 3.55 Final   • WBC 10/16/2019 7.12  3.40 - 10.80 10*3/mm3 Final   • RBC 10/16/2019 5.19   4.14 - 5.80 10*6/mm3 Final   • Hemoglobin 10/16/2019 16.4  13.0 - 17.7 g/dL Final   • Hematocrit 10/16/2019 46.9  37.5 - 51.0 % Final   • MCV 10/16/2019 90.4  79.0 - 97.0 fL Final   • MCH 10/16/2019 31.6  26.6 - 33.0 pg Final   • MCHC 10/16/2019 35.0  31.5 - 35.7 g/dL Final   • RDW 10/16/2019 13.2  12.3 - 15.4 % Final   • RDW-SD 10/16/2019 43.5  37.0 - 54.0 fl Final   • MPV 10/16/2019 11.1  6.0 - 12.0 fL Final   • Platelets 10/16/2019 158  140 - 450 10*3/mm3 Final   • Neutrophil % 10/16/2019 64.0  42.7 - 76.0 % Final   • Lymphocyte % 10/16/2019 24.0  19.6 - 45.3 % Final   • Monocyte % 10/16/2019 7.0  5.0 - 12.0 % Final   • Eosinophil % 10/16/2019 5.0  0.3 - 6.2 % Final   • Neutrophils Absolute 10/16/2019 4.56  1.70 - 7.00 10*3/mm3 Final   • Lymphocytes Absolute 10/16/2019 1.71  0.70 - 3.10 10*3/mm3 Final   • Monocytes Absolute 10/16/2019 0.50  0.10 - 0.90 10*3/mm3 Final   • Eosinophils Absolute 10/16/2019 0.36  0.00 - 0.40 10*3/mm3 Final   • RBC Morphology 10/16/2019 Normal  Normal Final   • WBC Morphology 10/16/2019 Normal  Normal Final   • Platelet Estimate 10/16/2019 Adequate  Normal Final   ]    Assessment/Plan   1.  Tachycardia-this is probably combination is from deconditioning and his COPD.  We will go ahead and place a 7 to 10-day Holter monitor to assess for the heart rate variability and the peak heart rate.    2.  Chest pain-this seems atypical but will go ahead and schedule a cardiac CTA to assess for coronary anatomy.  If this is positive then we will probably proceed with a nuclear.    3.  Dyspnea- this is baseline from the shortness of air.  We will assess everything with a echo to see if there is any left ventricular chamber enlargement or the like.          Return in about 3 months (around 2/22/2020).

## 2019-11-25 RX ORDER — METOPROLOL TARTRATE 50 MG/1
50 TABLET, FILM COATED ORAL 2 TIMES DAILY
Qty: 180 TABLET | Refills: 0 | Status: SHIPPED | OUTPATIENT
Start: 2019-11-25 | End: 2020-02-28 | Stop reason: SDUPTHER

## 2019-11-27 ENCOUNTER — HOSPITAL ENCOUNTER (OUTPATIENT)
Dept: CT IMAGING | Facility: HOSPITAL | Age: 59
Discharge: HOME | End: 2019-11-27
Admitting: INTERNAL MEDICINE

## 2019-11-27 PROCEDURE — 75574 CT ANGIO HRT W/3D IMAGE: CPT

## 2019-11-27 PROCEDURE — 0 IOPAMIDOL PER 1 ML: Performed by: INTERNAL MEDICINE

## 2019-11-27 RX ORDER — SODIUM CHLORIDE 9 MG/ML
75 INJECTION, SOLUTION INTRAVENOUS
Status: COMPLETED | OUTPATIENT
Start: 2019-11-27 | End: 2019-11-27

## 2019-11-27 RX ADMIN — IOPAMIDOL 80 ML: 755 INJECTION, SOLUTION INTRAVENOUS at 10:47

## 2019-11-27 RX ADMIN — SODIUM CHLORIDE 75 ML: 9 INJECTION, SOLUTION INTRAVENOUS at 10:50

## 2019-12-05 DIAGNOSIS — R07.89 OTHER CHEST PAIN: Primary | ICD-10-CM

## 2019-12-10 LAB
BH CV ECHO MEAS - ACS: 1.2 CM
BH CV ECHO MEAS - AO MAX PG (FULL): 1.8 MMHG
BH CV ECHO MEAS - AO MAX PG: 5.3 MMHG
BH CV ECHO MEAS - AO MEAN PG (FULL): 0 MMHG
BH CV ECHO MEAS - AO MEAN PG: 2 MMHG
BH CV ECHO MEAS - AO ROOT AREA (BSA CORRECTED): 1.1
BH CV ECHO MEAS - AO ROOT AREA: 3.1 CM^2
BH CV ECHO MEAS - AO ROOT DIAM: 2 CM
BH CV ECHO MEAS - AO V2 MAX: 115 CM/SEC
BH CV ECHO MEAS - AO V2 MEAN: 73.2 CM/SEC
BH CV ECHO MEAS - AO V2 VTI: 21.6 CM
BH CV ECHO MEAS - AVA(I,A): 2.9 CM^2
BH CV ECHO MEAS - AVA(I,D): 2.9 CM^2
BH CV ECHO MEAS - AVA(V,A): 2.8 CM^2
BH CV ECHO MEAS - AVA(V,D): 2.8 CM^2
BH CV ECHO MEAS - BSA(HAYCOCK): 1.8 M^2
BH CV ECHO MEAS - BSA: 1.8 M^2
BH CV ECHO MEAS - BZI_BMI: 21.5 KILOGRAMS/M^2
BH CV ECHO MEAS - BZI_METRIC_HEIGHT: 177.8 CM
BH CV ECHO MEAS - BZI_METRIC_WEIGHT: 68 KG
BH CV ECHO MEAS - EDV(CUBED): 83.5 ML
BH CV ECHO MEAS - EDV(MOD-SP2): 94.5 ML
BH CV ECHO MEAS - EDV(MOD-SP4): 90.2 ML
BH CV ECHO MEAS - EDV(TEICH): 86.3 ML
BH CV ECHO MEAS - EF(CUBED): 65.3 %
BH CV ECHO MEAS - EF(MOD-SP2): 54.3 %
BH CV ECHO MEAS - EF(MOD-SP4): 51.3 %
BH CV ECHO MEAS - EF(TEICH): 57.1 %
BH CV ECHO MEAS - ESV(CUBED): 28.9 ML
BH CV ECHO MEAS - ESV(MOD-SP2): 43.2 ML
BH CV ECHO MEAS - ESV(MOD-SP4): 43.9 ML
BH CV ECHO MEAS - ESV(TEICH): 37 ML
BH CV ECHO MEAS - FS: 29.7 %
BH CV ECHO MEAS - IVS/LVPW: 1.1
BH CV ECHO MEAS - IVSD: 0.83 CM
BH CV ECHO MEAS - LA DIMENSION: 2.3 CM
BH CV ECHO MEAS - LA/AO: 1.2
BH CV ECHO MEAS - LV DIASTOLIC VOL/BSA (35-75): 48.8 ML/M^2
BH CV ECHO MEAS - LV MASS(C)D: 108.2 GRAMS
BH CV ECHO MEAS - LV MASS(C)DI: 58.6 GRAMS/M^2
BH CV ECHO MEAS - LV MAX PG: 3.5 MMHG
BH CV ECHO MEAS - LV MEAN PG: 2 MMHG
BH CV ECHO MEAS - LV SYSTOLIC VOL/BSA (12-30): 23.8 ML/M^2
BH CV ECHO MEAS - LV V1 MAX: 93.1 CM/SEC
BH CV ECHO MEAS - LV V1 MEAN: 56.3 CM/SEC
BH CV ECHO MEAS - LV V1 VTI: 17.9 CM
BH CV ECHO MEAS - LVIDD: 4.4 CM
BH CV ECHO MEAS - LVIDS: 3.1 CM
BH CV ECHO MEAS - LVLD AP2: 9 CM
BH CV ECHO MEAS - LVLD AP4: 9.5 CM
BH CV ECHO MEAS - LVLS AP2: 8.3 CM
BH CV ECHO MEAS - LVLS AP4: 7.5 CM
BH CV ECHO MEAS - LVOT AREA (M): 3.5 CM^2
BH CV ECHO MEAS - LVOT AREA: 3.5 CM^2
BH CV ECHO MEAS - LVOT DIAM: 2.1 CM
BH CV ECHO MEAS - LVPWD: 0.78 CM
BH CV ECHO MEAS - MR MAX PG: 2.2 MMHG
BH CV ECHO MEAS - MR MAX VEL: 73.8 CM/SEC
BH CV ECHO MEAS - MV A MAX VEL: 74.2 CM/SEC
BH CV ECHO MEAS - MV DEC SLOPE: 133 CM/SEC^2
BH CV ECHO MEAS - MV E MAX VEL: 60.6 CM/SEC
BH CV ECHO MEAS - MV E/A: 0.82
BH CV ECHO MEAS - MV MAX PG: 1.4 MMHG
BH CV ECHO MEAS - MV MEAN PG: 1 MMHG
BH CV ECHO MEAS - MV P1/2T MAX VEL: 55.4 CM/SEC
BH CV ECHO MEAS - MV P1/2T: 122 MSEC
BH CV ECHO MEAS - MV V2 MAX: 59.8 CM/SEC
BH CV ECHO MEAS - MV V2 MEAN: 38.7 CM/SEC
BH CV ECHO MEAS - MV V2 VTI: 23.6 CM
BH CV ECHO MEAS - MVA P1/2T LCG: 4 CM^2
BH CV ECHO MEAS - MVA(P1/2T): 1.8 CM^2
BH CV ECHO MEAS - MVA(VTI): 2.6 CM^2
BH CV ECHO MEAS - PA MAX PG: 1.1 MMHG
BH CV ECHO MEAS - PA MEAN PG: 1 MMHG
BH CV ECHO MEAS - PA V2 MAX: 51.6 CM/SEC
BH CV ECHO MEAS - PA V2 MEAN: 35.1 CM/SEC
BH CV ECHO MEAS - PA V2 VTI: 10.6 CM
BH CV ECHO MEAS - RAP SYSTOLE: 10 MMHG
BH CV ECHO MEAS - RVSP: 11.7 MMHG
BH CV ECHO MEAS - SI(AO): 36.7 ML/M^2
BH CV ECHO MEAS - SI(CUBED): 29.5 ML/M^2
BH CV ECHO MEAS - SI(LVOT): 33.6 ML/M^2
BH CV ECHO MEAS - SI(MOD-SP2): 27.8 ML/M^2
BH CV ECHO MEAS - SI(MOD-SP4): 25.1 ML/M^2
BH CV ECHO MEAS - SI(TEICH): 26.7 ML/M^2
BH CV ECHO MEAS - SV(AO): 67.9 ML
BH CV ECHO MEAS - SV(CUBED): 54.5 ML
BH CV ECHO MEAS - SV(LVOT): 62 ML
BH CV ECHO MEAS - SV(MOD-SP2): 51.3 ML
BH CV ECHO MEAS - SV(MOD-SP4): 46.3 ML
BH CV ECHO MEAS - SV(TEICH): 49.3 ML
BH CV ECHO MEAS - TR MAX VEL: 65.2 CM/SEC
MAXIMAL PREDICTED HEART RATE: 159 BPM
STRESS TARGET HR: 135 BPM

## 2019-12-17 DIAGNOSIS — M15.9 PRIMARY OSTEOARTHRITIS INVOLVING MULTIPLE JOINTS: Chronic | ICD-10-CM

## 2019-12-17 RX ORDER — HYDROCODONE BITARTRATE AND ACETAMINOPHEN 7.5; 325 MG/1; MG/1
1 TABLET ORAL 2 TIMES DAILY PRN
Qty: 60 TABLET | Refills: 0 | Status: SHIPPED | OUTPATIENT
Start: 2019-12-17 | End: 2020-01-17 | Stop reason: SDUPTHER

## 2019-12-17 NOTE — TELEPHONE ENCOUNTER
----- Message from Lenora Leos sent at 12/17/2019 10:11 AM CST -----  Regarding: MED REFILL  Contact: [phone]   Needs refill on HYDROcodone-acetaminophen (NORCO) 7.5-325 MG, due Thursday to [facility].

## 2019-12-17 NOTE — TELEPHONE ENCOUNTER
Due Thursday    CONTROLLED SUBSTANCE TRACKING 7/23/2019 10/21/2019   Last Long 7/23/2019 10/21/2019   Report Number [id number]   Last UDS 4/17/2019 -   Last Controlled Substance Agreement 7/23/2019 10/21/2019   Prior UDT result Expected -

## 2019-12-20 ENCOUNTER — APPOINTMENT (OUTPATIENT)
Dept: NUCLEAR MEDICINE | Facility: HOSPITAL | Age: 59
End: 2019-12-20

## 2019-12-20 ENCOUNTER — HOSPITAL ENCOUNTER (OUTPATIENT)
Dept: NUCLEAR MEDICINE | Facility: HOSPITAL | Age: 59
End: 2019-12-20

## 2019-12-20 ENCOUNTER — HOSPITAL ENCOUNTER (OUTPATIENT)
Dept: CARDIOLOGY | Facility: HOSPITAL | Age: 59
End: 2019-12-20

## 2019-12-30 ENCOUNTER — APPOINTMENT (OUTPATIENT)
Dept: NUCLEAR MEDICINE | Facility: HOSPITAL | Age: 59
End: 2019-12-30

## 2019-12-30 ENCOUNTER — HOSPITAL ENCOUNTER (OUTPATIENT)
Dept: CARDIOLOGY | Facility: HOSPITAL | Age: 59
End: 2019-12-30

## 2020-01-13 ENCOUNTER — HOSPITAL ENCOUNTER (OUTPATIENT)
Dept: NUCLEAR MEDICINE | Facility: HOSPITAL | Age: 60
Discharge: HOME | End: 2020-01-13

## 2020-01-13 ENCOUNTER — HOSPITAL ENCOUNTER (OUTPATIENT)
Dept: CARDIOLOGY | Facility: HOSPITAL | Age: 60
Discharge: HOME | End: 2020-01-13

## 2020-01-13 LAB
BH CV STRESS BP STAGE 1: NORMAL
BH CV STRESS COMMENTS STAGE 1: NORMAL
BH CV STRESS DOSE REGADENOSON STAGE 1: 0.4
BH CV STRESS DURATION MIN STAGE 1: 0
BH CV STRESS DURATION SEC STAGE 1: 10
BH CV STRESS HR STAGE 1: 112
BH CV STRESS PROTOCOL 1: NORMAL
BH CV STRESS RECOVERY BP: NORMAL MMHG
BH CV STRESS RECOVERY HR: 114 BPM
BH CV STRESS STAGE 1: 1
LV EF NUC BP: 68 %
MAXIMAL PREDICTED HEART RATE: 159 BPM
PERCENT MAX PREDICTED HR: 73.58 %
STRESS BASELINE BP: NORMAL MMHG
STRESS BASELINE HR: 69 BPM
STRESS PERCENT HR: 87 %
STRESS POST ESTIMATED WORKLOAD: 1 METS
STRESS POST PEAK BP: NORMAL MMHG
STRESS POST PEAK HR: 117 BPM
STRESS TARGET HR: 135 BPM

## 2020-01-13 PROCEDURE — 93016 CV STRESS TEST SUPVJ ONLY: CPT | Performed by: INTERNAL MEDICINE

## 2020-01-13 PROCEDURE — 0 TECHNETIUM SESTAMIBI: Performed by: INTERNAL MEDICINE

## 2020-01-13 PROCEDURE — 93017 CV STRESS TEST TRACING ONLY: CPT

## 2020-01-13 PROCEDURE — 93018 CV STRESS TEST I&R ONLY: CPT | Performed by: INTERNAL MEDICINE

## 2020-01-13 PROCEDURE — 78452 HT MUSCLE IMAGE SPECT MULT: CPT | Performed by: INTERNAL MEDICINE

## 2020-01-13 PROCEDURE — A9500 TC99M SESTAMIBI: HCPCS | Performed by: INTERNAL MEDICINE

## 2020-01-13 PROCEDURE — 78452 HT MUSCLE IMAGE SPECT MULT: CPT

## 2020-01-13 PROCEDURE — 25010000002 REGADENOSON 0.4 MG/5ML SOLUTION: Performed by: INTERNAL MEDICINE

## 2020-01-13 RX ORDER — SODIUM CHLORIDE 0.9 % (FLUSH) 0.9 %
10 SYRINGE (ML) INJECTION ONCE
Status: COMPLETED | OUTPATIENT
Start: 2020-01-13 | End: 2020-01-13

## 2020-01-13 RX ADMIN — TECHNETIUM TC 99M SESTAMIBI 1 DOSE: 1 INJECTION INTRAVENOUS at 08:15

## 2020-01-13 RX ADMIN — TECHNETIUM TC 99M SESTAMIBI 1 DOSE: 1 INJECTION INTRAVENOUS at 07:02

## 2020-01-13 RX ADMIN — SODIUM CHLORIDE, PRESERVATIVE FREE 10 ML: 5 INJECTION INTRAVENOUS at 08:15

## 2020-01-13 RX ADMIN — REGADENOSON 0.4 MG: 0.08 INJECTION, SOLUTION INTRAVENOUS at 08:15

## 2020-01-17 ENCOUNTER — OFFICE VISIT (OUTPATIENT)
Dept: FAMILY MEDICINE CLINIC | Facility: CLINIC | Age: 60
End: 2020-01-17

## 2020-01-17 VITALS
WEIGHT: 156 LBS | OXYGEN SATURATION: 99 % | HEIGHT: 70 IN | TEMPERATURE: 98.6 F | SYSTOLIC BLOOD PRESSURE: 118 MMHG | HEART RATE: 91 BPM | BODY MASS INDEX: 22.33 KG/M2 | DIASTOLIC BLOOD PRESSURE: 68 MMHG

## 2020-01-17 DIAGNOSIS — G59 MONONEUROPATHY DUE TO UNDERLYING DISEASE: Chronic | ICD-10-CM

## 2020-01-17 DIAGNOSIS — M15.9 PRIMARY OSTEOARTHRITIS INVOLVING MULTIPLE JOINTS: Primary | Chronic | ICD-10-CM

## 2020-01-17 DIAGNOSIS — F41.1 GENERALIZED ANXIETY DISORDER: Chronic | ICD-10-CM

## 2020-01-17 PROCEDURE — 99214 OFFICE O/P EST MOD 30 MIN: CPT | Performed by: INTERNAL MEDICINE

## 2020-01-17 RX ORDER — TIZANIDINE 4 MG/1
4 TABLET ORAL EVERY 8 HOURS PRN
Qty: 90 TABLET | Refills: 0 | Status: SHIPPED | OUTPATIENT
Start: 2020-01-17 | End: 2020-07-10 | Stop reason: SDUPTHER

## 2020-01-17 RX ORDER — HYDROCODONE BITARTRATE AND ACETAMINOPHEN 7.5; 325 MG/1; MG/1
1 TABLET ORAL 2 TIMES DAILY PRN
Qty: 60 TABLET | Refills: 0 | Status: SHIPPED | OUTPATIENT
Start: 2020-01-17 | End: 2020-02-14 | Stop reason: SDUPTHER

## 2020-01-17 RX ORDER — FLUOXETINE HYDROCHLORIDE 40 MG/1
40 CAPSULE ORAL DAILY
Qty: 30 CAPSULE | Refills: 5 | Status: SHIPPED | OUTPATIENT
Start: 2020-01-17 | End: 2020-06-02

## 2020-01-17 NOTE — PROGRESS NOTES
Chief Complaint   Patient presents with   • Osteoarthritis     3 month med refill      Subjective   Mitch Rinaldi is a 59 y.o. male who presents to the office for follow-up. He has osteoarthritis which causes chronic back and joint pain.  This mainly affects his low back.  He takes Norco 7.5/325 mg twice daily for treatment of the back pain.  This is keeping it at a tolerable level.  He has neuropathic pain, but does not currently require medication for this.  He also has muscle spasm in his back and takes Zanaflex as needed. He has GERD and takes Nexium 20 mg daily.  He also takes Carafate 3-4 times daily. He has anxiety and takes fluoxetine 40 mg daily.  His anxiety has been doing well.    He recently saw cardiology and had a stress test which was normal.  He was started on metoprolol due to tachycardia.    The following portions of the patient's history were reviewed and updated as appropriate: allergies, current medications, past family history, past medical history, past social history, past surgical history and problem list.    Review of Systems   Constitutional: Negative for chills, fatigue and fever.   HENT: Negative for congestion, sinus pressure, sneezing, sore throat, tinnitus and trouble swallowing.    Eyes: Negative for visual disturbance.   Respiratory: Negative for cough, chest tightness, shortness of breath and wheezing.    Cardiovascular: Negative for chest pain, palpitations and leg swelling.   Gastrointestinal: Negative for abdominal pain, constipation, diarrhea, nausea and vomiting.   Genitourinary: Negative for dysuria, frequency and urgency.   Musculoskeletal: Positive for arthralgias and back pain. Negative for neck pain.   Skin: Negative for rash.   Neurological: Negative for dizziness, weakness and headaches.   Psychiatric/Behavioral:        Patient denies any feelings of depression and has not felt down, hopeless or lost interest in any activities.   All other systems reviewed and are  "negative.      Objective   Vitals:    01/17/20 0810   BP: 118/68   Pulse: 91   Temp: 98.6 °F (37 °C)   TempSrc: Oral   SpO2: 99%   Weight: 70.8 kg (156 lb)   Height: 177.8 cm (70\")   PainSc:   4   PainLoc: Generalized      Body mass index is 22.38 kg/m².    Physical Exam   Constitutional: He is oriented to person, place, and time. He appears well-developed and well-nourished. No distress.   HENT:   Head: Normocephalic and atraumatic.   Nose: Nose normal.   Mouth/Throat: No oropharyngeal exudate.   Eyes: Pupils are equal, round, and reactive to light. Conjunctivae and EOM are normal. No scleral icterus.   Neck: Normal range of motion. Neck supple.   Cardiovascular: Normal rate, regular rhythm and normal heart sounds. Exam reveals no gallop and no friction rub.   No murmur heard.  Pulmonary/Chest: Effort normal and breath sounds normal. No respiratory distress. He has no wheezes. He has no rales.   Abdominal: Soft. Bowel sounds are normal. He exhibits no distension. There is no tenderness. There is no rebound and no guarding.   Musculoskeletal: He exhibits no edema.        Lumbar back: He exhibits decreased range of motion and pain.   Lymphadenopathy:     He has no cervical adenopathy.   Neurological: He is alert and oriented to person, place, and time. No cranial nerve deficit.   Skin: Skin is warm and dry. No rash noted.   Psychiatric: He has a normal mood and affect. His behavior is normal. Judgment and thought content normal.   Nursing note and vitals reviewed.      Assessment/Plan   Mitch was seen today for osteoarthritis.    Diagnoses and all orders for this visit:    Primary osteoarthritis involving multiple joints  -     HYDROcodone-acetaminophen (NORCO) 7.5-325 MG per tablet; Take 1 tablet by mouth 2 (Two) Times a Day As Needed for Mild Pain  or Moderate Pain .  -     tiZANidine (ZANAFLEX) 4 MG tablet; Take 1 tablet by mouth Every 8 (Eight) Hours As Needed for Muscle Spasms. 0.5 - 1 tab as " needed    Mononeuropathy due to underlying disease    Generalized anxiety disorder  -     FLUoxetine (PROzac) 40 MG capsule; Take 1 capsule by mouth Daily.          He will continue to use ibuprofen for the arthritic inflammation, and the Zanaflex as needed for the muscle spasm.  He will continue with Norco 7.5/325 mg twice a day for treatment of the musculoskeletal pain.  He will continue with fluoxetine 40 mg daily for treatment of his anxiety.  He will follow-up with cardiology as scheduled.    Patient understands the risks associated with this controlled medication, including tolerance and addiction.  Patient also agrees to only obtain this medication from me, and not from a another provider, unless that provider is covering for me in my absence.  Patient also agrees to be compliant in dosing, and not self adjust the dose of medication.  A signed controlled substance agreement is on file, and the patient has received a controlled substance education sheet at this a previous visit.  The patient has also signed a consent for treatment with a controlled substance as per [facility] policy. SOFI was obtained.    Patient's Body mass index is 22.38 kg/m². BMI is above normal parameters. Recommendations include: exercise counseling and nutrition counseling.    CONTROLLED SUBSTANCE TRACKING 7/23/2019 10/21/2019 1/17/2020   Last Sofi 7/23/2019 10/21/2019 1/17/2020   Report Number [id number]   Last UDS 4/17/2019 - -   Last Controlled Substance Agreement 7/23/2019 10/21/2019 1/17/2020   Prior UDT result Expected - -         PHQ-2/PHQ-9 Depression Screening 10/21/2019   Little interest or pleasure in doing things 0   Feeling down, depressed, or hopeless 0   Trouble falling or staying asleep, or sleeping too much -   Feeling tired or having little energy -   Poor appetite or overeating -   Feeling bad about yourself - or that you are a failure or have let yourself or your family down -   Trouble  concentrating on things, such as reading the newspaper or watching television -   Moving or speaking so slowly that other people could have noticed. Or the opposite - being so fidgety or restless that you have been moving around a lot more than usual -   Thoughts that you would be better off dead, or of hurting yourself in some way -   Total Score 0   .

## 2020-02-14 DIAGNOSIS — M15.9 PRIMARY OSTEOARTHRITIS INVOLVING MULTIPLE JOINTS: Chronic | ICD-10-CM

## 2020-02-14 RX ORDER — HYDROCODONE BITARTRATE AND ACETAMINOPHEN 7.5; 325 MG/1; MG/1
1 TABLET ORAL 2 TIMES DAILY PRN
Qty: 60 TABLET | Refills: 0 | Status: SHIPPED | OUTPATIENT
Start: 2020-02-14 | End: 2020-03-13 | Stop reason: SDUPTHER

## 2020-02-14 NOTE — TELEPHONE ENCOUNTER
CONTROLLED SUBSTANCE TRACKING 7/23/2019 10/21/2019 1/17/2020   Last Long 7/23/2019 10/21/2019 1/17/2020   Report Number [id number]   Last UDS 4/17/2019 - -   Last Controlled Substance Agreement 7/23/2019 10/21/2019 1/17/2020   Prior UDT result Expected - -     Next office visit is scheduled for 4-14-20. Last refill of Hydrocodone was on 1-.

## 2020-02-28 ENCOUNTER — OFFICE VISIT (OUTPATIENT)
Dept: CARDIOLOGY | Facility: CLINIC | Age: 60
End: 2020-02-28

## 2020-02-28 VITALS
DIASTOLIC BLOOD PRESSURE: 76 MMHG | WEIGHT: 162.8 LBS | HEART RATE: 80 BPM | HEIGHT: 70 IN | SYSTOLIC BLOOD PRESSURE: 116 MMHG | OXYGEN SATURATION: 95 % | BODY MASS INDEX: 23.31 KG/M2

## 2020-02-28 DIAGNOSIS — R07.89 OTHER CHEST PAIN: Primary | ICD-10-CM

## 2020-02-28 DIAGNOSIS — I25.10 CORONARY ARTERY DISEASE INVOLVING NATIVE CORONARY ARTERY OF NATIVE HEART WITHOUT ANGINA PECTORIS: ICD-10-CM

## 2020-02-28 DIAGNOSIS — E78.2 MIXED HYPERLIPIDEMIA: ICD-10-CM

## 2020-02-28 PROCEDURE — 99214 OFFICE O/P EST MOD 30 MIN: CPT | Performed by: INTERNAL MEDICINE

## 2020-02-28 RX ORDER — METOPROLOL TARTRATE 50 MG/1
50 TABLET, FILM COATED ORAL 2 TIMES DAILY
Qty: 60 TABLET | Refills: 11 | Status: SHIPPED | OUTPATIENT
Start: 2020-02-28 | End: 2021-04-06 | Stop reason: SDUPTHER

## 2020-02-28 RX ORDER — ROSUVASTATIN CALCIUM 10 MG/1
10 TABLET, COATED ORAL DAILY
Qty: 30 TABLET | Refills: 11 | Status: SHIPPED | OUTPATIENT
Start: 2020-02-28 | End: 2021-02-12

## 2020-02-28 NOTE — PROGRESS NOTES
Mitch Rinaldi  59 y.o. male    02/28/2020  Chief Complaint   Patient presents with   • Follow-up       History of Present Illness she was having chest pain.  Feels very good today and says he feels better than he ever has.  We did a cardiac CTA did confirm coronary disease but a nuclear showed no ischemia.  He is currently very happy on medical therapy.  Says he has gained some weight since he is on the metoprolol and have some dizziness in the morning so he decrease his morning dose to half a pill.  He had lipids done recently and his LDL is elevated.      -        SUBJECTIVE    Allergies   Allergen Reactions   • Darvon [Propoxyphene] Nausea And Vomiting     Actual product is darvocet, darvon was the nearest alternative I could find   • Percocet [Oxycodone-Acetaminophen] Palpitations         Past Medical History:   Diagnosis Date   • Acute gastroenteritis    • Allergic    • Anxiety    • Asthma     as a  child   • Diverticulitis of colon    • Emphysema lung (CMS/HCC)    • Epididymal cyst    • Gastroesophageal reflux disease    • Generalized anxiety disorder    • Hyperlipidemia    • Impaired glucose tolerance    • Pain, lumbar region     Pain radiating to lumbar region of back      • Peripheral nervous system disease     Disorder of the peripheral nervous system      • Pleuritic chest pain    • Pneumonia    • Primary osteoarthritis involving multiple joints 8/29/2016   • Shoulder pain, left    • Spermatocele 4/25/2018    Added automatically from request for surgery [id number]   • Tinnitus    • Wears glasses    • Wears partial dentures          Past Surgical History:   Procedure Laterality Date   • CHOLECYSTECTOMY     • COLONOSCOPY N/A 1/14/2019    Procedure: COLONOSCOPY;  Surgeon: Ronald Rodriguez DO;  Location: [facility] ENDOSCOPY;  Service: Gastroenterology   • DENTAL PROCEDURE     • ENDOSCOPY AND COLONOSCOPY     • EPIDIDYMIS SURGERY Right     cyst   • HERNIA REPAIR  Feb 2017   • INGUINAL HERNIA REPAIR Left 2/6/2017     Procedure: OPEN LEFT INGUINAL HERNIA REPAIR WITH MESH;  Surgeon: Miles Gregorio MD;  Location: [facility];  Service:    • INJECTION OF MEDICATION      Depo Medrol (Methylprednisone) 80mg (8)      09/01/2015       • INJECTION OF MEDICATION      Kenalog (1)      12/11/2012       • LUNG SURGERY       Pneumothorax 1980       • SPERMATOCELECTOMY Left 5/11/2018    Procedure: LEFT SPERMATOCELECTOMY;  Surgeon: Miles Groves MD;  Location: [facility];  Service: Urology         Family History   Problem Relation Age of Onset   • Dementia Mother    • Hypertension Mother    • Thyroid disease Mother    • Arthritis Mother    • Diabetes Father    • Stomach cancer Father    • Cancer Father         stomach   • Thyroid disease Brother    • Down syndrome Brother    • COPD Maternal Aunt    • Alcohol abuse Paternal Uncle    • Macular degeneration Brother    • Alcohol abuse Brother    • Alcohol abuse Brother    • Thyroid disease Brother    • Alcohol abuse Paternal Uncle    • Birth defects Brother    • COPD Maternal Aunt    • Hearing loss Brother          Social History     Socioeconomic History   • Marital status: Single     Spouse name: Not on file   • Number of children: Not on file   • Years of education: Not on file   • Highest education level: Not on file   Tobacco Use   • Smoking status: Current Every Day Smoker     Packs/day: 1.00     Years: 40.00     Pack years: 40.00     Types: Cigarettes   • Smokeless tobacco: Former User     Quit date: 2014   • Tobacco comment: has tried chantix -side effects   Substance and Sexual Activity   • Alcohol use: No   • Drug use: No   • Sexual activity: Yes     Partners: Female         Prior to Admission medications    Medication Sig Start Date End Date Taking? Authorizing Provider   cetirizine (ZyrTEC) 10 MG tablet Take 10 mg by mouth daily as needed for allergies.   Yes Provider, MD Hannah   esomeprazole (nexIUM) 20 MG capsule Take 20 mg by mouth Every Morning Before Breakfast.    "Yes Provider, MD Hannah   FLUoxetine (PROzac) 40 MG capsule Take 1 capsule by mouth Daily. 1/17/20  Yes Carmelo Farmer MD   fluticasone (FLONASE) 50 MCG/ACT nasal spray 2 sprays into the nostril(s) as directed by provider Daily. Administer 2 sprays in each nostril for each dose. 4/26/19  Yes Carmelo Farmer MD   HYDROcodone-acetaminophen (NORCO) 7.5-325 MG per tablet Take 1 tablet by mouth 2 (Two) Times a Day As Needed for Mild Pain  or Moderate Pain . 2/14/20  Yes Carmelo Farmer MD   metoprolol tartrate (LOPRESSOR) 50 MG tablet Take 1 tablet by mouth 2 (Two) Times a Day.  Patient taking differently: Take 50 mg by mouth 2 (Two) Times a Day. Patient taking 1/2 in joy am and 1 tablet at night 11/25/19  Yes Tomi Gill MD   promethazine (PHENERGAN) 25 MG tablet TAKE 1 TABLET BY MOUTH EVERY 6 (SIX) HOURS AS NEEDED FOR NAUSEA OR VOMITING. 11/12/19  Yes Carmelo Farmer MD   tiZANidine (ZANAFLEX) 4 MG tablet Take 1 tablet by mouth Every 8 (Eight) Hours As Needed for Muscle Spasms. 0.5 - 1 tab as needed 1/17/20  Yes Carmelo Farmer MD   sucralfate (CARAFATE) 1 g tablet Take 1 tablet by mouth 4 (Four) Times a Day. 5/14/19   Carmelo Farmer MD         OBJECTIVE    /76 (BP Location: Left arm, Patient Position: Sitting) Comment: 116/76 right arm  Pulse 80   Ht 177.8 cm (70\")   Wt 73.8 kg (162 lb 12.8 oz)   SpO2 95%   BMI 23.36 kg/m²         Review of Systems     Constitutional:  Denies recent weight loss, weight gain, fever or chills, no change in exercise tolerance     HENT:  Denies any hearing loss, epistaxis, hoarseness, or difficulty speaking.     Eyes: Wears eyeglasses or contact lenses     Respiratory:  Denies dyspnea with exertion,no cough, wheezing, or hemoptysis.     Cardiovascular: Negative for palpations, chest pain, orthopnea, PND, peripheral edema, syncope, or claudication.     Gastrointestinal:  Denies change in bowel habits, dyspepsia, ulcer disease, hematochezia, or melena.     "     Endocrine: Negative for cold intolerance, heat intolerance, polydipsia, polyphagia and polyuria. Denies any history of weight change, heat/cold intolerance, polydipsia, polyuria     Genitourinary: Negative.      Musculoskeletal: Denies any history of arthritic symptoms or back problems     Skin:  Denies any change in hair or nails, rashes, or skin lesions.     Allergic/Immunologic: Negative.  Negative for environmental allergies, food allergies and immunocompromised state.     Neurological:  Denies any history of recurrent headaches, strokes, TIA, or seizure disorder.     Hematological: Denies any food allergies, seasonal allergies, bleeding disorders, or lymphadenopathy.     Psychiatric/Behavioral: Denies any history of depression, substance abuse, or change in cognitive function.         Physical Exam     Constitutional: Cooperative, alert and oriented, well-developed, well-nourished, in no acute distress.     HENT:   Head: Normocephalic, normal hair patterns, no masses or tenderness.  Ears, Nose, and Throat: No gross abnormalities. No pallor or cyanosis. Dentition good.   Eyes: EOMS intact, PERRL, conjunctivae and lids unremarkable. Fundoscopic exam and visual fields not performed.   Neck: No palpable masses or adenopathy, no thyromegaly, no JVD, carotid pulses are full and equal bilaterally and without  Bruits.     Cardiovascular: Regular rhythm, S1 and S2 normal, no S3 or S4. Apical impulse not displaced. No murmurs, gallops, or rubs detected.     Pulmonary/Chest: Chest: normal symmetry, no tenderness to palpation, normal respiratory excursion, no intercostal retraction, no use of accessory muscles.            Pulmonary: Normal breath sounds. No rales or ronchi.    Abdominal: Abdomen soft, bowel sounds normoactive, no masses, no hepatosplenomegaly, non-tender, no bruits.     Musculoskeletal: No deformities, clubbing, cyanosis, erythema, or edema observed. There are no spinal abnormalities noted. Normal  muscle strength and tone. Pulses full and equal in all extremities, no bruits auscultated.     Neurological: No gross motor or sensory deficits noted, affect appropriate, oriented to time, person, place.     Skin: Warm and dry to the touch, no apparent skin lesions or masses noted.     Psychiatric: He has a normal mood and affect. His behavior is normal. Judgment and thought content normal.         Procedures      Lab Results   Component Value Date    WBC 7.12 10/16/2019    HGB 16.4 10/16/2019    HCT 46.9 10/16/2019    MCV 90.4 10/16/2019     10/16/2019     Lab Results   Component Value Date    GLUCOSE 111 (H) 10/16/2019    BUN 14 10/16/2019    CREATININE 0.96 10/16/2019    EGFRIFNONA 80 10/16/2019    BCR 14.6 10/16/2019    CO2 25.0 10/16/2019    CALCIUM 9.4 10/16/2019    ALBUMIN 4.50 10/16/2019    AST 28 10/16/2019    ALT 19 10/16/2019     Lab Results   Component Value Date    CHOL 186 10/16/2019    CHOL 161 04/17/2019    CHOL 176 10/26/2018     Lab Results   Component Value Date    TRIG 186 (H) 10/16/2019    TRIG 139 04/17/2019    TRIG 98 10/26/2018     Lab Results   Component Value Date    HDL 30 (L) 10/16/2019    HDL 38 (L) 04/17/2019    HDL 38 (L) 10/26/2018     No components found for: LDLCALC  Lab Results   Component Value Date     (H) 10/16/2019    LDL 95 04/17/2019     (H) 10/26/2018     No results found for: HDLLDLRATIO  No components found for: CHOLHDL  Lab Results   Component Value Date    HGBA1C 5.81 (H) 10/16/2019     Lab Results   Component Value Date    TSH 2.160 10/16/2019           ASSESSMENT AND PLAN      Mitch was seen today for follow-up.    Diagnoses and all orders for this visit:    Other chest pain  Chest pain is resolved on medical therapy.  We will continue him on this.  Talked him about taking aspirin a day and he is agreeable.  Coronary artery disease involving native coronary artery of native heart without angina pectoris  By CT scan he does have coronary disease but  his nuclear showed no significant ischemia so we continue on his medicines including his restarting of 81 mg aspirin daily.  We will also add Crestor to his regimen as the CT scan was abnormal.  Mixed hyperlipidemia  We will go ahead and start his Crestor 10 mg daily as his LDL is elevated and HDL is low.  He is to have a recheck that is already been ordered.  Other orders  -     rosuvastatin (CRESTOR) 10 MG tablet; Take 1 tablet by mouth Daily.  -     metoprolol tartrate (LOPRESSOR) 50 MG tablet; Take 1 tablet by mouth 2 (Two) Times a Day.        Patient's Body mass index is 23.36 kg/m². BMI is within normal parameters. No follow-up required..      Mitch Rinaldi  reports that he has been smoking cigarettes. He has a 40.00 pack-year smoking history. He quit smokeless tobacco use about 6 years ago.. I have educated him on the risk of diseases from using tobacco products such as cancer, COPD and heart diease.     I advised him to quit and he is not willing to quit.    I spent 4.5 minutes counseling the patient.               Tomi Gill MD  2/28/2020  11:36 AM

## 2020-03-13 DIAGNOSIS — M15.9 PRIMARY OSTEOARTHRITIS INVOLVING MULTIPLE JOINTS: Chronic | ICD-10-CM

## 2020-03-13 DIAGNOSIS — K21.9 GASTROESOPHAGEAL REFLUX DISEASE WITHOUT ESOPHAGITIS: Chronic | ICD-10-CM

## 2020-03-13 RX ORDER — PROMETHAZINE HYDROCHLORIDE 25 MG/1
25 TABLET ORAL EVERY 6 HOURS PRN
Qty: 60 TABLET | Refills: 2 | Status: SHIPPED | OUTPATIENT
Start: 2020-03-13 | End: 2020-06-12

## 2020-03-13 RX ORDER — HYDROCODONE BITARTRATE AND ACETAMINOPHEN 7.5; 325 MG/1; MG/1
1 TABLET ORAL 2 TIMES DAILY PRN
Qty: 60 TABLET | Refills: 0 | Status: SHIPPED | OUTPATIENT
Start: 2020-03-13 | End: 2020-04-14 | Stop reason: SDUPTHER

## 2020-03-13 NOTE — TELEPHONE ENCOUNTER
----- Message from Lenora Leos sent at 3/12/2020 10:01 AM CDT -----  Regarding: med refill  Contact: [phone]   Needs refill on HYDROcodone-acetaminophen (NORCO) 7.5-325 MG, due tomorrow to [facility].

## 2020-03-13 NOTE — TELEPHONE ENCOUNTER
CONTROLLED SUBSTANCE TRACKING 7/23/2019 10/21/2019 1/17/2020   Last Long 7/23/2019 10/21/2019 1/17/2020   Report Number [id number]   Last UDS 4/17/2019 - -   Last Controlled Substance Agreement 7/23/2019 10/21/2019 1/17/2020   Prior UDT result Expected - -     Next office visit is due around 4-17-20 but has not been scheduled. Last refill of Hydrocodone was on 2-14-20.

## 2020-04-14 ENCOUNTER — TELEMEDICINE (OUTPATIENT)
Dept: FAMILY MEDICINE CLINIC | Facility: CLINIC | Age: 60
End: 2020-04-14

## 2020-04-14 VITALS
TEMPERATURE: 98.3 F | HEART RATE: 68 BPM | SYSTOLIC BLOOD PRESSURE: 125 MMHG | DIASTOLIC BLOOD PRESSURE: 68 MMHG | OXYGEN SATURATION: 97 %

## 2020-04-14 DIAGNOSIS — F41.1 GENERALIZED ANXIETY DISORDER: Chronic | ICD-10-CM

## 2020-04-14 DIAGNOSIS — K21.9 GASTROESOPHAGEAL REFLUX DISEASE WITHOUT ESOPHAGITIS: Chronic | ICD-10-CM

## 2020-04-14 DIAGNOSIS — Z87.891 PERSONAL HISTORY OF TOBACCO USE, PRESENTING HAZARDS TO HEALTH: ICD-10-CM

## 2020-04-14 DIAGNOSIS — R73.02 IMPAIRED GLUCOSE TOLERANCE: Primary | Chronic | ICD-10-CM

## 2020-04-14 DIAGNOSIS — E78.2 MIXED HYPERLIPIDEMIA: Chronic | ICD-10-CM

## 2020-04-14 DIAGNOSIS — Z72.0 TOBACCO ABUSE: ICD-10-CM

## 2020-04-14 DIAGNOSIS — M15.9 PRIMARY OSTEOARTHRITIS INVOLVING MULTIPLE JOINTS: Chronic | ICD-10-CM

## 2020-04-14 PROCEDURE — 99214 OFFICE O/P EST MOD 30 MIN: CPT | Performed by: INTERNAL MEDICINE

## 2020-04-14 RX ORDER — HYDROCODONE BITARTRATE AND ACETAMINOPHEN 7.5; 325 MG/1; MG/1
1 TABLET ORAL 2 TIMES DAILY PRN
Qty: 60 TABLET | Refills: 0 | Status: SHIPPED | OUTPATIENT
Start: 2020-04-14 | End: 2020-05-12 | Stop reason: SDUPTHER

## 2020-04-14 NOTE — PROGRESS NOTES
Telemedicine visit    You have chosen to receive care through a telehealth visit.  Do you consent to use a video/audio connection for your medical care today? Yes  Chief Complaint   Patient presents with   • Hyperlipidemia   • Osteoarthritis   • Anxiety     Subjective   Mitch Giovanny Rinaldi is a 59 y.o. male who is seen via telehealth video visit for follow-up. Patient is due for routine labs, but these have been postponed due to the coronavirus pandemic. He has impaired glucose tolerance and has been monitoring his dietary intake of sugars and carbohydrates.  He has hyperlipidemia and takes Crestor daily.  He has GERD which is well controlled with Nexium. He was previously taking Carafate 3-4 times daily.  His GI symptoms have been doing better, so he is not currently taking the Carafate.  He has osteoarthritis which causes chronic back and joint pain.  He takes Norco 7.5/325 mg twice daily as needed for the pain.  He uses Zanaflex as needed for muscle spasm.  He has neuropathy secondary to the arthritis in his low back, but does not require any additional medication for this. He has anxiety and depression, and takes fluoxetine daily.  He takes metoprolol for treatment of tachycardia.  He was started on a dose of 50 mg twice a day.  His blood pressure started running low, so this is now been reduced to 25 mg in the morning and 50 mg at night.  He has been doing much better with this dose.    The following portions of the patient's history were reviewed and updated as appropriate: allergies, current medications, past family history, past medical history, past social history, past surgical history and problem list.    Review of Systems   Constitutional: Negative for chills, fatigue and fever.   HENT: Negative for congestion, sinus pressure, sneezing, sore throat, tinnitus and trouble swallowing.    Eyes: Negative for visual disturbance.   Respiratory: Negative for cough, chest tightness, shortness of breath and wheezing.     Cardiovascular: Negative for chest pain, palpitations and leg swelling.   Gastrointestinal: Negative for abdominal pain, constipation, diarrhea, nausea and vomiting.   Genitourinary: Negative for dysuria, frequency and urgency.   Musculoskeletal: Positive for arthralgias and back pain. Negative for neck pain.   Skin: Negative for rash.   Neurological: Negative for dizziness, weakness and headaches.   Psychiatric/Behavioral:        Patient denies any feelings of depression and has not felt down, hopeless or lost interest in any activities.   All other systems reviewed and are negative.      Objective    Vitals:    04/14/20 0838   BP: 125/68  Comment: Patient reported   Pulse: 68   Temp: 98.3 °F (36.8 °C)  Comment: Patient reported   TempSrc: Oral   SpO2: 97%  Comment: Patient Reported       Physical Exam   Constitutional: He is oriented to person, place, and time. He appears well-developed and well-nourished. No distress.   HENT:   Head: Normocephalic and atraumatic.   Right Ear: Hearing and external ear normal.   Left Ear: Hearing and external ear normal.   Nose: Nose normal.   Eyes: Pupils are equal, round, and reactive to light. EOM are normal. Right eye exhibits no discharge. Left eye exhibits no discharge.   Neck: Normal range of motion.   Pulmonary/Chest: Effort normal.   Neurological: He is alert and oriented to person, place, and time. No cranial nerve deficit.   Psychiatric: He has a normal mood and affect. His behavior is normal. Judgment and thought content normal.       Assessment/Plan   Mitch was seen today for hyperlipidemia, osteoarthritis and anxiety.    Diagnoses and all orders for this visit:    Impaired glucose tolerance    Mixed hyperlipidemia    Gastroesophageal reflux disease without esophagitis    Primary osteoarthritis involving multiple joints  -     HYDROcodone-acetaminophen (NORCO) 7.5-325 MG per tablet; Take 1 tablet by mouth 2 (Two) Times a Day As Needed for Mild Pain  or Moderate Pain  .    Generalized anxiety disorder    Tobacco abuse  -     CT Chest Low Dose Wo; Future    Personal history of tobacco use, presenting hazards to health  -     CT Chest Low Dose Wo; Future         Patient will have labs updated in a couple of months once social distancing restrictions are lifted.  He will continue to monitor his sugar intake due to the impaired glucose tolerance.  He will continue with Crestor for treatment of the hyperlipidemia.  He will continue with metoprolol for treatment of the tachycardia.  He will continue with Nexium for treatment of GERD.  He may also continue with Carafate as needed.  He will continue with Zanaflex as needed for the muscle spasm.  He will continue with Norco 7.5/325 mg twice a day for treatment of the musculoskeletal pain.  He will continue with fluoxetine 40 mg daily for treatment of his anxiety.    Patient understands the risks associated with this controlled medication, including tolerance and addiction.  Patient also agrees to only obtain this medication from me, and not from a another provider, unless that provider is covering for me in my absence.  Patient also agrees to be compliant in dosing, and not self adjust the dose of medication.  A signed controlled substance agreement is on file, and the patient has received a controlled substance education sheet at this or a previous visit.  The patient has also signed a consent for treatment with a controlled substance as per [facility] policy. SOFI is obtained.    He had low-dose CT lung cancer screening in January 2019.  He is due for a 1 year follow-up, and this is scheduled today.  I discussed this with him today, and he participated in shared decision making. He has smoked one pack of cigarettes per day for 42 years.       Low-Dose CT: Lung Cancer Screening  Criteria:  • Age 55-77 years of age (CMS) , 55-80 years of age (Commercial) and;  o Patient Age: 59 y.o.  • 30 pack-year smoking history (# yrs smoked X  avg #ppd = pack/yrs); if  pt has quit smoking it must be within <15yrs and;  • Asymptomatic for lung cancer  ICD-10 Codes:  • History of smoking  • Tobacco abuse/addiction  ? Z72.0 (current smoker)  ? Z87.891 (personal history of smoking/nicotine dependence) use with CMS   • Patient Smoking History  Social History     Tobacco Use   Smoking Status Current Every Day Smoker   • Packs/day: 1.00   • Years: 42.00   • Pack years: 42.00   • Types: Cigarettes   Tobacco Comment    has tried chantix -side effects     CPT Codes:  • 25153 low dose (must say low dose otherwise is CT without contrast)  / (for patients with [facility] and CMS)    PHQ-2/PHQ-9 Depression Screening 4/14/2020   Little interest or pleasure in doing things 0   Feeling down, depressed, or hopeless 0   Trouble falling or staying asleep, or sleeping too much -   Feeling tired or having little energy -   Poor appetite or overeating -   Feeling bad about yourself - or that you are a failure or have let yourself or your family down -   Trouble concentrating on things, such as reading the newspaper or watching television -   Moving or speaking so slowly that other people could have noticed. Or the opposite - being so fidgety or restless that you have been moving around a lot more than usual -   Thoughts that you would be better off dead, or of hurting yourself in some way -   Total Score 0

## 2020-05-12 DIAGNOSIS — M15.9 PRIMARY OSTEOARTHRITIS INVOLVING MULTIPLE JOINTS: Chronic | ICD-10-CM

## 2020-05-12 RX ORDER — HYDROCODONE BITARTRATE AND ACETAMINOPHEN 7.5; 325 MG/1; MG/1
1 TABLET ORAL 2 TIMES DAILY PRN
Qty: 60 TABLET | Refills: 0 | Status: SHIPPED | OUTPATIENT
Start: 2020-05-12 | End: 2020-06-11 | Stop reason: SDUPTHER

## 2020-05-12 NOTE — TELEPHONE ENCOUNTER
Last SOFI was on 4-14-20.  Controlled consent was on 1-17-20  Last refill of Hydrocodone was on 4-14-20

## 2020-05-13 DIAGNOSIS — R91.8 ABNORMAL CT SCAN OF LUNG: Primary | ICD-10-CM

## 2020-05-14 ENCOUNTER — TELEPHONE (OUTPATIENT)
Dept: FAMILY MEDICINE CLINIC | Facility: CLINIC | Age: 60
End: 2020-05-14

## 2020-05-14 DIAGNOSIS — R91.8 ABNORMAL CT LUNG SCREENING: Primary | ICD-10-CM

## 2020-05-14 DIAGNOSIS — R91.1 LEFT UPPER LOBE PULMONARY NODULE: ICD-10-CM

## 2020-05-14 NOTE — TELEPHONE ENCOUNTER
Urgent referral placed for Dr. Salvador Cardiothoracic surgery in [place], they will call patient to schedule appt.    Notes recorded by Say Sparks LPN on 5/14/2020 at 2:53 PM CDT  Patient notified, verbalized understanding with verbal readback.  ------    Notes recorded by Say Sparks LPN on 5/14/2020 at 1:13 PM CDT  No answer with patient, x 2, will try calling again. Order has been changed to PCP per request.  ------    Notes recorded by Carmelo Farmer MD on 5/14/2020 at 6:59 AM CDT  Contact patient and let him know that his CT lung cancer screening shows 2 small nodules, 1 of which is suspicious.  It is recommended that he have a PET scan for further evaluation.  He also needs referral to Dr. Salvador for consult regarding this lesion.  Report was called to Dr. Castro yesterday by the radiologist.  Dr. Castro contacted the patient, but I want to verify that he is aware and understands the importance of getting the PET scan and consult.  If the PET scan has not yet been scheduled, please change the current order to my name as the authorizing provider so I will receive the results.

## 2020-05-15 DIAGNOSIS — R91.1 SOLITARY PULMONARY NODULE: Primary | ICD-10-CM

## 2020-05-22 ENCOUNTER — APPOINTMENT (OUTPATIENT)
Dept: PET IMAGING | Facility: HOSPITAL | Age: 60
End: 2020-05-22

## 2020-05-29 ENCOUNTER — HOSPITAL ENCOUNTER (OUTPATIENT)
Dept: PET IMAGING | Facility: HOSPITAL | Age: 60
Discharge: HOME | End: 2020-05-29
Admitting: THORACIC SURGERY (CARDIOTHORACIC VASCULAR SURGERY)

## 2020-05-29 DIAGNOSIS — R91.1 SOLITARY PULMONARY NODULE: ICD-10-CM

## 2020-05-29 PROCEDURE — 78815 PET IMAGE W/CT SKULL-THIGH: CPT

## 2020-05-29 PROCEDURE — A9552 F18 FDG: HCPCS | Performed by: THORACIC SURGERY (CARDIOTHORACIC VASCULAR SURGERY)

## 2020-05-29 PROCEDURE — 0 FLUDEOXYGLUCOSE F18 SOLUTION: Performed by: THORACIC SURGERY (CARDIOTHORACIC VASCULAR SURGERY)

## 2020-05-29 RX ADMIN — FLUDEOXYGLUCOSE F18 1 DOSE: 300 INJECTION INTRAVENOUS at 08:17

## 2020-06-01 ENCOUNTER — OFFICE VISIT (OUTPATIENT)
Dept: CARDIAC SURGERY | Facility: CLINIC | Age: 60
End: 2020-06-01

## 2020-06-01 ENCOUNTER — LAB (OUTPATIENT)
Dept: LAB | Facility: HOSPITAL | Age: 60
End: 2020-06-01

## 2020-06-01 VITALS
BODY MASS INDEX: 24.31 KG/M2 | SYSTOLIC BLOOD PRESSURE: 118 MMHG | HEIGHT: 70 IN | OXYGEN SATURATION: 99 % | WEIGHT: 169.8 LBS | HEART RATE: 74 BPM | TEMPERATURE: 98.4 F | DIASTOLIC BLOOD PRESSURE: 68 MMHG

## 2020-06-01 DIAGNOSIS — E78.2 MIXED HYPERLIPIDEMIA: Chronic | ICD-10-CM

## 2020-06-01 DIAGNOSIS — M15.9 PRIMARY OSTEOARTHRITIS INVOLVING MULTIPLE JOINTS: Chronic | ICD-10-CM

## 2020-06-01 DIAGNOSIS — G59 MONONEUROPATHY DUE TO UNDERLYING DISEASE: Chronic | ICD-10-CM

## 2020-06-01 DIAGNOSIS — F41.1 GENERALIZED ANXIETY DISORDER: Chronic | ICD-10-CM

## 2020-06-01 DIAGNOSIS — R91.1 LUNG NODULE: ICD-10-CM

## 2020-06-01 DIAGNOSIS — R91.1 LUNG NODULE: Primary | ICD-10-CM

## 2020-06-01 DIAGNOSIS — F17.218 NICOTINE DEPENDENCE, CIGARETTES, WITH OTHER NICOTINE-INDUCED DISORDERS: ICD-10-CM

## 2020-06-01 DIAGNOSIS — I10 BENIGN ESSENTIAL HTN: ICD-10-CM

## 2020-06-01 LAB
ABO GROUP BLD: NORMAL
BLD GP AB SCN SERPL QL: NEGATIVE
CANCER AG19-9 SERPL-ACNC: 18.2 U/ML
CEA SERPL-MCNC: 3.01 NG/ML
Lab: NORMAL
RH BLD: POSITIVE
SARS-COV-2 RNA RESP QL NAA+PROBE: NOT DETECTED
T&S EXPIRATION DATE: NORMAL

## 2020-06-01 PROCEDURE — 86900 BLOOD TYPING SEROLOGIC ABO: CPT

## 2020-06-01 PROCEDURE — 86850 RBC ANTIBODY SCREEN: CPT

## 2020-06-01 PROCEDURE — 36415 COLL VENOUS BLD VENIPUNCTURE: CPT

## 2020-06-01 PROCEDURE — 87635 SARS-COV-2 COVID-19 AMP PRB: CPT | Performed by: THORACIC SURGERY (CARDIOTHORACIC VASCULAR SURGERY)

## 2020-06-01 PROCEDURE — 86901 BLOOD TYPING SEROLOGIC RH(D): CPT

## 2020-06-01 PROCEDURE — 86301 IMMUNOASSAY TUMOR CA 19-9: CPT

## 2020-06-01 PROCEDURE — 82378 CARCINOEMBRYONIC ANTIGEN: CPT

## 2020-06-01 PROCEDURE — 99205 OFFICE O/P NEW HI 60 MIN: CPT | Performed by: THORACIC SURGERY (CARDIOTHORACIC VASCULAR SURGERY)

## 2020-06-01 RX ORDER — SODIUM CHLORIDE 9 MG/ML
100 INJECTION, SOLUTION INTRAVENOUS CONTINUOUS
Status: CANCELLED | OUTPATIENT
Start: 2020-06-03

## 2020-06-01 RX ORDER — BUPIVACAINE HCL/0.9 % NACL/PF 0.1 %
2 PLASTIC BAG, INJECTION (ML) EPIDURAL ONCE
Status: CANCELLED | OUTPATIENT
Start: 2020-06-03 | End: 2020-06-01

## 2020-06-01 NOTE — H&P (VIEW-ONLY)
6/1/2020    Mitch Rinaldi  1960    Chief Complaint:    Chief Complaint   Patient presents with   • Lung Nodule       HPI:      PCP:  Carmelo Farmer MD  Cardiology:  Dr Gill    59 y.o. male with HTN(stable, increased risk stroke, rupture), Hyperlipidemia(stable, increased risk cardiovascular events), Smoker(uncontrolled, increased risk cardiovascular events) and COPD(stable, increased risk pulmonary complications) , lung nodule(new, suspicious malignancy).  smokes 1 PPD.  CT screening shows LEFT lung nodule.  PET positive with lymphadenopathy and pancreatic mass.  Prior LEFT pneumothorax x3 1980.  No TIA stroke amaurosis.  No MI claudication. No other associated signs, symptoms or modifying factors.    1980 LEFT thoracotomy talc pleurodesis  3/2017 CT Chest screening:  No nodules, severe emphysematous changes.  1/2019 CT Chest screening:  No nodules, severe emphysematous changes.  5/2020 CT Chest screening:  New 24mm LEFT upper lobe apical nodule, severe emphysematous changes.  5/2020 PET CT:  24mm LEFT upper lobe apical nodule (SUV 7.1), LEFT submandibular nodule 8mm (SUV 8.2), RIGHT axillary lymph node (SUV 5.2), LEFT axillary lymph node (SUV 6.2), LEFT inframammary nodule (SUV 5.0), LEFT hilar (SUV 7.0), pancreatic head area (SUV 12), RIGHT inguinal lymph node (SUV 6.8), LEFT inguinal lymph node (SUV 5.8).    11/2019 Holter:  Normal study.  11/2019 ECG:  NSR 92, QTc 432.  12/2019 Echocardiogram:  EF 55%, LA 23mm, LV 44mm, RVSP 12mmHg.  Tr MR TR.  1/2020 Stress Test: EF 68%, no ischemia, low risk study.    The following portions of the patient's history were reviewed and updated as appropriate: allergies, current medications, past family history, past medical history, past social history, past surgical history and problem list.  Recent images independently reviewed.  Available laboratory values reviewed.    PMH:  Past Medical History:   Diagnosis Date   • Acute gastroenteritis    • Allergic    • Anxiety     • Asthma     as a  child   • Diverticulitis of colon    • Emphysema lung (CMS/HCC)    • Epididymal cyst    • Gastroesophageal reflux disease    • Generalized anxiety disorder    • Hyperlipidemia    • Impaired glucose tolerance    • Pain, lumbar region     Pain radiating to lumbar region of back      • Peripheral nervous system disease     Disorder of the peripheral nervous system      • Pleuritic chest pain    • Pneumonia    • Primary osteoarthritis involving multiple joints 8/29/2016   • Shoulder pain, left    • Spermatocele 4/25/2018    Added automatically from request for surgery [id number]   • Tinnitus    • Wears glasses    • Wears partial dentures      Past Surgical History:   Procedure Laterality Date   • CHOLECYSTECTOMY     • COLONOSCOPY N/A 1/14/2019    Procedure: COLONOSCOPY;  Surgeon: Ronald Rodriguez DO;  Location: [facility] ENDOSCOPY;  Service: Gastroenterology   • DENTAL PROCEDURE     • ENDOSCOPY AND COLONOSCOPY     • EPIDIDYMIS SURGERY Right     cyst   • HERNIA REPAIR  Feb 2017   • INGUINAL HERNIA REPAIR Left 2/6/2017    Procedure: OPEN LEFT INGUINAL HERNIA REPAIR WITH MESH;  Surgeon: Miles Gregorio MD;  Location: [facility] OR;  Service:    • INJECTION OF MEDICATION      Depo Medrol (Methylprednisone) 80mg (8)      09/01/2015       • INJECTION OF MEDICATION      Kenalog (1)      12/11/2012       • LUNG SURGERY       Pneumothorax 1980       • SPERMATOCELECTOMY Left 5/11/2018    Procedure: LEFT SPERMATOCELECTOMY;  Surgeon: Milse Groves MD;  Location: [facility] OR;  Service: Urology     Family History   Problem Relation Age of Onset   • Dementia Mother    • Hypertension Mother    • Thyroid disease Mother    • Arthritis Mother    • Diabetes Father    • Stomach cancer Father    • Cancer Father         stomach   • Thyroid disease Brother    • Down syndrome Brother    • COPD Maternal Aunt    • Alcohol abuse Paternal Uncle    • Macular degeneration Brother    • Alcohol abuse Brother    • Alcohol abuse  Brother    • Thyroid disease Brother    • Alcohol abuse Paternal Uncle    • Birth defects Brother    • COPD Maternal Aunt    • Hearing loss Brother      Social History     Tobacco Use   • Smoking status: Current Every Day Smoker     Packs/day: 1.00     Years: 42.00     Pack years: 42.00     Types: Cigarettes   • Smokeless tobacco: Former User     Quit date: 2014   • Tobacco comment: has tried chantix -side effects   Substance Use Topics   • Alcohol use: No   • Drug use: No       ALLERGIES:  Allergies   Allergen Reactions   • Darvon [Propoxyphene] Nausea And Vomiting     Actual product is darvocet, darvon was the nearest alternative I could find   • Percocet [Oxycodone-Acetaminophen] Palpitations         MEDICATIONS:    Current Outpatient Medications:   •  cetirizine (ZyrTEC) 10 MG tablet, Take 10 mg by mouth daily as needed for allergies., Disp: , Rfl:   •  esomeprazole (nexIUM) 20 MG capsule, Take 20 mg by mouth Every Morning Before Breakfast., Disp: , Rfl:   •  FLUoxetine (PROzac) 40 MG capsule, Take 1 capsule by mouth Daily., Disp: 30 capsule, Rfl: 5  •  fluticasone (FLONASE) 50 MCG/ACT nasal spray, 2 sprays into the nostril(s) as directed by provider Daily. Administer 2 sprays in each nostril for each dose., Disp: 1 bottle, Rfl: 3  •  HYDROcodone-acetaminophen (NORCO) 7.5-325 MG per tablet, Take 1 tablet by mouth 2 (Two) Times a Day As Needed for Mild Pain  or Moderate Pain ., Disp: 60 tablet, Rfl: 0  •  metoprolol tartrate (LOPRESSOR) 50 MG tablet, Take 1 tablet by mouth 2 (Two) Times a Day., Disp: 60 tablet, Rfl: 11  •  promethazine (PHENERGAN) 25 MG tablet, TAKE 1 TABLET BY MOUTH EVERY 6 (SIX) HOURS AS NEEDED FOR NAUSEA OR VOMITING., Disp: 60 tablet, Rfl: 2  •  rosuvastatin (CRESTOR) 10 MG tablet, Take 1 tablet by mouth Daily., Disp: 30 tablet, Rfl: 11  •  sucralfate (CARAFATE) 1 g tablet, Take 1 tablet by mouth 4 (Four) Times a Day., Disp: 120 tablet, Rfl: 5  •  tiZANidine (ZANAFLEX) 4 MG tablet, Take 1  "tablet by mouth Every 8 (Eight) Hours As Needed for Muscle Spasms. 0.5 - 1 tab as needed, Disp: 90 tablet, Rfl: 0    Review of Systems   Review of Systems   Constitution: Negative for malaise/fatigue, night sweats and weight loss.   HENT: Negative for hearing loss, hoarse voice and stridor.    Eyes: Negative for vision loss in left eye, vision loss in right eye and visual disturbance.   Cardiovascular: Negative for chest pain, leg swelling and palpitations.   Respiratory: Negative for cough, hemoptysis and shortness of breath.    Hematologic/Lymphatic: Negative for adenopathy and bleeding problem. Does not bruise/bleed easily.   Skin: Negative for color change, poor wound healing and rash.   Musculoskeletal: Positive for arthritis, back pain and joint pain. Negative for muscle weakness and neck pain.   Gastrointestinal: Negative for abdominal pain, dysphagia and heartburn.   Neurological: Positive for numbness and paresthesias. Negative for dizziness and seizures.   Psychiatric/Behavioral: Negative for altered mental status, depression and memory loss. The patient is not nervous/anxious.        Physical Exam   Vitals:    06/01/20 0847 06/01/20 0848   BP: 124/72 118/68   BP Location: Left arm Right arm   Pulse: 74    Temp: 98.4 °F (36.9 °C)    TempSrc: Temporal    SpO2: 99%    Weight: 77 kg (169 lb 12.8 oz)    Height: 177.8 cm (70\")      Physical Exam   Constitutional: He is oriented to person, place, and time. He is active and cooperative. He does not appear ill. No distress.   HENT:   Head: Atraumatic.   Right Ear: Hearing normal.   Left Ear: Hearing normal.   Nose: No nasal deformity. No epistaxis.   Mouth/Throat: He does not have dentures. Normal dentition.   Eyes: Conjunctivae and lids are normal. Right pupil is round and reactive. Left pupil is round and reactive.   Neck: No JVD present. Carotid bruit is not present. No tracheal deviation present. No thyroid mass and no thyromegaly present.   Cardiovascular: " Normal rate and regular rhythm.   No murmur heard.  Pulses:       Carotid pulses are 2+ on the right side, and 2+ on the left side.       Radial pulses are 2+ on the right side, and 2+ on the left side.        Dorsalis pedis pulses are 2+ on the right side, and 2+ on the left side.        Posterior tibial pulses are 2+ on the right side, and 2+ on the left side.   Healed LEFT thoracotomy scar   Pulmonary/Chest: Effort normal and breath sounds normal.   Abdominal: Soft. He exhibits no distension and no mass. There is no splenomegaly or hepatomegaly. There is no tenderness.   Musculoskeletal: He exhibits no deformity.   Gait normal.    Lymphadenopathy:     He has no cervical adenopathy.        Right: No supraclavicular adenopathy present.        Left: No supraclavicular adenopathy present.   Neurological: He is alert and oriented to person, place, and time. He has normal strength.   Skin: Skin is warm and dry. No cyanosis or erythema. No pallor.   No venous staining   Psychiatric: He has a normal mood and affect. His speech is normal. Judgment and thought content normal.       BUN   Date Value Ref Range Status   10/16/2019 14 7 - 23 mg/dL Final     Creatinine   Date Value Ref Range Status   10/16/2019 0.96 0.70 - 1.30 mg/dL Final     eGFR Non  Amer   Date Value Ref Range Status   10/16/2019 80 49 - 113 mL/min/1.73 Final       ASSESSMENT:  Mitch was seen today for lung nodule.    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 Multiple lymph node basins, lung nodule, area of pancreatic head positive on PET. Significant mediastinal lymphadenopathy requiring tissue diagnosis. Bronchoscopy, Mediastinoscopy and biopsy are advisable.    Risks including, but not limited to, bleeding, transfusion, infection, pulmonary dysfunction, blood vessel or nerve injury, possible open procedure to repair bleeding or lung injury.  Benefits: Diagnosis leading to possible treatment.  Options: observation, endoscopic ultrasound with biopsy discussed.    Bronchoscopy, mediastinoscopy. Possible cervical axillary inguinal lymph node biopsy.  GEN.  SDS.  6/3/2020      Return after above studies complete  Smoking cessation advised and assistance options offered including behavioral counseling ([employer]oking Cessation Classes), Nicotine replacement therapy (patches or gum), pharmacologic therapy (Chantix, Wellbutrin). patient understands that continued use of tobacco products increases risk of heart disease, stroke, cancer; counseling for 3-5min.  Recommended regular physical activity, progressive walking program.  Continue current medications as directed.  Will Obtain relevant old records.    Thank you for the opportunity to participate in this patient's care.    Copy to primary care provider.    EMR Dragon/Transcription disclaimer:   Much of this encounter note is an electronic transcription/translation of spoken language to printed text. The electronic translation of spoken language may permit erroneous, or at times, nonsensical words or phrases to be inadvertently transcribed; Although I have reviewed the note for such errors, some may still exist.

## 2020-06-01 NOTE — PROGRESS NOTES
6/1/2020    Mitch Rinaldi  1960    Chief Complaint:    Chief Complaint   Patient presents with   • Lung Nodule       HPI:      PCP:  Carmelo Farmer MD  Cardiology:  Dr Gill    59 y.o. male with HTN(stable, increased risk stroke, rupture), Hyperlipidemia(stable, increased risk cardiovascular events), Smoker(uncontrolled, increased risk cardiovascular events) and COPD(stable, increased risk pulmonary complications) , lung nodule(new, suspicious malignancy).  smokes 1 PPD.  CT screening shows LEFT lung nodule.  PET positive with lymphadenopathy and pancreatic mass.  Prior LEFT pneumothorax x3 1980.  No TIA stroke amaurosis.  No MI claudication. No other associated signs, symptoms or modifying factors.    1980 LEFT thoracotomy talc pleurodesis  3/2017 CT Chest screening:  No nodules, severe emphysematous changes.  1/2019 CT Chest screening:  No nodules, severe emphysematous changes.  5/2020 CT Chest screening:  New 24mm LEFT upper lobe apical nodule, severe emphysematous changes.  5/2020 PET CT:  24mm LEFT upper lobe apical nodule (SUV 7.1), LEFT submandibular nodule 8mm (SUV 8.2), RIGHT axillary lymph node (SUV 5.2), LEFT axillary lymph node (SUV 6.2), LEFT inframammary nodule (SUV 5.0), LEFT hilar (SUV 7.0), pancreatic head area (SUV 12), RIGHT inguinal lymph node (SUV 6.8), LEFT inguinal lymph node (SUV 5.8).    11/2019 Holter:  Normal study.  11/2019 ECG:  NSR 92, QTc 432.  12/2019 Echocardiogram:  EF 55%, LA 23mm, LV 44mm, RVSP 12mmHg.  Tr MR TR.  1/2020 Stress Test: EF 68%, no ischemia, low risk study.    The following portions of the patient's history were reviewed and updated as appropriate: allergies, current medications, past family history, past medical history, past social history, past surgical history and problem list.  Recent images independently reviewed.  Available laboratory values reviewed.    PMH:  Past Medical History:   Diagnosis Date   • Acute gastroenteritis    • Allergic    • Anxiety     • Asthma     as a  child   • Diverticulitis of colon    • Emphysema lung (CMS/HCC)    • Epididymal cyst    • Gastroesophageal reflux disease    • Generalized anxiety disorder    • Hyperlipidemia    • Impaired glucose tolerance    • Pain, lumbar region     Pain radiating to lumbar region of back      • Peripheral nervous system disease     Disorder of the peripheral nervous system      • Pleuritic chest pain    • Pneumonia    • Primary osteoarthritis involving multiple joints 8/29/2016   • Shoulder pain, left    • Spermatocele 4/25/2018    Added automatically from request for surgery [id number]   • Tinnitus    • Wears glasses    • Wears partial dentures      Past Surgical History:   Procedure Laterality Date   • CHOLECYSTECTOMY     • COLONOSCOPY N/A 1/14/2019    Procedure: COLONOSCOPY;  Surgeon: Ronald Rodriguez DO;  Location: [facility] ENDOSCOPY;  Service: Gastroenterology   • DENTAL PROCEDURE     • ENDOSCOPY AND COLONOSCOPY     • EPIDIDYMIS SURGERY Right     cyst   • HERNIA REPAIR  Feb 2017   • INGUINAL HERNIA REPAIR Left 2/6/2017    Procedure: OPEN LEFT INGUINAL HERNIA REPAIR WITH MESH;  Surgeon: Miles Gregorio MD;  Location: [facility] OR;  Service:    • INJECTION OF MEDICATION      Depo Medrol (Methylprednisone) 80mg (8)      09/01/2015       • INJECTION OF MEDICATION      Kenalog (1)      12/11/2012       • LUNG SURGERY       Pneumothorax 1980       • SPERMATOCELECTOMY Left 5/11/2018    Procedure: LEFT SPERMATOCELECTOMY;  Surgeon: Miles Groves MD;  Location: [facility] OR;  Service: Urology     Family History   Problem Relation Age of Onset   • Dementia Mother    • Hypertension Mother    • Thyroid disease Mother    • Arthritis Mother    • Diabetes Father    • Stomach cancer Father    • Cancer Father         stomach   • Thyroid disease Brother    • Down syndrome Brother    • COPD Maternal Aunt    • Alcohol abuse Paternal Uncle    • Macular degeneration Brother    • Alcohol abuse Brother    • Alcohol abuse  Brother    • Thyroid disease Brother    • Alcohol abuse Paternal Uncle    • Birth defects Brother    • COPD Maternal Aunt    • Hearing loss Brother      Social History     Tobacco Use   • Smoking status: Current Every Day Smoker     Packs/day: 1.00     Years: 42.00     Pack years: 42.00     Types: Cigarettes   • Smokeless tobacco: Former User     Quit date: 2014   • Tobacco comment: has tried chantix -side effects   Substance Use Topics   • Alcohol use: No   • Drug use: No       ALLERGIES:  Allergies   Allergen Reactions   • Darvon [Propoxyphene] Nausea And Vomiting     Actual product is darvocet, darvon was the nearest alternative I could find   • Percocet [Oxycodone-Acetaminophen] Palpitations         MEDICATIONS:    Current Outpatient Medications:   •  cetirizine (ZyrTEC) 10 MG tablet, Take 10 mg by mouth daily as needed for allergies., Disp: , Rfl:   •  esomeprazole (nexIUM) 20 MG capsule, Take 20 mg by mouth Every Morning Before Breakfast., Disp: , Rfl:   •  FLUoxetine (PROzac) 40 MG capsule, Take 1 capsule by mouth Daily., Disp: 30 capsule, Rfl: 5  •  fluticasone (FLONASE) 50 MCG/ACT nasal spray, 2 sprays into the nostril(s) as directed by provider Daily. Administer 2 sprays in each nostril for each dose., Disp: 1 bottle, Rfl: 3  •  HYDROcodone-acetaminophen (NORCO) 7.5-325 MG per tablet, Take 1 tablet by mouth 2 (Two) Times a Day As Needed for Mild Pain  or Moderate Pain ., Disp: 60 tablet, Rfl: 0  •  metoprolol tartrate (LOPRESSOR) 50 MG tablet, Take 1 tablet by mouth 2 (Two) Times a Day., Disp: 60 tablet, Rfl: 11  •  promethazine (PHENERGAN) 25 MG tablet, TAKE 1 TABLET BY MOUTH EVERY 6 (SIX) HOURS AS NEEDED FOR NAUSEA OR VOMITING., Disp: 60 tablet, Rfl: 2  •  rosuvastatin (CRESTOR) 10 MG tablet, Take 1 tablet by mouth Daily., Disp: 30 tablet, Rfl: 11  •  sucralfate (CARAFATE) 1 g tablet, Take 1 tablet by mouth 4 (Four) Times a Day., Disp: 120 tablet, Rfl: 5  •  tiZANidine (ZANAFLEX) 4 MG tablet, Take 1  "tablet by mouth Every 8 (Eight) Hours As Needed for Muscle Spasms. 0.5 - 1 tab as needed, Disp: 90 tablet, Rfl: 0    Review of Systems   Review of Systems   Constitution: Negative for malaise/fatigue, night sweats and weight loss.   HENT: Negative for hearing loss, hoarse voice and stridor.    Eyes: Negative for vision loss in left eye, vision loss in right eye and visual disturbance.   Cardiovascular: Negative for chest pain, leg swelling and palpitations.   Respiratory: Negative for cough, hemoptysis and shortness of breath.    Hematologic/Lymphatic: Negative for adenopathy and bleeding problem. Does not bruise/bleed easily.   Skin: Negative for color change, poor wound healing and rash.   Musculoskeletal: Positive for arthritis, back pain and joint pain. Negative for muscle weakness and neck pain.   Gastrointestinal: Negative for abdominal pain, dysphagia and heartburn.   Neurological: Positive for numbness and paresthesias. Negative for dizziness and seizures.   Psychiatric/Behavioral: Negative for altered mental status, depression and memory loss. The patient is not nervous/anxious.        Physical Exam   Vitals:    06/01/20 0847 06/01/20 0848   BP: 124/72 118/68   BP Location: Left arm Right arm   Pulse: 74    Temp: 98.4 °F (36.9 °C)    TempSrc: Temporal    SpO2: 99%    Weight: 77 kg (169 lb 12.8 oz)    Height: 177.8 cm (70\")      Physical Exam   Constitutional: He is oriented to person, place, and time. He is active and cooperative. He does not appear ill. No distress.   HENT:   Head: Atraumatic.   Right Ear: Hearing normal.   Left Ear: Hearing normal.   Nose: No nasal deformity. No epistaxis.   Mouth/Throat: He does not have dentures. Normal dentition.   Eyes: Conjunctivae and lids are normal. Right pupil is round and reactive. Left pupil is round and reactive.   Neck: No JVD present. Carotid bruit is not present. No tracheal deviation present. No thyroid mass and no thyromegaly present.   Cardiovascular: " Normal rate and regular rhythm.   No murmur heard.  Pulses:       Carotid pulses are 2+ on the right side, and 2+ on the left side.       Radial pulses are 2+ on the right side, and 2+ on the left side.        Dorsalis pedis pulses are 2+ on the right side, and 2+ on the left side.        Posterior tibial pulses are 2+ on the right side, and 2+ on the left side.   Healed LEFT thoracotomy scar   Pulmonary/Chest: Effort normal and breath sounds normal.   Abdominal: Soft. He exhibits no distension and no mass. There is no splenomegaly or hepatomegaly. There is no tenderness.   Musculoskeletal: He exhibits no deformity.   Gait normal.    Lymphadenopathy:     He has no cervical adenopathy.        Right: No supraclavicular adenopathy present.        Left: No supraclavicular adenopathy present.   Neurological: He is alert and oriented to person, place, and time. He has normal strength.   Skin: Skin is warm and dry. No cyanosis or erythema. No pallor.   No venous staining   Psychiatric: He has a normal mood and affect. His speech is normal. Judgment and thought content normal.       BUN   Date Value Ref Range Status   10/16/2019 14 7 - 23 mg/dL Final     Creatinine   Date Value Ref Range Status   10/16/2019 0.96 0.70 - 1.30 mg/dL Final     eGFR Non  Amer   Date Value Ref Range Status   10/16/2019 80 49 - 113 mL/min/1.73 Final       ASSESSMENT:  Mitch was seen today for lung nodule.    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 Multiple lymph node basins, lung nodule, area of pancreatic head positive on PET. Significant mediastinal lymphadenopathy requiring tissue diagnosis. Bronchoscopy, Mediastinoscopy and biopsy are advisable.    Risks including, but not limited to, bleeding, transfusion, infection, pulmonary dysfunction, blood vessel or nerve injury, possible open procedure to repair bleeding or lung injury.  Benefits: Diagnosis leading to possible treatment.  Options: observation, endoscopic ultrasound with biopsy discussed.    Bronchoscopy, mediastinoscopy. Possible cervical axillary inguinal lymph node biopsy.  GEN.  SDS.  6/3/2020      Return after above studies complete  Smoking cessation advised and assistance options offered including behavioral counseling ([employer] Smoking Cessation Classes), Nicotine replacement therapy (patches or gum), pharmacologic therapy (Chantix, Wellbutrin). patient understands that continued use of tobacco products increases risk of heart disease, stroke, cancer; counseling for 3-5min.  Recommended regular physical activity, progressive walking program.  Continue current medications as directed.  Will Obtain relevant old records.    Thank you for the opportunity to participate in this patient's care.    Copy to primary care provider.    EMR Dragon/Transcription disclaimer:   Much of this encounter note is an electronic transcription/translation of spoken language to printed text. The electronic translation of spoken language may permit erroneous, or at times, nonsensical words or phrases to be inadvertently transcribed; Although I have reviewed the note for such errors, some may still exist.

## 2020-06-02 ENCOUNTER — ANESTHESIA EVENT (OUTPATIENT)
Dept: PERIOP | Facility: HOSPITAL | Age: 60
End: 2020-06-02

## 2020-06-02 RX ORDER — FLUOXETINE HYDROCHLORIDE 40 MG/1
40 CAPSULE ORAL NIGHTLY
COMMUNITY
End: 2020-07-20

## 2020-06-02 RX ORDER — FLUTICASONE PROPIONATE 50 MCG
2 SPRAY, SUSPENSION (ML) NASAL DAILY PRN
COMMUNITY

## 2020-06-03 ENCOUNTER — ANESTHESIA (OUTPATIENT)
Dept: PERIOP | Facility: HOSPITAL | Age: 60
End: 2020-06-03

## 2020-06-03 ENCOUNTER — HOSPITAL ENCOUNTER (OUTPATIENT)
Facility: HOSPITAL | Age: 60
Setting detail: HOSPITAL OUTPATIENT SURGERY
Discharge: HOME | End: 2020-06-03
Attending: THORACIC SURGERY (CARDIOTHORACIC VASCULAR SURGERY) | Admitting: THORACIC SURGERY (CARDIOTHORACIC VASCULAR SURGERY)

## 2020-06-03 VITALS
SYSTOLIC BLOOD PRESSURE: 124 MMHG | OXYGEN SATURATION: 94 % | HEART RATE: 88 BPM | RESPIRATION RATE: 18 BRPM | WEIGHT: 165.34 LBS | BODY MASS INDEX: 23.67 KG/M2 | TEMPERATURE: 98 F | HEIGHT: 70 IN | DIASTOLIC BLOOD PRESSURE: 74 MMHG

## 2020-06-03 DIAGNOSIS — R91.1 LUNG NODULE: ICD-10-CM

## 2020-06-03 LAB
ABO GROUP BLD: NORMAL
BLD GP AB SCN SERPL QL: NEGATIVE
Lab: NORMAL
RH BLD: POSITIVE
T&S EXPIRATION DATE: NORMAL

## 2020-06-03 PROCEDURE — 38520 BIOPSY/REMOVAL LYMPH NODES: CPT | Performed by: THORACIC SURGERY (CARDIOTHORACIC VASCULAR SURGERY)

## 2020-06-03 PROCEDURE — 25010000002 MIDAZOLAM PER 1 MG: Performed by: NURSE ANESTHETIST, CERTIFIED REGISTERED

## 2020-06-03 PROCEDURE — 88275 CYTOGENETICS 100-300: CPT

## 2020-06-03 PROCEDURE — 25010000002 HYDROMORPHONE PER 4 MG: Performed by: NURSE ANESTHETIST, CERTIFIED REGISTERED

## 2020-06-03 PROCEDURE — 88271 CYTOGENETICS DNA PROBE: CPT

## 2020-06-03 PROCEDURE — 93010 ELECTROCARDIOGRAM REPORT: CPT | Performed by: INTERNAL MEDICINE

## 2020-06-03 PROCEDURE — 25010000002 SUCCINYLCHOLINE PER 20 MG: Performed by: NURSE ANESTHETIST, CERTIFIED REGISTERED

## 2020-06-03 PROCEDURE — 25010000002 PROPOFOL 10 MG/ML EMULSION: Performed by: NURSE ANESTHETIST, CERTIFIED REGISTERED

## 2020-06-03 PROCEDURE — 25010000002 DEXAMETHASONE PER 1 MG: Performed by: NURSE ANESTHETIST, CERTIFIED REGISTERED

## 2020-06-03 PROCEDURE — 39402 MEDIASTINOSCPY W/LMPH NOD BX: CPT | Performed by: THORACIC SURGERY (CARDIOTHORACIC VASCULAR SURGERY)

## 2020-06-03 PROCEDURE — 25010000002 ONDANSETRON PER 1 MG: Performed by: NURSE ANESTHETIST, CERTIFIED REGISTERED

## 2020-06-03 PROCEDURE — 93005 ELECTROCARDIOGRAM TRACING: CPT | Performed by: ANESTHESIOLOGY

## 2020-06-03 PROCEDURE — 86901 BLOOD TYPING SEROLOGIC RH(D): CPT | Performed by: ANESTHESIOLOGY

## 2020-06-03 PROCEDURE — 86850 RBC ANTIBODY SCREEN: CPT | Performed by: ANESTHESIOLOGY

## 2020-06-03 PROCEDURE — 31622 DX BRONCHOSCOPE/WASH: CPT | Performed by: THORACIC SURGERY (CARDIOTHORACIC VASCULAR SURGERY)

## 2020-06-03 PROCEDURE — 94640 AIRWAY INHALATION TREATMENT: CPT

## 2020-06-03 PROCEDURE — 25010000002 NEOSTIGMINE 4 MG/4ML SOLUTION PREFILLED SYRINGE: Performed by: NURSE ANESTHETIST, CERTIFIED REGISTERED

## 2020-06-03 PROCEDURE — 86900 BLOOD TYPING SEROLOGIC ABO: CPT | Performed by: ANESTHESIOLOGY

## 2020-06-03 PROCEDURE — 25010000002 FENTANYL CITRATE (PF) 100 MCG/2ML SOLUTION: Performed by: NURSE ANESTHETIST, CERTIFIED REGISTERED

## 2020-06-03 DEVICE — CLIP LIGAT VASC HORIZON TI MD BLU 6CT: Type: IMPLANTABLE DEVICE | Site: NECK | Status: FUNCTIONAL

## 2020-06-03 DEVICE — CLIP LIGAT VASC HORIZON TI SM/WD RED 24CT: Type: IMPLANTABLE DEVICE | Site: NECK | Status: FUNCTIONAL

## 2020-06-03 RX ORDER — LABETALOL HYDROCHLORIDE 5 MG/ML
5 INJECTION, SOLUTION INTRAVENOUS
Status: DISCONTINUED | OUTPATIENT
Start: 2020-06-03 | End: 2020-06-03 | Stop reason: HOSPADM

## 2020-06-03 RX ORDER — ROCURONIUM BROMIDE 10 MG/ML
INJECTION, SOLUTION INTRAVENOUS AS NEEDED
Status: DISCONTINUED | OUTPATIENT
Start: 2020-06-03 | End: 2020-06-03 | Stop reason: SURG

## 2020-06-03 RX ORDER — ACETAMINOPHEN 650 MG/1
650 SUPPOSITORY RECTAL ONCE AS NEEDED
Status: DISCONTINUED | OUTPATIENT
Start: 2020-06-03 | End: 2020-06-03 | Stop reason: HOSPADM

## 2020-06-03 RX ORDER — EPHEDRINE SULFATE 50 MG/ML
INJECTION, SOLUTION INTRAVENOUS AS NEEDED
Status: DISCONTINUED | OUTPATIENT
Start: 2020-06-03 | End: 2020-06-03 | Stop reason: SURG

## 2020-06-03 RX ORDER — EPHEDRINE SULFATE 50 MG/ML
5 INJECTION, SOLUTION INTRAVENOUS ONCE AS NEEDED
Status: DISCONTINUED | OUTPATIENT
Start: 2020-06-03 | End: 2020-06-03 | Stop reason: HOSPADM

## 2020-06-03 RX ORDER — LIDOCAINE HYDROCHLORIDE 20 MG/ML
INJECTION, SOLUTION INFILTRATION; PERINEURAL AS NEEDED
Status: DISCONTINUED | OUTPATIENT
Start: 2020-06-03 | End: 2020-06-03 | Stop reason: SURG

## 2020-06-03 RX ORDER — DEXAMETHASONE SODIUM PHOSPHATE 4 MG/ML
INJECTION, SOLUTION INTRA-ARTICULAR; INTRALESIONAL; INTRAMUSCULAR; INTRAVENOUS; SOFT TISSUE AS NEEDED
Status: DISCONTINUED | OUTPATIENT
Start: 2020-06-03 | End: 2020-06-03 | Stop reason: SURG

## 2020-06-03 RX ORDER — PROMETHAZINE HYDROCHLORIDE 25 MG/ML
12.5 INJECTION, SOLUTION INTRAMUSCULAR; INTRAVENOUS ONCE AS NEEDED
Status: DISCONTINUED | OUTPATIENT
Start: 2020-06-03 | End: 2020-06-03 | Stop reason: HOSPADM

## 2020-06-03 RX ORDER — BUPIVACAINE HCL/0.9 % NACL/PF 0.1 %
2 PLASTIC BAG, INJECTION (ML) EPIDURAL ONCE
Status: COMPLETED | OUTPATIENT
Start: 2020-06-03 | End: 2020-06-03

## 2020-06-03 RX ORDER — SUCCINYLCHOLINE CHLORIDE 20 MG/ML
INJECTION INTRAMUSCULAR; INTRAVENOUS AS NEEDED
Status: DISCONTINUED | OUTPATIENT
Start: 2020-06-03 | End: 2020-06-03 | Stop reason: SURG

## 2020-06-03 RX ORDER — HYDROCODONE BITARTRATE AND ACETAMINOPHEN 5; 325 MG/1; MG/1
1 TABLET ORAL EVERY 4 HOURS PRN
Qty: 40 TABLET | Refills: 0 | Status: SHIPPED | OUTPATIENT
Start: 2020-06-03 | End: 2020-06-11

## 2020-06-03 RX ORDER — PROMETHAZINE HYDROCHLORIDE 25 MG/1
25 SUPPOSITORY RECTAL ONCE AS NEEDED
Status: DISCONTINUED | OUTPATIENT
Start: 2020-06-03 | End: 2020-06-03 | Stop reason: HOSPADM

## 2020-06-03 RX ORDER — HYDROMORPHONE HCL 110MG/55ML
PATIENT CONTROLLED ANALGESIA SYRINGE INTRAVENOUS AS NEEDED
Status: DISCONTINUED | OUTPATIENT
Start: 2020-06-03 | End: 2020-06-03 | Stop reason: SURG

## 2020-06-03 RX ORDER — DIPHENHYDRAMINE HYDROCHLORIDE 50 MG/ML
12.5 INJECTION INTRAMUSCULAR; INTRAVENOUS
Status: DISCONTINUED | OUTPATIENT
Start: 2020-06-03 | End: 2020-06-03 | Stop reason: HOSPADM

## 2020-06-03 RX ORDER — FLUMAZENIL 0.1 MG/ML
0.2 INJECTION INTRAVENOUS AS NEEDED
Status: DISCONTINUED | OUTPATIENT
Start: 2020-06-03 | End: 2020-06-03 | Stop reason: HOSPADM

## 2020-06-03 RX ORDER — ACETAMINOPHEN 325 MG/1
650 TABLET ORAL ONCE AS NEEDED
Status: DISCONTINUED | OUTPATIENT
Start: 2020-06-03 | End: 2020-06-03 | Stop reason: HOSPADM

## 2020-06-03 RX ORDER — HYDROCODONE BITARTRATE AND ACETAMINOPHEN 7.5; 325 MG/1; MG/1
1 TABLET ORAL ONCE AS NEEDED
Status: DISCONTINUED | OUTPATIENT
Start: 2020-06-03 | End: 2020-06-03 | Stop reason: HOSPADM

## 2020-06-03 RX ORDER — PROPOFOL 10 MG/ML
VIAL (ML) INTRAVENOUS AS NEEDED
Status: DISCONTINUED | OUTPATIENT
Start: 2020-06-03 | End: 2020-06-03 | Stop reason: SURG

## 2020-06-03 RX ORDER — SODIUM CHLORIDE 9 MG/ML
100 INJECTION, SOLUTION INTRAVENOUS CONTINUOUS
Status: DISCONTINUED | OUTPATIENT
Start: 2020-06-03 | End: 2020-06-03 | Stop reason: HOSPADM

## 2020-06-03 RX ORDER — FENTANYL CITRATE 50 UG/ML
INJECTION, SOLUTION INTRAMUSCULAR; INTRAVENOUS AS NEEDED
Status: DISCONTINUED | OUTPATIENT
Start: 2020-06-03 | End: 2020-06-03 | Stop reason: SURG

## 2020-06-03 RX ORDER — ONDANSETRON 4 MG/1
4 TABLET, FILM COATED ORAL ONCE AS NEEDED
Status: DISCONTINUED | OUTPATIENT
Start: 2020-06-03 | End: 2020-06-03 | Stop reason: HOSPADM

## 2020-06-03 RX ORDER — NEOSTIGMINE METHYLSULFATE 4 MG/4 ML
SYRINGE (ML) INTRAVENOUS AS NEEDED
Status: DISCONTINUED | OUTPATIENT
Start: 2020-06-03 | End: 2020-06-03 | Stop reason: SURG

## 2020-06-03 RX ORDER — ONDANSETRON 2 MG/ML
4 INJECTION INTRAMUSCULAR; INTRAVENOUS ONCE AS NEEDED
Status: DISCONTINUED | OUTPATIENT
Start: 2020-06-03 | End: 2020-06-03 | Stop reason: HOSPADM

## 2020-06-03 RX ORDER — ALBUTEROL SULFATE 2.5 MG/3ML
2.5 SOLUTION RESPIRATORY (INHALATION) ONCE
Status: COMPLETED | OUTPATIENT
Start: 2020-06-03 | End: 2020-06-03

## 2020-06-03 RX ORDER — SODIUM CHLORIDE 9 MG/ML
INJECTION, SOLUTION INTRAVENOUS CONTINUOUS PRN
Status: DISCONTINUED | OUTPATIENT
Start: 2020-06-03 | End: 2020-06-03 | Stop reason: SURG

## 2020-06-03 RX ORDER — ACETAMINOPHEN 325 MG/1
650 TABLET ORAL ONCE
Status: DISCONTINUED | OUTPATIENT
Start: 2020-06-03 | End: 2020-06-03 | Stop reason: HOSPADM

## 2020-06-03 RX ORDER — NALOXONE HCL 0.4 MG/ML
0.4 VIAL (ML) INJECTION AS NEEDED
Status: DISCONTINUED | OUTPATIENT
Start: 2020-06-03 | End: 2020-06-03 | Stop reason: HOSPADM

## 2020-06-03 RX ORDER — PROMETHAZINE HYDROCHLORIDE 25 MG/1
25 TABLET ORAL ONCE AS NEEDED
Status: DISCONTINUED | OUTPATIENT
Start: 2020-06-03 | End: 2020-06-03 | Stop reason: HOSPADM

## 2020-06-03 RX ORDER — ONDANSETRON 2 MG/ML
INJECTION INTRAMUSCULAR; INTRAVENOUS AS NEEDED
Status: DISCONTINUED | OUTPATIENT
Start: 2020-06-03 | End: 2020-06-03 | Stop reason: SURG

## 2020-06-03 RX ORDER — MIDAZOLAM HYDROCHLORIDE 1 MG/ML
INJECTION INTRAMUSCULAR; INTRAVENOUS AS NEEDED
Status: DISCONTINUED | OUTPATIENT
Start: 2020-06-03 | End: 2020-06-03 | Stop reason: SURG

## 2020-06-03 RX ADMIN — SODIUM CHLORIDE: 900 INJECTION, SOLUTION INTRAVENOUS at 07:45

## 2020-06-03 RX ADMIN — PROPOFOL 30 MG: 10 INJECTION, EMULSION INTRAVENOUS at 08:47

## 2020-06-03 RX ADMIN — HYDROCODONE BITARTRATE AND ACETAMINOPHEN 1 TABLET: 7.5; 325 TABLET ORAL at 10:58

## 2020-06-03 RX ADMIN — DEXAMETHASONE SODIUM PHOSPHATE 4 MG: 4 INJECTION, SOLUTION INTRAMUSCULAR; INTRAVENOUS at 08:10

## 2020-06-03 RX ADMIN — FENTANYL CITRATE 50 MCG: 50 INJECTION, SOLUTION INTRAMUSCULAR; INTRAVENOUS at 07:48

## 2020-06-03 RX ADMIN — ROCURONIUM BROMIDE 35 MG: 10 INJECTION INTRAVENOUS at 07:48

## 2020-06-03 RX ADMIN — MIDAZOLAM HYDROCHLORIDE 2 MG: 2 INJECTION, SOLUTION INTRAMUSCULAR; INTRAVENOUS at 07:28

## 2020-06-03 RX ADMIN — FENTANYL CITRATE 100 MCG: 50 INJECTION, SOLUTION INTRAMUSCULAR; INTRAVENOUS at 08:21

## 2020-06-03 RX ADMIN — PROPOFOL 120 MG: 10 INJECTION, EMULSION INTRAVENOUS at 07:37

## 2020-06-03 RX ADMIN — PROPOFOL 50 MG: 10 INJECTION, EMULSION INTRAVENOUS at 08:51

## 2020-06-03 RX ADMIN — ROCURONIUM BROMIDE 30 MG: 10 INJECTION INTRAVENOUS at 08:19

## 2020-06-03 RX ADMIN — FENTANYL CITRATE 50 MCG: 50 INJECTION, SOLUTION INTRAMUSCULAR; INTRAVENOUS at 08:19

## 2020-06-03 RX ADMIN — FENTANYL CITRATE 50 MCG: 50 INJECTION, SOLUTION INTRAMUSCULAR; INTRAVENOUS at 08:16

## 2020-06-03 RX ADMIN — PROPOFOL 50 MG: 10 INJECTION, EMULSION INTRAVENOUS at 08:20

## 2020-06-03 RX ADMIN — EPHEDRINE SULFATE 10 MG: 50 INJECTION INTRAVENOUS at 08:02

## 2020-06-03 RX ADMIN — Medication 2 G: at 07:50

## 2020-06-03 RX ADMIN — Medication 4 MG: at 09:30

## 2020-06-03 RX ADMIN — LIDOCAINE HYDROCHLORIDE 100 MG: 20 INJECTION, SOLUTION INFILTRATION; PERINEURAL at 07:37

## 2020-06-03 RX ADMIN — HYDROMORPHONE HYDROCHLORIDE 0.5 MG: 2 INJECTION, SOLUTION INTRAMUSCULAR; INTRAVENOUS; SUBCUTANEOUS at 08:24

## 2020-06-03 RX ADMIN — ROCURONIUM BROMIDE 5 MG: 10 INJECTION INTRAVENOUS at 07:37

## 2020-06-03 RX ADMIN — SODIUM CHLORIDE 100 ML/HR: 9 INJECTION, SOLUTION INTRAVENOUS at 06:36

## 2020-06-03 RX ADMIN — ONDANSETRON 4 MG: 2 INJECTION INTRAMUSCULAR; INTRAVENOUS at 09:16

## 2020-06-03 RX ADMIN — EPHEDRINE SULFATE 10 MG: 50 INJECTION INTRAVENOUS at 08:04

## 2020-06-03 RX ADMIN — ALBUTEROL SULFATE 2.5 MG: 2.5 SOLUTION RESPIRATORY (INHALATION) at 06:41

## 2020-06-03 RX ADMIN — HYDROMORPHONE HYDROCHLORIDE 0.5 MG: 2 INJECTION, SOLUTION INTRAMUSCULAR; INTRAVENOUS; SUBCUTANEOUS at 08:47

## 2020-06-03 RX ADMIN — SUCCINYLCHOLINE CHLORIDE 160 MG: 20 INJECTION, SOLUTION INTRAMUSCULAR; INTRAVENOUS at 07:37

## 2020-06-03 RX ADMIN — FENTANYL CITRATE 100 MCG: 50 INJECTION, SOLUTION INTRAMUSCULAR; INTRAVENOUS at 07:37

## 2020-06-03 RX ADMIN — GLYCOPYRROLATE 0.8 MG: 0.2 INJECTION, SOLUTION INTRAMUSCULAR; INTRAVITREAL at 09:30

## 2020-06-03 NOTE — DISCHARGE INSTR - APPOINTMENTS
Office stated they will call pt after talking with Dr. Salvador about changing appointment to be with MD

## 2020-06-03 NOTE — ANESTHESIA PREPROCEDURE EVALUATION
Anesthesia Evaluation     Patient summary reviewed and Nursing notes reviewed   no history of anesthetic complications:  NPO Solid Status: > 8 hours  NPO Liquid Status: > 8 hours           Airway   Mallampati: II  TM distance: >3 FB  Neck ROM: full  possible difficult intubation  Comment: General anesthesia 2/6/17,5/11/18 LMA #4.  Dental    (+) poor dentation    Comment: Upper dentures with lower remaining dentition in poor repair.    Pulmonary     breath sounds clear to auscultation  (+) a smoker Current Smoked day of surgery, COPD severe, asthma,decreased breath sounds,     PE comment: Albuterol treatment ordered pre-op.  Cardiovascular - normal exam    ECG reviewed  Rhythm: regular  Rate: normal    (+) hypertension, hyperlipidemia,   (-) murmur, carotid bruits    ROS comment: Normal sinus rhythm  Minimal voltage criteria for LVH, may be normal variant  Borderline ECG  No previous ECGs available     Referred By:             Confirmed By:     Specimen Collected: 06/03/20 05:28      Left Ventricle Normal left ventricular cavity size and wall thickness noted. All left ventricular wall segments contract normally.  Right Ventricle Normal right ventricular cavity size, wall thickness, systolic function and septal motion noted.  Left Atrium Normal left atrial size and volume noted.  Right Atrium Normal right atrial size noted.  Aortic Valve The aortic valve is structurally normal. No aortic valve regurgitation is present. No aortic valve stenosis is present.  Mitral Valve The mitral valve is normal in structure. Trace mitral valve regurgitation is present. No significant mitral valve stenosis is present.  Tricuspid Valve The tricuspid valve is normal. No evidence of tricuspid valve stenosis is present. Trace to mild tricuspid valve regurgitation is present.  Pulmonic Valve The pulmonic valve is structurally normal. There is no significant pulmonic valve stenosis present. There is no pulmonic valve regurgitation  present.  Greater Vessels No dilation of the aortic root is present.  Pericardium The pericardium is normal. There is no evidence of pericardial effusion.        Neuro/Psych  (+) psychiatric history Anxiety,     GI/Hepatic/Renal/Endo    (+) obesity,  GERD well controlled,      Musculoskeletal     Abdominal   (+) obese,    Substance History - negative use     OB/GYN negative ob/gyn ROS         Other   arthritis,                        Anesthesia Plan    ASA 3     general   (Discussed central line,arterial line if necessary,additional peripheral IV and patient understands possible complications,risks and agrees.)  intravenous induction     Anesthetic plan, all risks, benefits, and alternatives have been provided, discussed and informed consent has been obtained with: patient.

## 2020-06-03 NOTE — ANESTHESIA POSTPROCEDURE EVALUATION
Patient: Mitch Rinaldi    Procedure Summary     Date:  06/03/20 Room / Location:  [facility] OR  / [facility] OR    Anesthesia Start:  0731 Anesthesia Stop:  0957    Procedures:       MEDIASTINOSCOPY, BRONCHOSCOPY possible cervical axillary inguinal lymph node biopsy (N/A Neck)      BIOPSY LYMPH NODE (N/A ) Diagnosis:       Lung nodule      (Lung nodule [R91.1])    Surgeon:  Bry Salvador MD Provider:  Oscar Mancera MD    Anesthesia Type:  general ASA Status:  3          Anesthesia Type: general    Vitals  Vitals Value Taken Time   /79 6/3/2020  9:43 AM   Temp 97 °F (36.1 °C) 6/3/2020  9:43 AM   Pulse 81 6/3/2020  9:43 AM   Resp 20 6/3/2020  9:43 AM   SpO2 98 % 6/3/2020  9:43 AM           Post Anesthesia Care and Evaluation    Patient location during evaluation: PACU  Patient participation: complete - patient participated  Level of consciousness: awake and alert  Pain management: adequate  Airway patency: patent  Anesthetic complications: No anesthetic complications  PONV Status: none  Cardiovascular status: acceptable  Respiratory status: acceptable and oral airway  Hydration status: acceptable

## 2020-06-03 NOTE — OP NOTE
OPERATIVE NOTE  Mitch Rinaldi  1960  6/3/2020    PREOP DIAGNOSES:  Lung nodule [R91.1]   Lymphadenopathy diffuse    POSTOP DIAGNOSES:  Lung nodule [R91.1]    PROCEDURE:   MEDIASTINOSCOPY,   BRONCHOSCOPY   Excisional biopsy submandibular LYMPH NODE, gland    SURGEON: ROBERT Salvador MD FACS RPVI    ASSISTANT: Samson Garzon CFA    ANESTHESIA: General ET     ESTIMATED BLOOD LOSS: minimal    SPECIMEN:  lymph node submandibular gland    COMPLICATIONS: None    DESCRIPTION OF OPERATION: Patient taken to operating room, placed in supine position.  General anesthesia induced. Fiberoptic bronchoscopy performed demonstrating normal mucosa right upper lower middle lobe bronchi were clear left upper and lower lobe bronchi were clear.  Mild thin secretions.  Prepped and draped in sterile fashion.      2.5.cm incision was made just above the sternal notch. Dissection carried down to the anterior tracheal fascia.  Blunt finger dissection down the anterior surface of the trachea.  Mediastinoscope was introduced identifying mediastinal adenopathy in the level 4R position.  Multiple biopsies taken, Hemostasis was obtained. Incision closed in layers of 3-0 Vicryl in the platysma, 4-0 Monocryl in the skin with Dermabond.     2.5cm incision made LEFT cervical submandibular area.  Lymph node partial submandibular gland resected consistent with CT PET findings. Piece sent for frozen section appears reactive lymph node. Hemostasis obtained, incision closed in layers 3-0 vicryl 4-0 monocril dermabond tape.    Tolerated procedure well, transferred PACU in stable condition.          This document has been electronically signed by Bry Salvador MD on Chloé 3, 2020 09:29

## 2020-06-03 NOTE — ANESTHESIA PROCEDURE NOTES
Airway  Urgency: elective    Date/Time: 6/3/2020 7:43 AM  Airway not difficult    General Information and Staff    Patient location during procedure: OR  CRNA: Gita Pickard CRNA    Indications and Patient Condition  Indications for airway management: airway protection    Preoxygenated: yes  Mask difficulty assessment: 1 - vent by mask    Final Airway Details  Final airway type: endotracheal airway      Successful airway: ETT  Cuffed: yes   Successful intubation technique: video laryngoscopy  Facilitating devices/methods: intubating stylet  Endotracheal tube insertion site: oral  Blade: Marshall  Blade size: 4  ETT size (mm): 8.5  Cormack-Lehane Classification: grade IIa - partial view of glottis  Placement verified by: chest auscultation and capnometry   Cuff volume (mL): 10  Measured from: lips  ETT/EBT  to lips (cm): 21  Number of attempts at approach: 1  Assessment: lips, teeth, and gum same as pre-op and atraumatic intubation

## 2020-06-08 ENCOUNTER — TELEPHONE (OUTPATIENT)
Dept: ONCOLOGY | Facility: CLINIC | Age: 60
End: 2020-06-08

## 2020-06-08 DIAGNOSIS — C83.38 DIFFUSE LARGE B-CELL LYMPHOMA OF LYMPH NODES OF MULTIPLE REGIONS (HCC): Primary | ICD-10-CM

## 2020-06-08 NOTE — TELEPHONE ENCOUNTER
Called pt to discuss my role as the nurse navigator in cancer care and my support services to pt and family on referral from Dr. Salvador for newly diagnosed lymphoma. Informed pt of my role as the nurse navigator in cancer care @ [facility] and my support to pt and family. Stressed the importance of education and informed decision making and my assistance as needed to pt and family. Referral for medical oncology received from Dr. Salvador. Consult set up with Dr. Garcia for 2/11/20 at 11:30am and reported to patient with instructions for the cancer center arrival and waiting. Provided my contact information and encouraged pt to reach out anytime to me with any concerns or questions. Pt stated understanding of discussion and denied any further concerns or questions today. Will f/u with pt as needed throughout cancer treatment course.

## 2020-06-11 ENCOUNTER — LAB (OUTPATIENT)
Dept: ONCOLOGY | Facility: HOSPITAL | Age: 60
End: 2020-06-11

## 2020-06-11 ENCOUNTER — OFFICE VISIT (OUTPATIENT)
Dept: CARDIAC SURGERY | Facility: CLINIC | Age: 60
End: 2020-06-11

## 2020-06-11 ENCOUNTER — CONSULT (OUTPATIENT)
Dept: ONCOLOGY | Facility: CLINIC | Age: 60
End: 2020-06-11

## 2020-06-11 ENCOUNTER — DOCUMENTATION (OUTPATIENT)
Dept: ONCOLOGY | Facility: CLINIC | Age: 60
End: 2020-06-11

## 2020-06-11 VITALS
TEMPERATURE: 97.5 F | SYSTOLIC BLOOD PRESSURE: 143 MMHG | RESPIRATION RATE: 18 BRPM | DIASTOLIC BLOOD PRESSURE: 75 MMHG | HEART RATE: 72 BPM | WEIGHT: 145.2 LBS | BODY MASS INDEX: 20.83 KG/M2

## 2020-06-11 VITALS
SYSTOLIC BLOOD PRESSURE: 119 MMHG | WEIGHT: 166.2 LBS | HEART RATE: 79 BPM | OXYGEN SATURATION: 97 % | BODY MASS INDEX: 23.79 KG/M2 | HEIGHT: 70 IN | DIASTOLIC BLOOD PRESSURE: 62 MMHG

## 2020-06-11 DIAGNOSIS — R91.1 LEFT UPPER LOBE PULMONARY NODULE: ICD-10-CM

## 2020-06-11 DIAGNOSIS — C83.08 SMALL B-CELL LYMPHOMA OF LYMPH NODES OF MULTIPLE REGIONS (HCC): ICD-10-CM

## 2020-06-11 DIAGNOSIS — Z48.813 SURGICAL AFTERCARE, RESPIRATORY SYSTEM: ICD-10-CM

## 2020-06-11 DIAGNOSIS — I10 BENIGN ESSENTIAL HTN: ICD-10-CM

## 2020-06-11 DIAGNOSIS — E78.2 MIXED HYPERLIPIDEMIA: Primary | Chronic | ICD-10-CM

## 2020-06-11 DIAGNOSIS — E78.2 MIXED HYPERLIPIDEMIA: Chronic | ICD-10-CM

## 2020-06-11 DIAGNOSIS — C83.08 SMALL B-CELL LYMPHOMA OF LYMPH NODES OF MULTIPLE REGIONS (HCC): Primary | ICD-10-CM

## 2020-06-11 DIAGNOSIS — M15.9 PRIMARY OSTEOARTHRITIS INVOLVING MULTIPLE JOINTS: Chronic | ICD-10-CM

## 2020-06-11 LAB
ALBUMIN SERPL-MCNC: 4 G/DL (ref 3.5–5.2)
ALBUMIN/GLOB SERPL: 1.1 G/DL
ALP SERPL-CCNC: 100 U/L (ref 39–117)
ALT SERPL W P-5'-P-CCNC: 13 U/L (ref 1–41)
ANION GAP SERPL CALCULATED.3IONS-SCNC: 11 MMOL/L (ref 5–15)
AST SERPL-CCNC: 16 U/L (ref 1–40)
B2 MICROGLOB SERPL-MCNC: 2.7 MG/L (ref 0.8–2.2)
BASOPHILS # BLD AUTO: 0.11 10*3/MM3 (ref 0–0.2)
BASOPHILS NFR BLD AUTO: 1.5 % (ref 0–1.5)
BILIRUB SERPL-MCNC: 0.3 MG/DL (ref 0.2–1.2)
BUN BLD-MCNC: 14 MG/DL (ref 8–23)
BUN/CREAT SERPL: 13.1 (ref 7–25)
CALCIUM SPEC-SCNC: 9 MG/DL (ref 8.6–10.5)
CHLORIDE SERPL-SCNC: 105 MMOL/L (ref 98–107)
CO2 SERPL-SCNC: 23 MMOL/L (ref 22–29)
CREAT BLD-MCNC: 1.07 MG/DL (ref 0.76–1.27)
DEPRECATED RDW RBC AUTO: 39.4 FL (ref 37–54)
EOSINOPHIL # BLD AUTO: 0.26 10*3/MM3 (ref 0–0.4)
EOSINOPHIL NFR BLD AUTO: 3.5 % (ref 0.3–6.2)
ERYTHROCYTE [DISTWIDTH] IN BLOOD BY AUTOMATED COUNT: 12.1 % (ref 12.3–15.4)
GFR SERPL CREATININE-BSD FRML MDRD: 70 ML/MIN/1.73
GLOBULIN UR ELPH-MCNC: 3.5 GM/DL
GLUCOSE BLD-MCNC: 91 MG/DL (ref 65–99)
HAV IGM SERPL QL IA: NORMAL
HBV CORE IGM SERPL QL IA: NORMAL
HBV SURFACE AG SERPL QL IA: NORMAL
HCT VFR BLD AUTO: 42.5 % (ref 37.5–51)
HCV AB SER DONR QL: NORMAL
HGB BLD-MCNC: 14.9 G/DL (ref 13–17.7)
IMM GRANULOCYTES # BLD AUTO: 0.05 10*3/MM3 (ref 0–0.05)
IMM GRANULOCYTES NFR BLD AUTO: 0.7 % (ref 0–0.5)
LDH SERPL-CCNC: 184 U/L (ref 135–225)
LYMPHOCYTES # BLD AUTO: 1.75 10*3/MM3 (ref 0.7–3.1)
LYMPHOCYTES NFR BLD AUTO: 23.2 % (ref 19.6–45.3)
MCH RBC QN AUTO: 31 PG (ref 26.6–33)
MCHC RBC AUTO-ENTMCNC: 35.1 G/DL (ref 31.5–35.7)
MCV RBC AUTO: 88.5 FL (ref 79–97)
MONOCYTES # BLD AUTO: 0.59 10*3/MM3 (ref 0.1–0.9)
MONOCYTES NFR BLD AUTO: 7.8 % (ref 5–12)
NEUTROPHILS # BLD AUTO: 4.77 10*3/MM3 (ref 1.7–7)
NEUTROPHILS NFR BLD AUTO: 63.3 % (ref 42.7–76)
NRBC BLD AUTO-RTO: 0 /100 WBC (ref 0–0.2)
PLATELET # BLD AUTO: 201 10*3/MM3 (ref 140–450)
PMV BLD AUTO: 10.5 FL (ref 6–12)
POTASSIUM BLD-SCNC: 3.9 MMOL/L (ref 3.5–5.2)
PROT SERPL-MCNC: 7.5 G/DL (ref 6–8.5)
RBC # BLD AUTO: 4.8 10*6/MM3 (ref 4.14–5.8)
SODIUM BLD-SCNC: 139 MMOL/L (ref 136–145)
WBC NRBC COR # BLD: 7.53 10*3/MM3 (ref 3.4–10.8)

## 2020-06-11 PROCEDURE — 80074 ACUTE HEPATITIS PANEL: CPT

## 2020-06-11 PROCEDURE — 83883 ASSAY NEPHELOMETRY NOT SPEC: CPT

## 2020-06-11 PROCEDURE — 82232 ASSAY OF BETA-2 PROTEIN: CPT

## 2020-06-11 PROCEDURE — 83615 LACTATE (LD) (LDH) ENZYME: CPT

## 2020-06-11 PROCEDURE — 99204 OFFICE O/P NEW MOD 45 MIN: CPT | Performed by: INTERNAL MEDICINE

## 2020-06-11 PROCEDURE — 82784 ASSAY IGA/IGD/IGG/IGM EACH: CPT

## 2020-06-11 PROCEDURE — 86334 IMMUNOFIX E-PHORESIS SERUM: CPT

## 2020-06-11 PROCEDURE — 99024 POSTOP FOLLOW-UP VISIT: CPT | Performed by: NURSE PRACTITIONER

## 2020-06-11 PROCEDURE — G0463 HOSPITAL OUTPT CLINIC VISIT: HCPCS | Performed by: INTERNAL MEDICINE

## 2020-06-11 PROCEDURE — 80053 COMPREHEN METABOLIC PANEL: CPT

## 2020-06-11 PROCEDURE — 85025 COMPLETE CBC W/AUTO DIFF WBC: CPT

## 2020-06-11 RX ORDER — HYDROCODONE BITARTRATE AND ACETAMINOPHEN 7.5; 325 MG/1; MG/1
1 TABLET ORAL 2 TIMES DAILY PRN
Qty: 60 TABLET | Refills: 0 | Status: SHIPPED | OUTPATIENT
Start: 2020-06-11 | End: 2020-07-01 | Stop reason: SDUPTHER

## 2020-06-11 NOTE — PATIENT INSTRUCTIONS
Clean operative site with antibacterial soap/water, pat dry. Keep open to air unless draining, then may apply dry dressing.  No ointments or creams unless prescribed by provider.    Contact provider for swelling at surgical incision site or drainage.

## 2020-06-11 NOTE — PROGRESS NOTES
New patient consultation.  Medical oncology.  59 year old male patient presents newly diagnosed with lymphoma.  Pt is accompanied in the room by his wife who presents attentive and supportive. Pt scored a # 5 on distress marking indicator of fear/anxiety emotional distress.  Pt. Was encouraged to share his experiences thoughts and feelings.  Pt. Describes self as optimistic and processes stress internally.  Pt. States he has good family support and history of adaptive coping.  Time was spent discussing stress related to  COVID 19 precautions. Pt. Describes sammie when talking about his grandson.  No needs were expressed today and ongoing support offered. Contact info provided. Pt. Verbalized good understanding of information provided today as he proceeds with bone marrow Bx.   LCSW offered person centered therapeutic feedback by means of collecting history, emotional support, active listening, validation of emotions and clarification of feedback and recommendations for care.  Provided education related to oncology supports and services.

## 2020-06-11 NOTE — PROGRESS NOTES
Mitch Baltazar Gentry  [id number]  1960    REASON FOR CONSULTATION:  B cell lymphoma   Provide an opinion on any further workup or treatment                             REQUESTING PHYSICIAN:  Bry Salvador MD    RECORDS OBTAINED:  Records of the patients history including those obtained from the referring provider were reviewed and summarized in detail.      History of Present Illness     This is a pleasant 59-year-old male who was seen in consultation at the request of Dr Salvador for evaluation of B cell lymphoma.  He is otherwise healthy 59-year-old male with past medical history of hypertension, hyperlipidemia, osteoarthritis and tobacco abuse.    Patient was evaluated by his primary care provider and was recommended to undergo low-dose CT chest screening for lung cancer given his tobacco abuse.  He underwent CT scan of chest on May 12, 2020.  This showed left upper lobe noncalcified mass lesion with spiculated border.  This measured 2.57 x 2.23 x 1.52 cm.  In addition, it also showed left lateral basilar segment small lung parenchymal nodule measuring 5.2 mm.  Patient subsequently had a PET scan arranged.PET scan on May 29, 2020 showed hypermetabolic activity involving nodule in the left upper lobe.  In addition hypermetabolic activity with lymph node in the right axilla with SUV max of 5.2, left axilla with SUV max of 6.2, hypermetabolic activity was also noted to the left of the sternum with SUV max of 5.  Hypermetabolic activity in the left hilum with SUV max of 7.1, right hilum with SUV max of 3.7, hypermetabolic activity in the right and left posterior inferior mediastinum with SUV max of 5.4. In addition hypermetabolic activity was noticed in todd hepatis area with SUV max of 12, hypermetabolic activity in the left and right inguinal region with SUV max of 5.8 and 3.8 respectively.    Patient was evaluated by CT surgery on June 1, 2020.  He underwent excisional biopsy of left submandibular  lymph node as well as bronchoscopy, mediastinoscopy and biopsy of the sternal adenopathy at level 4 out location.    Pathology on biopsy came back as CD20 positive, CD5 negative CD10 negative small to medium sized B-cell lymphoma involving left submandibular and level 4 right mediastinal lymph node.  Immunophenotype was negative for CD30, CD43.  Ki-67 showed 10 to 15% nuclear proliferation index.  EBV was positive in occasional cells.  Immunohistochemical stain for cyclin D1 was positive in atypical lymphocyte.        Past Medical History:   Diagnosis Date   • Acute gastroenteritis    • Allergic    • Anxiety    • Asthma     as a  child   • Diverticulitis of colon    • Emphysema lung (CMS/HCC)    • Epididymal cyst    • Gastroesophageal reflux disease    • Generalized anxiety disorder    • Hyperlipidemia    • Hypertension    • Impaired glucose tolerance    • Pain, lumbar region     Pain radiating to lumbar region of back      • Peripheral nervous system disease     Disorder of the peripheral nervous system      • Pleuritic chest pain    • Pneumonia    • Primary osteoarthritis involving multiple joints 8/29/2016   • Shoulder pain, left    • Spermatocele 4/25/2018    Added automatically from request for surgery [id number]   • Tachycardia    • Tinnitus    • Wears glasses    • Wears partial dentures         Past Surgical History:   Procedure Laterality Date   • CHOLECYSTECTOMY     • COLONOSCOPY N/A 1/14/2019    Procedure: COLONOSCOPY;  Surgeon: Ronald Rodriguez DO;  Location: [facility] ENDOSCOPY;  Service: Gastroenterology   • DENTAL PROCEDURE     • ENDOSCOPY AND COLONOSCOPY     • EPIDIDYMIS SURGERY Right     cyst   • HERNIA REPAIR  Feb 2017   • INGUINAL HERNIA REPAIR Left 2/6/2017    Procedure: OPEN LEFT INGUINAL HERNIA REPAIR WITH MESH;  Surgeon: Miles Gregorio MD;  Location: [facility] OR;  Service:    • INJECTION OF MEDICATION      Depo Medrol (Methylprednisone) 80mg (8)      09/01/2015       • INJECTION OF  MEDICATION      Kenalog (1)      12/11/2012       • LUNG SURGERY       Pneumothorax 1980       • LYMPH NODE BIOPSY N/A 6/3/2020    Procedure: BIOPSY LYMPH NODE;  Surgeon: Bry Salvador MD;  Location: [facility];  Service: Cardiothoracic;  Laterality: N/A;   • MEDIASTINOSCOPY, BRONCHOSCOPY N/A 6/3/2020    Procedure: MEDIASTINOSCOPY, BRONCHOSCOPY cervical  lymph node biopsy;  Surgeon: Bry Salvador MD;  Location: [facility];  Service: Cardiothoracic;  Laterality: N/A;   • SPERMATOCELECTOMY Left 5/11/2018    Procedure: LEFT SPERMATOCELECTOMY;  Surgeon: Miles Groves MD;  Location: [facility];  Service: Urology        Current Outpatient Medications on File Prior to Visit   Medication Sig Dispense Refill   • cetirizine (ZyrTEC) 10 MG tablet Take 10 mg by mouth daily as needed for allergies.     • esomeprazole (nexIUM) 20 MG capsule Take 20 mg by mouth Every Morning Before Breakfast.     • FLUoxetine (PROzac) 40 MG capsule Take 40 mg by mouth Every Night.     • fluticasone (FLONASE) 50 MCG/ACT nasal spray 2 sprays into the nostril(s) as directed by provider Daily As Needed for Rhinitis.     • metoprolol tartrate (LOPRESSOR) 50 MG tablet Take 1 tablet by mouth 2 (Two) Times a Day. 60 tablet 11   • promethazine (PHENERGAN) 25 MG tablet TAKE 1 TABLET BY MOUTH EVERY 6 (SIX) HOURS AS NEEDED FOR NAUSEA OR VOMITING. 60 tablet 2   • rosuvastatin (CRESTOR) 10 MG tablet Take 1 tablet by mouth Daily. 30 tablet 11   • tiZANidine (ZANAFLEX) 4 MG tablet Take 1 tablet by mouth Every 8 (Eight) Hours As Needed for Muscle Spasms. 0.5 - 1 tab as needed 90 tablet 0   • [DISCONTINUED] HYDROcodone-acetaminophen (NORCO) 7.5-325 MG per tablet Take 1 tablet by mouth 2 (Two) Times a Day As Needed for Mild Pain  or Moderate Pain . 60 tablet 0   • [DISCONTINUED] HYDROcodone-acetaminophen (NORCO) 5-325 MG per tablet Take 1 tablet by mouth Every 4 (Four) Hours As Needed for Severe Pain . 40 tablet 0     No current  facility-administered medications on file prior to visit.         ALLERGIES:    Allergies   Allergen Reactions   • Darvon [Propoxyphene] Nausea And Vomiting   • Percocet [Oxycodone-Acetaminophen] Palpitations        Social History     Socioeconomic History   • Marital status: Single     Spouse name: Not on file   • Number of children: Not on file   • Years of education: Not on file   • Highest education level: Not on file   Tobacco Use   • Smoking status: Current Every Day Smoker     Packs/day: 1.00     Years: 42.00     Pack years: 42.00     Types: Cigarettes   • Smokeless tobacco: Former User     Quit date: 2014   Substance and Sexual Activity   • Alcohol use: No   • Drug use: No   • Sexual activity: Defer        Family History   Problem Relation Age of Onset   • Dementia Mother    • Hypertension Mother    • Thyroid disease Mother    • Arthritis Mother    • Diabetes Father    • Stomach cancer Father    • Cancer Father         stomach   • Thyroid disease Brother    • Down syndrome Brother    • COPD Maternal Aunt    • Alcohol abuse Paternal Uncle    • Macular degeneration Brother    • Alcohol abuse Brother    • Alcohol abuse Brother    • Thyroid disease Brother    • Alcohol abuse Paternal Uncle    • Birth defects Brother    • COPD Maternal Aunt    • Hearing loss Brother         Review of Systems       CONSTITUTIONAL: No weight loss, fever, chills, weakness or fatigue.  HEENT: Eyes: No visual loss, blurred vision, double vision or yellow sclerae. Ears, Nose, Throat: No hearing loss, sneezing, congestion, runny nose or sore throat.  SKIN: No rash or itching.  CARDIOVASCULAR: No chest pain, chest pressure or chest discomfort. No palpitations or edema.  RESPIRATORY: No shortness of breath, cough or sputum.  GASTROINTESTINAL: No anorexia, nausea, vomiting or diarrhea. No abdominal pain or blood.  GENITOURINARY: Negative for urgency, frequency or dysuria.   NEUROLOGICAL: No headache, dizziness, syncope, paralysis, ataxia,  numbness or tingling in the extremities. No change in bowel or bladder control.  MUSCULOSKELETAL: chronic joint pain + .  HEMATOLOGIC: No anemia, bleeding or bruising.  LYMPHATICS: No enlarged nodes. No history of splenectomy.  PSYCHIATRIC: No history of depression or anxiety.  ENDOCRINOLOGIC: No reports of sweating, cold or heat intolerance. No polyuria or polydipsia.  ALLERGIES: No history of asthma, hives, eczema or rhinitis.      Objective     Vitals:    06/11/20 1117   BP: 143/75   Pulse: 72   Resp: 18   Temp: 97.5 °F (36.4 °C)   Weight: 65.9 kg (145 lb 3.2 oz)   PainSc:   3   PainLoc: Shoulder  Comment: left     Current Status 6/11/2020   ECOG score 0       Physical Exam      GENERAL: Alert, awake, oriented.  Well dressed.  Not in apparent distress. Vitals as above.   HEAD: Normocephalic, atraumatic.   EYES: PERRL, EOMI.  vision is grossly intact.  NECK: Supple, no adenopathy or thyromegaly.   THROAT: Normal oral cavity and pharynx. No inflammation, swelling, exudate, or lesions.  CARDIAC: Normal S1 and S2. No S3, S4 or murmurs. Rhythm is regular.  Extremities are warm and well perfused.   LUNGS: Clear to auscultation and percussion without rales, rhonchi, wheezing or diminished breath sounds.  ABDOMEN: Positive bowel sounds. Soft, nondistended, nontender. No guarding or rebound. No masses.  BACK:  No bony tenderness.   EXTREMITIES: No significant deformity or joint abnormality.  Peripheral pulses intact. No varicosities.  SKIN: No rash or bruising.  NEUROLOGICAL: Grossly non-focal exam. No focal weakness. Gait: Normal.   PSYCH: Mood and affect normal. No hallucination or suicidal thoughts.   LYMPHATIC: Small lymphadenopathy involving bilateral axilla, inguinal region.  Submandibular excisional biopsy area healing well.      RECENT LABS: Independently reviewed and summarized   Hematology WBC   Date Value Ref Range Status   06/11/2020 7.53 3.40 - 10.80 10*3/mm3 Final     RBC   Date Value Ref Range Status    06/11/2020 4.80 4.14 - 5.80 10*6/mm3 Final     Hemoglobin   Date Value Ref Range Status   06/11/2020 14.9 13.0 - 17.7 g/dL Final     Hematocrit   Date Value Ref Range Status   06/11/2020 42.5 37.5 - 51.0 % Final     Platelets   Date Value Ref Range Status   06/11/2020 201 140 - 450 10*3/mm3 Final          Imaging (independently reviewed and summarized):     CT chest low-dose on May 12, 2020 showed left upper lobe noncalcified mass lesion with spiculated border.  This measured 2.57 x 2.23 x 1.52 cm.  In addition, it also showed left lateral basilar segment small lung parenchymal nodule measuring 5.2 mm.  No prior comparison available.    PET scan on May 29, 2020 showed hypermetabolic activity involving nodule in the left upper lobe.  In addition hypermetabolic activity with lymph node in the right axilla with SUV max of 5.2, left axilla with SUV max of 6.2, hypermetabolic activity was also noted to the left of the sternum with SUV max of 5.  Hypermetabolic activity in the left hilum with SUV max of 7.1, right hilum with SUV max of 3.7, hypermetabolic activity in the right and left posterior inferior mediastinum with SUV max of 5.4.    In addition hypermetabolic activity was noticed in todd hepatis area with SUV max of 12, hypermetabolic activity in the left and right inguinal region with SUV max of 5.8 and 3.8 respectively.  No prior comparison available    Pathology:   Pathology showed CD20 positive, CD5 negative CD10 negative small to medium sized B-cell lymphoma involving left submandibular and level 4 right mediastinal lymph node.  Immunophenotype was negative for CD30, CD43.  Ki-67 showed 10 to 15% nuclear proliferation index.  EBV was positive in occasional cells.  Immunohistochemical stain for cyclin D1 was positive for atypical lymphocyte.    I have reviewed old records and summarized them in HPI as well as assessment and plan section of this note.     Mitch Rinaldi reports a pain score of 3.  Given his  pain assessment as noted, treatment options were discussed and the following options were decided upon as a follow-up plan to address the patient's pain: continuation of current treatment plan for pain.    PHQ-9 Total Score: 2  Depression screen negative.     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  Patient has generalized lymphadenopathy involving mediastinum, axillary and inguinal region.  He is completely asymptomatic from this.  No B symptoms.  I reviewed his imaging with radiologist and biopsy with hematopathologist Dr Patel.   Patient has small to medium size B cell lymphoma that is CD5 negative, CD10 negative, CD19 positive, CD20 positive.  Ki-67 showed 10 to 15% proliferation index.  Occasional EBV positive.  Initially this was felt to be marginal zone lymphoma however his cyclin D1 immunostaining was positive in atypical lymphocytes.  I have requested FISH for t(11,14) which is pending at the time of dictation.   It is quite possible that this is mantle cell lymphoma with aberrant phenotype.   In addition, I will also check EBV serology given occasional positive cells.  Discussed with patient and family that I am still awaiting some of the results to clarify his definitive diagnosis.  In addition, I will also arrange for bone marrow aspiration biopsy to rule out bone marrow involvement from lymphoma.  Discussed potential risk associated with bone marrow aspiration biopsy at length with the patient and family.  I also gave him option of performing bone marrow aspiration biopsy with local anesthetic versus conscious sedation.  Patient was agreeable to outpatient  bone marrow aspiration biopsy with local anesthetic.    Treatment recommendations are pending while awaiting the result of FISH t(11,14) and bone marrow aspiration/biopsy.     In addition, I will check his CBC, LDH, beta-2 microglobulin, serum protein electrophoresis, immunofixation and free light chains.    (2) Left upper lobe pulmonary nodule:   Discussed with patient that likely this represents lymphoma (especially given mediastinal lymph node was positive for lymphoma).  However two concomitant malignant processes cannot be ruled out with 100% certainty.  Recommend given generalized lymphadenopathy and biopsy showing lymphoma I would be inclined to treat his lymphoma and see if left upper lobe nodule gets better.  If it does not then it is quite possible that this could represent separate primary and would need to be addressed surgically.    (3) Osteoarthritis, multiple joints: Stable on PRN [place].  New prescription given to the patient today.    (4) Hypertension: Stable on Lopressor.    (5) Hyperlipidemia: Stable on Crestor    Case was discussed with pathologist and radiologist at length.     Thank you for involving me in Mr Gentry' care.     Please call us if any questions/concerns.     Jose Alexis MD   Hematology Oncology

## 2020-06-12 DIAGNOSIS — C83.08 SMALL B-CELL LYMPHOMA OF LYMPH NODES OF MULTIPLE REGIONS (HCC): Primary | ICD-10-CM

## 2020-06-12 DIAGNOSIS — K21.9 GASTROESOPHAGEAL REFLUX DISEASE WITHOUT ESOPHAGITIS: Chronic | ICD-10-CM

## 2020-06-12 LAB
IGA SERPL-MCNC: 266 MG/DL (ref 61–437)
IGG SERPL-MCNC: 1303 MG/DL (ref 603–1613)
IGM SERPL-MCNC: 37 MG/DL (ref 20–172)
KAPPA LC SERPL-MCNC: 60.5 MG/L (ref 3.3–19.4)
KAPPA LC/LAMBDA SER: 2.01 {RATIO} (ref 0.26–1.65)
LAMBDA LC FREE SERPL-MCNC: 30.1 MG/L (ref 5.7–26.3)
PROT PATTERN SERPL IFE-IMP: NORMAL
REF LAB TEST METHOD: NORMAL

## 2020-06-12 RX ORDER — PROMETHAZINE HYDROCHLORIDE 25 MG/1
25 TABLET ORAL EVERY 6 HOURS PRN
Qty: 60 TABLET | Refills: 2 | Status: SHIPPED | OUTPATIENT
Start: 2020-06-12 | End: 2020-10-15

## 2020-06-14 DIAGNOSIS — C83.08 SMALL B-CELL LYMPHOMA OF LYMPH NODES OF MULTIPLE REGIONS (HCC): Primary | ICD-10-CM

## 2020-06-15 LAB — REF LAB TEST METHOD: NORMAL

## 2020-06-15 NOTE — PROGRESS NOTES
6/15/2020    Mitch Rinaldi  1960    Chief Complaint:    No chief complaint on file.      HPI:      PCP:  Carmelo Farmer MD  Cardiology:  Dr Gill  Oncology: Dr. Cunningham    59 y.o. male with HTN(stable, increased risk stroke, rupture), Hyperlipidemia(stable, increased risk cardiovascular events), Smoker(uncontrolled, increased risk cardiovascular events) and COPD(stable, increased risk pulmonary complications) , lung nodule(new, suspicious malignancy).  smokes 1 PPD.  CT screening shows LEFT lung nodule.  PET positive with lymphadenopathy and pancreatic mass.  Prior LEFT pneumothorax x3 1980.  Undergoing bone marrow biopsy by oncology, determining future therapy.  Follows with Dr. Cunningham.  No TIA stroke amaurosis.  No MI claudication. No other associated signs, symptoms or modifying factors.    1980 LEFT thoracotomy talc pleurodesis  3/2017 CT Chest screening:  No nodules, severe emphysematous changes.  1/2019 CT Chest screening:  No nodules, severe emphysematous changes.  5/2020 CT Chest screening:  New 24mm LEFT upper lobe apical nodule, severe emphysematous changes.  5/2020 PET CT:  24mm LEFT upper lobe apical nodule (SUV 7.1), LEFT submandibular nodule 8mm (SUV 8.2), RIGHT axillary lymph node (SUV 5.2), LEFT axillary lymph node (SUV 6.2), LEFT inframammary nodule (SUV 5.0), LEFT hilar (SUV 7.0), pancreatic head area (SUV 12), RIGHT inguinal lymph node (SUV 6.8), LEFT inguinal lymph node (SUV 5.8).  6/2020 Mediastinoscopy, bronchoscopy, excision biopsy submandibular lymph node Path + B-Cell lymphoma    11/2019 Holter:  Normal study.  11/2019 ECG:  NSR 92, QTc 432.  12/2019 Echocardiogram:  EF 55%, LA 23mm, LV 44mm, RVSP 12mmHg.  Tr MR TR.  1/2020 Stress Test: EF 68%, no ischemia, low risk study.    The following portions of the patient's history were reviewed and updated as appropriate: allergies, current medications, past family history, past medical history, past social history, past surgical history and  problem list.  Recent images independently reviewed.  Available laboratory values reviewed.    PMH:  Past Medical History:   Diagnosis Date   • Acute gastroenteritis    • Allergic    • Anxiety    • Asthma     as a  child   • Diverticulitis of colon    • Emphysema lung (CMS/HCC)    • Epididymal cyst    • Gastroesophageal reflux disease    • Generalized anxiety disorder    • Hyperlipidemia    • Hypertension    • Impaired glucose tolerance    • Pain, lumbar region     Pain radiating to lumbar region of back      • Peripheral nervous system disease     Disorder of the peripheral nervous system      • Pleuritic chest pain    • Pneumonia    • Primary osteoarthritis involving multiple joints 8/29/2016   • Shoulder pain, left    • Spermatocele 4/25/2018    Added automatically from request for surgery [id number]   • Tachycardia    • Tinnitus    • Wears glasses    • Wears partial dentures      Past Surgical History:   Procedure Laterality Date   • CHOLECYSTECTOMY     • COLONOSCOPY N/A 1/14/2019    Procedure: COLONOSCOPY;  Surgeon: Ronald Rodriguez DO;  Location: [facility] ENDOSCOPY;  Service: Gastroenterology   • DENTAL PROCEDURE     • ENDOSCOPY AND COLONOSCOPY     • EPIDIDYMIS SURGERY Right     cyst   • HERNIA REPAIR  Feb 2017   • INGUINAL HERNIA REPAIR Left 2/6/2017    Procedure: OPEN LEFT INGUINAL HERNIA REPAIR WITH MESH;  Surgeon: Miles Gregorio MD;  Location: [facility] OR;  Service:    • INJECTION OF MEDICATION      Depo Medrol (Methylprednisone) 80mg (8)      09/01/2015       • INJECTION OF MEDICATION      Kenalog (1)      12/11/2012       • LUNG SURGERY       Pneumothorax 1980       • LYMPH NODE BIOPSY N/A 6/3/2020    Procedure: BIOPSY LYMPH NODE;  Surgeon: Bry Salvador MD;  Location: [facility];  Service: Cardiothoracic;  Laterality: N/A;   • MEDIASTINOSCOPY, BRONCHOSCOPY N/A 6/3/2020    Procedure: MEDIASTINOSCOPY, BRONCHOSCOPY cervical  lymph node biopsy;  Surgeon: Bry Salvador MD;  Location:  [facility] OR;  Service: Cardiothoracic;  Laterality: N/A;   • SPERMATOCELECTOMY Left 5/11/2018    Procedure: LEFT SPERMATOCELECTOMY;  Surgeon: Miles Groves MD;  Location: [facility];  Service: Urology     Family History   Problem Relation Age of Onset   • Dementia Mother    • Hypertension Mother    • Thyroid disease Mother    • Arthritis Mother    • Diabetes Father    • Stomach cancer Father    • Cancer Father         stomach   • Thyroid disease Brother    • Down syndrome Brother    • COPD Maternal Aunt    • Alcohol abuse Paternal Uncle    • Macular degeneration Brother    • Alcohol abuse Brother    • Alcohol abuse Brother    • Thyroid disease Brother    • Alcohol abuse Paternal Uncle    • Birth defects Brother    • COPD Maternal Aunt    • Hearing loss Brother      Social History     Tobacco Use   • Smoking status: Current Every Day Smoker     Packs/day: 1.00     Years: 42.00     Pack years: 42.00     Types: Cigarettes   • Smokeless tobacco: Former User     Quit date: 2014   Substance Use Topics   • Alcohol use: No   • Drug use: No       ALLERGIES:  Allergies   Allergen Reactions   • Darvon [Propoxyphene] Nausea And Vomiting   • Percocet [Oxycodone-Acetaminophen] Palpitations         MEDICATIONS:    Current Outpatient Medications:   •  cetirizine (ZyrTEC) 10 MG tablet, Take 10 mg by mouth daily as needed for allergies., Disp: , Rfl:   •  esomeprazole (nexIUM) 20 MG capsule, Take 20 mg by mouth Every Morning Before Breakfast., Disp: , Rfl:   •  FLUoxetine (PROzac) 40 MG capsule, Take 40 mg by mouth Every Night., Disp: , Rfl:   •  fluticasone (FLONASE) 50 MCG/ACT nasal spray, 2 sprays into the nostril(s) as directed by provider Daily As Needed for Rhinitis., Disp: , Rfl:   •  HYDROcodone-acetaminophen (NORCO) 7.5-325 MG per tablet, Take 1 tablet by mouth 2 (Two) Times a Day As Needed for Mild Pain  or Moderate Pain ., Disp: 60 tablet, Rfl: 0  •  metoprolol tartrate (LOPRESSOR) 50 MG tablet, Take 1 tablet by mouth 2  "(Two) Times a Day., Disp: 60 tablet, Rfl: 11  •  rosuvastatin (CRESTOR) 10 MG tablet, Take 1 tablet by mouth Daily., Disp: 30 tablet, Rfl: 11  •  tiZANidine (ZANAFLEX) 4 MG tablet, Take 1 tablet by mouth Every 8 (Eight) Hours As Needed for Muscle Spasms. 0.5 - 1 tab as needed, Disp: 90 tablet, Rfl: 0  •  promethazine (PHENERGAN) 25 MG tablet, TAKE 1 TABLET BY MOUTH EVERY 6 (SIX) HOURS AS NEEDED FOR NAUSEA OR VOMITING., Disp: 60 tablet, Rfl: 2    Review of Systems   Review of Systems   Constitution: Negative for malaise/fatigue, night sweats and weight loss.   HENT: Negative for hearing loss, hoarse voice and stridor.    Eyes: Negative for vision loss in left eye, vision loss in right eye and visual disturbance.   Cardiovascular: Negative for chest pain, leg swelling and palpitations.   Respiratory: Negative for cough, hemoptysis and shortness of breath.    Hematologic/Lymphatic: Negative for adenopathy and bleeding problem. Does not bruise/bleed easily.   Skin: Negative for color change, poor wound healing and rash.   Musculoskeletal: Positive for arthritis, back pain and joint pain. Negative for muscle weakness and neck pain.   Gastrointestinal: Negative for abdominal pain, dysphagia and heartburn.   Neurological: Positive for numbness and paresthesias. Negative for dizziness and seizures.   Psychiatric/Behavioral: Negative for altered mental status, depression and memory loss. The patient is not nervous/anxious.        Physical Exam   Vitals:    06/11/20 1356   BP: 119/62   BP Location: Left arm   Pulse: 79   SpO2: 97%   Weight: 75.4 kg (166 lb 3.2 oz)   Height: 177.8 cm (70\")     Physical Exam   Constitutional: He is oriented to person, place, and time. He is active and cooperative. He does not appear ill. No distress.   HENT:   Head: Atraumatic.   Right Ear: Hearing normal.   Left Ear: Hearing normal.   Nose: No nasal deformity. No epistaxis.   Mouth/Throat: He does not have dentures. Normal dentition.   Eyes: " Conjunctivae and lids are normal. Right pupil is round and reactive. Left pupil is round and reactive.   Neck: No JVD present. Carotid bruit is not present. No tracheal deviation present. No thyroid mass and no thyromegaly present.   Cardiovascular: Normal rate and regular rhythm.   No murmur heard.  Pulses:       Carotid pulses are 2+ on the right side, and 2+ on the left side.       Radial pulses are 2+ on the right side, and 2+ on the left side.        Dorsalis pedis pulses are 2+ on the right side, and 2+ on the left side.        Posterior tibial pulses are 2+ on the right side, and 2+ on the left side.   Healed LEFT thoracotomy scar   Pulmonary/Chest: Effort normal and breath sounds normal.   Abdominal: Soft. He exhibits no distension and no mass. There is no splenomegaly or hepatomegaly. There is no tenderness.   Musculoskeletal: He exhibits no deformity.   Gait normal.    Lymphadenopathy:     He has no cervical adenopathy.        Right: No supraclavicular adenopathy present.        Left: No supraclavicular adenopathy present.   Neurological: He is alert and oriented to person, place, and time. He has normal strength.   Skin: Skin is warm and dry. No cyanosis or erythema. No pallor.   No venous staining    Midsternal incision CDI  Left neck mild swelling (improving), CDI   Psychiatric: He has a normal mood and affect. His speech is normal. Judgment and thought content normal.       BUN   Date Value Ref Range Status   06/11/2020 14 8 - 23 mg/dL Final     Creatinine   Date Value Ref Range Status   06/11/2020 1.07 0.76 - 1.27 mg/dL Final     eGFR Non  Amer   Date Value Ref Range Status   06/11/2020 70 >60 mL/min/1.73 Final       ASSESSMENT:  Diagnoses and all orders for this visit:    Mixed hyperlipidemia    Surgical aftercare, respiratory system      PLAN:    Clean operative site with antibacterial soap/water, pat dry. Keep open to air unless draining, then may apply dry dressing.  No ointments or creams  unless prescribed by provider.    Contact [facility] for any post surgical concerns, drainage, or erythema at incision site.     Lipid-lowering therapy has been proven beneficial in patients with cardio-vascular disease. Current guidelines recommend statin treatment for all patients with PAD,CAD and carotid stenosis. Statins are beneficial in preventing cardiovascular events, increasing functional capacity and lower the risk of adverse limb loss in PAD. Statins decrease the progression of plaque formation and may improve peripheral vessel lining, and aid in reversing atherosclerosis.        EMR Dragon/Transcription disclaimer:   Much of this encounter note is an electronic transcription/translation of spoken language to printed text. The electronic translation of spoken language may permit erroneous, or at times, nonsensical words or phrases to be inadvertently transcribed; Although I have reviewed the note for such errors, some may still exist.

## 2020-06-16 ENCOUNTER — DOCUMENTATION (OUTPATIENT)
Dept: NUTRITION | Facility: HOSPITAL | Age: 60
End: 2020-06-16

## 2020-06-16 NOTE — PROGRESS NOTES
Adult Outpatient Nutrition  Assessment    Patient Name:  Mitch Rinaldi  YOB: 1960  MRN: [id number]    Assessment Date:  6/16/2020    Comments: Chart review revealed 59WM coming [facility] due to 1) lymphoma--mandibular + medastinal nodes  2) lung nodule. Work-up in progress--tx to be determined. Wt 166.2 lb--up/down (overall stable). BMI 23.9. Alb 4. No indication of dietary restrictions or immediate nutrition problems. Will assess further as more data avail.                        Electronically signed by:  Soraya Goldstein RD  06/16/20 14:37

## 2020-06-17 ENCOUNTER — LAB (OUTPATIENT)
Dept: ONCOLOGY | Facility: HOSPITAL | Age: 60
End: 2020-06-17

## 2020-06-17 ENCOUNTER — PROCEDURE VISIT (OUTPATIENT)
Dept: ONCOLOGY | Facility: CLINIC | Age: 60
End: 2020-06-17

## 2020-06-17 VITALS
HEART RATE: 76 BPM | TEMPERATURE: 97.2 F | BODY MASS INDEX: 23.47 KG/M2 | RESPIRATION RATE: 18 BRPM | SYSTOLIC BLOOD PRESSURE: 117 MMHG | WEIGHT: 163.6 LBS | DIASTOLIC BLOOD PRESSURE: 65 MMHG

## 2020-06-17 DIAGNOSIS — C83.08 SMALL B-CELL LYMPHOMA OF LYMPH NODES OF MULTIPLE REGIONS (HCC): ICD-10-CM

## 2020-06-17 DIAGNOSIS — C83.08 SMALL B-CELL LYMPHOMA OF LYMPH NODES OF MULTIPLE REGIONS (HCC): Primary | ICD-10-CM

## 2020-06-17 LAB
DEPRECATED RDW RBC AUTO: 40.3 FL (ref 37–54)
EOSINOPHIL # BLD MANUAL: 0.53 10*3/MM3 (ref 0–0.4)
EOSINOPHIL NFR BLD MANUAL: 7 % (ref 0.3–6.2)
ERYTHROCYTE [DISTWIDTH] IN BLOOD BY AUTOMATED COUNT: 12.4 % (ref 12.3–15.4)
HCT VFR BLD AUTO: 40.2 % (ref 37.5–51)
HGB BLD-MCNC: 13.9 G/DL (ref 13–17.7)
HGB RETIC QN AUTO: 37.7 PG (ref 29.8–36.1)
IMM RETICS NFR: 7 % (ref 3–15.8)
LYMPHOCYTES # BLD MANUAL: 0.91 10*3/MM3 (ref 0.7–3.1)
LYMPHOCYTES NFR BLD MANUAL: 12 % (ref 19.6–45.3)
LYMPHOCYTES NFR BLD MANUAL: 3 % (ref 5–12)
MCH RBC QN AUTO: 30.8 PG (ref 26.6–33)
MCHC RBC AUTO-ENTMCNC: 34.6 G/DL (ref 31.5–35.7)
MCV RBC AUTO: 88.9 FL (ref 79–97)
MONOCYTES # BLD AUTO: 0.23 10*3/MM3 (ref 0.1–0.9)
NEUTROPHILS # BLD AUTO: 5.84 10*3/MM3 (ref 1.7–7)
NEUTROPHILS NFR BLD MANUAL: 77 % (ref 42.7–76)
PLATELET # BLD AUTO: 189 10*3/MM3 (ref 140–450)
PMV BLD AUTO: 10.4 FL (ref 6–12)
RBC # BLD AUTO: 4.52 10*6/MM3 (ref 4.14–5.8)
RBC MORPH BLD: NORMAL
RETICS/RBC NFR AUTO: 1.69 % (ref 0.7–1.9)
SMALL PLATELETS BLD QL SMEAR: ADEQUATE
VARIANT LYMPHS NFR BLD MANUAL: 1 % (ref 0–5)
WBC MORPH BLD: NORMAL
WBC NRBC COR # BLD: 7.58 10*3/MM3 (ref 3.4–10.8)

## 2020-06-17 PROCEDURE — 87799 DETECT AGENT NOS DNA QUANT: CPT

## 2020-06-17 PROCEDURE — 85046 RETICYTE/HGB CONCENTRATE: CPT

## 2020-06-17 PROCEDURE — 38222 DX BONE MARROW BX & ASPIR: CPT | Performed by: INTERNAL MEDICINE

## 2020-06-17 PROCEDURE — 85025 COMPLETE CBC W/AUTO DIFF WBC: CPT

## 2020-06-17 PROCEDURE — G0463 HOSPITAL OUTPT CLINIC VISIT: HCPCS | Performed by: INTERNAL MEDICINE

## 2020-06-17 PROCEDURE — 85007 BL SMEAR W/DIFF WBC COUNT: CPT

## 2020-06-17 NOTE — PROGRESS NOTES
Mitch Baltazar Gentry  [id number]  1960      Date of procedure: 6/17/20     Indication: B cell lymphoma         PROCEDURE NOTE:  POSTERIOR ILIAC CREST BONE MARROW ASPIRATE AND BIOPSY     PROCEDURE PERFORMED BY: Atiya Alexis MD      SITE OF BIOPSY: right posterior superior iliac spine.      After reviewing with the patient the risks of bone marrow aspiration and biopsy (including but not limited to bleeding, infection, injury to the sciatic nerve, bursitis, and sacroiliac joint inflammation, localized hematoma formation and bruising, and discomfort during the procedure), as well as the benefits of this procedure, verbal consent was obtained to proceed.      The patient was positioned in the prone position. After time out to identify the patient and the correct site of the procedure, the bone marrow site was prepped with topical povidone iodine solution, and a sterile drape was applied.  Using sterile technique, 10 cc of 1% lidocaine was then injected circumferentially around the site to achieve local analgesia.  After a short delay to allow achievement of local analgesia, a 3mm incision with a scalpel blade.  Using a Jamshidi needle with trocar in place, the bone marrow was penetrated and 1-2 cc of marrow was aspirated.  A second aspirate of 6 cc was then obtained for additional studies.  The Jamshidi needle was then repositioned, and with trocar removed, a satisfactory trephine biopsy was then obtained.  The Jamshidi needle was then removed, pressure was applied to the biopsy site, and a sterile bandage was applied.       The patient was then placed in the supine position, and instructed to remain in that position for an additional 15-20 minutes.  Nursing staff was instructed to check the site for bleeding.  The patient was instructed on the aftercare of the bone marrow biopsy site. The procedure was well tolerated, without complications.       Jose Alexis MD   Hematology Oncology

## 2020-06-19 ENCOUNTER — TELEPHONE (OUTPATIENT)
Dept: NUTRITION | Facility: HOSPITAL | Age: 60
End: 2020-06-19

## 2020-06-19 LAB
EBV DNA # BLD NAA+PROBE: 841 COPIES/ML
LOG 10 EBV DNA QN PCR: 2.92 LOG10COPY/ML

## 2020-06-19 NOTE — PROGRESS NOTES
Adult Outpatient Nutrition  Assessment    Patient Name:  Mitch Rinaldi  YOB: 1960  MRN: [id number]    Assessment Date:  6/19/2020    Comments: RDN consulted. Wt 163 lb--down 3 lb in past week. Attempted phone contact without success. Left message encouraging nutrition/offering nutrition services. Mailed information to home 1) basic nutrition/hydration 2) importance of protein 3) snack/supplement suggestions 4) RDN's name/number.                        Electronically signed by:  Soraya Goldstein RD  06/19/20 15:17

## 2020-06-23 LAB — REF LAB TEST METHOD: NORMAL

## 2020-06-26 LAB
LAB AP CASE REPORT: NORMAL
LAB AP CLINICAL INFORMATION: NORMAL
PATH REPORT.FINAL DX SPEC: NORMAL

## 2020-07-01 ENCOUNTER — OFFICE VISIT (OUTPATIENT)
Dept: ONCOLOGY | Facility: CLINIC | Age: 60
End: 2020-07-01

## 2020-07-01 ENCOUNTER — DOCUMENTATION (OUTPATIENT)
Dept: ONCOLOGY | Facility: CLINIC | Age: 60
End: 2020-07-01

## 2020-07-01 VITALS
TEMPERATURE: 98.8 F | DIASTOLIC BLOOD PRESSURE: 65 MMHG | HEIGHT: 70 IN | WEIGHT: 166.4 LBS | HEART RATE: 70 BPM | RESPIRATION RATE: 18 BRPM | BODY MASS INDEX: 23.82 KG/M2 | SYSTOLIC BLOOD PRESSURE: 115 MMHG

## 2020-07-01 DIAGNOSIS — G89.3 CANCER ASSOCIATED PAIN: ICD-10-CM

## 2020-07-01 DIAGNOSIS — Z71.6 ENCOUNTER FOR TOBACCO USE CESSATION COUNSELING: ICD-10-CM

## 2020-07-01 DIAGNOSIS — M15.9 PRIMARY OSTEOARTHRITIS INVOLVING MULTIPLE JOINTS: Chronic | ICD-10-CM

## 2020-07-01 DIAGNOSIS — C83.18 MANTLE CELL LYMPHOMA OF LYMPH NODES OF MULTIPLE REGIONS (HCC): Primary | ICD-10-CM

## 2020-07-01 DIAGNOSIS — C83.08 SMALL B-CELL LYMPHOMA OF LYMPH NODES OF MULTIPLE REGIONS (HCC): ICD-10-CM

## 2020-07-01 PROCEDURE — G0463 HOSPITAL OUTPT CLINIC VISIT: HCPCS | Performed by: INTERNAL MEDICINE

## 2020-07-01 PROCEDURE — 99406 BEHAV CHNG SMOKING 3-10 MIN: CPT | Performed by: INTERNAL MEDICINE

## 2020-07-01 PROCEDURE — 99214 OFFICE O/P EST MOD 30 MIN: CPT | Performed by: INTERNAL MEDICINE

## 2020-07-01 RX ORDER — HYDROCODONE BITARTRATE AND ACETAMINOPHEN 7.5; 325 MG/1; MG/1
1 TABLET ORAL 2 TIMES DAILY PRN
Qty: 60 TABLET | Refills: 0 | Status: SHIPPED | OUTPATIENT
Start: 2020-07-01 | End: 2020-07-27 | Stop reason: SDUPTHER

## 2020-07-02 PROBLEM — G89.3 CANCER ASSOCIATED PAIN: Status: ACTIVE | Noted: 2020-07-02

## 2020-07-02 PROBLEM — Z71.6 ENCOUNTER FOR TOBACCO USE CESSATION COUNSELING: Status: ACTIVE | Noted: 2020-07-02

## 2020-07-02 PROBLEM — J44.9 COPD (CHRONIC OBSTRUCTIVE PULMONARY DISEASE): Status: ACTIVE | Noted: 2020-07-02

## 2020-07-02 NOTE — PROGRESS NOTES
Mitch Rinaldi  [id number]  1960    Subjective     Mr Rinaldi was seen in follow up for lymphoma.   We reviewed the result of lymph node biopsy and bone marrow biopsy at length.   Case was reviewed with our hematologist.   His diagnosis is consistent with CD 5 negative mantle cell lymphoma.   Bone marrow aspiration/biopsy negative.   We discussed diagnosis, prognosis and treatment options at length.   He is still struggling with pain at the site of his biopsy.   New prescription of norco was sent to his pharmacy to assist with pain.   He continues to smoke.   Counseled about smoking cessation.     Past Medical History, Past Surgical History, Social History, Family History have been reviewed and are without significant changes except as mentioned.    Review of Systems   CONSTITUTIONAL: No weight loss, fever, chills, weakness or fatigue.  HEENT: Eyes: No visual loss, blurred vision, double vision or yellow sclerae. Ears, Nose, Throat: No hearing loss, sneezing, congestion, runny nose or sore throat.  SKIN: No rash or itching.  CARDIOVASCULAR: No chest pain, chest pressure or chest discomfort. No palpitations or edema.  RESPIRATORY: No shortness of breath, cough or sputum.  GASTROINTESTINAL: No anorexia, nausea, vomiting or diarrhea. No abdominal pain or blood.  GENITOURINARY: Negative for urgency, frequency or dysuria.   NEUROLOGICAL: No headache, dizziness, syncope, paralysis, ataxia, numbness or tingling in the extremities. No change in bowel or bladder control.  MUSCULOSKELETAL: chronic joint pain + .  HEMATOLOGIC: No anemia, bleeding or bruising.  LYMPHATICS: No enlarged nodes. No history of splenectomy.  PSYCHIATRIC: No history of depression or anxiety.  ENDOCRINOLOGIC: No reports of sweating, cold or heat intolerance. No polyuria or polydipsia.  ALLERGIES: No history of asthma, hives, eczema or rhinitis.    Medications:  The current medication list was reviewed in the EMR    ALLERGIES:    Allergies   Allergen  "Reactions   • Darvon [Propoxyphene] Nausea And Vomiting   • Percocet [Oxycodone-Acetaminophen] Palpitations       Objective      Vitals:    07/01/20 0932   BP: 115/65   Pulse: 70   Resp: 18   Temp: 98.8 °F (37.1 °C)   TempSrc: Temporal   Weight: 75.5 kg (166 lb 6.4 oz)   Height: 177.8 cm (70\")   PainSc:   6   PainLoc: Generalized  Comment: back and left shoulder     Current Status 7/1/2020   ECOG score 0       Physical Exam      GENERAL: Alert, awake, oriented.  Well dressed.  Not in apparent distress. Vitals as above.   HEAD: Normocephalic, atraumatic.   EYES: PERRL, EOMI.  vision is grossly intact.  NECK: Supple, no adenopathy or thyromegaly.   THROAT: Normal oral cavity and pharynx. No inflammation, swelling, exudate, or lesions.  CARDIAC: Normal S1 and S2. No S3, S4 or murmurs. Rhythm is regular.  Extremities are warm and well perfused.   LUNGS: Clear to auscultation and percussion without rales, rhonchi, wheezing or diminished breath sounds.  ABDOMEN: Positive bowel sounds. Soft, nondistended, nontender. No guarding or rebound. No masses.  BACK:  No bony tenderness.   EXTREMITIES: No significant deformity or joint abnormality.  Peripheral pulses intact. No varicosities.  SKIN: No rash or bruising.  NEUROLOGICAL: Grossly non-focal exam. No focal weakness. Gait: Normal.   PSYCH: Mood and affect normal. No hallucination or suicidal thoughts.   LYMPHATIC: Small lymphadenopathy involving bilateral axilla, inguinal region.  Submandibular excisional biopsy area healing well.    RECENT LABS:Independently reviewed and summarized.  Hematology WBC   Date Value Ref Range Status   06/17/2020 7.58 3.40 - 10.80 10*3/mm3 Final     RBC   Date Value Ref Range Status   06/17/2020 4.52 4.14 - 5.80 10*6/mm3 Final     Hemoglobin   Date Value Ref Range Status   06/17/2020 13.9 13.0 - 17.7 g/dL Final     Hematocrit   Date Value Ref Range Status   06/17/2020 40.2 37.5 - 51.0 % Final     Platelets   Date Value Ref Range Status "   06/17/2020 189 140 - 450 10*3/mm3 Final          Imaging (independently reviewed and summarized):      CT chest low-dose on May 12, 2020 showed left upper lobe noncalcified mass lesion with spiculated border.  This measured 2.57 x 2.23 x 1.52 cm.  In addition, it also showed left lateral basilar segment small lung parenchymal nodule measuring 5.2 mm.  No prior comparison available.     PET scan on May 29, 2020 showed hypermetabolic activity involving nodule in the left upper lobe.  In addition hypermetabolic activity with lymph node in the right axilla with SUV max of 5.2, left axilla with SUV max of 6.2, hypermetabolic activity was also noted to the left of the sternum with SUV max of 5.  Hypermetabolic activity in the left hilum with SUV max of 7.1, right hilum with SUV max of 3.7, hypermetabolic activity in the right and left posterior inferior mediastinum with SUV max of 5.4.    In addition hypermetabolic activity was noticed in todd hepatis area with SUV max of 12, hypermetabolic activity in the left and right inguinal region with SUV max of 5.8 and 3.8 respectively.  No prior comparison available     Pathology:   Pathology showed CD20 positive, CD5 negative CD10 negative small to medium sized B-cell lymphoma involving left submandibular and level 4 right mediastinal lymph node.  Immunophenotype was negative for CD30, CD43.  Ki-67 showed 10 to 15% nuclear proliferation index.  EBV was positive in occasional cells.  Immunohistochemical stain for cyclin D1 was positive for atypical lymphocyte.    FISH positive for t(11,14).     Bone marrow negative for Lymphoma.     Diagnosis:   (1) Mantle cell lymphoma   (2) Left upper lobe pulmonary nodule   (3) Osteoarthritis, multiple joints   (4) Hypertension   (5) Hyperlipidemia   (6) Encounter for tobacco use cessation counseling     Assessment/Plan       (1) Mantle cell lymphoma:     Patient with diagnosis of CD 5 negative mantle cell lymphoma.   IHC for cyclin D1 -  positive x 2.   FISH positive for t(11,14) translocation.   Cytogenetics - normal (no TP53 mutation)  Ki - 67: 10% to 15%.   Bone marrow is negative.     Stage IV (Provided lung lesions are mantle cell lymphoma. Mediastinal nodes sampling showed mantle cell lymphoma, so it is quite likely that lung nodules are mantle cell lymphoma; however, given history smoking history primary lung cancer can not be ruled out 100% certainty).     MIPI score 4 (intermediate risk).     I had a very lengthy discussion about his diagnosis, prognosis and treatment options at length.     There is a diversity of clinical practice and ambiguity surrounding the preferred treatment approach for patients with MCL.    He is relatively young with minimal co-morbidities.     Even though he is asymptomatic I am concerned about lung nodule, and adenopathy in todd hepatic area which could cause potentially biliary obstruction.     Clinical trials if available would be the best approach (may be BTK inhibitors in combination of chemo). We do not have available this at our location. I will send him to [place] to see if they have a research study available.     Outside of clinical trial, I recommend BR x 6 cycles.     I can not give hyper CVAD type multiagent chemo here. I can not also offer him consolidative auto-transplant at our location.     [facility] opinion at [facility] might be beneficial.         (2) Left upper lobe pulmonary nodule:   Discussed with patient that likely this represents lymphoma (especially given mediastinal lymph node was positive for lymphoma).  However two concomitant malignant processes cannot be ruled out with 100% certainty.  Recommend given generalized lymphadenopathy and biopsy showing lymphoma I would be inclined to treat his lymphoma and see if left upper lobe nodule gets better.  If it does not then it is quite possible that this could represent separate primary and would need to be addressed surgically.    (3)  Osteoarthritis, multiple joints: Stable on PRN [place].  New prescription given to the patient today.     (4) Hypertension: Stable on Lopressor.     (5) Hyperlipidemia: Stable on Crestor    (6) Encounter for tobacco use cessation counseling: I had 5-7 minutes discussion with the patient about smoking cessation. Problems with continued smoking including respiratory, cardiovascular and oncological problems were discussion. Advice on smoking cessation was reiterated including methods of quitting and different medications and support system available for smoking cessation was discussed. Patient understood and was agreeable.      Case was discussed with hematopathologist at length.     Prescription sent today.: norco  Referral to [facility] arranged.         Interim history, ROS, Physical exam and assessment/plan was updated on 7/1/20.   Some parts of history, review of system, physical exam and assessment/plan might have remained unchanged compared to prior visit due to no change in the clinical condition. Even though copy/paste functionality was used, this note was updated on 7/1/20 as per [facility] compliance policy # 2421.4.       Jose Alexis MD     7/2/2020      CC:

## 2020-07-10 ENCOUNTER — OFFICE VISIT (OUTPATIENT)
Dept: FAMILY MEDICINE CLINIC | Facility: CLINIC | Age: 60
End: 2020-07-10

## 2020-07-10 VITALS
BODY MASS INDEX: 23.62 KG/M2 | SYSTOLIC BLOOD PRESSURE: 120 MMHG | OXYGEN SATURATION: 98 % | HEART RATE: 74 BPM | TEMPERATURE: 98.6 F | HEIGHT: 70 IN | WEIGHT: 165 LBS | DIASTOLIC BLOOD PRESSURE: 78 MMHG

## 2020-07-10 DIAGNOSIS — I10 BENIGN ESSENTIAL HTN: Chronic | ICD-10-CM

## 2020-07-10 DIAGNOSIS — K21.9 GASTROESOPHAGEAL REFLUX DISEASE WITHOUT ESOPHAGITIS: Chronic | ICD-10-CM

## 2020-07-10 DIAGNOSIS — R73.02 IMPAIRED GLUCOSE TOLERANCE: Primary | Chronic | ICD-10-CM

## 2020-07-10 DIAGNOSIS — G59 MONONEUROPATHY DUE TO UNDERLYING DISEASE: Chronic | ICD-10-CM

## 2020-07-10 DIAGNOSIS — M15.9 PRIMARY OSTEOARTHRITIS INVOLVING MULTIPLE JOINTS: Chronic | ICD-10-CM

## 2020-07-10 DIAGNOSIS — E78.2 MIXED HYPERLIPIDEMIA: Chronic | ICD-10-CM

## 2020-07-10 DIAGNOSIS — C83.18 MANTLE CELL LYMPHOMA OF LYMPH NODES OF MULTIPLE REGIONS (HCC): ICD-10-CM

## 2020-07-10 PROCEDURE — 99214 OFFICE O/P EST MOD 30 MIN: CPT | Performed by: INTERNAL MEDICINE

## 2020-07-10 RX ORDER — TIZANIDINE 4 MG/1
4 TABLET ORAL EVERY 8 HOURS PRN
Qty: 90 TABLET | Refills: 3 | Status: SHIPPED | OUTPATIENT
Start: 2020-07-10 | End: 2020-12-18

## 2020-07-10 NOTE — PROGRESS NOTES
Chief Complaint   Patient presents with   • Hyperlipidemia     3 month f/u patient states that his cancer dr is filling his hydrocodone   • Hypertension     Subjective   Mitch Rinaldi is a 60 y.o. male who presents to the office for follow-up and review of labs. He has impaired glucose tolerance and has been monitoring his dietary intake of sugars and carbohydrates.  He has hyperlipidemia and controls this with diet.  He has GERD which is well controlled with Nexium.  He has osteoarthritis which causes chronic back and joint pain.  He takes Norco 7.5/325 mg twice daily as needed for the pain.  He also takes ibuprofen to help with inflammation.  He uses Zanaflex as needed for muscle spasm.  He has neuropathy secondary to the arthritis in his low back, but does not require any additional medication for this. He has anxiety and depression, and takes fluoxetine daily.    After his last visit, he had his annual low-dose chest CT for lung cancer screening.  This was found to have an abnormality.  He was referred for further evaluation, and was ultimately diagnosed with mantle cell lymphoma.  He is currently seeing oncology for continuing work-up and treatment of this.  He has been asymptomatic.  He has an upcoming appointment at the [facility] in [place] for a second opinion regarding best treatment option.    The following portions of the patient's history were reviewed and updated as appropriate: allergies, current medications, past family history, past medical history, past social history, past surgical history and problem list.    Review of Systems   Constitutional: Negative for chills, fatigue and fever.   HENT: Negative for congestion, sneezing, sore throat and trouble swallowing.    Eyes: Negative for visual disturbance.   Respiratory: Negative for cough, chest tightness, shortness of breath and wheezing.    Cardiovascular: Negative for chest pain, palpitations and leg swelling.   Gastrointestinal:  "Positive for nausea. Negative for abdominal pain, constipation, diarrhea and vomiting.   Genitourinary: Negative for dysuria, frequency and urgency.   Musculoskeletal: Negative for neck pain.   Skin: Negative for rash.   Neurological: Negative for dizziness, weakness and headaches.   Psychiatric/Behavioral: The patient is nervous/anxious.    All other systems reviewed and are negative.      Objective   Vitals:    07/10/20 0759   BP: 120/78   Pulse: 74   Temp: 98.6 °F (37 °C)   TempSrc: Oral   SpO2: 98%   Weight: 74.8 kg (165 lb)   Height: 177.8 cm (70\")   PainSc: 0-No pain   PainLoc: Generalized      Body mass index is 23.68 kg/m².    Physical Exam   Constitutional: He is oriented to person, place, and time. He appears well-developed and well-nourished. No distress.   HENT:   Head: Normocephalic and atraumatic.   Nose: Nose normal.   Mouth/Throat: Oropharynx is clear and moist. No oropharyngeal exudate.   Eyes: Pupils are equal, round, and reactive to light. Conjunctivae and EOM are normal. No scleral icterus.   Neck: Normal range of motion. Neck supple.   Cardiovascular: Regular rhythm and normal heart sounds. Tachycardia present. Exam reveals no gallop and no friction rub.   No murmur heard.  Pulmonary/Chest: Effort normal and breath sounds normal. No respiratory distress. He has no wheezes. He has no rales.   Abdominal: Soft. Bowel sounds are normal. He exhibits no distension. There is no tenderness. There is no rebound and no guarding.   Musculoskeletal: He exhibits no edema.        Lumbar back: He exhibits decreased range of motion and pain.   Lymphadenopathy:     He has no cervical adenopathy.   Neurological: He is alert and oriented to person, place, and time. No cranial nerve deficit.   Skin: Skin is warm and dry. No rash noted.   Psychiatric: He has a normal mood and affect. His behavior is normal. Judgment and thought content normal.   Nursing note and vitals reviewed.      Assessment/Plan   Mitch was seen " today for hyperlipidemia and hypertension.    Diagnoses and all orders for this visit:    Impaired glucose tolerance    Benign essential HTN    Mixed hyperlipidemia    Gastroesophageal reflux disease without esophagitis    Primary osteoarthritis involving multiple joints  -     tiZANidine (ZANAFLEX) 4 MG tablet; Take 1 tablet by mouth Every 8 (Eight) Hours As Needed for Muscle Spasms.    Mononeuropathy due to underlying disease    Mantle cell lymphoma of lymph nodes of multiple regions (CMS/HCC)         Labs are reviewed with patient.  He has not yet had routine labs completed, but he did have labs at the hospital during the work-up of his lymphoma.  His glucose is normal at 91.  He will continue to watch diet to help maintain control of the glucose.  He understands that there is an increased risk of developing diabetes in the future. He will continue with dietary management of the hyperlipidemia. He will continue with Nexium for treatment of GERD.  He will continue with ibuprofen for treatment of his arthritic pain.  He will continue with Norco 7.5/325 mg up to twice a day as needed for flareups in the pain, and any pain related to the lymphoma.  His oncologist is currently writing his prescription for Norco, so he does not need this refilled today.  He will continue with fluoxetine for treatment of the anxiety and depression.    Patient understands the risks associated with this controlled medication, including tolerance and addiction.  Patient also agrees to only obtain this medication from me, and not from a another provider, unless that provider is covering for me in my absence.  Patient also agrees to be compliant in dosing, and not self adjust the dose of medication.  A signed controlled substance agreement is on file, and the patient has received a controlled substance education sheet at this a previous visit.  The patient has also signed a consent for treatment with a controlled substance as per [facility]  health policy. SOFI was obtained.      CONTROLLED SUBSTANCE TRACKING 7/23/2019 10/21/2019 1/17/2020 4/14/2020 7/10/2020   Last Sofi 7/23/2019 10/21/2019 1/17/2020 4/14/2020 7/10/2020   Report Number [id number]   Last UDS 4/17/2019 - - - -   Last Controlled Substance Agreement 7/23/2019 10/21/2019 1/17/2020 - 7/10/2020   Prior UDT result Expected - - - -         PHQ-2/PHQ-9 Depression Screening 7/1/2020   Little interest or pleasure in doing things 0   Feeling down, depressed, or hopeless 0   Trouble falling or staying asleep, or sleeping too much 2   Feeling tired or having little energy 0   Poor appetite or overeating 0   Feeling bad about yourself - or that you are a failure or have let yourself or your family down 0   Trouble concentrating on things, such as reading the newspaper or watching television 0   Moving or speaking so slowly that other people could have noticed. Or the opposite - being so fidgety or restless that you have been moving around a lot more than usual 0   Thoughts that you would be better off dead, or of hurting yourself in some way 0   Total Score 2   If you checked off any problems, how difficult have these problems made it for you to do your work, take care of things at home, or get along with other people? Not difficult at all         Lab on 06/17/2020   Component Date Value Ref Range Status   • Immature Reticulocyte Fraction 06/17/2020 7.0  3.0 - 15.8 % Final   • Reticulocyte % 06/17/2020 1.69  0.70 - 1.90 % Final   • Reticulocyte Hgb 06/17/2020 37.7* 29.8 - 36.1 pg Final   • EBV PCR Quant WB 06/17/2020 841  Negative copies/mL Final    The quantitative range of this assay is 100 to 1 million copies/mL.  This test was developed and its performance characteristics determined  by [employer].  It has not been cleared or approved by the Food and Drug  Administration.  The FDA has determined that such clearance or  approval is not necessary.   • log 10 EBV  DNA Qn PCR 06/17/2020 2.925  qzb75gyyf/mL Final   • Case Report 06/17/2020    Final                    Value:Surgical Pathology Report                         Case: [id number]                                  Authorizing Provider:  Atiya Alexis,   Collected:           06/17/2020 11:30 AM                                 MD                                                                           Ordering Location:     [facility]     Received:            06/17/2020 12:32 PM                                 GROUP HEMATOLOGY AND                                                                                ONCOLOGY                                                                     Pathologist:           Kymberly Patel MD                                                        Specimen:    Marrow, bone marrow, right iliac                                                          • Final Diagnosis 06/17/2020    Final                    Value:This result contains rich text formatting which cannot be displayed here.   • WBC 06/17/2020 7.58  3.40 - 10.80 10*3/mm3 Final   • RBC 06/17/2020 4.52  4.14 - 5.80 10*6/mm3 Final   • Hemoglobin 06/17/2020 13.9  13.0 - 17.7 g/dL Final   • Hematocrit 06/17/2020 40.2  37.5 - 51.0 % Final   • MCV 06/17/2020 88.9  79.0 - 97.0 fL Final   • MCH 06/17/2020 30.8  26.6 - 33.0 pg Final   • MCHC 06/17/2020 34.6  31.5 - 35.7 g/dL Final   • RDW 06/17/2020 12.4  12.3 - 15.4 % Final   • RDW-SD 06/17/2020 40.3  37.0 - 54.0 fl Final   • MPV 06/17/2020 10.4  6.0 - 12.0 fL Final   • Platelets 06/17/2020 189  140 - 450 10*3/mm3 Final   • Neutrophil % 06/17/2020 77.0* 42.7 - 76.0 % Final   • Lymphocyte % 06/17/2020 12.0* 19.6 - 45.3 % Final   • Monocyte % 06/17/2020 3.0* 5.0 - 12.0 % Final   • Eosinophil % 06/17/2020 7.0* 0.3 - 6.2 % Final   • Atypical Lymphocyte % 06/17/2020 1.0  0.0 - 5.0 % Final   • Neutrophils Absolute 06/17/2020 5.84  1.70 - 7.00 10*3/mm3 Final   • Lymphocytes  Absolute 06/17/2020 0.91  0.70 - 3.10 10*3/mm3 Final   • Monocytes Absolute 06/17/2020 0.23  0.10 - 0.90 10*3/mm3 Final   • Eosinophils Absolute 06/17/2020 0.53* 0.00 - 0.40 10*3/mm3 Final   • RBC Morphology 06/17/2020 Normal  Normal Final   • WBC Morphology 06/17/2020 Normal  Normal Final   • Platelet Estimate 06/17/2020 Adequate  Normal Final   Lab on 06/11/2020   Component Date Value Ref Range Status   • Glucose 06/11/2020 91  65 - 99 mg/dL Final   • BUN 06/11/2020 14  8 - 23 mg/dL Final   • Creatinine 06/11/2020 1.07  0.76 - 1.27 mg/dL Final   • Sodium 06/11/2020 139  136 - 145 mmol/L Final   • Potassium 06/11/2020 3.9  3.5 - 5.2 mmol/L Final   • Chloride 06/11/2020 105  98 - 107 mmol/L Final   • CO2 06/11/2020 23.0  22.0 - 29.0 mmol/L Final   • Calcium 06/11/2020 9.0  8.6 - 10.5 mg/dL Final   • Total Protein 06/11/2020 7.5  6.0 - 8.5 g/dL Final   • Albumin 06/11/2020 4.00  3.50 - 5.20 g/dL Final   • ALT (SGPT) 06/11/2020 13  1 - 41 U/L Final   • AST (SGOT) 06/11/2020 16  1 - 40 U/L Final   • Alkaline Phosphatase 06/11/2020 100  39 - 117 U/L Final   • Total Bilirubin 06/11/2020 0.3  0.2 - 1.2 mg/dL Final   • eGFR Non African Amer 06/11/2020 70  >60 mL/min/1.73 Final   • Globulin 06/11/2020 3.5  gm/dL Final   • A/G Ratio 06/11/2020 1.1  g/dL Final   • BUN/Creatinine Ratio 06/11/2020 13.1  7.0 - 25.0 Final   • Anion Gap 06/11/2020 11.0  5.0 - 15.0 mmol/L Final   • Immunofixation Result, Serum 06/11/2020 Comment   Final    No monoclonality detected.   • IgG 06/11/2020 1303  603 - 1613 mg/dL Final   • IgA 06/11/2020 266  61 - 437 mg/dL Final   • IgM 06/11/2020 37  20 - 172 mg/dL Final   • Free Light Chain, Kappa 06/11/2020 60.5* 3.3 - 19.4 mg/L Final   • Free Lambda Light Chains 06/11/2020 30.1* 5.7 - 26.3 mg/L Final   • Kappa/Lambda Ratio 06/11/2020 2.01* 0.26 - 1.65 Final   • LDH 06/11/2020 184  135 - 225 U/L Final   • Beta-2 Microglobulin 06/11/2020 2.7* 0.8 - 2.2 mg/L Final   • WBC 06/11/2020 7.53  3.40 - 10.80  10*3/mm3 Final   • RBC 06/11/2020 4.80  4.14 - 5.80 10*6/mm3 Final   • Hemoglobin 06/11/2020 14.9  13.0 - 17.7 g/dL Final   • Hematocrit 06/11/2020 42.5  37.5 - 51.0 % Final   • MCV 06/11/2020 88.5  79.0 - 97.0 fL Final   • MCH 06/11/2020 31.0  26.6 - 33.0 pg Final   • MCHC 06/11/2020 35.1  31.5 - 35.7 g/dL Final   • RDW 06/11/2020 12.1* 12.3 - 15.4 % Final   • RDW-SD 06/11/2020 39.4  37.0 - 54.0 fl Final   • MPV 06/11/2020 10.5  6.0 - 12.0 fL Final   • Platelets 06/11/2020 201  140 - 450 10*3/mm3 Final   • Neutrophil % 06/11/2020 63.3  42.7 - 76.0 % Final   • Lymphocyte % 06/11/2020 23.2  19.6 - 45.3 % Final   • Monocyte % 06/11/2020 7.8  5.0 - 12.0 % Final   • Eosinophil % 06/11/2020 3.5  0.3 - 6.2 % Final   • Basophil % 06/11/2020 1.5  0.0 - 1.5 % Final   • Immature Grans % 06/11/2020 0.7* 0.0 - 0.5 % Final   • Neutrophils, Absolute 06/11/2020 4.77  1.70 - 7.00 10*3/mm3 Final   • Lymphocytes, Absolute 06/11/2020 1.75  0.70 - 3.10 10*3/mm3 Final   • Monocytes, Absolute 06/11/2020 0.59  0.10 - 0.90 10*3/mm3 Final   • Eosinophils, Absolute 06/11/2020 0.26  0.00 - 0.40 10*3/mm3 Final   • Basophils, Absolute 06/11/2020 0.11  0.00 - 0.20 10*3/mm3 Final   • Immature Grans, Absolute 06/11/2020 0.05  0.00 - 0.05 10*3/mm3 Final   • nRBC 06/11/2020 0.0  0.0 - 0.2 /100 WBC Final   • Hepatitis B Surface Ag 06/11/2020 Non-Reactive  Non-Reactive Final   • Hep A IgM 06/11/2020 Non-Reactive  Non-Reactive Final   • Hep B C IgM 06/11/2020 Non-Reactive  Non-Reactive Final   • Hepatitis C Ab 06/11/2020 Non-Reactive  Non-Reactive Final   Admission on 06/03/2020, Discharged on 06/03/2020   Component Date Value Ref Range Status   • COVID19 06/01/2020 Not Detected  Not Detected - Ref. Range Final   • ABO Type 06/03/2020 O   Final   • RH type 06/03/2020 Positive   Final   • Antibody Screen 06/03/2020 Negative   Final   • T&S Expiration Date 06/03/2020 6/6/2020 11:59:59 PM   Final   • Previous History 06/03/2020 Previous Record on File    Final   • Case Report 06/03/2020    Incomplete                    Value:Surgical Pathology Report                         Case: [id number]                                  Authorizing Provider:  Bry Salvador MD Collected:           06/03/2020 09:05 AM          Ordering Location:     [facility]             Received:            06/03/2020 09:10 AM                                 [facility]                                                              Pathologist:           Kymberly Patel MD                                                        Specimens:   1) - Lymph Node, left submandibular lymph node                                                      2) - Lymph Node, left submandibular lymph node                                                      3) - Lymph Node, level 4 Right                                                            • Final Diagnosis 06/03/2020    Incomplete                    Value:This result contains rich text formatting which cannot be displayed here.   Lab on 06/01/2020   Component Date Value Ref Range Status   • CEA 06/01/2020 3.01  ng/mL Final   • CA 19-9 06/01/2020 18.2  <=35.0 U/mL Final   • ABO Type 06/01/2020 O   Final   • RH type 06/01/2020 Positive   Final   • Antibody Screen 06/01/2020 Negative   Final   • T&S Expiration Date 06/01/2020 6/4/2020 11:59:59 PM   Final   • Previous History 06/01/2020 No record on File   Final   ].  .

## 2020-07-20 ENCOUNTER — TELEPHONE (OUTPATIENT)
Dept: NUTRITION | Facility: HOSPITAL | Age: 60
End: 2020-07-20

## 2020-07-20 RX ORDER — FLUOXETINE HYDROCHLORIDE 40 MG/1
CAPSULE ORAL
Qty: 30 CAPSULE | Refills: 5 | Status: SHIPPED | OUTPATIENT
Start: 2020-07-20 | End: 2020-12-18

## 2020-07-24 ENCOUNTER — TELEPHONE (OUTPATIENT)
Dept: ONCOLOGY | Facility: CLINIC | Age: 60
End: 2020-07-24

## 2020-07-24 NOTE — TELEPHONE ENCOUNTER
Patient added on Dr. Garcia's schedule for Monday. Gave date/time of appointment. He verbalized understanding.

## 2020-07-24 NOTE — TELEPHONE ENCOUNTER
Patient would like to speak to Dr. Garcia.    Dr. Garcia sent to patient [place] for what he thought was a second opinion on his treatment.    They sent him to a lung doctor who wants him to do a lung biopsy on 8/6.    He is unsure about this and feels lost in it all.    Please call patient asap  [phone]

## 2020-07-27 ENCOUNTER — OFFICE VISIT (OUTPATIENT)
Dept: ONCOLOGY | Facility: CLINIC | Age: 60
End: 2020-07-27

## 2020-07-27 VITALS
WEIGHT: 167.5 LBS | HEART RATE: 75 BPM | TEMPERATURE: 98.7 F | DIASTOLIC BLOOD PRESSURE: 66 MMHG | BODY MASS INDEX: 23.98 KG/M2 | RESPIRATION RATE: 18 BRPM | SYSTOLIC BLOOD PRESSURE: 115 MMHG | HEIGHT: 70 IN

## 2020-07-27 DIAGNOSIS — R91.1 LEFT UPPER LOBE PULMONARY NODULE: ICD-10-CM

## 2020-07-27 DIAGNOSIS — Z71.6 ENCOUNTER FOR TOBACCO USE CESSATION COUNSELING: ICD-10-CM

## 2020-07-27 DIAGNOSIS — C83.18 MANTLE CELL LYMPHOMA OF LYMPH NODES OF MULTIPLE REGIONS (HCC): Primary | ICD-10-CM

## 2020-07-27 DIAGNOSIS — M15.9 PRIMARY OSTEOARTHRITIS INVOLVING MULTIPLE JOINTS: Chronic | ICD-10-CM

## 2020-07-27 LAB
LAB AP CASE REPORT: NORMAL
PATH REPORT.FINAL DX SPEC: NORMAL

## 2020-07-27 PROCEDURE — 99406 BEHAV CHNG SMOKING 3-10 MIN: CPT | Performed by: INTERNAL MEDICINE

## 2020-07-27 PROCEDURE — G0463 HOSPITAL OUTPT CLINIC VISIT: HCPCS | Performed by: INTERNAL MEDICINE

## 2020-07-27 PROCEDURE — 99214 OFFICE O/P EST MOD 30 MIN: CPT | Performed by: INTERNAL MEDICINE

## 2020-07-27 RX ORDER — BUPROPION HYDROCHLORIDE 100 MG/1
100 TABLET, EXTENDED RELEASE ORAL 2 TIMES DAILY
Qty: 90 TABLET | Refills: 0 | Status: SHIPPED | OUTPATIENT
Start: 2020-07-27 | End: 2020-11-19 | Stop reason: SDUPTHER

## 2020-07-27 RX ORDER — NICOTINE 21 MG/24HR
1 PATCH, TRANSDERMAL 24 HOURS TRANSDERMAL EVERY 24 HOURS
Qty: 30 EACH | Refills: 0 | Status: SHIPPED | OUTPATIENT
Start: 2020-07-27 | End: 2021-07-16

## 2020-07-27 RX ORDER — HYDROCODONE BITARTRATE AND ACETAMINOPHEN 7.5; 325 MG/1; MG/1
1 TABLET ORAL EVERY 8 HOURS PRN
Qty: 90 TABLET | Refills: 0 | Status: SHIPPED | OUTPATIENT
Start: 2020-07-27 | End: 2020-08-27 | Stop reason: SDUPTHER

## 2020-07-28 ENCOUNTER — OFFICE VISIT (OUTPATIENT)
Dept: OTOLARYNGOLOGY | Facility: CLINIC | Age: 60
End: 2020-07-28

## 2020-07-28 VITALS — OXYGEN SATURATION: 98 % | HEIGHT: 70 IN | WEIGHT: 167 LBS | BODY MASS INDEX: 23.91 KG/M2

## 2020-07-28 DIAGNOSIS — H69.83 ETD (EUSTACHIAN TUBE DYSFUNCTION), BILATERAL: Primary | ICD-10-CM

## 2020-07-28 DIAGNOSIS — R91.1 LEFT UPPER LOBE PULMONARY NODULE: ICD-10-CM

## 2020-07-28 DIAGNOSIS — C83.18 MANTLE CELL LYMPHOMA OF LYMPH NODES OF MULTIPLE REGIONS (HCC): Primary | ICD-10-CM

## 2020-07-28 PROCEDURE — 99213 OFFICE O/P EST LOW 20 MIN: CPT | Performed by: OTOLARYNGOLOGY

## 2020-07-31 NOTE — PROGRESS NOTES
Subjective   Mitch Rinaldi is a 60 y.o. male.       History of Present Illness   Patient has been seen previously with eustachian tube dysfunction.  At last visit had a history consistent with otitis media with rupture that had healed.  Has not been seen since April 2019.  Reports that since I saw him last he has been diagnosed with mantle cell lymphoma and is awaiting biopsy of a separate lung lesion.  Says his ears have not been bothering him at all.      The following portions of the patient's history were reviewed and updated as appropriate: allergies, current medications, past family history, past medical history, past social history, past surgical history and problem list.     reports that he has been smoking cigarettes. He has a 42.00 pack-year smoking history. He quit smokeless tobacco use about 6 years ago. He reports that he does not drink alcohol or use drugs.   Patient is a tobacco user and has been counseled for use of tobacco products      Review of Systems   Constitutional: Negative for fever.           Objective   Physical Exam  General: Well-developed well-nourished male in no acute distress.  Alert and oriented x3. Head: Normocephalic. Face: Symmetrical strength and appearance. PERRL. EOMI. Voice:Strong. Speech:Fluent  Ears: External ears no deformity, canals no discharge, tympanic membranes intact clear and mobile bilaterally.  Nose: Nares show no discharge mass polyp or purulence.  Boggy mucosa is present.  No gross external deformity.    Oral cavity: Lips and gums without lesions.  Tongue and floor of mouth without lesions.  Parotid and submandibular ducts unobstructed.  No mucosal lesions on the buccal mucosa or vestibule of the mouth.  Pharynx: No erythema exudate mass or ulcer  Neck: No lymphadenopathy.  No thyromegaly.  Trachea and larynx midline.  No masses in the parotid or submandibular glands.  Healed surgical scar in the left submandibular region.  This is from a lymph node biopsy  performed by Dr. Salvador.      Assessment/Plan   Mitch was seen today for follow-up.    Diagnoses and all orders for this visit:    ETD (Eustachian tube dysfunction), bilateral      Plan: With his ears clear for more than a year I do not think any additional treatment is needed.  May follow-up with me as needed.

## 2020-08-01 NOTE — PROGRESS NOTES
Mitch Rinaldi  [id number]  1960    Subjective     Mr Gentry was seen in follow up for mantle cell lymphoma.     Patient was evaluated at [facility] for second opinion regarding his mantle cell lymphoma.  He was recommended to undergo bronchoscopy to evaluate pulmonary nodule further with possible biopsy.  The main concern was to rule out concomitant primary lung neoplasm especially given his extensive smoking history.  I believe this is very reasonable.  He scheduled to undergo bronchoscopy and possible biopsy next Thursday.  He is requesting that he will need COVID testing performed prior to bronchoscopy and was wondering if this can be arranged locally.  I will coordinate the timing of the test which needs to be within 72 hours of bronchoscopy with our lab and arrange for COVID testing prior to his bronchoscopy.  Patient was instructed to come to our urgent care on Monday morning for call with test and results should be available on Tuesday.  His bronchoscopy is on Thursday so this should work out well.      He continues to smoke.  Given he has pulmonary nodule I recommend that he needs to stop smoking especially if he is diagnosed with primary lung neoplasm which could require lung resection.  Patient was interested and was requesting Wellbutrin and nicotine patch to assist him with quitting.  This was arranged today.    Past Medical History, Past Surgical History, Social History, Family History have been reviewed and are without significant changes except as mentioned.    Review of Systems       CONSTITUTIONAL: fatigue + No weight loss, fever, chills, weakness.  HEENT: Eyes: No visual loss, blurred vision, double vision or yellow sclerae. Ears, Nose, Throat: No hearing loss, sneezing, congestion, runny nose or sore throat.  SKIN: No rash or itching.  CARDIOVASCULAR: No chest pain, chest pressure or chest discomfort. No palpitations or edema.  RESPIRATORY: No shortness of breath, cough  "or sputum.  GASTROINTESTINAL: No anorexia, nausea, vomiting or diarrhea. No abdominal pain or blood.  GENITOURINARY: Negative for urgency, frequency or dysuria.   NEUROLOGICAL: No headache, dizziness, syncope, paralysis, ataxia, numbness or tingling in the extremities. No change in bowel or bladder control.  MUSCULOSKELETAL: chronic joint pain + low back pain + .  HEMATOLOGIC: No anemia, bleeding or bruising.  LYMPHATICS: No enlarged nodes. No history of splenectomy.  PSYCHIATRIC: No history of depression or anxiety.  ENDOCRINOLOGIC: No reports of sweating, cold or heat intolerance. No polyuria or polydipsia.  ALLERGIES: No history of asthma, hives, eczema or rhinitis.      Medications:  The current medication list was reviewed in the EMR    ALLERGIES:    Allergies   Allergen Reactions   • Darvon [Propoxyphene] Nausea And Vomiting   • Percocet [Oxycodone-Acetaminophen] Palpitations       Objective      Vitals:    07/27/20 1351   BP: 115/66   Pulse: 75   Resp: 18   Temp: 98.7 °F (37.1 °C)   TempSrc: Temporal   Weight: 76 kg (167 lb 8 oz)   Height: 177.8 cm (70\")   PainSc: 0-No pain     Current Status 7/27/2020   ECOG score 0       Physical Exam      GENERAL: Alert, awake, oriented.  Well dressed.  Not in apparent distress. Vitals as above.   HEAD: Normocephalic, atraumatic.   EYES: PERRL, EOMI.  vision is grossly intact.  NECK: Supple, no adenopathy or thyromegaly.   THROAT: Normal oral cavity and pharynx. No inflammation, swelling, exudate, or lesions.  CARDIAC: Normal S1 and S2. No S3, S4 or murmurs. Rhythm is regular.  Extremities are warm and well perfused.   LUNGS: Clear to auscultation and percussion without rales, rhonchi, wheezing or diminished breath sounds.  ABDOMEN: Positive bowel sounds. Soft, nondistended, nontender. No guarding or rebound. No masses.  BACK:  No bony tenderness.   EXTREMITIES: No significant deformity or joint abnormality.  Peripheral pulses intact. No varicosities.  SKIN: No rash or " bruising.  NEUROLOGICAL: Grossly non-focal exam. No focal weakness. Gait: Normal.   PSYCH: Mood and affect normal. No hallucination or suicidal thoughts.   LYMPHATIC: Small lymphadenopathy involving bilateral axilla, inguinal region.     RECENT LABS:Independently reviewed and summarized.  Hematology WBC   Date Value Ref Range Status   06/17/2020 7.58 3.40 - 10.80 10*3/mm3 Final     RBC   Date Value Ref Range Status   06/17/2020 4.52 4.14 - 5.80 10*6/mm3 Final     Hemoglobin   Date Value Ref Range Status   06/17/2020 13.9 13.0 - 17.7 g/dL Final     Hematocrit   Date Value Ref Range Status   06/17/2020 40.2 37.5 - 51.0 % Final     Platelets   Date Value Ref Range Status   06/17/2020 189 140 - 450 10*3/mm3 Final          Imaging (independently reviewed and summarized):      CT chest low-dose on May 12, 2020 showed left upper lobe noncalcified mass lesion with spiculated border.  This measured 2.57 x 2.23 x 1.52 cm.  In addition, it also showed left lateral basilar segment small lung parenchymal nodule measuring 5.2 mm.  No prior comparison available.     PET scan on May 29, 2020 showed hypermetabolic activity involving nodule in the left upper lobe.  In addition hypermetabolic activity with lymph node in the right axilla with SUV max of 5.2, left axilla with SUV max of 6.2, hypermetabolic activity was also noted to the left of the sternum with SUV max of 5.  Hypermetabolic activity in the left hilum with SUV max of 7.1, right hilum with SUV max of 3.7, hypermetabolic activity in the right and left posterior inferior mediastinum with SUV max of 5.4.    In addition hypermetabolic activity was noticed in todd hepatis area with SUV max of 12, hypermetabolic activity in the left and right inguinal region with SUV max of 5.8 and 3.8 respectively.  No prior comparison available     Pathology:   Pathology showed CD20 positive, CD5 negative CD10 negative small to medium sized B-cell lymphoma involving left submandibular and  level 4 right mediastinal lymph node.  Immunophenotype was negative for CD30, CD43.  Ki-67 showed 10 to 15% nuclear proliferation index.  EBV was positive in occasional cells.  Immunohistochemical stain for cyclin D1 was positive for atypical lymphocyte.    FISH positive for t(11,14).     Bone marrow negative for Lymphoma.      Mitch Rinaldi reports a pain score of 0.  Given his pain assessment as noted, treatment options were discussed and the following options were decided upon as a follow-up plan to address the patient's pain: continuation of current treatment plan for pain.    PHQ-9 Total Score: 2  Depression screen negative.      Diagnosis:   (1) Mantle cell lymphoma   (2) Left upper lobe pulmonary nodule   (3) Osteoarthritis, multiple joints   (4) Hypertension   (5) Hyperlipidemia   (6) Encounter for tobacco use cessation counseling     Assessment/Plan       (1) Mantle cell lymphoma:     Patient with diagnosis of CD 5 negative mantle cell lymphoma.   IHC for cyclin D1 - positive x 2.   FISH positive for t(11,14) translocation.   Cytogenetics - normal (no TP53 mutation)  Ki - 67: 10% to 15%.   Bone marrow is negative.     Stage IV (Provided lung lesions are mantle cell lymphoma. Mediastinal nodes sampling showed mantle cell lymphoma, so it is quite likely that lung nodules are mantle cell lymphoma; however, given history smoking history primary lung cancer can not be ruled out 100% certainty).     MIPI score 4 (intermediate risk).     He had second opinion at [facility] by lymphoma specialist and was recommended to undergo biopsy for pulmonary nodule to rule out primary lung neoplasm especially given his smoking habit.    They do not have any clinical trials available for him.    Given he is asymptomatic it was recommended that he continue observation/surveillance for his mantle cell lymphoma.  I will plan to see him back after the result of biopsy of pulmonary nodule to decide definitive  treatment recommendation.        (2) Left upper lobe pulmonary nodule:   Discussed with patient that likely this represents lymphoma (especially given mediastinal lymph node was positive for lymphoma).      Patient had evaluation performed with lymphoma specialist at [facility] and was recommended to undergo bronchoscopy and biopsy of left upper lobe pulmonary nodule to rule out primary lung neoplasm.    He scheduled to undergo bronchoscopy and biopsy next week.  He is requesting that he will need COVID testing performed prior to bronchoscopy and was wondering if this can be arranged locally.  I will coordinate the timing of the test which needs to be within 72 hours of bronchoscopy with our lab and arrange for COVID testing prior to his bronchoscopy.  Patient was instructed to come to our urgent care on Monday morning for call with test and results should be available on Tuesday.  His bronchoscopy is on Thursday so this should work out well.    (3) Osteoarthritis, multiple joints: Stable on PRN [place].  New prescription given to the patient today.     (4) Hypertension: Stable on Lopressor.     (5) Hyperlipidemia: Stable on Crestor    (6) Encounter for tobacco use cessation counseling: I had 5-7 minutes discussion with the patient about smoking cessation. Problems with continued smoking including respiratory, cardiovascular and oncological problems were discussion. Advice on smoking cessation was reiterated including methods of quitting and different medications and support system available for smoking cessation was discussed. Patient understood and was agreeable.  He is requesting prescription of nicotine patch and Wellbutrin which was sent to his pharmacy today.         Prescription sent today: norco, wellbutrin, nicotine patch   Covid testing arranged prior to his bronchoscopy.       Jose Alexis MD     8/1/2020      CC:

## 2020-08-03 PROCEDURE — 87635 SARS-COV-2 COVID-19 AMP PRB: CPT | Performed by: INTERNAL MEDICINE

## 2020-08-24 ENCOUNTER — OFFICE VISIT (OUTPATIENT)
Dept: ONCOLOGY | Facility: CLINIC | Age: 60
End: 2020-08-24

## 2020-08-24 VITALS
BODY MASS INDEX: 24.29 KG/M2 | RESPIRATION RATE: 18 BRPM | TEMPERATURE: 98 F | SYSTOLIC BLOOD PRESSURE: 125 MMHG | HEIGHT: 70 IN | HEART RATE: 80 BPM | WEIGHT: 169.7 LBS | DIASTOLIC BLOOD PRESSURE: 69 MMHG

## 2020-08-24 DIAGNOSIS — G89.3 CANCER ASSOCIATED PAIN: ICD-10-CM

## 2020-08-24 DIAGNOSIS — R91.1 LEFT UPPER LOBE PULMONARY NODULE: ICD-10-CM

## 2020-08-24 DIAGNOSIS — C83.18 MANTLE CELL LYMPHOMA OF LYMPH NODES OF MULTIPLE REGIONS (HCC): Primary | ICD-10-CM

## 2020-08-24 DIAGNOSIS — J44.9 CHRONIC OBSTRUCTIVE PULMONARY DISEASE, UNSPECIFIED COPD TYPE (HCC): ICD-10-CM

## 2020-08-24 PROCEDURE — G0463 HOSPITAL OUTPT CLINIC VISIT: HCPCS | Performed by: INTERNAL MEDICINE

## 2020-08-24 PROCEDURE — 99214 OFFICE O/P EST MOD 30 MIN: CPT | Performed by: INTERNAL MEDICINE

## 2020-08-24 NOTE — PROGRESS NOTES
Nguyễn Rinaldi was seen in follow-up for mantle cell lymphoma and left upper lobe pulmonary nodule.  He is doing well since his last visit.  Continues to struggle with fatigue.  Denies any fever or chills.  Denies drenching night sweats.  Result of bronchoscopy and pathology reviewed.  Patient had bronchoscopy with EBUS and FNA of left upper lobe nodule and subcarinal and left hilar node FNA performed which showed no evidence of carcinoma or lymphoma.  Treatment options for mantle cell lymphoma discussed at length.  This include observation versus chemoimmunotherapy using bendamustine and rituximab.  Potential side effects of chemotherapy discussed at length as well.  Discussed with patient that treating mantle cell lymphoma early versus waiting until he developed symptoms would not affect the eventual outcome.      Past Medical History, Past Surgical History, Social History, Family History have been reviewed and are without significant changes except as mentioned.    Review of Systems       CONSTITUTIONAL: fatigue +  No weight loss, fever, chills, weakness.  HEENT: Eyes: No visual loss, blurred vision, double vision or yellow sclerae. Ears, Nose, Throat: No hearing loss, sneezing, congestion, runny nose or sore throat.  SKIN: No rash or itching.  CARDIOVASCULAR: No chest pain, chest pressure or chest discomfort. No palpitations or edema.  RESPIRATORY: No shortness of breath, cough or sputum.  GASTROINTESTINAL: No anorexia, nausea, vomiting or diarrhea. No abdominal pain or blood.  GENITOURINARY: Negative for urgency, frequency or dysuria.   NEUROLOGICAL: No headache, dizziness, syncope, paralysis, ataxia, numbness or tingling in the extremities. No change in bowel or bladder control.  MUSCULOSKELETAL: low back pain + .  HEMATOLOGIC: No anemia, bleeding or bruising.  LYMPHATICS: No enlarged nodes. No history of splenectomy.  PSYCHIATRIC: No history of depression or anxiety.  ENDOCRINOLOGIC: No  reports of sweating, cold or heat intolerance. No polyuria or polydipsia.  ALLERGIES: No history of asthma, hives, eczema or rhinitis.      Medications:  The current medication list was reviewed in the EMR    ALLERGIES:    Allergies   Allergen Reactions   • Darvon [Propoxyphene] Nausea And Vomiting   • Percocet [Oxycodone-Acetaminophen] Palpitations       Objective      There were no vitals filed for this visit.  Current Status 7/27/2020   ECOG score 0       Physical Exam      GENERAL: Alert, awake, oriented.  Well dressed.  Not in apparent distress. Vitals as above.   HEAD: Normocephalic, atraumatic.   EYES: PERRL, EOMI. vision is grossly intact.  NECK: Supple, no adenopathy or thyromegaly.   THROAT: Normal oral cavity and pharynx. No inflammation, swelling, exudate, or lesions.  CARDIAC: Normal S1 and S2. No S3, S4 or murmurs. Rhythm is regular.  Extremities are warm and well perfused.   LUNGS: Clear to auscultation and percussion without rales, rhonchi, wheezing or diminished breath sounds.  ABDOMEN: Positive bowel sounds. Soft, nondistended, nontender. No guarding or rebound. No masses.  BACK:  No bony tenderness.   EXTREMITIES: No significant deformity or joint abnormality.  Peripheral pulses intact. No varicosities.  SKIN: No rash or bruising.  NEUROLOGICAL: Grossly non-focal exam. No focal weakness. Gait: Normal.   PSYCH: Mood and affect normal. No hallucination or suicidal thoughts.   LYMPHATIC: Lymphadenopathy involving bilateral axilla and inguinal region +       RECENT LABS: Independently reviewed and summarized  Hematology WBC   Date Value Ref Range Status   06/17/2020 7.58 3.40 - 10.80 10*3/mm3 Final     RBC   Date Value Ref Range Status   06/17/2020 4.52 4.14 - 5.80 10*6/mm3 Final     Hemoglobin   Date Value Ref Range Status   06/17/2020 13.9 13.0 - 17.7 g/dL Final     Hematocrit   Date Value Ref Range Status   06/17/2020 40.2 37.5 - 51.0 % Final     Platelets   Date Value Ref Range Status   06/17/2020  189 140 - 450 10*3/mm3 Final          Pathology:    8/6/20  FNA of left upper lobe nodule as well as subcarinal and left hilar lymph node FNA did not show any evidence of carcinoma.      Diagnosis:   (1) Mantle cell lymphoma   (2) Left upper lobe pulmonary nodule   (3) Osteoarthritis, multiple joints   (4) Hypertension     Assessment/Plan       (1) Mantle cell lymphoma:      Patient with diagnosis of CD 5 negative mantle cell lymphoma.   IHC for cyclin D1 - positive x 2.   FISH positive for t(11,14) translocation.   Cytogenetics - normal (no TP53 mutation)  Ki - 67: 10% to 15%.   Bone marrow is negative.     Left upper lobe pulmonary nodule biopsy (FNA) did not show any carcinoma or lymphoma.  Inadequate sampling for lymphoma could not be ruled out.    If we assume left upper lobe nodule which is hypermetabolic is secondary to mantle cell lymphoma he has stage IV disease.    MIPI score 4 (intermediate risk).     It has been 3 months since his last imaging.  I will repeat PET scan to see if there is any evidence of disease progression.  Follow-up in a week to go over treatment options.  This could include observation versus chemoimmunotherapy using bendamustine and rituximab.  Discussed with patient that treating mantle cell lymphoma early versus waiting until he developed symptoms would not affect the eventual outcome.    (2) Left upper lobe pulmonary nodule:   Patient underwent bronchoscopy and EBUS guided left upper lobe nodule FNA as well as subcarinal and left hilar lymph node FNA on August 6, 2020.  This showed no evidence of carcinoma or lymphoma.  It is quite possible that sampling was inadequate for lymphoma diagnosis.  This nodule needs to be monitored closely given his extensive smoking habit.     (3) osteoarthritis, multiple joints: Stable on [place]    (4) Hypertension: His blood pressure is well controlled on current antihypertensive medications.          Interim history, ROS, Physical exam and  assessment/plan was updated on 8/24/20.  Some parts of history, review of system, physical exam and assessment/plan might have remained unchanged compared to prior visit due to no change in the clinical condition. Even though copy/paste functionality was used, this note was updated on 8/24/20 as per [facility] compliance policy # 2421.4.       8/24/2020      CC:

## 2020-08-27 DIAGNOSIS — M15.9 PRIMARY OSTEOARTHRITIS INVOLVING MULTIPLE JOINTS: Chronic | ICD-10-CM

## 2020-08-27 RX ORDER — HYDROCODONE BITARTRATE AND ACETAMINOPHEN 7.5; 325 MG/1; MG/1
1 TABLET ORAL EVERY 8 HOURS PRN
Qty: 90 TABLET | Refills: 0 | Status: SHIPPED | OUTPATIENT
Start: 2020-08-27 | End: 2020-09-24 | Stop reason: SDUPTHER

## 2020-08-28 ENCOUNTER — HOSPITAL ENCOUNTER (OUTPATIENT)
Dept: PET IMAGING | Facility: HOSPITAL | Age: 60
Discharge: HOME | End: 2020-08-28
Admitting: INTERNAL MEDICINE

## 2020-08-28 DIAGNOSIS — C83.18 MANTLE CELL LYMPHOMA OF LYMPH NODES OF MULTIPLE REGIONS (HCC): ICD-10-CM

## 2020-08-28 PROCEDURE — A9552 F18 FDG: HCPCS | Performed by: INTERNAL MEDICINE

## 2020-08-28 PROCEDURE — 78815 PET IMAGE W/CT SKULL-THIGH: CPT

## 2020-08-28 PROCEDURE — 0 FLUDEOXYGLUCOSE F18 SOLUTION: Performed by: INTERNAL MEDICINE

## 2020-08-28 RX ADMIN — FLUDEOXYGLUCOSE F18 1 DOSE: 300 INJECTION INTRAVENOUS at 11:07

## 2020-09-01 ENCOUNTER — OFFICE VISIT (OUTPATIENT)
Dept: ONCOLOGY | Facility: CLINIC | Age: 60
End: 2020-09-01

## 2020-09-01 VITALS
WEIGHT: 173.4 LBS | TEMPERATURE: 98.1 F | BODY MASS INDEX: 24.82 KG/M2 | SYSTOLIC BLOOD PRESSURE: 107 MMHG | HEART RATE: 71 BPM | DIASTOLIC BLOOD PRESSURE: 60 MMHG | RESPIRATION RATE: 18 BRPM | HEIGHT: 70 IN

## 2020-09-01 DIAGNOSIS — R91.1 LEFT UPPER LOBE PULMONARY NODULE: ICD-10-CM

## 2020-09-01 DIAGNOSIS — C83.18 MANTLE CELL LYMPHOMA OF LYMPH NODES OF MULTIPLE REGIONS (HCC): Primary | ICD-10-CM

## 2020-09-01 DIAGNOSIS — J44.9 CHRONIC OBSTRUCTIVE PULMONARY DISEASE, UNSPECIFIED COPD TYPE (HCC): ICD-10-CM

## 2020-09-01 PROCEDURE — 99214 OFFICE O/P EST MOD 30 MIN: CPT | Performed by: INTERNAL MEDICINE

## 2020-09-01 PROCEDURE — G0463 HOSPITAL OUTPT CLINIC VISIT: HCPCS | Performed by: INTERNAL MEDICINE

## 2020-09-01 NOTE — PROGRESS NOTES
Subjective     Mitch Rinaldi was seen in follow-up for mantle cell lymphoma and left upper lobe pulmonary nodule.  We reviewed the result of PET scan which showed overall stable lymphadenopathy and pulmonary nodule.  Bronchoscopy and biopsy  of pulmonary nodule was negative for any malignancy.  I had a very lengthy discussion with patient about his diagnosis and treatment options.  Given he is asymptomatic and his PET scan has not changed in 3 months I believe his mantle cell lymphoma is slow-growing.  He is currently asymptomatic.  Case was discussed with lymphoma specialist at [facility] and pulmonary physician.  After lengthy discussion and reviewing the recent PET scan I recommend we continue to observe him for any signs and symptoms of mantle cell lymphoma without any treatment.  Patient was reassured that treating his lymphoma at the moment versus later when he developed symptoms would not change the outcome.  I will plan to obtain CT scan of chest, abdomen pelvis in 3 months to follow-up on pulmonary nodule as well as status of enlarged lymph nodes.  Meanwhile, he was instructed to call us if he develops any new symptoms.  He continue to take Wellbutrin and starting nicotine patch today to assist him quit smoking.    Past Medical History, Past Surgical History, Social History, Family History have been reviewed and are without significant changes except as mentioned.    Review of Systems       CONSTITUTIONAL: fatigue + No weight loss, fever, chills, weakness.  HEENT: Eyes: No visual loss, blurred vision, double vision or yellow sclerae. Ears, Nose, Throat: No hearing loss, sneezing, congestion, runny nose or sore throat.  SKIN: No rash or itching.  CARDIOVASCULAR: No chest pain, chest pressure or chest discomfort. No palpitations or edema.  RESPIRATORY: No shortness of breath, cough or sputum.  GASTROINTESTINAL: No anorexia, nausea, vomiting or diarrhea. No abdominal pain or  "blood.  GENITOURINARY: Negative for urgency, frequency or dysuria.   NEUROLOGICAL: No headache, dizziness, syncope, paralysis, ataxia, numbness or tingling in the extremities. No change in bowel or bladder control.  MUSCULOSKELETAL: low back pain +   HEMATOLOGIC: No anemia, bleeding or bruising.  LYMPHATICS: enlarged nodes + . No history of splenectomy.  PSYCHIATRIC: No history of depression or anxiety.  ENDOCRINOLOGIC: No reports of sweating, cold or heat intolerance. No polyuria or polydipsia.  ALLERGIES: No history of asthma, hives, eczema or rhinitis.      Medications:  The current medication list was reviewed in the EMR    ALLERGIES:    Allergies   Allergen Reactions   • Darvon [Propoxyphene] Nausea And Vomiting   • Percocet [Oxycodone-Acetaminophen] Palpitations       Objective      Vitals:    09/01/20 1004   BP: 107/60   Pulse: 71   Resp: 18   Temp: 98.1 °F (36.7 °C)   TempSrc: Temporal   Weight: 78.7 kg (173 lb 6.4 oz)   Height: 177.8 cm (70\")   PainSc: 0-No pain     Current Status 9/1/2020   ECOG score 0       Physical Exam      GENERAL: Alert, awake, oriented.  Well dressed.  Not in apparent distress. Vitals as above.   HEAD: Normocephalic, atraumatic.   EYES: PERRL, EOMI.  vision is grossly intact.  NECK: Supple, no adenopathy or thyromegaly.   THROAT: Normal oral cavity and pharynx. No inflammation, swelling, exudate, or lesions.  CARDIAC: Normal S1 and S2. No S3, S4 or murmurs. Rhythm is regular.  Extremities are warm and well perfused.   LUNGS: Clear to auscultation and percussion without rales, rhonchi, wheezing or diminished breath sounds.  ABDOMEN: Positive bowel sounds. Soft, nondistended, nontender. No guarding or rebound. No masses.  BACK:  No bony tenderness.   EXTREMITIES: No significant deformity or joint abnormality.  Peripheral pulses intact. No varicosities.  SKIN: No rash or bruising.  NEUROLOGICAL: Grossly non-focal exam. No focal weakness. Gait: Normal.   PSYCH: Mood and affect normal. " No hallucination or suicidal thoughts.   LYMPHATIC: Small lymphadenopathy involving bilateral axilla and inguinal region +       RECENT LABS: Independently reviewed and summarized  Hematology WBC   Date Value Ref Range Status   06/17/2020 7.58 3.40 - 10.80 10*3/mm3 Final     RBC   Date Value Ref Range Status   06/17/2020 4.52 4.14 - 5.80 10*6/mm3 Final     Hemoglobin   Date Value Ref Range Status   06/17/2020 13.9 13.0 - 17.7 g/dL Final     Hematocrit   Date Value Ref Range Status   06/17/2020 40.2 37.5 - 51.0 % Final     Platelets   Date Value Ref Range Status   06/17/2020 189 140 - 450 10*3/mm3 Final          Imaging (independently reviewed and summarized):   Results of PET scan from August 28, 2020 reviewed.  Showed overall stable pathologic lymphadenopathy without any change.   Left upper lobe pulmonary nodule stable compared to prior PET scan from May 29, 2020.    Diagnosis:   (1) Mantle cell lymphoma   (2) Left upper lobe pulmonary nodule   (3) Osteoarthritis, multiple joints   (4) Hypertension   (5) Tobacco abuse     Assessment/Plan       (1) Mantle cell lymphoma:      Patient with diagnosis of CD 5 negative mantle cell lymphoma.   IHC for cyclin D1 - positive x 2.   FISH positive for t(11,14) translocation.   Cytogenetics - normal (no TP53 mutation)  Ki - 67: 10% to 15%.   Bone marrow is negative.      Left upper lobe pulmonary nodule biopsy (FNA) did not show any carcinoma or lymphoma.  Inadequate sampling for lymphoma could not be ruled out.     If we assume left upper lobe nodule which is hypermetabolic is secondary to mantle cell lymphoma he has stage IV disease.     MIPI score 4 (intermediate risk).      Given he is asymptomatic with low Ki-67 it was decided to watch him.  We obtain PET scan which showed overall stable disease without any evidence of progression.  Discussed with patient that given he is asymptomatic and his lymphoma has not progressed in 3 months at all I believe it would be reasonable  to wait and watch until he becomes symptomatic.  Patient was evaluated by oncologist/lymphoma specialist at [facility] and recommended to wait and watch approach as well.  Discussed with patient that treating him right at the moment versus waiting until he becomes symptomatic would not change the outcome of mantle cell lymphoma.  Patient and family were in agreement with wait and watch approach    I will plan to see him back in 3 months with CT scan of chest, abdomen pelvis.  Sooner if he becomes symptomatic.    2) Left upper lobe pulmonary nodule:   Patient underwent bronchoscopy and EBUS guided left upper lobe nodule FNA as well as subcarinal and left hilar lymph node FNA on August 6, 2020.  This showed no evidence of carcinoma or lymphoma.  It is quite possible that sampling was inadequate for lymphoma diagnosis.  This nodule needs to be monitored closely given his extensive smoking habit.   I will plan to obtain CT scan of chest in 3 months.  He will continue to be monitored by pulmonary doctor in [facility].    (3) Osteoarthritis, multiple joints   Stable on [place].  We will continue this.    (4) Hypertension   His blood pressure is well controlled on current antihypertensive medications.    (5) Tobacco abuse: We have started him on Wellbutrin and nicotine patch.  He was encouraged to stay abstinent from smoking.        Interim history, ROS, Physical exam and assessment/plan was updated on 9/1/20.  Some parts of history, review of system, physical exam and assessment/plan might have remained unchanged compared to prior visit due to no change in the clinical condition. Even though copy/paste functionality was used, this note was updated on 9/1/20 as per [facility] compliance policy # 2421.4.       9/1/2020      CC:

## 2020-09-04 ENCOUNTER — OFFICE VISIT (OUTPATIENT)
Dept: CARDIOLOGY | Facility: CLINIC | Age: 60
End: 2020-09-04

## 2020-09-04 VITALS
DIASTOLIC BLOOD PRESSURE: 70 MMHG | WEIGHT: 172.8 LBS | OXYGEN SATURATION: 98 % | HEART RATE: 75 BPM | SYSTOLIC BLOOD PRESSURE: 120 MMHG | HEIGHT: 70 IN | BODY MASS INDEX: 24.74 KG/M2

## 2020-09-04 DIAGNOSIS — E78.2 MIXED HYPERLIPIDEMIA: ICD-10-CM

## 2020-09-04 DIAGNOSIS — I25.10 CORONARY ARTERY DISEASE INVOLVING NATIVE CORONARY ARTERY OF NATIVE HEART WITHOUT ANGINA PECTORIS: Primary | ICD-10-CM

## 2020-09-04 PROCEDURE — 99406 BEHAV CHNG SMOKING 3-10 MIN: CPT | Performed by: INTERNAL MEDICINE

## 2020-09-04 PROCEDURE — 99214 OFFICE O/P EST MOD 30 MIN: CPT | Performed by: INTERNAL MEDICINE

## 2020-09-04 NOTE — PROGRESS NOTES
Mitch Rinaldi  60 y.o. male    09/04/2020  Chief Complaint   Patient presents with   • Follow-up       History of Present Illness  Hyperlipidemia, hypertension    -        SUBJECTIVE  Patient overall is doing well.  He had a normal exercise nuclear after borderline cardiac CTA.  He is having no angina.  He has been diagnosed with lymphoma but that is stable and he is not being treated.  He is having no chest pain.  All in all she is feeling well.  Allergies   Allergen Reactions   • Darvon [Propoxyphene] Nausea And Vomiting   • Percocet [Oxycodone-Acetaminophen] Palpitations         Past Medical History:   Diagnosis Date   • Acute gastroenteritis    • Allergic    • Anxiety    • Asthma     as a  child   • Diverticulitis of colon    • Emphysema lung (CMS/HCC)    • Epididymal cyst    • Gastroesophageal reflux disease    • Generalized anxiety disorder    • Hyperlipidemia    • Hypertension    • Impaired glucose tolerance    • Pain, lumbar region     Pain radiating to lumbar region of back      • Peripheral nervous system disease     Disorder of the peripheral nervous system      • Pleuritic chest pain    • Pneumonia    • Primary osteoarthritis involving multiple joints 8/29/2016   • Shoulder pain, left    • Spermatocele 4/25/2018    Added automatically from request for surgery [id number]   • Tachycardia    • Tinnitus    • Wears glasses    • Wears partial dentures          Past Surgical History:   Procedure Laterality Date   • CHOLECYSTECTOMY     • COLONOSCOPY N/A 1/14/2019    Procedure: COLONOSCOPY;  Surgeon: Ronald Rodriguez DO;  Location: [facility] ENDOSCOPY;  Service: Gastroenterology   • DENTAL PROCEDURE     • ENDOSCOPY AND COLONOSCOPY     • EPIDIDYMIS SURGERY Right     cyst   • HERNIA REPAIR  Feb 2017   • INGUINAL HERNIA REPAIR Left 2/6/2017    Procedure: OPEN LEFT INGUINAL HERNIA REPAIR WITH MESH;  Surgeon: Miles Gregorio MD;  Location: [facility] OR;  Service:    • INJECTION OF MEDICATION      Depo Medrol  (Methylprednisone) 80mg (8)      09/01/2015       • INJECTION OF MEDICATION      Kenalog (1)      12/11/2012       • LUNG SURGERY       Pneumothorax 1980       • LYMPH NODE BIOPSY N/A 6/3/2020    Procedure: BIOPSY LYMPH NODE;  Surgeon: Bry Salvador MD;  Location: [facility];  Service: Cardiothoracic;  Laterality: N/A;   • MEDIASTINOSCOPY, BRONCHOSCOPY N/A 6/3/2020    Procedure: MEDIASTINOSCOPY, BRONCHOSCOPY cervical  lymph node biopsy;  Surgeon: Bry Salvador MD;  Location: [facility];  Service: Cardiothoracic;  Laterality: N/A;   • SPERMATOCELECTOMY Left 5/11/2018    Procedure: LEFT SPERMATOCELECTOMY;  Surgeon: Miles Groves MD;  Location: [facility];  Service: Urology         Family History   Problem Relation Age of Onset   • Dementia Mother    • Hypertension Mother    • Thyroid disease Mother    • Arthritis Mother    • Diabetes Father    • Stomach cancer Father    • Cancer Father         stomach   • Thyroid disease Brother    • Down syndrome Brother    • COPD Maternal Aunt    • Alcohol abuse Paternal Uncle    • Macular degeneration Brother    • Alcohol abuse Brother    • Alcohol abuse Brother    • Thyroid disease Brother    • Alcohol abuse Paternal Uncle    • Birth defects Brother    • COPD Maternal Aunt    • Hearing loss Brother          Social History     Socioeconomic History   • Marital status: Single     Spouse name: Not on file   • Number of children: Not on file   • Years of education: Not on file   • Highest education level: Not on file   Tobacco Use   • Smoking status: Current Every Day Smoker     Packs/day: 1.00     Years: 42.00     Pack years: 42.00     Types: Cigarettes   • Smokeless tobacco: Former User     Quit date: 2014   Substance and Sexual Activity   • Alcohol use: No   • Drug use: No   • Sexual activity: Defer         Prior to Admission medications    Medication Sig Start Date End Date Taking? Authorizing Provider   buPROPion SR (Wellbutrin SR) 100 MG 12 hr tablet Take 1  "tablet by mouth 2 (Two) Times a Day. 7/27/20  Yes Atiya Alexis MD   cetirizine (ZyrTEC) 10 MG tablet Take 10 mg by mouth daily as needed for allergies.   Yes ProviderHannah MD   esomeprazole (nexIUM) 20 MG capsule Take 20 mg by mouth Every Morning Before Breakfast.   Yes ProviderHannah MD   FLUoxetine (PROzac) 40 MG capsule TAKE 1 CAPSULE BY MOUTH DAILY. 7/20/20  Yes Carmelo Farmer MD   fluticasone (FLONASE) 50 MCG/ACT nasal spray 2 sprays into the nostril(s) as directed by provider Daily As Needed for Rhinitis.   Yes ProviderHannah MD   HYDROcodone-acetaminophen (NORCO) 7.5-325 MG per tablet Take 1 tablet by mouth Every 8 (Eight) Hours As Needed for Moderate Pain . 8/27/20  Yes Atiya Alexis MD   metoprolol tartrate (LOPRESSOR) 50 MG tablet Take 1 tablet by mouth 2 (Two) Times a Day. 2/28/20  Yes Tomi Gill MD   nicotine (NICODERM CQ) 21 MG/24HR patch Place 1 patch on the skin as directed by provider Daily. 7/27/20  Yes Atiya Alexis MD   promethazine (PHENERGAN) 25 MG tablet TAKE 1 TABLET BY MOUTH EVERY 6 (SIX) HOURS AS NEEDED FOR NAUSEA OR VOMITING. 6/12/20  Yes Carmelo Farmer MD   rosuvastatin (CRESTOR) 10 MG tablet Take 1 tablet by mouth Daily. 2/28/20  Yes Tomi Gill MD   tiZANidine (ZANAFLEX) 4 MG tablet Take 1 tablet by mouth Every 8 (Eight) Hours As Needed for Muscle Spasms. 7/10/20  Yes Carmelo Farmer MD         OBJECTIVE    /70 (BP Location: Right arm, Patient Position: Sitting)   Pulse 75   Ht 177.8 cm (70\")   Wt 78.4 kg (172 lb 12.8 oz)   SpO2 98%   BMI 24.79 kg/m²         Review of Systems     Constitutional:  Denies recent weight loss, weight gain, fever or chills, no change in exercise tolerance     HENT:  Denies any hearing loss, epistaxis, hoarseness, or difficulty speaking.     Eyes: Wears eyeglasses or contact lenses     Respiratory:  Denies dyspnea with exertion,no cough, wheezing, or hemoptysis.     "     Cardiovascular: Negative for palpations, chest pain, orthopnea, PND, peripheral edema, syncope, or claudication.     Gastrointestinal:  Denies change in bowel habits, dyspepsia, ulcer disease, hematochezia, or melena.     Endocrine: Negative for cold intolerance, heat intolerance, polydipsia, polyphagia and polyuria. Denies any history of weight change, heat/cold intolerance, polydipsia, polyuria     Genitourinary: Negative.      Musculoskeletal: Denies any history of arthritic symptoms or back problems     Skin:  Denies any change in hair or nails, rashes, or skin lesions.     Allergic/Immunologic: Negative.  Negative for environmental allergies, food allergies and immunocompromised state.     Neurological:  Denies any history of recurrent headaches, strokes, TIA, or seizure disorder.     Hematological: Denies any food allergies, seasonal allergies, bleeding disorders, or lymphadenopathy.     Psychiatric/Behavioral: Denies any history of depression, substance abuse, or change in cognitive function.         Physical Exam     Constitutional: Cooperative, alert and oriented, well-developed, well-nourished, in no acute distress.     HENT:   Head: Normocephalic, normal hair patterns, no masses or tenderness.  Ears, Nose, and Throat: No gross abnormalities. No pallor or cyanosis. Dentition good.   Eyes: EOMS intact, PERRL, conjunctivae and lids unremarkable. Fundoscopic exam and visual fields not performed.   Neck: No palpable masses or adenopathy, no thyromegaly, no JVD, carotid pulses are full and equal bilaterally and without  Bruits.     Cardiovascular: Regular rhythm, S1 and S2 normal, no S3 or S4. Apical impulse not displaced. No murmurs, gallops, or rubs detected.     Pulmonary/Chest: Chest: normal symmetry, no tenderness to palpation, normal respiratory excursion, no intercostal retraction, no use of accessory muscles.            Pulmonary: Normal breath sounds. No rales or ronchi.    Abdominal: Abdomen soft,  bowel sounds normoactive, no masses, no hepatosplenomegaly, non-tender, no bruits.     Musculoskeletal: No deformities, clubbing, cyanosis, erythema, or edema observed. There are no spinal abnormalities noted. Normal muscle strength and tone. Pulses full and equal in all extremities, no bruits auscultated.     Neurological: No gross motor or sensory deficits noted, affect appropriate, oriented to time, person, place.     Skin: Warm and dry to the touch, no apparent skin lesions or masses noted.     Psychiatric: He has a normal mood and affect. His behavior is normal. Judgment and thought content normal.         Procedures      Lab Results   Component Value Date    WBC 7.58 06/17/2020    HGB 13.9 06/17/2020    HCT 40.2 06/17/2020    MCV 88.9 06/17/2020     06/17/2020     Lab Results   Component Value Date    GLUCOSE 91 06/11/2020    BUN 14 06/11/2020    CREATININE 1.07 06/11/2020    EGFRIFNONA 70 06/11/2020    BCR 13.1 06/11/2020    CO2 23.0 06/11/2020    CALCIUM 9.0 06/11/2020    ALBUMIN 4.00 06/11/2020    AST 16 06/11/2020    ALT 13 06/11/2020     Lab Results   Component Value Date    CHOL 186 10/16/2019    CHOL 161 04/17/2019    CHOL 176 10/26/2018     Lab Results   Component Value Date    TRIG 186 (H) 10/16/2019    TRIG 139 04/17/2019    TRIG 98 10/26/2018     Lab Results   Component Value Date    HDL 30 (L) 10/16/2019    HDL 38 (L) 04/17/2019    HDL 38 (L) 10/26/2018     No components found for: LDLCALC  Lab Results   Component Value Date     (H) 10/16/2019    LDL 95 04/17/2019     (H) 10/26/2018     No results found for: HDLLDLRATIO  No components found for: CHOLHDL  Lab Results   Component Value Date    HGBA1C 5.81 (H) 10/16/2019     Lab Results   Component Value Date    TSH 2.160 10/16/2019           ASSESSMENT AND PLAN      Mitch was seen today for follow-up.    Diagnoses and all orders for this visit:    Coronary artery disease involving native coronary artery of native heart without  angina pectoris  -He is doing well.  His nuclear was negative.  He is on antilipid therapy and we will need to check this.  He will have it done in the next week or so.    Comprehensive Metabolic Panel  -     Lipid Panel    Mixed hyperlipidemia  -He is currently on therapy and is planning to have the levels checked in the next week or so.       Comprehensive Metabolic Panel  -     Lipid Panel    Hypertension  He is stable from this standpoint.  He is tolerating his medicine well.  He says his blood pressures in the 110s in the morning he does know whether to take his medicine though he does have encouraged him to go ahead and do it both times as this helps stabilize the blood pressure and does not have rebound hypertension or tachycardia.    Patient's Body mass index is 24.79 kg/m². BMI is within normal parameters. No follow-up required..      Mitch Rinaldi  reports that he has been smoking cigarettes. He has a 42.00 pack-year smoking history. He quit smokeless tobacco use about 6 years ago.. I have educated him on the risk of diseases from using tobacco products such as cancer, COPD and heart diease.     I advised him to quit and he is willing to quit. We have discussed the following method/s for tobacco cessation:  OTC Cessation Products.  Together we have set a quit date for 1 month from today.  He will follow up with me in 6 weeks or sooner to check on his progress.    I spent 3  minutes counseling the patient.               Tomi Gill MD  9/4/2020  11:00

## 2020-09-24 DIAGNOSIS — M15.9 PRIMARY OSTEOARTHRITIS INVOLVING MULTIPLE JOINTS: Chronic | ICD-10-CM

## 2020-09-24 RX ORDER — HYDROCODONE BITARTRATE AND ACETAMINOPHEN 7.5; 325 MG/1; MG/1
1 TABLET ORAL EVERY 8 HOURS PRN
Qty: 90 TABLET | Refills: 0 | Status: SHIPPED | OUTPATIENT
Start: 2020-09-24 | End: 2020-10-22 | Stop reason: SDUPTHER

## 2020-10-08 ENCOUNTER — OFFICE VISIT (OUTPATIENT)
Dept: FAMILY MEDICINE CLINIC | Facility: CLINIC | Age: 60
End: 2020-10-08

## 2020-10-08 DIAGNOSIS — C83.18 MANTLE CELL LYMPHOMA OF LYMPH NODES OF MULTIPLE REGIONS (HCC): Chronic | ICD-10-CM

## 2020-10-08 DIAGNOSIS — M15.9 PRIMARY OSTEOARTHRITIS INVOLVING MULTIPLE JOINTS: Chronic | ICD-10-CM

## 2020-10-08 DIAGNOSIS — I10 BENIGN ESSENTIAL HTN: Primary | Chronic | ICD-10-CM

## 2020-10-08 DIAGNOSIS — F41.1 GENERALIZED ANXIETY DISORDER: Chronic | ICD-10-CM

## 2020-10-08 DIAGNOSIS — J44.9 COPD, MODERATE (HCC): Chronic | ICD-10-CM

## 2020-10-08 PROCEDURE — 99443 PR PHYS/QHP TELEPHONE EVALUATION 21-30 MIN: CPT | Performed by: INTERNAL MEDICINE

## 2020-10-08 RX ORDER — ALBUTEROL SULFATE 90 UG/1
2 AEROSOL, METERED RESPIRATORY (INHALATION) EVERY 4 HOURS PRN
Qty: 18 G | Refills: 3 | Status: SHIPPED | OUTPATIENT
Start: 2020-10-08

## 2020-10-08 RX ORDER — BUDESONIDE AND FORMOTEROL FUMARATE DIHYDRATE 160; 4.5 UG/1; UG/1
2 AEROSOL RESPIRATORY (INHALATION)
Qty: 1 INHALER | Refills: 12 | Status: SHIPPED | OUTPATIENT
Start: 2020-10-08 | End: 2021-11-12

## 2020-10-08 NOTE — PROGRESS NOTES
Telemedicine visit    You have chosen to receive care through a telephone visit. Do you consent to use a telephone visit for your medical care today? Yes      Chief Complaint   Patient presents with   • Hypertension   • COPD   • Osteoarthritis   • Anxiety     Subjective   Mitch Rinaldi is a 60 y.o. male who is seen via telephone visit for follow-up. He has hypertension, and his blood pressure has been controlled.  He has osteoarthritis which causes chronic back and joint pain.  He takes Norco 7.5/325 mg twice a day as needed for treatment of the pain.  He takes ibuprofen to help with inflammation.  He takes Zanaflex as needed for muscle spasm.  He has neuropathy secondary to the arthritis in his low back, but does not require any additional medication for this. He has anxiety and depression, and takes Wellbutrin and fluoxetine daily.    While having his annual low-dose chest CT for lung cancer screening, he was found to have an abnormality. He was referred for further evaluation, and was ultimately diagnosed with mantle cell lymphoma.  He continues to follow with oncology. He has been asymptomatic. Since he has a low level of disease, no treatment is currently indicated. During the pulmonary workup, he was diagnosed with moderate COPD.  He has been having increasing shortness of breath.  This is worse when walking up stairs or for a long distance.  He does not have any inhalers.  He has recently stopped smoking, and is now 31 days smoke-free.     The following portions of the patient's history were reviewed and updated as appropriate: allergies, current medications, past family history, past medical history, past social history, past surgical history and problem list.    Review of Systems   Constitutional: Negative for chills, fatigue and fever.   HENT: Negative for congestion, sneezing, sore throat and trouble swallowing.    Eyes: Negative for visual disturbance.   Respiratory: Negative for  cough, chest tightness, shortness of breath and wheezing.    Cardiovascular: Negative for chest pain, palpitations and leg swelling.   Gastrointestinal: Negative for abdominal pain, constipation, diarrhea, nausea and vomiting.   Genitourinary: Negative for dysuria, frequency and urgency.   Musculoskeletal: Positive for arthralgias. Negative for neck pain.   Skin: Negative for rash.   Neurological: Negative for dizziness, weakness and headaches.   Psychiatric/Behavioral:        Patient denies any feelings of depression and has not felt down, hopeless or lost interest in any activities.   All other systems reviewed and are negative.      Objective     Physical Exam  Constitutional:       General: He is not in acute distress.     Appearance: He is well-developed.   Neurological:      Mental Status: He is alert and oriented to person, place, and time.   Psychiatric:         Behavior: Behavior normal.         Thought Content: Thought content normal.         Judgment: Judgment normal.         Assessment/Plan             Mitch was seen today for hypertension, copd, osteoarthritis and anxiety.    Diagnoses and all orders for this visit:    Benign essential HTN    COPD, moderate (CMS/HCC)  -     budesonide-formoterol (Symbicort) 160-4.5 MCG/ACT inhaler; Inhale 2 puffs 2 (Two) Times a Day.  -     albuterol sulfate  (90 Base) MCG/ACT inhaler; Inhale 2 puffs Every 4 (Four) Hours As Needed for Wheezing.    Mantle cell lymphoma of lymph nodes of multiple regions (CMS/HCC)    Primary osteoarthritis involving multiple joints    Generalized anxiety disorder         He will continue with ibuprofen for treatment of his arthritic pain.  He will continue with Norco 7.5/325 mg twice a day as needed for flareups in the arthritic pain as well as any pain related to his lymphoma.   His oncologist is currently writing his prescription for Norco, so he does not need this refilled today.  He will continue with Wellbutrin and fluoxetine  for treatment of his anxiety and depression.  His blood pressure is controlled, and he will continue with his current blood pressure medication.  I will start him on Symbicort for treatment of the shortness of breath related to his COPD.  He is given an albuterol inhaler to use as needed for wheezing or shortness of breath.    Patient understands the risks associated with this controlled medication, including tolerance and addiction.  Patient also agrees to only obtain this medication from me, and not from a another provider, unless that provider is covering for me in my absence.  Patient also agrees to be compliant in dosing, and not self adjust the dose of medication.  A signed controlled substance agreement is on file, and the patient has received a controlled substance education sheet at this a previous visit.  The patient has also signed a consent for treatment with a controlled substance as per [facility] policy. SOFI was obtained.    This visit has been rescheduled as a phone visit to comply with patient safety concerns in accordance with CDC recommendations. Total time of discussion was 22 minutes.    PHQ-2/PHQ-9 Depression Screening 9/1/2020   Little interest or pleasure in doing things 0   Feeling down, depressed, or hopeless 0   Trouble falling or staying asleep, or sleeping too much 2   Feeling tired or having little energy 0   Poor appetite or overeating 0   Feeling bad about yourself - or that you are a failure or have let yourself or your family down 0   Trouble concentrating on things, such as reading the newspaper or watching television 0   Moving or speaking so slowly that other people could have noticed. Or the opposite - being so fidgety or restless that you have been moving around a lot more than usual 0   Thoughts that you would be better off dead, or of hurting yourself in some way 0   Total Score 2   If you checked off any problems, how difficult have these problems made it for you to  do your work, take care of things at home, or get along with other people? Not difficult at all

## 2020-10-14 DIAGNOSIS — K21.9 GASTROESOPHAGEAL REFLUX DISEASE WITHOUT ESOPHAGITIS: Chronic | ICD-10-CM

## 2020-10-15 RX ORDER — PROMETHAZINE HYDROCHLORIDE 25 MG/1
25 TABLET ORAL EVERY 6 HOURS PRN
Qty: 60 TABLET | Refills: 2 | Status: SHIPPED | OUTPATIENT
Start: 2020-10-15 | End: 2020-12-18

## 2020-10-22 DIAGNOSIS — M15.9 PRIMARY OSTEOARTHRITIS INVOLVING MULTIPLE JOINTS: Chronic | ICD-10-CM

## 2020-10-22 RX ORDER — HYDROCODONE BITARTRATE AND ACETAMINOPHEN 7.5; 325 MG/1; MG/1
1 TABLET ORAL EVERY 8 HOURS PRN
Qty: 90 TABLET | Refills: 0 | Status: SHIPPED | OUTPATIENT
Start: 2020-10-22 | End: 2020-11-19 | Stop reason: SDUPTHER

## 2020-11-11 ENCOUNTER — TELEPHONE (OUTPATIENT)
Dept: CARDIOLOGY | Facility: CLINIC | Age: 60
End: 2020-11-11

## 2020-11-19 ENCOUNTER — TELEPHONE (OUTPATIENT)
Dept: ONCOLOGY | Facility: CLINIC | Age: 60
End: 2020-11-19

## 2020-11-19 DIAGNOSIS — M15.9 PRIMARY OSTEOARTHRITIS INVOLVING MULTIPLE JOINTS: Chronic | ICD-10-CM

## 2020-11-19 RX ORDER — BUPROPION HYDROCHLORIDE 100 MG/1
100 TABLET, EXTENDED RELEASE ORAL 2 TIMES DAILY
Qty: 90 TABLET | Refills: 0 | Status: SHIPPED | OUTPATIENT
Start: 2020-11-19 | End: 2020-11-19 | Stop reason: SDUPTHER

## 2020-11-19 RX ORDER — BUPROPION HYDROCHLORIDE 100 MG/1
100 TABLET, EXTENDED RELEASE ORAL 2 TIMES DAILY
Qty: 90 TABLET | Refills: 0 | Status: SHIPPED | OUTPATIENT
Start: 2020-11-19 | End: 2021-02-12

## 2020-11-19 RX ORDER — HYDROCODONE BITARTRATE AND ACETAMINOPHEN 7.5; 325 MG/1; MG/1
1 TABLET ORAL EVERY 8 HOURS PRN
Qty: 90 TABLET | Refills: 0 | Status: SHIPPED | OUTPATIENT
Start: 2020-11-19 | End: 2020-12-31 | Stop reason: SDUPTHER

## 2020-11-19 RX ORDER — HYDROCODONE BITARTRATE AND ACETAMINOPHEN 7.5; 325 MG/1; MG/1
1 TABLET ORAL EVERY 8 HOURS PRN
Qty: 90 TABLET | Refills: 0 | Status: SHIPPED | OUTPATIENT
Start: 2020-11-19 | End: 2020-11-19 | Stop reason: SDUPTHER

## 2020-11-19 NOTE — TELEPHONE ENCOUNTER
Pt needs refill on medication  Hydrocodone   7.5 325 mg per tab  1 tab every 8 hrs  90 tablet   Pt will be out tomorrow,   Wellbutrin 100 mg tablet   Pt has enough for tomorrow  Pt ph [phone]  [facility] pharmacy -[phone]

## 2020-11-24 ENCOUNTER — HOSPITAL ENCOUNTER (OUTPATIENT)
Dept: CT IMAGING | Facility: HOSPITAL | Age: 60
Discharge: HOME | End: 2020-11-24
Admitting: INTERNAL MEDICINE

## 2020-11-24 DIAGNOSIS — C83.18 MANTLE CELL LYMPHOMA OF LYMPH NODES OF MULTIPLE REGIONS (HCC): ICD-10-CM

## 2020-11-24 DIAGNOSIS — R91.1 LEFT UPPER LOBE PULMONARY NODULE: ICD-10-CM

## 2020-11-24 PROCEDURE — 74177 CT ABD & PELVIS W/CONTRAST: CPT

## 2020-11-24 PROCEDURE — 71260 CT THORAX DX C+: CPT

## 2020-11-24 PROCEDURE — 25010000002 IOPAMIDOL 61 % SOLUTION: Performed by: INTERNAL MEDICINE

## 2020-11-24 RX ADMIN — IOPAMIDOL 90 ML: 612 INJECTION, SOLUTION INTRAVENOUS at 08:18

## 2020-11-26 DIAGNOSIS — C83.18 MANTLE CELL LYMPHOMA OF LYMPH NODES OF MULTIPLE REGIONS (HCC): Primary | ICD-10-CM

## 2020-11-26 DIAGNOSIS — R91.1 LEFT UPPER LOBE PULMONARY NODULE: ICD-10-CM

## 2020-12-01 ENCOUNTER — OFFICE VISIT (OUTPATIENT)
Dept: ONCOLOGY | Facility: CLINIC | Age: 60
End: 2020-12-01

## 2020-12-01 ENCOUNTER — LAB (OUTPATIENT)
Dept: ONCOLOGY | Facility: HOSPITAL | Age: 60
End: 2020-12-01

## 2020-12-01 VITALS
WEIGHT: 178.9 LBS | DIASTOLIC BLOOD PRESSURE: 75 MMHG | SYSTOLIC BLOOD PRESSURE: 119 MMHG | HEART RATE: 78 BPM | RESPIRATION RATE: 18 BRPM | HEIGHT: 70 IN | BODY MASS INDEX: 25.61 KG/M2 | TEMPERATURE: 97.8 F

## 2020-12-01 DIAGNOSIS — C83.18 MANTLE CELL LYMPHOMA OF LYMPH NODES OF MULTIPLE REGIONS (HCC): Primary | Chronic | ICD-10-CM

## 2020-12-01 DIAGNOSIS — C83.18 MANTLE CELL LYMPHOMA OF LYMPH NODES OF MULTIPLE REGIONS (HCC): ICD-10-CM

## 2020-12-01 DIAGNOSIS — R91.1 LEFT UPPER LOBE PULMONARY NODULE: ICD-10-CM

## 2020-12-01 DIAGNOSIS — I10 BENIGN ESSENTIAL HTN: Chronic | ICD-10-CM

## 2020-12-01 LAB
ALBUMIN SERPL-MCNC: 4.3 G/DL (ref 3.5–5.2)
ALBUMIN/GLOB SERPL: 1.2 G/DL
ALP SERPL-CCNC: 133 U/L (ref 39–117)
ALT SERPL W P-5'-P-CCNC: 17 U/L (ref 1–41)
ANION GAP SERPL CALCULATED.3IONS-SCNC: 10 MMOL/L (ref 5–15)
AST SERPL-CCNC: 18 U/L (ref 1–40)
BASOPHILS # BLD AUTO: 0.08 10*3/MM3 (ref 0–0.2)
BASOPHILS NFR BLD AUTO: 1.1 % (ref 0–1.5)
BILIRUB SERPL-MCNC: 0.3 MG/DL (ref 0–1.2)
BUN SERPL-MCNC: 14 MG/DL (ref 8–23)
BUN/CREAT SERPL: 10.8 (ref 7–25)
CALCIUM SPEC-SCNC: 9.4 MG/DL (ref 8.6–10.5)
CHLORIDE SERPL-SCNC: 103 MMOL/L (ref 98–107)
CO2 SERPL-SCNC: 25 MMOL/L (ref 22–29)
CREAT SERPL-MCNC: 1.3 MG/DL (ref 0.76–1.27)
DEPRECATED RDW RBC AUTO: 39.4 FL (ref 37–54)
EOSINOPHIL # BLD AUTO: 0.24 10*3/MM3 (ref 0–0.4)
EOSINOPHIL NFR BLD AUTO: 3.3 % (ref 0.3–6.2)
ERYTHROCYTE [DISTWIDTH] IN BLOOD BY AUTOMATED COUNT: 12.2 % (ref 12.3–15.4)
GFR SERPL CREATININE-BSD FRML MDRD: 56 ML/MIN/1.73
GLOBULIN UR ELPH-MCNC: 3.7 GM/DL
GLUCOSE SERPL-MCNC: 102 MG/DL (ref 65–99)
HCT VFR BLD AUTO: 43.8 % (ref 37.5–51)
HGB BLD-MCNC: 15.3 G/DL (ref 13–17.7)
IMM GRANULOCYTES # BLD AUTO: 0.02 10*3/MM3 (ref 0–0.05)
IMM GRANULOCYTES NFR BLD AUTO: 0.3 % (ref 0–0.5)
LDH SERPL-CCNC: 188 U/L (ref 135–225)
LYMPHOCYTES # BLD AUTO: 1.72 10*3/MM3 (ref 0.7–3.1)
LYMPHOCYTES NFR BLD AUTO: 23.9 % (ref 19.6–45.3)
MCH RBC QN AUTO: 30.8 PG (ref 26.6–33)
MCHC RBC AUTO-ENTMCNC: 34.9 G/DL (ref 31.5–35.7)
MCV RBC AUTO: 88.3 FL (ref 79–97)
MONOCYTES # BLD AUTO: 0.56 10*3/MM3 (ref 0.1–0.9)
MONOCYTES NFR BLD AUTO: 7.8 % (ref 5–12)
NEUTROPHILS NFR BLD AUTO: 4.57 10*3/MM3 (ref 1.7–7)
NEUTROPHILS NFR BLD AUTO: 63.6 % (ref 42.7–76)
NRBC BLD AUTO-RTO: 0 /100 WBC (ref 0–0.2)
PLATELET # BLD AUTO: 189 10*3/MM3 (ref 140–450)
PMV BLD AUTO: 10 FL (ref 6–12)
POTASSIUM SERPL-SCNC: 4.6 MMOL/L (ref 3.5–5.2)
PROT SERPL-MCNC: 8 G/DL (ref 6–8.5)
RBC # BLD AUTO: 4.96 10*6/MM3 (ref 4.14–5.8)
SODIUM SERPL-SCNC: 138 MMOL/L (ref 136–145)
WBC # BLD AUTO: 7.19 10*3/MM3 (ref 3.4–10.8)

## 2020-12-01 PROCEDURE — 85025 COMPLETE CBC W/AUTO DIFF WBC: CPT

## 2020-12-01 PROCEDURE — G0463 HOSPITAL OUTPT CLINIC VISIT: HCPCS | Performed by: INTERNAL MEDICINE

## 2020-12-01 PROCEDURE — 80053 COMPREHEN METABOLIC PANEL: CPT

## 2020-12-01 PROCEDURE — 99214 OFFICE O/P EST MOD 30 MIN: CPT | Performed by: INTERNAL MEDICINE

## 2020-12-01 PROCEDURE — 83615 LACTATE (LD) (LDH) ENZYME: CPT

## 2020-12-01 NOTE — PROGRESS NOTES
Subjective     Mitch Rinaldi was seen in follow-up for pulmonary nodule and mantle cell lymphoma.  He denies any new health issues since last visit.  No new hospitalizations.  No new medications.  He has quit smoking and is now remain abstinent from smoking for last 3 months.  I congratulated him on his success.  No B symptoms.  We reviewed the results of CT scan of chest, abdomen pelvis which shows stable lymphadenopathy however increase in the size of left upper lobe nodule.  Recommend that he needs to have follow-up with pulmonary and may be have a repeat biopsy performed as I am concerned about left upper lobe nodule being primary lung neoplasm as opposed to mantle cell lymphoma.  ROS as below.     Past Medical History, Past Surgical History, Social History, Family History have been reviewed and are without significant changes except as mentioned.    Review of Systems       CONSTITUTIONAL: fatigue + No weight loss, fever, chills, weakness or fatigue.  HEENT: Eyes: No visual loss, blurred vision, double vision or yellow sclerae. Ears, Nose, Throat: No hearing loss, sneezing, congestion, runny nose or sore throat.  SKIN: No rash or itching.  CARDIOVASCULAR: No chest pain, chest pressure or chest discomfort. No palpitations or edema.  RESPIRATORY: No shortness of breath, cough or sputum.  GASTROINTESTINAL: No anorexia, nausea, vomiting or diarrhea. No abdominal pain or blood.  GENITOURINARY: Negative for urgency, frequency or dysuria.   NEUROLOGICAL: No headache, dizziness, syncope, paralysis, ataxia, numbness or tingling in the extremities. No change in bowel or bladder control.  MUSCULOSKELETAL: low back pain + .  HEMATOLOGIC: No anemia, bleeding or bruising.  LYMPHATICS:  enlarged nodes + . No history of splenectomy.  PSYCHIATRIC: No history of depression or anxiety.  ENDOCRINOLOGIC: No reports of sweating, cold or heat intolerance. No polyuria or polydipsia.  ALLERGIES: No history of asthma, hives, eczema  "or rhinitis.      Medications:  The current medication list was reviewed in the EMR    ALLERGIES:    Allergies   Allergen Reactions   • Darvon [Propoxyphene] Nausea And Vomiting   • Percocet [Oxycodone-Acetaminophen] Palpitations       Objective      Vitals:    12/01/20 1021   BP: 119/75   Pulse: 78   Resp: 18   Temp: 97.8 °F (36.6 °C)   TempSrc: Temporal   Weight: 81.1 kg (178 lb 14.4 oz)   Height: 177.8 cm (70\")   PainSc: 0-No pain     Current Status 12/1/2020   ECOG score 0       Physical Exam      GENERAL: Alert, awake, oriented.  Well dressed.  Not in apparent distress. Vitals as above.   HEAD: Normocephalic, atraumatic.   EYES: PERRL, EOMI. vision is grossly intact.  NECK: Supple, no adenopathy or thyromegaly.   THROAT: Normal oral cavity and pharynx. No inflammation, swelling, exudate, or lesions.  CARDIAC: Normal S1 and S2. No S3, S4 or murmurs. Rhythm is regular.  Extremities are warm and well perfused.   LUNGS: Clear to auscultation and percussion without rales, rhonchi, wheezing or diminished breath sounds.  ABDOMEN: Positive bowel sounds. Soft, nondistended, nontender. No guarding or rebound. No masses.  BACK:  No bony tenderness.   EXTREMITIES: No significant deformity or joint abnormality.  Peripheral pulses intact. No varicosities.  SKIN: No rash or bruising.  NEUROLOGICAL: Grossly non-focal exam. No focal weakness. Gait: Normal.   PSYCH: Mood and affect normal. No hallucination or suicidal thoughts.   LYMPHATIC: Small lymphadenopathy involving bilateral axillae and inguinal region+       RECENT LABS:Independently reviewed and summarized  Hematology WBC   Date Value Ref Range Status   12/01/2020 7.19 3.40 - 10.80 10*3/mm3 Final     RBC   Date Value Ref Range Status   12/01/2020 4.96 4.14 - 5.80 10*6/mm3 Final     Hemoglobin   Date Value Ref Range Status   12/01/2020 15.3 13.0 - 17.7 g/dL Final     Hematocrit   Date Value Ref Range Status   12/01/2020 43.8 37.5 - 51.0 % Final     Platelets   Date Value " Ref Range Status   12/01/2020 189 140 - 450 10*3/mm3 Final          Imaging (independently reviewed and summarized):     Result of CT scan of chest, abdomen pelvis from November 24, 2020 reviewed.  Compared to prior PET scan from August 28, 2020 lymphadenopathy -in mediastinum and left hilar, bilateral axillary and left iliac region is stable.  However, size of left upper lobe pulmonary nodule now has increased to 3.6 cm from prior 2.3 cm.    Diagnosis:   (1) Mantle cell lymphoma   (2) Left upper lobe pulmonary nodule   (3) Osteoarthritis, multiple joints   (4) Hypertension     Assessment/Plan         (1) Mantle cell lymphoma     Patient with diagnosis of CD 5 negative mantle cell lymphoma.   IHC for cyclin D1 - positive x 2.   FISH positive for t(11,14) translocation.   Cytogenetics - normal (no TP53 mutation)  Ki - 67: 10% to 15%.   Bone marrow is negative.     Result of CT scan reviewed which shows stable lymphadenopathy.  He remains asymptomatic from his mantle cell lymphoma.  We will continue surveillance.    (2) Left upper lobe pulmonary nodule     Patient underwent bronchoscopy and EBUS guided left upper lobe nodule FNA as well as subcarinal and left hilar lymph node FNA on August 6, 2020.  This showed no evidence of carcinoma or lymphoma.  Given the recent CT scan showing increase in the size of left upper lobe nodule I think he needs referral to pulmonary and may be repeat biopsy.    I am concerned about this being primary lung neoplasm as opposed to mantle cell lymphoma as his lymphadenopathy has remained stable and he does have extensive smoking history.    (3) Osteoarthritis, multiple joints   Stable on Filer.  He will continue this.    (4) Hypertension   Blood pressure is well controlled on current antihypertensive medications.      12/1/2020      CC:

## 2020-12-04 ENCOUNTER — TELEPHONE (OUTPATIENT)
Dept: ONCOLOGY | Facility: HOSPITAL | Age: 60
End: 2020-12-04

## 2020-12-04 NOTE — TELEPHONE ENCOUNTER
Patient called and said he had not heard from Dr. Arthur's office regarding appointment.     Called [facility].   Spoke with Zee.   Patient scheduled for this Wednesday 12/9 at 2 PM with Dr. Arthur.     Informed patient of date/time of appointment. He verbalized understanding.

## 2020-12-11 ENCOUNTER — DOCUMENTATION (OUTPATIENT)
Dept: CARDIOLOGY | Facility: CLINIC | Age: 60
End: 2020-12-11

## 2020-12-13 DIAGNOSIS — C83.18 MANTLE CELL LYMPHOMA OF LYMPH NODES OF MULTIPLE REGIONS (HCC): Primary | ICD-10-CM

## 2020-12-13 PROCEDURE — 87635 SARS-COV-2 COVID-19 AMP PRB: CPT | Performed by: INTERNAL MEDICINE

## 2020-12-18 DIAGNOSIS — C83.18 MANTLE CELL LYMPHOMA OF LYMPH NODES OF MULTIPLE REGIONS (HCC): Primary | ICD-10-CM

## 2020-12-18 DIAGNOSIS — M15.9 PRIMARY OSTEOARTHRITIS INVOLVING MULTIPLE JOINTS: Chronic | ICD-10-CM

## 2020-12-18 DIAGNOSIS — K21.9 GASTROESOPHAGEAL REFLUX DISEASE WITHOUT ESOPHAGITIS: Chronic | ICD-10-CM

## 2020-12-18 RX ORDER — PROMETHAZINE HYDROCHLORIDE 25 MG/1
25 TABLET ORAL EVERY 6 HOURS PRN
Qty: 60 TABLET | Refills: 2 | Status: SHIPPED | OUTPATIENT
Start: 2020-12-18 | End: 2021-04-29

## 2020-12-18 RX ORDER — TIZANIDINE 4 MG/1
4 TABLET ORAL EVERY 8 HOURS PRN
Qty: 90 TABLET | Refills: 3 | Status: SHIPPED | OUTPATIENT
Start: 2020-12-18 | End: 2021-07-27

## 2020-12-18 RX ORDER — FLUOXETINE HYDROCHLORIDE 40 MG/1
CAPSULE ORAL
Qty: 30 CAPSULE | Refills: 5 | Status: SHIPPED | OUTPATIENT
Start: 2020-12-18 | End: 2021-07-01

## 2020-12-23 ENCOUNTER — TELEPHONE (OUTPATIENT)
Dept: ONCOLOGY | Facility: CLINIC | Age: 60
End: 2020-12-23

## 2020-12-23 ENCOUNTER — PROCEDURE VISIT (OUTPATIENT)
Dept: PULMONOLOGY | Facility: CLINIC | Age: 60
End: 2020-12-23

## 2020-12-23 DIAGNOSIS — C83.18 MANTLE CELL LYMPHOMA OF LYMPH NODES OF MULTIPLE REGIONS (HCC): ICD-10-CM

## 2020-12-23 PROCEDURE — 94010 BREATHING CAPACITY TEST: CPT | Performed by: INTERNAL MEDICINE

## 2020-12-23 PROCEDURE — 94729 DIFFUSING CAPACITY: CPT | Performed by: INTERNAL MEDICINE

## 2020-12-23 PROCEDURE — 94727 GAS DIL/WSHOT DETER LNG VOL: CPT | Performed by: INTERNAL MEDICINE

## 2020-12-23 NOTE — PROGRESS NOTES
PFT, no post spirometry, patient used an inhaler prior to appt.  Read by Dr. Chong.  Answers for HPI/ROS submitted by the patient on 12/21/2020   Shortness of breath  What is the primary reason for your visit?: Shortness of Breath  Chronicity: recurrent  Onset: more than 1 year ago  Frequency: daily  Progression since onset: gradually worsening  Episode duration: 3 minutes  abdominal pain: No  chest pain: No  claudication: Yes  headaches: No  hemoptysis: No  leg pain: No  leg swelling: No  neck pain: No  orthopnea: No  PND: No  rash: No  rhinorrhea: No  sore throat: No  sputum production: Yes  swollen glands: No  syncope: No  vomiting: No  wheezing: No  Aggravating factors: exercise, odors, URIs, any activity, weather changes

## 2020-12-23 NOTE — TELEPHONE ENCOUNTER
Caller: RICHARD    Relationship to patient:  CARDIOLOGY    Best call back number: [phone] ASK FOR RICHARD DIRECTLY    CONFIRMING SPIROMETRY DLCO OR ADDITION OF FULL PFT?

## 2020-12-29 ENCOUNTER — APPOINTMENT (OUTPATIENT)
Dept: ONCOLOGY | Facility: HOSPITAL | Age: 60
End: 2020-12-29

## 2020-12-29 ENCOUNTER — APPOINTMENT (OUTPATIENT)
Dept: ONCOLOGY | Facility: CLINIC | Age: 60
End: 2020-12-29

## 2020-12-31 ENCOUNTER — LAB (OUTPATIENT)
Dept: ONCOLOGY | Facility: HOSPITAL | Age: 60
End: 2020-12-31

## 2020-12-31 ENCOUNTER — OFFICE VISIT (OUTPATIENT)
Dept: ONCOLOGY | Facility: CLINIC | Age: 60
End: 2020-12-31

## 2020-12-31 VITALS
SYSTOLIC BLOOD PRESSURE: 132 MMHG | HEART RATE: 92 BPM | BODY MASS INDEX: 25.71 KG/M2 | DIASTOLIC BLOOD PRESSURE: 79 MMHG | WEIGHT: 179.2 LBS | TEMPERATURE: 97.5 F

## 2020-12-31 DIAGNOSIS — M15.9 PRIMARY OSTEOARTHRITIS INVOLVING MULTIPLE JOINTS: Chronic | ICD-10-CM

## 2020-12-31 DIAGNOSIS — C83.18 MANTLE CELL LYMPHOMA OF LYMPH NODES OF MULTIPLE REGIONS (HCC): ICD-10-CM

## 2020-12-31 DIAGNOSIS — R91.1 LEFT UPPER LOBE PULMONARY NODULE: ICD-10-CM

## 2020-12-31 DIAGNOSIS — G89.3 CANCER ASSOCIATED PAIN: ICD-10-CM

## 2020-12-31 DIAGNOSIS — C34.12 CANCER OF UPPER LOBE OF LEFT LUNG (HCC): Primary | ICD-10-CM

## 2020-12-31 DIAGNOSIS — C83.18 MANTLE CELL LYMPHOMA OF LYMPH NODES OF MULTIPLE REGIONS (HCC): Chronic | ICD-10-CM

## 2020-12-31 LAB
ALBUMIN SERPL-MCNC: 4 G/DL (ref 3.5–5.2)
ALBUMIN/GLOB SERPL: 1.1 G/DL
ALP SERPL-CCNC: 124 U/L (ref 39–117)
ALT SERPL W P-5'-P-CCNC: 18 U/L (ref 1–41)
ANION GAP SERPL CALCULATED.3IONS-SCNC: 10 MMOL/L (ref 5–15)
AST SERPL-CCNC: 19 U/L (ref 1–40)
BASOPHILS # BLD AUTO: 0.1 10*3/MM3 (ref 0–0.2)
BASOPHILS NFR BLD AUTO: 1.3 % (ref 0–1.5)
BILIRUB SERPL-MCNC: 0.4 MG/DL (ref 0–1.2)
BUN SERPL-MCNC: 15 MG/DL (ref 8–23)
BUN/CREAT SERPL: 12 (ref 7–25)
CALCIUM SPEC-SCNC: 9 MG/DL (ref 8.6–10.5)
CHLORIDE SERPL-SCNC: 107 MMOL/L (ref 98–107)
CO2 SERPL-SCNC: 22 MMOL/L (ref 22–29)
CREAT SERPL-MCNC: 1.25 MG/DL (ref 0.76–1.27)
DEPRECATED RDW RBC AUTO: 38.7 FL (ref 37–54)
EOSINOPHIL # BLD AUTO: 0.19 10*3/MM3 (ref 0–0.4)
EOSINOPHIL NFR BLD AUTO: 2.5 % (ref 0.3–6.2)
ERYTHROCYTE [DISTWIDTH] IN BLOOD BY AUTOMATED COUNT: 12 % (ref 12.3–15.4)
GFR SERPL CREATININE-BSD FRML MDRD: 59 ML/MIN/1.73
GLOBULIN UR ELPH-MCNC: 3.6 GM/DL
GLUCOSE SERPL-MCNC: 92 MG/DL (ref 65–99)
HCT VFR BLD AUTO: 42.3 % (ref 37.5–51)
HGB BLD-MCNC: 14.7 G/DL (ref 13–17.7)
IMM GRANULOCYTES # BLD AUTO: 0.03 10*3/MM3 (ref 0–0.05)
IMM GRANULOCYTES NFR BLD AUTO: 0.4 % (ref 0–0.5)
LDH SERPL-CCNC: 90 U/L (ref 135–225)
LYMPHOCYTES # BLD AUTO: 1.64 10*3/MM3 (ref 0.7–3.1)
LYMPHOCYTES NFR BLD AUTO: 21.2 % (ref 19.6–45.3)
MCH RBC QN AUTO: 30.3 PG (ref 26.6–33)
MCHC RBC AUTO-ENTMCNC: 34.8 G/DL (ref 31.5–35.7)
MCV RBC AUTO: 87.2 FL (ref 79–97)
MONOCYTES # BLD AUTO: 0.63 10*3/MM3 (ref 0.1–0.9)
MONOCYTES NFR BLD AUTO: 8.2 % (ref 5–12)
NEUTROPHILS NFR BLD AUTO: 5.13 10*3/MM3 (ref 1.7–7)
NEUTROPHILS NFR BLD AUTO: 66.4 % (ref 42.7–76)
NRBC BLD AUTO-RTO: 0 /100 WBC (ref 0–0.2)
PLATELET # BLD AUTO: 192 10*3/MM3 (ref 140–450)
PMV BLD AUTO: 10.1 FL (ref 6–12)
POTASSIUM SERPL-SCNC: 3.9 MMOL/L (ref 3.5–5.2)
PROT SERPL-MCNC: 7.6 G/DL (ref 6–8.5)
RBC # BLD AUTO: 4.85 10*6/MM3 (ref 4.14–5.8)
SODIUM SERPL-SCNC: 139 MMOL/L (ref 136–145)
WBC # BLD AUTO: 7.72 10*3/MM3 (ref 3.4–10.8)

## 2020-12-31 PROCEDURE — G0463 HOSPITAL OUTPT CLINIC VISIT: HCPCS | Performed by: INTERNAL MEDICINE

## 2020-12-31 PROCEDURE — 85025 COMPLETE CBC W/AUTO DIFF WBC: CPT

## 2020-12-31 PROCEDURE — 99214 OFFICE O/P EST MOD 30 MIN: CPT | Performed by: INTERNAL MEDICINE

## 2020-12-31 PROCEDURE — 83615 LACTATE (LD) (LDH) ENZYME: CPT

## 2020-12-31 PROCEDURE — 80053 COMPREHEN METABOLIC PANEL: CPT

## 2020-12-31 RX ORDER — HYDROCODONE BITARTRATE AND ACETAMINOPHEN 7.5; 325 MG/1; MG/1
1 TABLET ORAL EVERY 8 HOURS PRN
Qty: 90 TABLET | Refills: 0 | Status: SHIPPED | OUTPATIENT
Start: 2021-01-14 | End: 2021-02-10 | Stop reason: SDUPTHER

## 2021-01-01 PROBLEM — C34.12 CANCER OF UPPER LOBE OF LEFT LUNG: Status: ACTIVE | Noted: 2021-01-01

## 2021-01-01 NOTE — PROGRESS NOTES
Nguyễn Rinaldi was seen in follow up for lung cancer and mantle cell lymphoma.     Patient underwent repeat bronchoscopy and EBUS guided FNA and core biopsy of left upper lung mass.  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    We discussed diagnosis, prognosis and treatment options at length.  His PFTs borderline.  He scheduled to see CT surgery in [place] to discuss surgical options.  If not felt to be surgical candidate he would definitely benefit from SBRT treatment.    We will plan to see him after potential surgery to discuss adjuvant therapy options.    Patient has chronic pain since initial surgery.  Stable on [place].  He is requesting new prescription which was sent to his pharmacy.    No B symptoms.      Past Medical History, Past Surgical History, Social History, Family History have been reviewed and are without significant changes except as mentioned.    Review of Systems   Constitutional: Positive for fatigue. Negative for activity change, chills, fever and unexpected weight change.   HENT: Negative for congestion, hearing loss, sore throat and voice change.    Eyes: Negative for pain, discharge and visual disturbance.   Respiratory: Negative for cough, chest tightness, shortness of breath and wheezing.    Cardiovascular: Negative for chest pain and leg swelling.   Gastrointestinal: Negative for abdominal distention, abdominal pain, blood in stool, constipation, diarrhea, nausea and vomiting.   Endocrine: Negative for cold intolerance, heat intolerance and polydipsia.   Genitourinary: Negative for difficulty urinating, dysuria, flank pain, frequency and urgency.   Musculoskeletal: Positive for arthralgias, back pain and neck pain. Negative for gait problem and joint swelling.   Skin: Negative for color change, pallor and rash.   Allergic/Immunologic: Negative for environmental allergies and food allergies.    Neurological: Negative for dizziness, weakness, light-headedness and headaches.   Hematological: Positive for adenopathy. Bruises/bleeds easily.   Psychiatric/Behavioral: Negative for agitation, dysphoric mood and suicidal ideas. The patient is not nervous/anxious.           Medications:  The current medication list was reviewed in the EMR    ALLERGIES:    Allergies   Allergen Reactions   • Darvon [Propoxyphene] Nausea And Vomiting   • Percocet [Oxycodone-Acetaminophen] Palpitations       Objective      Vitals:    12/31/20 1257   BP: 132/79   Pulse: 92   Temp: 97.5 °F (36.4 °C)   Weight: 81.3 kg (179 lb 3.2 oz)   PainSc: 0-No pain     Current Status 12/1/2020   ECOG score 0       Physical Exam  Vitals signs and nursing note reviewed.   Constitutional:       Appearance: Normal appearance. He is normal weight. He is not ill-appearing.   HENT:      Head: Normocephalic and atraumatic.      Nose: Nose normal. No congestion.      Mouth/Throat:      Mouth: Mucous membranes are moist.      Pharynx: No oropharyngeal exudate or posterior oropharyngeal erythema.   Eyes:      General: No scleral icterus.     Extraocular Movements: Extraocular movements intact.      Conjunctiva/sclera: Conjunctivae normal.      Pupils: Pupils are equal, round, and reactive to light.   Neck:      Musculoskeletal: Normal range of motion and neck supple. No muscular tenderness.   Cardiovascular:      Rate and Rhythm: Normal rate and regular rhythm.      Pulses: Normal pulses.      Heart sounds: No murmur.   Pulmonary:      Effort: Pulmonary effort is normal. No respiratory distress.      Breath sounds: Normal breath sounds. No wheezing.   Abdominal:      General: Abdomen is flat. Bowel sounds are normal. There is no distension.      Palpations: Abdomen is soft.      Tenderness: There is no abdominal tenderness.   Musculoskeletal: Normal range of motion.         General: No tenderness.      Right lower leg: No edema.      Left lower leg: No edema.    Lymphadenopathy:      Cervical: No cervical adenopathy.   Skin:     General: Skin is dry.      Coloration: Skin is not jaundiced or pale.      Findings: No bruising.   Neurological:      General: No focal deficit present.      Mental Status: He is alert and oriented to person, place, and time. Mental status is at baseline.      Cranial Nerves: No cranial nerve deficit.      Coordination: Coordination normal.   Psychiatric:         Mood and Affect: Mood normal.         Behavior: Behavior normal.         Thought Content: Thought content normal.               RECENT LABS:Independently reviewed and summarized  Hematology WBC   Date Value Ref Range Status   12/31/2020 7.72 3.40 - 10.80 10*3/mm3 Final     RBC   Date Value Ref Range Status   12/31/2020 4.85 4.14 - 5.80 10*6/mm3 Final     Hemoglobin   Date Value Ref Range Status   12/31/2020 14.7 13.0 - 17.7 g/dL Final     Hematocrit   Date Value Ref Range Status   12/31/2020 42.3 37.5 - 51.0 % Final     Platelets   Date Value Ref Range Status   12/31/2020 192 140 - 450 10*3/mm3 Final          Pathology (result reviewed):     Left upper lobe lung nodule FNA and core biopsy showed moderate to poorly differentiated squamous cell carcinoma.    Mitch Rinaldi reports a pain score of 0.  Given his pain assessment as noted, treatment options were discussed and the following options were decided upon as a follow-up plan to address the patient's pain: continuation of current treatment plan for pain.    Patient screened positive for depression based on a PHQ-9 score of 0 on 12/1/2020. Follow-up recommendations include: Suicide Risk Assessment performed.        Diagnosis:   (1) Left lung cancer, left upper lobe - new issue   Squamous cell carcinoma   Stage IB    (2) Mantle cell lymphoma   (3) Cancer associated pain       Assessment/Plan     (1) Left lung cancer, left upper lobe - new issue     Patient underwent repeat bronchoscopy and EBUS guided FNA and core biopsy of left upper  lung mass.  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    We discussed diagnosis, prognosis and treatment options at length.  His PFTs borderline.  He scheduled to see CT surgery in [place] to discuss surgical options.  If not felt to be surgical candidate he would definitely benefit from SBRT treatment.    We will plan to see him after potential surgery to discuss adjuvant therapy options.    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  IHC positive for cyclin D1  FISH is positive for t(11,14) translocation.   Cytogenetics are normal.  Ki-67 -10 to 15%.  Bone marrow aspiration biopsy was negative.  He is overall lymphadenopathy has remained stable.  Recommend we continue to monitor him  For asymptomatic mantle cell lymphoma with labs and physical exam every 3 months.    (3) Cancer associated pain   Chronic stable.   Prescription of Helm was sent to his pharmacy.       Prescription sent today: norco       1/1/2021      CC:

## 2021-01-04 ENCOUNTER — LAB (OUTPATIENT)
Dept: LAB | Facility: OTHER | Age: 61
End: 2021-01-04

## 2021-01-04 DIAGNOSIS — E78.2 MIXED HYPERLIPIDEMIA: ICD-10-CM

## 2021-01-04 DIAGNOSIS — Z00.00 ROUTINE ADULT HEALTH MAINTENANCE: ICD-10-CM

## 2021-01-04 DIAGNOSIS — I10 BENIGN ESSENTIAL HTN: ICD-10-CM

## 2021-01-04 DIAGNOSIS — R73.02 IMPAIRED GLUCOSE TOLERANCE: Chronic | ICD-10-CM

## 2021-01-04 DIAGNOSIS — Z12.5 SCREENING FOR PROSTATE CANCER: ICD-10-CM

## 2021-01-04 DIAGNOSIS — E78.2 MIXED HYPERLIPIDEMIA: Primary | Chronic | ICD-10-CM

## 2021-01-04 DIAGNOSIS — I10 BENIGN ESSENTIAL HTN: Chronic | ICD-10-CM

## 2021-01-04 LAB
ALBUMIN SERPL-MCNC: 4 G/DL (ref 3.5–5)
ALBUMIN/GLOB SERPL: 1.1 G/DL (ref 1.1–1.8)
ALP SERPL-CCNC: 113 U/L (ref 38–126)
ALT SERPL W P-5'-P-CCNC: 20 U/L
ANION GAP SERPL CALCULATED.3IONS-SCNC: 13 MMOL/L (ref 5–15)
AST SERPL-CCNC: 24 U/L (ref 17–59)
BACTERIA UR QL AUTO: ABNORMAL /HPF
BASOPHILS # BLD AUTO: 0.05 10*3/MM3 (ref 0–0.2)
BASOPHILS NFR BLD AUTO: 0.7 % (ref 0–1.5)
BILIRUB SERPL-MCNC: 0.5 MG/DL (ref 0.2–1.3)
BILIRUB UR QL STRIP: ABNORMAL
BUN SERPL-MCNC: 15 MG/DL (ref 7–23)
BUN/CREAT SERPL: 12.7 (ref 7–25)
CALCIUM SPEC-SCNC: 8.5 MG/DL (ref 8.4–10.2)
CHLORIDE SERPL-SCNC: 104 MMOL/L (ref 101–112)
CHOLEST SERPL-MCNC: 124 MG/DL (ref 150–200)
CLARITY UR: CLEAR
CO2 SERPL-SCNC: 22 MMOL/L (ref 22–30)
COLOR UR: YELLOW
CREAT SERPL-MCNC: 1.18 MG/DL (ref 0.7–1.3)
DEPRECATED RDW RBC AUTO: 39.8 FL (ref 37–54)
EOSINOPHIL # BLD AUTO: 0.26 10*3/MM3 (ref 0–0.4)
EOSINOPHIL NFR BLD AUTO: 3.9 % (ref 0.3–6.2)
ERYTHROCYTE [DISTWIDTH] IN BLOOD BY AUTOMATED COUNT: 12.5 % (ref 12.3–15.4)
GFR SERPL CREATININE-BSD FRML MDRD: 63 ML/MIN/1.73 (ref 49–113)
GLOBULIN UR ELPH-MCNC: 3.6 GM/DL (ref 2.3–3.5)
GLUCOSE SERPL-MCNC: 111 MG/DL (ref 70–99)
GLUCOSE UR STRIP-MCNC: NEGATIVE MG/DL
HBA1C MFR BLD: 5.5 % (ref 4.8–5.6)
HCT VFR BLD AUTO: 41.7 % (ref 37.5–51)
HDLC SERPL-MCNC: 35 MG/DL (ref 40–59)
HGB BLD-MCNC: 14.6 G/DL (ref 13–17.7)
HGB UR QL STRIP.AUTO: NEGATIVE
HYALINE CASTS UR QL AUTO: ABNORMAL /LPF
KETONES UR QL STRIP: ABNORMAL
LDLC SERPL CALC-MCNC: 68 MG/DL
LDLC/HDLC SERPL: 1.88 {RATIO} (ref 0–3.55)
LEUKOCYTE ESTERASE UR QL STRIP.AUTO: NEGATIVE
LYMPHOCYTES # BLD AUTO: 1.88 10*3/MM3 (ref 0.7–3.1)
LYMPHOCYTES NFR BLD AUTO: 28.1 % (ref 19.6–45.3)
MCH RBC QN AUTO: 30.9 PG (ref 26.6–33)
MCHC RBC AUTO-ENTMCNC: 35 G/DL (ref 31.5–35.7)
MCV RBC AUTO: 88.3 FL (ref 79–97)
MONOCYTES # BLD AUTO: 0.58 10*3/MM3 (ref 0.1–0.9)
MONOCYTES NFR BLD AUTO: 8.7 % (ref 5–12)
MUCOUS THREADS URNS QL MICRO: ABNORMAL /HPF
NEUTROPHILS NFR BLD AUTO: 3.92 10*3/MM3 (ref 1.7–7)
NEUTROPHILS NFR BLD AUTO: 58.6 % (ref 42.7–76)
NITRITE UR QL STRIP: NEGATIVE
PH UR STRIP.AUTO: 5.5 [PH] (ref 5.5–8)
PLATELET # BLD AUTO: 182 10*3/MM3 (ref 140–450)
PMV BLD AUTO: 10.3 FL (ref 6–12)
POTASSIUM SERPL-SCNC: 3.9 MMOL/L (ref 3.4–5)
PROT SERPL-MCNC: 7.6 G/DL (ref 6.3–8.6)
PROT UR QL STRIP: ABNORMAL
PSA SERPL-MCNC: 0.28 NG/ML (ref 0–4)
RBC # BLD AUTO: 4.72 10*6/MM3 (ref 4.14–5.8)
RBC # UR: ABNORMAL /HPF
REF LAB TEST METHOD: ABNORMAL
SODIUM SERPL-SCNC: 139 MMOL/L (ref 137–145)
SP GR UR STRIP: >=1.03 (ref 1–1.03)
SQUAMOUS #/AREA URNS HPF: ABNORMAL /HPF
T4 FREE SERPL-MCNC: 0.97 NG/DL (ref 0.93–1.7)
TRIGL SERPL-MCNC: 116 MG/DL
TSH SERPL DL<=0.05 MIU/L-ACNC: 3.05 UIU/ML (ref 0.27–4.2)
UROBILINOGEN UR QL STRIP: ABNORMAL
VLDLC SERPL-MCNC: 21 MG/DL (ref 5–40)
WBC # BLD AUTO: 6.69 10*3/MM3 (ref 3.4–10.8)
WBC UR QL AUTO: ABNORMAL /HPF

## 2021-01-04 PROCEDURE — 80307 DRUG TEST PRSMV CHEM ANLYZR: CPT | Performed by: INTERNAL MEDICINE

## 2021-01-04 PROCEDURE — 85025 COMPLETE CBC W/AUTO DIFF WBC: CPT | Performed by: INTERNAL MEDICINE

## 2021-01-04 PROCEDURE — 80053 COMPREHEN METABOLIC PANEL: CPT | Performed by: INTERNAL MEDICINE

## 2021-01-04 PROCEDURE — 80061 LIPID PANEL: CPT | Performed by: INTERNAL MEDICINE

## 2021-01-04 PROCEDURE — 83036 HEMOGLOBIN GLYCOSYLATED A1C: CPT | Performed by: INTERNAL MEDICINE

## 2021-01-04 PROCEDURE — G0103 PSA SCREENING: HCPCS | Performed by: INTERNAL MEDICINE

## 2021-01-04 PROCEDURE — 36415 COLL VENOUS BLD VENIPUNCTURE: CPT | Performed by: INTERNAL MEDICINE

## 2021-01-04 PROCEDURE — 84443 ASSAY THYROID STIM HORMONE: CPT | Performed by: INTERNAL MEDICINE

## 2021-01-04 PROCEDURE — G0481 DRUG TEST DEF 8-14 CLASSES: HCPCS | Performed by: INTERNAL MEDICINE

## 2021-01-04 PROCEDURE — 84439 ASSAY OF FREE THYROXINE: CPT | Performed by: INTERNAL MEDICINE

## 2021-01-04 PROCEDURE — 81001 URINALYSIS AUTO W/SCOPE: CPT | Performed by: INTERNAL MEDICINE

## 2021-01-05 ENCOUNTER — TELEPHONE (OUTPATIENT)
Dept: CARDIOLOGY | Facility: CLINIC | Age: 61
End: 2021-01-05

## 2021-01-05 NOTE — TELEPHONE ENCOUNTER
Called and gave patient results     He voiced understanding     ----- Message from Tomi Gill MD sent at 1/5/2021 12:38 PM CST -----  Looks better  ----- Message -----  From: Lab, Background User  Sent: 1/4/2021   8:32 AM CST  To: Tomi Gill MD

## 2021-01-07 LAB
6MAM UR QL CFM: NEGATIVE
6MAM/CREAT UR: NOT DETECTED NG/MG CREAT
7AMINOCLONAZEPAM/CREAT UR: NOT DETECTED NG/MG CREAT
A-OH ALPRAZ/CREAT UR: NOT DETECTED NG/MG CREAT
A-OH-TRIAZOLAM/CREAT UR CFM: NOT DETECTED NG/MG CREAT
ALFENTANIL/CREAT UR CFM: NOT DETECTED NG/MG CREAT
ALPHA-HYDROXYMIDAZOLAM, URINE: NOT DETECTED NG/MG CREAT
ALPRAZ/CREAT UR CFM: NOT DETECTED NG/MG CREAT
AMOBARBITAL UR QL CFM: NOT DETECTED
AMPHET/CREAT UR: NOT DETECTED NG/MG CREAT
AMPHETAMINES UR QL CFM: NEGATIVE
BARBITAL UR QL CFM: NOT DETECTED
BARBITURATES UR QL CFM: NEGATIVE
BENZODIAZ UR QL CFM: NEGATIVE
BUPRENORPHINE UR QL CFM: NEGATIVE
BUPRENORPHINE/CREAT UR: NOT DETECTED NG/MG CREAT
BUTABARBITAL UR QL CFM: NOT DETECTED
BUTALBITAL UR QL CFM: NOT DETECTED
BZE/CREAT UR: NOT DETECTED NG/MG CREAT
CANNABINOIDS UR QL CFM: NEGATIVE
CARBOXYTHC/CREAT UR: NOT DETECTED NG/MG CREAT
CLONAZEPAM/CREAT UR CFM: NOT DETECTED NG/MG CREAT
COCAETHYLENE/CREAT UR CFM: NOT DETECTED NG/MG CREAT
COCAINE UR QL CFM: NEGATIVE
COCAINE/CREAT UR CFM: NOT DETECTED NG/MG CREAT
CODEINE/CREAT UR: NOT DETECTED NG/MG CREAT
CREAT UR-MCNC: 242 MG/DL
DESALKYLFLURAZ/CREAT UR: NOT DETECTED NG/MG CREAT
DESMETHYLFLUNITRAZEPAM: NOT DETECTED NG/MG CREAT
DHC/CREAT UR: 245 NG/MG CREAT
DIAZEPAM/CREAT UR: NOT DETECTED NG/MG CREAT
DRUGS UR: NORMAL
EDDP/CREAT UR: NOT DETECTED NG/MG CREAT
ETHANOL UR CFM-MCNC: NOT DETECTED G/DL
ETHANOL UR QL CFM: NEGATIVE
FENTANYL UR QL CFM: NEGATIVE
FENTANYL/CREAT UR: NOT DETECTED NG/MG CREAT
FLUNITRAZEPAM UR QL CFM: NOT DETECTED NG/MG CREAT
HYDROCODONE/CREAT UR: 753 NG/MG CREAT
HYDROMORPHONE/CREAT UR: NOT DETECTED NG/MG CREAT
LEVEL OF DETECTION:: NORMAL
LORAZEPAM/CREAT UR: NOT DETECTED NG/MG CREAT
MDA/CREAT UR: NOT DETECTED NG/MG CREAT
MDMA/CREAT UR: NOT DETECTED NG/MG CREAT
MEPHOBARBITAL UR QL CFM: NOT DETECTED
METHADONE UR QL CFM: NEGATIVE
METHADONE/CREAT UR: NOT DETECTED NG/MG CREAT
METHAMPHET/CREAT UR: NOT DETECTED NG/MG CREAT
MIDAZOLAM/CREAT UR CFM: NOT DETECTED NG/MG CREAT
MORPHINE/CREAT UR: NOT DETECTED NG/MG CREAT
N-NORTRAMADOL/CREAT UR CFM: NOT DETECTED NG/MG CREAT
NARCOTICS UR: NEGATIVE
NORBUPRENORPHINE/CREAT UR: NOT DETECTED NG/MG CREAT
NORCODEINE/CREAT UR CFM: NOT DETECTED NG/MG CREAT
NORDIAZEPAM/CREAT UR: NOT DETECTED NG/MG CREAT
NORFENTANYL/CREAT UR: NOT DETECTED NG/MG CREAT
NORHYDROCODONE/CREAT UR: 688 NG/MG CREAT
NORMORPHINE UR-MCNC: NOT DETECTED NG/MG CREAT
NOROXYCODONE/CREAT UR: NOT DETECTED NG/MG CREAT
NOROXYMORPHONE/CREAT UR CFM: NOT DETECTED NG/MG CREAT
O-NORTRAMADOL UR CFM-MCNC: NOT DETECTED NG/MG CREAT
OPIATES UR QL CFM: NORMAL
OXAZEPAM/CREAT UR: NOT DETECTED NG/MG CREAT
OXYCODONE UR QL CFM: NEGATIVE
OXYCODONE/CREAT UR: NOT DETECTED NG/MG CREAT
OXYMORPHONE/CREAT UR: NOT DETECTED NG/MG CREAT
PENTOBARB UR QL CFM: NOT DETECTED
PHENOBARB UR QL CFM: NOT DETECTED
SECOBARBITAL UR QL CFM: NOT DETECTED
SUFENTANIL/CREAT UR CFM: NOT DETECTED NG/MG CREAT
TAPENTADOL UR QL CFM: NEGATIVE
TAPENTADOL/CREAT UR: NOT DETECTED NG/MG CREAT
TEMAZEPAM/CREAT UR: NOT DETECTED NG/MG CREAT
THIOPENTAL UR QL CFM: NOT DETECTED
TRAMADOL UR QL CFM: NOT DETECTED NG/MG CREAT

## 2021-01-08 ENCOUNTER — OFFICE VISIT (OUTPATIENT)
Dept: FAMILY MEDICINE CLINIC | Facility: CLINIC | Age: 61
End: 2021-01-08

## 2021-01-08 DIAGNOSIS — G59 MONONEUROPATHY DUE TO UNDERLYING DISEASE: Chronic | ICD-10-CM

## 2021-01-08 DIAGNOSIS — E78.2 MIXED HYPERLIPIDEMIA: Chronic | ICD-10-CM

## 2021-01-08 DIAGNOSIS — R73.02 IMPAIRED GLUCOSE TOLERANCE: Primary | Chronic | ICD-10-CM

## 2021-01-08 DIAGNOSIS — C34.12 CANCER OF UPPER LOBE OF LEFT LUNG (HCC): Chronic | ICD-10-CM

## 2021-01-08 DIAGNOSIS — C83.18 MANTLE CELL LYMPHOMA OF LYMPH NODES OF MULTIPLE REGIONS (HCC): Chronic | ICD-10-CM

## 2021-01-08 DIAGNOSIS — M15.9 PRIMARY OSTEOARTHRITIS INVOLVING MULTIPLE JOINTS: Chronic | ICD-10-CM

## 2021-01-08 DIAGNOSIS — K21.9 GASTROESOPHAGEAL REFLUX DISEASE WITHOUT ESOPHAGITIS: Chronic | ICD-10-CM

## 2021-01-08 DIAGNOSIS — I10 BENIGN ESSENTIAL HTN: Chronic | ICD-10-CM

## 2021-01-08 PROBLEM — R91.1 LEFT UPPER LOBE PULMONARY NODULE: Status: RESOLVED | Noted: 2020-06-01 | Resolved: 2021-01-08

## 2021-01-08 PROBLEM — Z48.813 SURGICAL AFTERCARE, RESPIRATORY SYSTEM: Status: RESOLVED | Noted: 2020-06-11 | Resolved: 2021-01-08

## 2021-01-08 PROCEDURE — 99443 PR PHYS/QHP TELEPHONE EVALUATION 21-30 MIN: CPT | Performed by: INTERNAL MEDICINE

## 2021-01-08 RX ORDER — ASPIRIN 81 MG/1
81 TABLET ORAL DAILY
COMMUNITY
Start: 2020-07-16

## 2021-01-08 NOTE — PROGRESS NOTES
Telemedicine visit    You have chosen to receive care through a telephone visit. Do you consent to use a telephone visit for your medical care today? Yes      Chief Complaint   Patient presents with   • Hypertension     Follow-up labs   • Hyperlipidemia   • Osteoarthritis     Subjective   Mitch Rinaldi is a 60 y.o. male who is seen via telephone visit for follow-up and review of labs.  He has impaired glucose tolerance and monitors his dietary intake of sugar and carbohydrates.  He has hyperlipidemia and takes Crestor daily.  He has GERD which is well managed with Nexium.  He has hypertension and his blood pressure has been doing well.  He has osteoarthritis which causes chronic back and joint pain.  He takes Norco 7.5/325 mg twice a day as needed for the pain.  He also takes ibuprofen for treatment of the inflammation.  He takes Zanaflex as needed for muscle spasm.  He has neuropathy secondary to the osteoarthritis in his back, but does not currently require any medication for this.  He has anxiety and depression and takes Wellbutrin and fluoxetine.  He has COPD and his breathing has been baseline.  He does have occasional shortness of breath, but this has been stable.  He stopped smoking several months ago.  I started him on a Symbicort inhaler at the last visit.  This has helped with his shortness of breath.    He has mantle cell lymphoma and follows with oncology.  He has been asymptomatic, and no treatment is currently indicated for this.  He was also found to have a mass in the left upper lobe of his lung.  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  He has been seen at the cancer center in [place] and plans to have radiation treatment for this.  It was decided not to proceed with surgical resection of the mass.    The following portions of the patient's history were reviewed and updated as appropriate: allergies, current medications, past family  history, past medical history, past social history, past surgical history and problem list.    Review of Systems   Constitutional: Negative for chills, fatigue and fever.   HENT: Negative for congestion, sneezing, sore throat and trouble swallowing.    Eyes: Negative for visual disturbance.   Respiratory: Negative for cough, chest tightness, shortness of breath and wheezing.    Cardiovascular: Negative for chest pain, palpitations and leg swelling.   Gastrointestinal: Negative for abdominal pain, constipation, diarrhea, nausea and vomiting.   Genitourinary: Negative for dysuria, frequency and urgency.   Musculoskeletal: Positive for arthralgias. Negative for neck pain.   Skin: Negative for rash.   Neurological: Negative for dizziness, weakness and headaches.   Psychiatric/Behavioral:        Patient denies any feelings of depression and has not felt down, hopeless or lost interest in any activities.   All other systems reviewed and are negative.  Review of systems has been reviewed and validated on 01/08/2021and updated with any changes.      Objective   There were no vitals taken for this visit.  Due to the possibility of current health risks associated with the patient being present in a clinical setting , and with current restrictions in place due to the COVID-19 pandemic, the patient was seen remotely.  Vital signs were unable to be obtained due to the nature of the remote visit.  Physical Exam  Constitutional:       General: He is not in acute distress.     Appearance: He is well-developed.   Neurological:      Mental Status: He is alert and oriented to person, place, and time.   Psychiatric:         Behavior: Behavior normal.         Thought Content: Thought content normal.         Judgment: Judgment normal.      Physical exam has been reviewed and validated on 01/08/2021and updated with any changes.      Assessment/Plan             Diagnoses and all orders for this visit:    1. Impaired glucose tolerance  (Primary)  -     Hemoglobin A1c; Future    2. Benign essential HTN  -     CBC & Differential; Future  -     Comprehensive Metabolic Panel; Future  -     T4, Free; Future  -     TSH; Future  -     Urinalysis With Culture If Indicated -; Future    3. Mixed hyperlipidemia  -     LDL Cholesterol, Direct; Future    4. Gastroesophageal reflux disease without esophagitis    5. Primary osteoarthritis involving multiple joints    6. Mononeuropathy due to underlying disease    7. Mantle cell lymphoma of lymph nodes of multiple regions (CMS/HCC)    8. Cancer of upper lobe of left lung (CMS/HCC)         Labs are reviewed with patient.  Patient's glucose is slightly elevated at 111.  His hemoglobin A1c shows a nondiabetic state at 5.5..  Patient will continue to watch diet to help maintain control of the glucose.  Patient understands that there is an increased risk of developing diabetes in the future.  His total cholesterol is 124, LDL 68 and triglycerides 116.  He will continue with Crestor for the hyperlipidemia.  His PSA is normal at 0.281.  He will continue with ibuprofen to help with the osteoarthritis.  He will continue with Norco 7.5/325 mg twice a day as needed for the arthritic pain and pain related to his lymphoma and lung cancer.  His pain medication is currently being prescribed by his oncologist, so he does not need a refill from me today.  He will continue with Wellbutrin and fluoxetine for treatment of his anxiety and depression.  His blood pressure is doing well, and he will continue with his current blood pressure medication.  He will continue with the Symbicort inhaler for treatment of his COPD.  He will continue with Nexium for treatment of GERD.    Patient understands the risks associated with this controlled medication, including tolerance and addiction.  Patient also agrees to only obtain this medication from me, and not from a another provider, unless that provider is covering for me in my absence.   Patient also agrees to be compliant in dosing, and not self adjust the dose of medication.  A signed controlled substance agreement is on file, and the patient has received a controlled substance education sheet at this a previous visit.  The patient has also signed a consent for treatment with a controlled substance as per [facility] policy. SOFI was obtained.  Urine drug screen is reviewed, and is appropriately positive for hydrocodone.    This visit has been rescheduled as a phone visit to comply with patient safety concerns in accordance with CDC recommendations. Total time of discussion was 23 minutes.    PHQ-2/PHQ-9 Depression Screening 1/8/2021   Little interest or pleasure in doing things 0   Feeling down, depressed, or hopeless 0   Trouble falling or staying asleep, or sleeping too much -   Feeling tired or having little energy -   Poor appetite or overeating -   Feeling bad about yourself - or that you are a failure or have let yourself or your family down -   Trouble concentrating on things, such as reading the newspaper or watching television -   Moving or speaking so slowly that other people could have noticed. Or the opposite - being so fidgety or restless that you have been moving around a lot more than usual -   Thoughts that you would be better off dead, or of hurting yourself in some way -   Total Score 0   If you checked off any problems, how difficult have these problems made it for you to do your work, take care of things at home, or get along with other people? -

## 2021-02-10 DIAGNOSIS — M15.9 PRIMARY OSTEOARTHRITIS INVOLVING MULTIPLE JOINTS: Chronic | ICD-10-CM

## 2021-02-10 RX ORDER — HYDROCODONE BITARTRATE AND ACETAMINOPHEN 7.5; 325 MG/1; MG/1
1 TABLET ORAL EVERY 8 HOURS PRN
Qty: 90 TABLET | Refills: 0 | Status: SHIPPED | OUTPATIENT
Start: 2021-02-10 | End: 2021-02-27 | Stop reason: SDUPTHER

## 2021-02-12 RX ORDER — BUPROPION HYDROCHLORIDE 100 MG/1
100 TABLET, EXTENDED RELEASE ORAL 2 TIMES DAILY
Qty: 90 TABLET | Refills: 0 | Status: SHIPPED | OUTPATIENT
Start: 2021-02-12 | End: 2021-02-26

## 2021-02-12 RX ORDER — ROSUVASTATIN CALCIUM 10 MG/1
10 TABLET, COATED ORAL DAILY
Qty: 30 TABLET | Refills: 11 | Status: SHIPPED | OUTPATIENT
Start: 2021-02-12 | End: 2022-02-15 | Stop reason: SDUPTHER

## 2021-02-26 ENCOUNTER — OFFICE VISIT (OUTPATIENT)
Dept: ONCOLOGY | Facility: CLINIC | Age: 61
End: 2021-02-26

## 2021-02-26 ENCOUNTER — LAB (OUTPATIENT)
Dept: ONCOLOGY | Facility: HOSPITAL | Age: 61
End: 2021-02-26

## 2021-02-26 VITALS
HEART RATE: 73 BPM | RESPIRATION RATE: 18 BRPM | WEIGHT: 181 LBS | DIASTOLIC BLOOD PRESSURE: 64 MMHG | HEIGHT: 70 IN | TEMPERATURE: 97.4 F | SYSTOLIC BLOOD PRESSURE: 119 MMHG | BODY MASS INDEX: 25.91 KG/M2

## 2021-02-26 DIAGNOSIS — C34.12 CANCER OF UPPER LOBE OF LEFT LUNG (HCC): Chronic | ICD-10-CM

## 2021-02-26 DIAGNOSIS — C83.18 MANTLE CELL LYMPHOMA OF LYMPH NODES OF MULTIPLE REGIONS (HCC): ICD-10-CM

## 2021-02-26 DIAGNOSIS — C83.18 MANTLE CELL LYMPHOMA OF LYMPH NODES OF MULTIPLE REGIONS (HCC): Primary | ICD-10-CM

## 2021-02-26 DIAGNOSIS — M15.9 PRIMARY OSTEOARTHRITIS INVOLVING MULTIPLE JOINTS: Chronic | ICD-10-CM

## 2021-02-26 LAB
ALBUMIN SERPL-MCNC: 4.1 G/DL (ref 3.5–5.2)
ALBUMIN/GLOB SERPL: 1.1 G/DL
ALP SERPL-CCNC: 112 U/L (ref 39–117)
ALT SERPL W P-5'-P-CCNC: 17 U/L (ref 1–41)
ANION GAP SERPL CALCULATED.3IONS-SCNC: 9 MMOL/L (ref 5–15)
AST SERPL-CCNC: 19 U/L (ref 1–40)
BASOPHILS # BLD AUTO: 0.08 10*3/MM3 (ref 0–0.2)
BASOPHILS NFR BLD AUTO: 1.2 % (ref 0–1.5)
BILIRUB SERPL-MCNC: 0.4 MG/DL (ref 0–1.2)
BUN SERPL-MCNC: 16 MG/DL (ref 8–23)
BUN/CREAT SERPL: 12.9 (ref 7–25)
CALCIUM SPEC-SCNC: 9.1 MG/DL (ref 8.6–10.5)
CHLORIDE SERPL-SCNC: 103 MMOL/L (ref 98–107)
CO2 SERPL-SCNC: 27 MMOL/L (ref 22–29)
CREAT SERPL-MCNC: 1.24 MG/DL (ref 0.76–1.27)
DEPRECATED RDW RBC AUTO: 40.9 FL (ref 37–54)
EOSINOPHIL # BLD AUTO: 0.26 10*3/MM3 (ref 0–0.4)
EOSINOPHIL NFR BLD AUTO: 3.9 % (ref 0.3–6.2)
ERYTHROCYTE [DISTWIDTH] IN BLOOD BY AUTOMATED COUNT: 12.5 % (ref 12.3–15.4)
GFR SERPL CREATININE-BSD FRML MDRD: 59 ML/MIN/1.73
GLOBULIN UR ELPH-MCNC: 3.8 GM/DL
GLUCOSE SERPL-MCNC: 105 MG/DL (ref 65–99)
HCT VFR BLD AUTO: 46.3 % (ref 37.5–51)
HGB BLD-MCNC: 16.3 G/DL (ref 13–17.7)
IMM GRANULOCYTES # BLD AUTO: 0.02 10*3/MM3 (ref 0–0.05)
IMM GRANULOCYTES NFR BLD AUTO: 0.3 % (ref 0–0.5)
LYMPHOCYTES # BLD AUTO: 1.29 10*3/MM3 (ref 0.7–3.1)
LYMPHOCYTES NFR BLD AUTO: 19.2 % (ref 19.6–45.3)
MCH RBC QN AUTO: 31.2 PG (ref 26.6–33)
MCHC RBC AUTO-ENTMCNC: 35.2 G/DL (ref 31.5–35.7)
MCV RBC AUTO: 88.5 FL (ref 79–97)
MONOCYTES # BLD AUTO: 0.64 10*3/MM3 (ref 0.1–0.9)
MONOCYTES NFR BLD AUTO: 9.5 % (ref 5–12)
NEUTROPHILS NFR BLD AUTO: 4.43 10*3/MM3 (ref 1.7–7)
NEUTROPHILS NFR BLD AUTO: 65.9 % (ref 42.7–76)
NRBC BLD AUTO-RTO: 0 /100 WBC (ref 0–0.2)
PLATELET # BLD AUTO: 203 10*3/MM3 (ref 140–450)
PMV BLD AUTO: 10 FL (ref 6–12)
POTASSIUM SERPL-SCNC: 3.9 MMOL/L (ref 3.5–5.2)
PROT SERPL-MCNC: 7.9 G/DL (ref 6–8.5)
RBC # BLD AUTO: 5.23 10*6/MM3 (ref 4.14–5.8)
SODIUM SERPL-SCNC: 139 MMOL/L (ref 136–145)
WBC # BLD AUTO: 6.72 10*3/MM3 (ref 3.4–10.8)

## 2021-02-26 PROCEDURE — 85025 COMPLETE CBC W/AUTO DIFF WBC: CPT | Performed by: INTERNAL MEDICINE

## 2021-02-26 PROCEDURE — 99214 OFFICE O/P EST MOD 30 MIN: CPT | Performed by: INTERNAL MEDICINE

## 2021-02-26 PROCEDURE — G0463 HOSPITAL OUTPT CLINIC VISIT: HCPCS | Performed by: INTERNAL MEDICINE

## 2021-02-26 PROCEDURE — 80053 COMPREHEN METABOLIC PANEL: CPT | Performed by: INTERNAL MEDICINE

## 2021-02-27 RX ORDER — HYDROCODONE BITARTRATE AND ACETAMINOPHEN 7.5; 325 MG/1; MG/1
1 TABLET ORAL EVERY 8 HOURS PRN
Qty: 90 TABLET | Refills: 0 | Status: SHIPPED | OUTPATIENT
Start: 2021-03-10 | End: 2021-04-07 | Stop reason: SDUPTHER

## 2021-02-27 NOTE — PROGRESS NOTES
"Chief Complaint  Follow-up - lung cancer, mantle cell lymphoma     Subjective          Mitch Rinaldi presents to [facility]  History of Present Illness     Mitch Rinaldi was seen in follow up for mantle cell lymphoma and lung cancer.   S/p SBRT to left upper lobe SCC.   Tolerated treatment well.   No B symptoms.   Lymph nodes unchanged.   OA pain stable. Requesting new prescription which was sent to the pharmacy.     Objective   Vital Signs:   /64   Pulse 73   Temp 97.4 °F (36.3 °C) (Temporal)   Resp 18   Ht 177.8 cm (70\")   Wt 82.1 kg (181 lb)   BMI 25.97 kg/m²     Physical Exam   Result Review :   The following data was reviewed by: Atiya Alexis MD on 02/26/2021:  Common labs    Common Labsle 12/31/20 12/31/20 1/4/21 1/4/21 1/4/21 1/4/21 1/4/21 2/26/21 2/26/21    1245 1245 0749 0749 0749 0749 0753 0738 0738   Glucose  92 111 (A)      105 (A)   BUN  15 15      16   Creatinine  1.25 1.18      1.24   eGFR Non  Am  59 (A) 63      59 (A)   Sodium  139 139      139   Potassium  3.9 3.9      3.9   Chloride  107 104      103   Calcium  9.0 8.5      9.1   Albumin  4.00 4.00      4.10   Total Bilirubin  0.4 0.5      0.4   Alkaline Phosphatase  124 (A) 113      112   AST (SGOT)  19 24      19   ALT (SGPT)  18 20      17   WBC 7.72      6.69 6.72    Hemoglobin 14.7      14.6 16.3    Hematocrit 42.3      41.7 46.3    Platelets 192      182 203    Total Cholesterol    124 (A)        Triglycerides    116        HDL Cholesterol    35 (A)        LDL Cholesterol     68        Hemoglobin A1C     5.50       PSA      0.281      (A) Abnormal value            CMP    CMP 12/31/20 1/4/21 2/26/21   Glucose 92 111 (A) 105 (A)   BUN 15 15 16   Creatinine 1.25 1.18 1.24   eGFR Non  Am 59 (A) 63 59 (A)   Sodium 139 139 139   Potassium 3.9 3.9 3.9   Chloride 107 104 103   Calcium 9.0 8.5 9.1   Albumin 4.00 4.00 4.10   Total Bilirubin 0.4 0.5 0.4   Alkaline " Phosphatase 124 (A) 113 112   AST (SGOT) 19 24 19   ALT (SGPT) 18 20 17   (A) Abnormal value            CBC    CBC 12/31/20 1/4/21 2/26/21   WBC 7.72 6.69 6.72   RBC 4.85 4.72 5.23   Hemoglobin 14.7 14.6 16.3   Hematocrit 42.3 41.7 46.3   MCV 87.2 88.3 88.5   MCH 30.3 30.9 31.2   MCHC 34.8 35.0 35.2   RDW 12.0 (A) 12.5 12.5   Platelets 192 182 203   (A) Abnormal value            Data reviewed: Consultant notes rad onc notes from [facility] reviewed. Underwent 5000 cGY to left upper lobe SCC.           Assessment and Plan    Diagnoses and all orders for this visit:    1. Mantle cell lymphoma of lymph nodes of multiple regions (CMS/HCC) (Primary)  -     CBC & Differential; Future  -     Comprehensive Metabolic Panel; Future  -     CBC & Differential; Future  -     Comprehensive Metabolic Panel; Future  -     Lactate Dehydrogenase; Future  -     CT chest w contrast; Future  -     CT abdomen pelvis w contrast; Future      Patient with diagnosis of CD5 negative mantle cell lymphoma.  IHC positive for cyclin D1  FISH is positive for t(11,14) translocation.   Cytogenetics are normal.  Ki-67 -10 to 15%.  Bone marrow aspiration biopsy was negative.  He is overall lymphadenopathy has remained stable.  Recommend we continue to monitor him  For asymptomatic mantle cell lymphoma.     Labs (as above) and CT Chest, abdomen, pelvis in 3 months.     2. Cancer of upper lobe of left lung (CMS/HCC)  -     CT chest w contrast; Future  -     CT abdomen pelvis w contrast; Future    Patient underwent SBRT to left upper lobe of lung squamous cell carcinoma.   Tolerated treatment well.   I will order CT chest to evaluate response to treatment in 3 months.     3. Primary osteoarthritis involving multiple joints  -     HYDROcodone-acetaminophen (NORCO) 7.5-325 MG per tablet; Take 1 tablet by mouth Every 8 (Eight) Hours As Needed for Moderate Pain .  Dispense: 90 tablet; Refill: 0        Follow Up   No follow-ups on file.  Patient was given  instructions and counseling regarding his condition or for health maintenance advice. Please see specific information pulled into the AVS if appropriate.

## 2021-03-02 ENCOUNTER — IMMUNIZATION (OUTPATIENT)
Dept: VACCINE CLINIC | Facility: HOSPITAL | Age: 61
End: 2021-03-02

## 2021-03-02 PROCEDURE — 91300 HC SARSCOV02 VAC 30MCG/0.3ML IM: CPT | Performed by: NURSE PRACTITIONER

## 2021-03-02 PROCEDURE — 0001A: CPT | Performed by: NURSE PRACTITIONER

## 2021-03-23 ENCOUNTER — IMMUNIZATION (OUTPATIENT)
Dept: VACCINE CLINIC | Facility: HOSPITAL | Age: 61
End: 2021-03-23

## 2021-03-23 PROCEDURE — 91300 HC SARSCOV02 VAC 30MCG/0.3ML IM: CPT | Performed by: NURSE PRACTITIONER

## 2021-03-23 PROCEDURE — 0002A: CPT | Performed by: NURSE PRACTITIONER

## 2021-04-06 ENCOUNTER — OFFICE VISIT (OUTPATIENT)
Dept: CARDIOLOGY | Facility: CLINIC | Age: 61
End: 2021-04-06

## 2021-04-06 VITALS
HEIGHT: 70 IN | HEART RATE: 76 BPM | BODY MASS INDEX: 26.05 KG/M2 | WEIGHT: 182 LBS | DIASTOLIC BLOOD PRESSURE: 98 MMHG | SYSTOLIC BLOOD PRESSURE: 144 MMHG | OXYGEN SATURATION: 98 %

## 2021-04-06 DIAGNOSIS — I25.10 CORONARY ARTERY DISEASE INVOLVING NATIVE CORONARY ARTERY OF NATIVE HEART WITHOUT ANGINA PECTORIS: Primary | ICD-10-CM

## 2021-04-06 DIAGNOSIS — C83.18 MANTLE CELL LYMPHOMA OF LYMPH NODES OF MULTIPLE REGIONS (HCC): ICD-10-CM

## 2021-04-06 DIAGNOSIS — C34.12 CANCER OF UPPER LOBE OF LEFT LUNG (HCC): ICD-10-CM

## 2021-04-06 DIAGNOSIS — E78.2 MIXED HYPERLIPIDEMIA: ICD-10-CM

## 2021-04-06 DIAGNOSIS — I10 BENIGN ESSENTIAL HTN: ICD-10-CM

## 2021-04-06 PROCEDURE — 99213 OFFICE O/P EST LOW 20 MIN: CPT | Performed by: INTERNAL MEDICINE

## 2021-04-06 RX ORDER — METOPROLOL TARTRATE 50 MG/1
50 TABLET, FILM COATED ORAL 2 TIMES DAILY
Qty: 180 TABLET | Refills: 3 | Status: SHIPPED | OUTPATIENT
Start: 2021-04-06 | End: 2023-03-28 | Stop reason: ALTCHOICE

## 2021-04-06 NOTE — PROGRESS NOTES
Mitch Rinaldi  60 y.o. male    04/06/2021  Chief Complaint   Patient presents with   • Coronary Artery Disease       History of Present Illness  Patient with coronary artery disease disease per CT scan    -        SUBJECTIVE  Patient cardiac wise is doing well.  Since his last visit he had been found to have lung CA along with his lymphoma.  He went to [place] and underwent high dose radiation therapy.  He is planning to have a scan in the future.  He has been more dyspneic but that is been secondary to this.  He says during the last year during the pandemic he has gained about 20 to 30 pounds.  His blood pressure for the most part he said at home is normal though occasionally he will have a elevated reading.  Allergies   Allergen Reactions   • Darvon [Propoxyphene] Nausea And Vomiting   • Percocet [Oxycodone-Acetaminophen] Palpitations         Past Medical History:   Diagnosis Date   • Acute gastroenteritis    • Allergic    • Anxiety    • Asthma     as a  child   • Diverticulitis of colon    • Emphysema lung (CMS/HCC)    • Epididymal cyst    • Gastroesophageal reflux disease    • Generalized anxiety disorder    • Hyperlipidemia    • Hypertension    • Impaired glucose tolerance    • Left upper lobe pulmonary nodule 6/1/2020    Added automatically from request for surgery [id number]   • Pain, lumbar region     Pain radiating to lumbar region of back      • Peripheral nervous system disease     Disorder of the peripheral nervous system      • Pleuritic chest pain    • Pneumonia    • Primary osteoarthritis involving multiple joints 8/29/2016   • Shoulder pain, left    • Spermatocele 4/25/2018    Added automatically from request for surgery [id number]   • Surgical aftercare, respiratory system 6/11/2020   • Tachycardia    • Tinnitus    • Wears glasses    • Wears partial dentures          Past Surgical History:   Procedure Laterality Date   • CHOLECYSTECTOMY     • COLONOSCOPY N/A 1/14/2019    Procedure: COLONOSCOPY;   Surgeon: Ronald Rodriguez DO;  Location: [facility] ENDOSCOPY;  Service: Gastroenterology   • DENTAL PROCEDURE     • ENDOSCOPY AND COLONOSCOPY     • EPIDIDYMIS SURGERY Right     cyst   • HERNIA REPAIR  Feb 2017   • INGUINAL HERNIA REPAIR Left 2/6/2017    Procedure: OPEN LEFT INGUINAL HERNIA REPAIR WITH MESH;  Surgeon: Miles Gregorio MD;  Location: [facility] OR;  Service:    • INJECTION OF MEDICATION      Depo Medrol (Methylprednisone) 80mg (8)      09/01/2015       • INJECTION OF MEDICATION      Kenalog (1)      12/11/2012       • LUNG SURGERY       Pneumothorax 1980       • LYMPH NODE BIOPSY N/A 6/3/2020    Procedure: BIOPSY LYMPH NODE;  Surgeon: Bry Salvador MD;  Location: [facility] OR;  Service: Cardiothoracic;  Laterality: N/A;   • MEDIASTINOSCOPY, BRONCHOSCOPY N/A 6/3/2020    Procedure: MEDIASTINOSCOPY, BRONCHOSCOPY cervical  lymph node biopsy;  Surgeon: Bry Salvador MD;  Location: [facility] OR;  Service: Cardiothoracic;  Laterality: N/A;   • SPERMATOCELECTOMY Left 5/11/2018    Procedure: LEFT SPERMATOCELECTOMY;  Surgeon: Miles Groves MD;  Location: [facility] OR;  Service: Urology         Family History   Problem Relation Age of Onset   • Dementia Mother    • Hypertension Mother    • Thyroid disease Mother    • Arthritis Mother    • Diabetes Father    • Stomach cancer Father    • Cancer Father         stomach   • Thyroid disease Brother    • Down syndrome Brother    • COPD Maternal Aunt    • Alcohol abuse Paternal Uncle    • Macular degeneration Brother    • Alcohol abuse Brother    • Alcohol abuse Brother    • Thyroid disease Brother    • Alcohol abuse Paternal Uncle    • Birth defects Brother    • COPD Maternal Aunt    • Hearing loss Brother          Social History     Socioeconomic History   • Marital status: Single     Spouse name: Not on file   • Number of children: Not on file   • Years of education: Not on file   • Highest education level: Not on file   Tobacco Use   • Smoking  status: Former Smoker     Packs/day: 1.00     Years: 42.00     Pack years: 42.00     Types: Cigarettes     Quit date: 2020     Years since quittin.5   • Smokeless tobacco: Former User     Quit date:    Vaping Use   • Vaping Use: Never used   Substance and Sexual Activity   • Alcohol use: No   • Drug use: No   • Sexual activity: Defer         Prior to Admission medications    Medication Sig Start Date End Date Taking? Authorizing Provider   albuterol sulfate  (90 Base) MCG/ACT inhaler Inhale 2 puffs Every 4 (Four) Hours As Needed for Wheezing. 10/8/20  Yes Carmelo Farmer MD   aspirin 81 MG EC tablet Take 81 mg by mouth Daily. 20  Yes Hannah Nguyen MD   budesonide-formoterol (Symbicort) 160-4.5 MCG/ACT inhaler Inhale 2 puffs 2 (Two) Times a Day. 10/8/20  Yes Carmelo Farmer MD   cetirizine (ZyrTEC) 10 MG tablet Take 10 mg by mouth daily as needed for allergies.   Yes Hannah Nguyen MD   esomeprazole (nexIUM) 20 MG capsule Take 20 mg by mouth Every Morning Before Breakfast.   Yes ProviderHannah MD   FLUoxetine (PROzac) 40 MG capsule TAKE 1 CAPSULE BY MOUTH DAILY. 20  Yes Carmelo Farmer MD   fluticasone (FLONASE) 50 MCG/ACT nasal spray 2 sprays into the nostril(s) as directed by provider Daily As Needed for Rhinitis.   Yes Hannah Nguyen MD   HYDROcodone-acetaminophen (NORCO) 7.5-325 MG per tablet Take 1 tablet by mouth Every 8 (Eight) Hours As Needed for Moderate Pain . 3/10/21  Yes Atiya Alexis MD   metoprolol tartrate (LOPRESSOR) 50 MG tablet Take 1 tablet by mouth 2 (Two) Times a Day. 20  Yes Tomi Gill MD   promethazine (PHENERGAN) 25 MG tablet TAKE 1 TABLET BY MOUTH EVERY 6 (SIX) HOURS AS NEEDED FOR NAUSEA OR VOMITING. 20  Yes Carmelo Farmer MD   rosuvastatin (CRESTOR) 10 MG tablet TAKE 1 TABLET BY MOUTH DAILY. 21  Yes Tomi Gill MD   tiZANidine (ZANAFLEX) 4 MG tablet TAKE 1 TABLET BY MOUTH EVERY 8  "(EIGHT) HOURS AS NEEDED FOR MUSCLE SPASMS. 12/18/20  Yes Carmelo Farmer MD   nicotine (NICODERM CQ) 21 MG/24HR patch Place 1 patch on the skin as directed by provider Daily. 7/27/20   Atiya Alexis MD         OBJECTIVE    /98   Pulse 76   Ht 177.8 cm (70\")   Wt 82.6 kg (182 lb)   SpO2 98%   BMI 26.11 kg/m²         Review of Systems     Constitutional:  Denies recent weight loss, weight gain, fever or chills, no change in exercise tolerance     HENT:  Denies any hearing loss, epistaxis, hoarseness, or difficulty speaking.     Eyes: Wears eyeglasses or contact lenses     Respiratory:  Denies dyspnea with exertion,no cough, wheezing, or hemoptysis.     Cardiovascular: Negative for palpations, chest pain, orthopnea, PND, peripheral edema, syncope, or claudication.     Gastrointestinal:  Denies change in bowel habits, dyspepsia, ulcer disease, hematochezia, or melena.     Endocrine: Negative for cold intolerance, heat intolerance, polydipsia, polyphagia and polyuria. Denies any history of weight change, heat/cold intolerance, polydipsia, polyuria     Genitourinary: Negative.      Musculoskeletal: Denies any history of arthritic symptoms or back problems     Skin:  Denies any change in hair or nails, rashes, or skin lesions.     Allergic/Immunologic: Negative.  Negative for environmental allergies, food allergies and immunocompromised state.     Neurological:  Denies any history of recurrent headaches, strokes, TIA, or seizure disorder.     Hematological: Denies any food allergies, seasonal allergies, bleeding disorders, or lymphadenopathy.     Psychiatric/Behavioral: Denies any history of depression, substance abuse, or change in cognitive function.         Physical Exam     Constitutional: Cooperative, alert and oriented, well-developed, well-nourished, in no acute distress.     HENT:   Head: Normocephalic, normal hair patterns, no masses or tenderness.  Ears, Nose, and Throat: No gross " abnormalities. No pallor or cyanosis. Dentition good.   Eyes: EOMS intact, PERRL, conjunctivae and lids unremarkable. Fundoscopic exam and visual fields not performed.   Neck: No palpable masses or adenopathy, no thyromegaly, no JVD, carotid pulses are full and equal bilaterally and without  Bruits.     Cardiovascular: Regular rhythm, S1 and S2 normal, no S3 or S4. Apical impulse not displaced. No murmurs, gallops, or rubs detected.     Pulmonary/Chest: Chest: normal symmetry, no tenderness to palpation, normal respiratory excursion, no intercostal retraction, no use of accessory muscles.            Pulmonary: Normal breath sounds. No rales or ronchi.    Abdominal: Abdomen soft, bowel sounds normoactive, no masses, no hepatosplenomegaly, non-tender, no bruits.     Musculoskeletal: No deformities, clubbing, cyanosis, erythema, or edema observed. There are no spinal abnormalities noted. Normal muscle strength and tone. Pulses full and equal in all extremities, no bruits auscultated.     Neurological: No gross motor or sensory deficits noted, affect appropriate, oriented to time, person, place.     Skin: Warm and dry to the touch, no apparent skin lesions or masses noted.     Psychiatric: He has a normal mood and affect. His behavior is normal. Judgment and thought content normal.         Procedures      Lab Results   Component Value Date    WBC 6.72 02/26/2021    HGB 16.3 02/26/2021    HCT 46.3 02/26/2021    MCV 88.5 02/26/2021     02/26/2021     Lab Results   Component Value Date    GLUCOSE 105 (H) 02/26/2021    BUN 16 02/26/2021    CREATININE 1.24 02/26/2021    EGFRIFNONA 59 (L) 02/26/2021    BCR 12.9 02/26/2021    CO2 27.0 02/26/2021    CALCIUM 9.1 02/26/2021    ALBUMIN 4.10 02/26/2021    AST 19 02/26/2021    ALT 17 02/26/2021     Lab Results   Component Value Date    CHOL 124 (L) 01/04/2021    CHOL 186 10/16/2019    CHOL 161 04/17/2019     Lab Results   Component Value Date    TRIG 116 01/04/2021    TRIG  186 (H) 10/16/2019    TRIG 139 04/17/2019     Lab Results   Component Value Date    HDL 35 (L) 01/04/2021    HDL 30 (L) 10/16/2019    HDL 38 (L) 04/17/2019     No components found for: LDLCALC  Lab Results   Component Value Date    LDL 68 01/04/2021     (H) 10/16/2019    LDL 95 04/17/2019     No results found for: HDLLDLRATIO  No components found for: CHOLHDL  Lab Results   Component Value Date    HGBA1C 5.50 01/04/2021     Lab Results   Component Value Date    TSH 3.050 01/04/2021           ASSESSMENT AND PLAN      Diagnoses and all orders for this visit:    1. Coronary artery disease involving native coronary artery of native heart without angina pectoris (Primary)    2. Mixed hyperlipidemia    3. Benign essential HTN    4. Cancer of upper lobe of left lung (CMS/HCC)    5. Mantle cell lymphoma of lymph nodes of multiple regions (CMS/HCC)    Other orders  -     metoprolol tartrate (LOPRESSOR) 50 MG tablet; Take 1 tablet by mouth 2 (Two) Times a Day.  Dispense: 180 tablet; Refill: 3    His coronary disease is stable.  His cholesterol in January had good LDL and total cholesterol levels.  His blood pressures little bit elevated this visit but he said for the most part his been normal at home with slight elevations at times.  He has gained weight during the pandemic and we have talked about exercising which he is started to do and weight loss which will I am sure to bring his blood pressure with an good control.    Patient's Body mass index is 26.11 kg/m². BMI is within normal parameters. No follow-up required..                Tomi Gill MD  4/6/2021  12:40 CDT

## 2021-04-07 DIAGNOSIS — M15.9 PRIMARY OSTEOARTHRITIS INVOLVING MULTIPLE JOINTS: Chronic | ICD-10-CM

## 2021-04-07 RX ORDER — HYDROCODONE BITARTRATE AND ACETAMINOPHEN 7.5; 325 MG/1; MG/1
1 TABLET ORAL EVERY 8 HOURS PRN
Qty: 90 TABLET | Refills: 0 | Status: SHIPPED | OUTPATIENT
Start: 2021-04-07 | End: 2021-05-07 | Stop reason: SDUPTHER

## 2021-04-29 DIAGNOSIS — K21.9 GASTROESOPHAGEAL REFLUX DISEASE WITHOUT ESOPHAGITIS: Chronic | ICD-10-CM

## 2021-04-29 RX ORDER — PROMETHAZINE HYDROCHLORIDE 25 MG/1
25 TABLET ORAL EVERY 6 HOURS PRN
Qty: 60 TABLET | Refills: 2 | Status: SHIPPED | OUTPATIENT
Start: 2021-04-29 | End: 2021-06-28

## 2021-05-04 ENCOUNTER — HOSPITAL ENCOUNTER (OUTPATIENT)
Dept: CT IMAGING | Facility: HOSPITAL | Age: 61
Discharge: HOME | End: 2021-05-04

## 2021-05-04 ENCOUNTER — TRANSCRIBE ORDERS (OUTPATIENT)
Dept: CT IMAGING | Facility: HOSPITAL | Age: 61
End: 2021-05-04

## 2021-05-04 ENCOUNTER — LAB (OUTPATIENT)
Dept: LAB | Facility: HOSPITAL | Age: 61
End: 2021-05-04

## 2021-05-04 DIAGNOSIS — C34.12 CANCER OF UPPER LOBE OF LEFT LUNG (HCC): Chronic | ICD-10-CM

## 2021-05-04 DIAGNOSIS — C83.18 MANTLE CELL LYMPHOMA OF LYMPH NODES OF MULTIPLE REGIONS (HCC): ICD-10-CM

## 2021-05-04 DIAGNOSIS — C83.18 MANTLE CELL LYMPHOMA OF LYMPH NODES OF MULTIPLE SITES (HCC): Primary | ICD-10-CM

## 2021-05-04 DIAGNOSIS — C83.18 MANTLE CELL LYMPHOMA OF LYMPH NODES OF MULTIPLE SITES (HCC): ICD-10-CM

## 2021-05-04 LAB
BUN SERPL-MCNC: 8 MG/DL (ref 8–23)
CREAT SERPL-MCNC: 1.08 MG/DL (ref 0.76–1.27)
GFR SERPL CREATININE-BSD FRML MDRD: 69 ML/MIN/1.73

## 2021-05-04 PROCEDURE — 74177 CT ABD & PELVIS W/CONTRAST: CPT

## 2021-05-04 PROCEDURE — 71260 CT THORAX DX C+: CPT

## 2021-05-04 PROCEDURE — 25010000002 IOPAMIDOL 61 % SOLUTION: Performed by: INTERNAL MEDICINE

## 2021-05-04 PROCEDURE — 84520 ASSAY OF UREA NITROGEN: CPT

## 2021-05-04 PROCEDURE — 82565 ASSAY OF CREATININE: CPT

## 2021-05-04 PROCEDURE — 36415 COLL VENOUS BLD VENIPUNCTURE: CPT

## 2021-05-04 RX ADMIN — IOPAMIDOL 90 ML: 612 INJECTION, SOLUTION INTRAVENOUS at 08:18

## 2021-05-07 ENCOUNTER — LAB (OUTPATIENT)
Dept: ONCOLOGY | Facility: HOSPITAL | Age: 61
End: 2021-05-07

## 2021-05-07 ENCOUNTER — OFFICE VISIT (OUTPATIENT)
Dept: ONCOLOGY | Facility: CLINIC | Age: 61
End: 2021-05-07

## 2021-05-07 VITALS
HEART RATE: 64 BPM | WEIGHT: 181 LBS | SYSTOLIC BLOOD PRESSURE: 134 MMHG | HEIGHT: 70 IN | TEMPERATURE: 97.8 F | RESPIRATION RATE: 18 BRPM | DIASTOLIC BLOOD PRESSURE: 73 MMHG | BODY MASS INDEX: 25.91 KG/M2

## 2021-05-07 DIAGNOSIS — C83.18 MANTLE CELL LYMPHOMA OF LYMPH NODES OF MULTIPLE REGIONS (HCC): ICD-10-CM

## 2021-05-07 DIAGNOSIS — C34.12 CANCER OF UPPER LOBE OF LEFT LUNG (HCC): Primary | Chronic | ICD-10-CM

## 2021-05-07 DIAGNOSIS — M15.9 PRIMARY OSTEOARTHRITIS INVOLVING MULTIPLE JOINTS: Chronic | ICD-10-CM

## 2021-05-07 DIAGNOSIS — C83.18 MANTLE CELL LYMPHOMA OF LYMPH NODES OF MULTIPLE REGIONS (HCC): Chronic | ICD-10-CM

## 2021-05-07 LAB
ALBUMIN SERPL-MCNC: 3.8 G/DL (ref 3.5–5.2)
ALBUMIN/GLOB SERPL: 1.1 G/DL
ALP SERPL-CCNC: 95 U/L (ref 39–117)
ALT SERPL W P-5'-P-CCNC: 10 U/L (ref 1–41)
ANION GAP SERPL CALCULATED.3IONS-SCNC: 9 MMOL/L (ref 5–15)
AST SERPL-CCNC: 18 U/L (ref 1–40)
BASOPHILS # BLD AUTO: 0.1 10*3/MM3 (ref 0–0.2)
BASOPHILS NFR BLD AUTO: 1.6 % (ref 0–1.5)
BILIRUB SERPL-MCNC: 0.3 MG/DL (ref 0–1.2)
BUN SERPL-MCNC: 13 MG/DL (ref 8–23)
BUN/CREAT SERPL: 12.3 (ref 7–25)
CALCIUM SPEC-SCNC: 9 MG/DL (ref 8.6–10.5)
CHLORIDE SERPL-SCNC: 104 MMOL/L (ref 98–107)
CO2 SERPL-SCNC: 27 MMOL/L (ref 22–29)
CREAT SERPL-MCNC: 1.06 MG/DL (ref 0.76–1.27)
DEPRECATED RDW RBC AUTO: 40.4 FL (ref 37–54)
EOSINOPHIL # BLD AUTO: 0.24 10*3/MM3 (ref 0–0.4)
EOSINOPHIL NFR BLD AUTO: 3.8 % (ref 0.3–6.2)
ERYTHROCYTE [DISTWIDTH] IN BLOOD BY AUTOMATED COUNT: 12.4 % (ref 12.3–15.4)
GFR SERPL CREATININE-BSD FRML MDRD: 71 ML/MIN/1.73
GLOBULIN UR ELPH-MCNC: 3.6 GM/DL
GLUCOSE SERPL-MCNC: 118 MG/DL (ref 65–99)
HCT VFR BLD AUTO: 42.4 % (ref 37.5–51)
HGB BLD-MCNC: 14.4 G/DL (ref 13–17.7)
HOLD SPECIMEN: NORMAL
IMM GRANULOCYTES # BLD AUTO: 0.02 10*3/MM3 (ref 0–0.05)
IMM GRANULOCYTES NFR BLD AUTO: 0.3 % (ref 0–0.5)
LDH SERPL-CCNC: 177 U/L (ref 135–225)
LYMPHOCYTES # BLD AUTO: 1.23 10*3/MM3 (ref 0.7–3.1)
LYMPHOCYTES NFR BLD AUTO: 19.3 % (ref 19.6–45.3)
MCH RBC QN AUTO: 30.2 PG (ref 26.6–33)
MCHC RBC AUTO-ENTMCNC: 34 G/DL (ref 31.5–35.7)
MCV RBC AUTO: 88.9 FL (ref 79–97)
MONOCYTES # BLD AUTO: 0.49 10*3/MM3 (ref 0.1–0.9)
MONOCYTES NFR BLD AUTO: 7.7 % (ref 5–12)
NEUTROPHILS NFR BLD AUTO: 4.3 10*3/MM3 (ref 1.7–7)
NEUTROPHILS NFR BLD AUTO: 67.3 % (ref 42.7–76)
NRBC BLD AUTO-RTO: 0 /100 WBC (ref 0–0.2)
PLATELET # BLD AUTO: 172 10*3/MM3 (ref 140–450)
PMV BLD AUTO: 10.4 FL (ref 6–12)
POTASSIUM SERPL-SCNC: 4 MMOL/L (ref 3.5–5.2)
PROT SERPL-MCNC: 7.4 G/DL (ref 6–8.5)
RBC # BLD AUTO: 4.77 10*6/MM3 (ref 4.14–5.8)
SODIUM SERPL-SCNC: 140 MMOL/L (ref 136–145)
WBC # BLD AUTO: 6.38 10*3/MM3 (ref 3.4–10.8)

## 2021-05-07 PROCEDURE — 1123F ACP DISCUSS/DSCN MKR DOCD: CPT | Performed by: INTERNAL MEDICINE

## 2021-05-07 PROCEDURE — 83615 LACTATE (LD) (LDH) ENZYME: CPT | Performed by: INTERNAL MEDICINE

## 2021-05-07 PROCEDURE — G0463 HOSPITAL OUTPT CLINIC VISIT: HCPCS | Performed by: INTERNAL MEDICINE

## 2021-05-07 PROCEDURE — 80053 COMPREHEN METABOLIC PANEL: CPT | Performed by: INTERNAL MEDICINE

## 2021-05-07 PROCEDURE — 1126F AMNT PAIN NOTED NONE PRSNT: CPT | Performed by: INTERNAL MEDICINE

## 2021-05-07 PROCEDURE — 99214 OFFICE O/P EST MOD 30 MIN: CPT | Performed by: INTERNAL MEDICINE

## 2021-05-07 PROCEDURE — G9903 PT SCRN TBCO ID AS NON USER: HCPCS | Performed by: INTERNAL MEDICINE

## 2021-05-07 PROCEDURE — 85025 COMPLETE CBC W/AUTO DIFF WBC: CPT | Performed by: INTERNAL MEDICINE

## 2021-05-07 RX ORDER — HYDROCODONE BITARTRATE AND ACETAMINOPHEN 7.5; 325 MG/1; MG/1
1 TABLET ORAL EVERY 8 HOURS PRN
Qty: 90 TABLET | Refills: 0 | Status: SHIPPED | OUTPATIENT
Start: 2021-05-07 | End: 2021-06-02 | Stop reason: SDUPTHER

## 2021-05-07 NOTE — PROGRESS NOTES
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 Artur Nguyen to go over the result of CT scan and recently done blood work.  Denies any new lymph node enlargement.  Denies any drenching night sweats.  Denies any bleeding.        Past Medical History, Past Surgical History, Social History, Family History have been reviewed and are without significant changes except as mentioned.    Review of Systems   Constitutional: Positive for fatigue.   Respiratory: Positive for shortness of breath.    Gastrointestinal: Negative for blood in stool.   Musculoskeletal: Positive for arthralgias.   Hematological: Negative for adenopathy.      A comprehensive 14 point review of systems was performed and was negative except as mentioned.    Medications:  The current medication list was reviewed in the EMR    ALLERGIES:    Allergies   Allergen Reactions   • Darvon [Propoxyphene] Nausea And Vomiting   • Percocet [Oxycodone-Acetaminophen] Palpitations       Objective      Vitals:    05/07/21 0818   BP: 134/73   Pulse: 64   Resp: 18   Temp: 97.8 °F (36.6 °C)   Weight: 82.1 kg (181 lb)   Height: 177.8 cm (70\")   PainSc: 0-No pain     Current Status 5/7/2021   ECOG score 0       Physical Exam  Cardiovascular:      Rate and Rhythm: Normal rate and regular rhythm.   Pulmonary:      Comments: Diminished air entry at bilateral upper lung field.  No rhonchi or wheezing.  Abdominal:      General: Bowel sounds are normal.      Palpations: Abdomen is soft.      Tenderness: There is no abdominal tenderness.   Musculoskeletal:      Comments: Decreased range of motion "   Neurological:      Mental Status: He is alert and oriented to person, place, and time.           RECENT LABS:  Glucose   Date Value Ref Range Status   05/07/2021 118 (H) 65 - 99 mg/dL Final     Sodium   Date Value Ref Range Status   05/07/2021 140 136 - 145 mmol/L Final     Potassium   Date Value Ref Range Status   05/07/2021 4.0 3.5 - 5.2 mmol/L Final     CO2   Date Value Ref Range Status   05/07/2021 27.0 22.0 - 29.0 mmol/L Final     Chloride   Date Value Ref Range Status   05/07/2021 104 98 - 107 mmol/L Final     Anion Gap   Date Value Ref Range Status   05/07/2021 9.0 5.0 - 15.0 mmol/L Final     Creatinine   Date Value Ref Range Status   05/07/2021 1.06 0.76 - 1.27 mg/dL Final     BUN   Date Value Ref Range Status   05/07/2021 13 8 - 23 mg/dL Final     BUN/Creatinine Ratio   Date Value Ref Range Status   05/07/2021 12.3 7.0 - 25.0 Final     Calcium   Date Value Ref Range Status   05/07/2021 9.0 8.6 - 10.5 mg/dL Final     eGFR Non  Amer   Date Value Ref Range Status   05/07/2021 71 >60 mL/min/1.73 Final     Alkaline Phosphatase   Date Value Ref Range Status   05/07/2021 95 39 - 117 U/L Final     Total Protein   Date Value Ref Range Status   05/07/2021 7.4 6.0 - 8.5 g/dL Final     ALT (SGPT)   Date Value Ref Range Status   05/07/2021 10 1 - 41 U/L Final     AST (SGOT)   Date Value Ref Range Status   05/07/2021 18 1 - 40 U/L Final     Total Bilirubin   Date Value Ref Range Status   05/07/2021 0.3 0.0 - 1.2 mg/dL Final     Albumin   Date Value Ref Range Status   05/07/2021 3.80 3.50 - 5.20 g/dL Final     Globulin   Date Value Ref Range Status   05/07/2021 3.6 gm/dL Final     Lab Results   Component Value Date    WBC 6.38 05/07/2021    HGB 14.4 05/07/2021    HCT 42.4 05/07/2021    MCV 88.9 05/07/2021     05/07/2021     Lab Results   Component Value Date    NEUTROABS 4.30 05/07/2021     Lab Results   Component Value Date     18.2 06/01/2020    CEA 3.01 06/01/2020         PATHOLOGY:  * Cannot find  OR log *         RADIOLOGY DATA :  CT Chest With Contrast Diagnostic    Result Date: 5/4/2021  1. Severe centrilobular emphysematous changes. 2.Favorable changes with decreasing size of left upper lobe apex poorly enhancing lung nodule with spiculated borders. This lesion on today's exam measures 2.35 x 1.19 x 2.22 cm. This would be consistent with at least partial response to therapy of lung neoplasm and/or metastatic disease. 3.Right upper lobe lung apex groundglass nodular opacity which measures 7.2 mm in greatest diameter. This may represent inflammatory changes versus early pulmonary nodule development in this region. Recommend follow-up CT in three months to further evaluate. 4. Mildly enlarged prostate gland impinging upon the base of bladder suspicious for benign prostatic hypertrophy versus prostate malignancy. Recommend clinical correlation. 5.Stable todd hepatis multiple enlarged lymph nodes are seen. The largest measures 2.18 x 3.15 x 3.59 cm. Stable shotty lesser curvature of the stomach lymph nodes with short axis diameter of less than 1 cm. Stable shotty central mesenteric lymph nodes with short axis diameter less than 1 cm. Stable shotty left and right para-aortic lymph nodes with short axis diameter less than 1 cm. Stable multiple enlarged distal right external iliac chain lymph nodes with the largest measuring 1.31 x 1.05 cm. Stable enlarged left distal external iliac chain lymph node which measures 1.21 x 1.09 cm. This would be suspicious for metastatic disease or lymphoma in these regions. Electronically signed by:  Carmelo Morales MD  5/4/2021 10:46 AM CDT Workstation: [id number]    CT Abdomen Pelvis With Contrast    Result Date: 5/4/2021  1. Severe centrilobular emphysematous changes. 2.Favorable changes with decreasing size of left upper lobe apex poorly enhancing lung nodule with spiculated borders. This lesion on today's exam measures 2.35 x 1.19 x 2.22 cm. This would be consistent with at  least partial response to therapy of lung neoplasm and/or metastatic disease. 3.Right upper lobe lung apex groundglass nodular opacity which measures 7.2 mm in greatest diameter. This may represent inflammatory changes versus early pulmonary nodule development in this region. Recommend follow-up CT in three months to further evaluate. 4. Mildly enlarged prostate gland impinging upon the base of bladder suspicious for benign prostatic hypertrophy versus prostate malignancy. Recommend clinical correlation. 5.Stable todd hepatis multiple enlarged lymph nodes are seen. The largest measures 2.18 x 3.15 x 3.59 cm. Stable shotty lesser curvature of the stomach lymph nodes with short axis diameter of less than 1 cm. Stable shotty central mesenteric lymph nodes with short axis diameter less than 1 cm. Stable shotty left and right para-aortic lymph nodes with short axis diameter less than 1 cm. Stable multiple enlarged distal right external iliac chain lymph nodes with the largest measuring 1.31 x 1.05 cm. Stable enlarged left distal external iliac chain lymph node which measures 1.21 x 1.09 cm. This would be suspicious for metastatic disease or lymphoma in these regions. Electronically signed by:  Carmelo Morales MD  5/4/2021 10:46 AM CDT Workstation: [id number]          Assessment/Plan     1.  Mantle cell lymphoma of multiple lymph node region:  -Patient was diagnosed in June 2020  -Has been on observation without any chemotherapy.  -Recent CT of chest abdomen and pelvis with contrast done on May 4, 2021 shows stable disease symptoms of adenopathy without any evidence of progression.  Results of CT scan were discussed with patient.  -We will ask patient to return to clinic in 3 months with repeat CBC, CMP, LDH to be done prior to that.    2.  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  -CT of chest done on May 4, 2021 was reviewed, discussed with patient which  shows decrease in the size of left upper lobe lung nodule.  There is 7 mm nodule in right upper lobe which appears more solid as compared to last CT scan in November 2020.  -Recommend follow-up CT of chest with contrast in 3 months prior to next clinic visit to follow-up on right upper lobe and left upper lobe lung nodule.  -If right upper lobe lung nodule continues to get bigger, patient may need some radiation treatment which was discussed with patient today.    3.  Hypertension    4.  Osteoarthritis    5.  Prescriptions: Prescription for Norco 7.5/325 has been sent to his pharmacy today.    6.  Health maintenance: Patient does not smoke    7. Advance Care Planning: For now patient remains full code and is able to make decisions.  Patient has health care surrogate mentioned on chart.                 PHQ-9 Total Score: 0   -Patient is not homicidal or suicidal.  No acute intervention required.    Mitch Rinaldi reports a pain score of 0.  Given his pain assessment as noted, treatment options were discussed and the following options were decided upon as a follow-up plan to address the patient's pain: continuation of current treatment plan for pain.         Messi Cunningham MD  5/7/2021  17:55 CDT        Part of this note may be an electronic transcription/translation of spoken language to printed text using the Dragon Dictation System.          CC:

## 2021-05-07 NOTE — PATIENT INSTRUCTIONS
Patient Instructions for CT Scan    · Your CT scan is being done without any oral contrast.  Your CT scan may be done with IV contrast, if you have an allergy to iodine--please tell your nurse.    · Do not eat or drink 4 hours prior to scan.     · You may take your medications with sips of water, except DO NOT take your diabetic pill the morning of the test.    Arrive at the Outpatient Surgery entrance at [facility] 20 minutes prior to appointment time.    You will receive a phone call with an appointment for your CT scan.  Please call our office, if someone does not contact you with 3 days.    Deidre Vazquez RN  May 7, 2021  09:01 CDT

## 2021-06-02 DIAGNOSIS — M15.9 PRIMARY OSTEOARTHRITIS INVOLVING MULTIPLE JOINTS: Chronic | ICD-10-CM

## 2021-06-02 RX ORDER — HYDROCODONE BITARTRATE AND ACETAMINOPHEN 7.5; 325 MG/1; MG/1
1 TABLET ORAL EVERY 8 HOURS PRN
Qty: 90 TABLET | Refills: 0 | Status: SHIPPED | OUTPATIENT
Start: 2021-06-02 | End: 2021-06-30 | Stop reason: SDUPTHER

## 2021-06-28 DIAGNOSIS — K21.9 GASTROESOPHAGEAL REFLUX DISEASE WITHOUT ESOPHAGITIS: Chronic | ICD-10-CM

## 2021-06-28 RX ORDER — PROMETHAZINE HYDROCHLORIDE 25 MG/1
25 TABLET ORAL EVERY 6 HOURS PRN
Qty: 60 TABLET | Refills: 2 | Status: SHIPPED | OUTPATIENT
Start: 2021-06-28 | End: 2021-11-12

## 2021-06-30 DIAGNOSIS — M15.9 PRIMARY OSTEOARTHRITIS INVOLVING MULTIPLE JOINTS: Chronic | ICD-10-CM

## 2021-06-30 RX ORDER — HYDROCODONE BITARTRATE AND ACETAMINOPHEN 7.5; 325 MG/1; MG/1
1 TABLET ORAL EVERY 8 HOURS PRN
Qty: 90 TABLET | Refills: 0 | Status: SHIPPED | OUTPATIENT
Start: 2021-06-30 | End: 2021-07-29 | Stop reason: SDUPTHER

## 2021-07-01 RX ORDER — FLUOXETINE HYDROCHLORIDE 40 MG/1
CAPSULE ORAL
Qty: 30 CAPSULE | Refills: 5 | Status: SHIPPED | OUTPATIENT
Start: 2021-07-01 | End: 2022-01-14

## 2021-07-13 ENCOUNTER — LAB (OUTPATIENT)
Dept: LAB | Facility: OTHER | Age: 61
End: 2021-07-13

## 2021-07-13 DIAGNOSIS — R73.02 IMPAIRED GLUCOSE TOLERANCE: Chronic | ICD-10-CM

## 2021-07-13 DIAGNOSIS — I10 BENIGN ESSENTIAL HTN: ICD-10-CM

## 2021-07-13 DIAGNOSIS — E78.2 MIXED HYPERLIPIDEMIA: Chronic | ICD-10-CM

## 2021-07-13 LAB
ALBUMIN SERPL-MCNC: 4.6 G/DL (ref 3.5–5)
ALBUMIN/GLOB SERPL: 1.2 G/DL (ref 1.1–1.8)
ALP SERPL-CCNC: 140 U/L (ref 38–126)
ALT SERPL W P-5'-P-CCNC: 18 U/L
ANION GAP SERPL CALCULATED.3IONS-SCNC: 8 MMOL/L (ref 5–15)
ARTICHOKE IGE QN: 53 MG/DL (ref 0–100)
AST SERPL-CCNC: 24 U/L (ref 17–59)
BASOPHILS # BLD AUTO: 0.05 10*3/MM3 (ref 0–0.2)
BASOPHILS NFR BLD AUTO: 0.7 % (ref 0–1.5)
BILIRUB SERPL-MCNC: 0.6 MG/DL (ref 0.2–1.3)
BILIRUB UR QL STRIP: NEGATIVE
BUN SERPL-MCNC: 12 MG/DL (ref 7–23)
BUN/CREAT SERPL: 11.8 (ref 7–25)
CALCIUM SPEC-SCNC: 9.6 MG/DL (ref 8.4–10.2)
CHLORIDE SERPL-SCNC: 106 MMOL/L (ref 101–112)
CLARITY UR: CLEAR
CO2 SERPL-SCNC: 27 MMOL/L (ref 22–30)
COLOR UR: YELLOW
CREAT SERPL-MCNC: 1.02 MG/DL (ref 0.7–1.3)
DEPRECATED RDW RBC AUTO: 42.6 FL (ref 37–54)
EOSINOPHIL # BLD AUTO: 0.24 10*3/MM3 (ref 0–0.4)
EOSINOPHIL NFR BLD AUTO: 3.2 % (ref 0.3–6.2)
ERYTHROCYTE [DISTWIDTH] IN BLOOD BY AUTOMATED COUNT: 12.9 % (ref 12.3–15.4)
GFR SERPL CREATININE-BSD FRML MDRD: 74 ML/MIN/1.73 (ref 49–113)
GLOBULIN UR ELPH-MCNC: 3.9 GM/DL (ref 2.3–3.5)
GLUCOSE SERPL-MCNC: 116 MG/DL (ref 70–99)
GLUCOSE UR STRIP-MCNC: NEGATIVE MG/DL
HBA1C MFR BLD: 5.57 % (ref 4.8–5.6)
HCT VFR BLD AUTO: 44.7 % (ref 37.5–51)
HGB BLD-MCNC: 15.4 G/DL (ref 13–17.7)
HGB UR QL STRIP.AUTO: NEGATIVE
KETONES UR QL STRIP: NEGATIVE
LEUKOCYTE ESTERASE UR QL STRIP.AUTO: NEGATIVE
LYMPHOCYTES # BLD AUTO: 1.14 10*3/MM3 (ref 0.7–3.1)
LYMPHOCYTES NFR BLD AUTO: 15 % (ref 19.6–45.3)
MCH RBC QN AUTO: 31.5 PG (ref 26.6–33)
MCHC RBC AUTO-ENTMCNC: 34.5 G/DL (ref 31.5–35.7)
MCV RBC AUTO: 91.4 FL (ref 79–97)
MONOCYTES # BLD AUTO: 0.54 10*3/MM3 (ref 0.1–0.9)
MONOCYTES NFR BLD AUTO: 7.1 % (ref 5–12)
NEUTROPHILS NFR BLD AUTO: 5.62 10*3/MM3 (ref 1.7–7)
NEUTROPHILS NFR BLD AUTO: 74 % (ref 42.7–76)
NITRITE UR QL STRIP: NEGATIVE
PH UR STRIP.AUTO: 6 [PH] (ref 5.5–8)
PLATELET # BLD AUTO: 201 10*3/MM3 (ref 140–450)
PMV BLD AUTO: 10.6 FL (ref 6–12)
POTASSIUM SERPL-SCNC: 3.8 MMOL/L (ref 3.4–5)
PROT SERPL-MCNC: 8.5 G/DL (ref 6.3–8.6)
PROT UR QL STRIP: NEGATIVE
RBC # BLD AUTO: 4.89 10*6/MM3 (ref 4.14–5.8)
SODIUM SERPL-SCNC: 141 MMOL/L (ref 137–145)
SP GR UR STRIP: 1.01 (ref 1–1.03)
T4 FREE SERPL-MCNC: 1.07 NG/DL (ref 0.93–1.7)
TSH SERPL DL<=0.05 MIU/L-ACNC: 1.41 UIU/ML (ref 0.27–4.2)
UROBILINOGEN UR QL STRIP: NORMAL
WBC # BLD AUTO: 7.59 10*3/MM3 (ref 3.4–10.8)

## 2021-07-13 PROCEDURE — 83036 HEMOGLOBIN GLYCOSYLATED A1C: CPT | Performed by: INTERNAL MEDICINE

## 2021-07-13 PROCEDURE — 80053 COMPREHEN METABOLIC PANEL: CPT | Performed by: INTERNAL MEDICINE

## 2021-07-13 PROCEDURE — 84443 ASSAY THYROID STIM HORMONE: CPT | Performed by: INTERNAL MEDICINE

## 2021-07-13 PROCEDURE — 83721 ASSAY OF BLOOD LIPOPROTEIN: CPT | Performed by: INTERNAL MEDICINE

## 2021-07-13 PROCEDURE — 85025 COMPLETE CBC W/AUTO DIFF WBC: CPT | Performed by: INTERNAL MEDICINE

## 2021-07-13 PROCEDURE — 84439 ASSAY OF FREE THYROXINE: CPT | Performed by: INTERNAL MEDICINE

## 2021-07-13 PROCEDURE — 81003 URINALYSIS AUTO W/O SCOPE: CPT | Performed by: INTERNAL MEDICINE

## 2021-07-13 PROCEDURE — 36415 COLL VENOUS BLD VENIPUNCTURE: CPT | Performed by: INTERNAL MEDICINE

## 2021-07-15 NOTE — PROGRESS NOTES
Chief Complaint   Patient presents with   • Hyperlipidemia     6 mo f/u w labs   • Osteoarthritis     Subjective   Mitch Rinaldi is a 61 y.o. male who presents to the office for follow-up and review of labs.  He has impaired glucose tolerance and monitors his dietary intake of sugar and carbohydrates.  He has hyperlipidemia and takes Crestor 10 mg daily.  He has GERD which is well managed with Nexium 20 mg daily.  He has hypertension and his blood pressure has been doing well.  He has osteoarthritis which causes chronic back and joint pain.  He takes Norco 7.5/325 mg as needed for the pain.  He was previously taking this twice a day.  This was recently increased to 3 times a day by his oncologist, as it is also being used to treat his cancer related pain.  He had a recent diagnosis of lung cancer and mantle cell lymphoma.  He also takes ibuprofen for treatment of the inflammation.  He takes Zanaflex as needed for muscle spasm.  He has neuropathy secondary to the osteoarthritis in his back, but does not currently require any medication for this.  He has anxiety and depression and takes Wellbutrin and fluoxetine.  He has COPD and his breathing has been baseline.  He does have occasional shortness of breath, but this has been stable.  He has anxiety and takes fluoxetine 40 mg daily.    He has mantle cell lymphoma and follows with oncology.  He has been asymptomatic, and no treatment is currently indicated for this.  He was also found to have a mass in the left upper lobe of his lung.  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  He has been seen at the cancer center in [place] and received radiation treatment for this.      History of Present Illness has been reviewed and validated on 07/16/2021 and updated with any changes.    The following portions of the patient's history were reviewed and updated as appropriate: allergies, current medications, past family history,  "past medical history, past social history, past surgical history and problem list.    Review of Systems   Constitutional: Negative for chills, fatigue and fever.   HENT: Negative for congestion, sneezing, sore throat and trouble swallowing.    Eyes: Negative for visual disturbance.   Respiratory: Negative for cough, chest tightness, shortness of breath and wheezing.    Cardiovascular: Negative for chest pain, palpitations and leg swelling.   Gastrointestinal: Negative for abdominal pain, constipation, diarrhea, nausea and vomiting.   Genitourinary: Negative for dysuria, frequency and urgency.   Musculoskeletal: Positive for arthralgias. Negative for neck pain.   Skin: Negative for rash.   Neurological: Negative for dizziness, weakness and headaches.   Psychiatric/Behavioral:        Patient denies any feelings of depression and has not felt down, hopeless or lost interest in any activities.   All other systems reviewed and are negative.  Review of systems has been reviewed and validated on 07/16/2021 and updated with any changes.      Objective   Vitals:    07/16/21 0824   BP: 128/80   BP Location: Left arm   Patient Position: Sitting   Cuff Size: Adult   Pulse: 51   Temp: 97.2 °F (36.2 °C)   TempSrc: Oral   SpO2: 98%   Weight: 78 kg (172 lb)   Height: 177.8 cm (70\")   PainSc: 0-No pain     Body mass index is 24.68 kg/m².    Physical Exam  Vitals and nursing note reviewed.   Constitutional:       General: He is not in acute distress.     Appearance: He is well-developed.   HENT:      Head: Normocephalic and atraumatic.      Nose: Nose normal.   Eyes:      General: No scleral icterus.     Conjunctiva/sclera: Conjunctivae normal.      Pupils: Pupils are equal, round, and reactive to light.   Cardiovascular:      Rate and Rhythm: Normal rate and regular rhythm.      Heart sounds: Normal heart sounds. No murmur heard.   No friction rub. No gallop.    Pulmonary:      Effort: Pulmonary effort is normal. No respiratory " distress.      Breath sounds: Decreased breath sounds present. No wheezing or rales.   Musculoskeletal:         General: Normal range of motion.      Cervical back: Normal range of motion and neck supple.      Comments: Low back pain with range of motion   Lymphadenopathy:      Cervical: No cervical adenopathy.   Skin:     General: Skin is warm and dry.      Findings: No rash.   Neurological:      Mental Status: He is alert and oriented to person, place, and time.      Cranial Nerves: No cranial nerve deficit.   Psychiatric:         Behavior: Behavior normal.         Thought Content: Thought content normal.         Judgment: Judgment normal.      Physical exam has been reviewed and validated on 07/16/2021 and updated with any changes.      Assessment/Plan             Diagnoses and all orders for this visit:    1. Impaired glucose tolerance (Primary)  -     Hemoglobin A1c; Future    2. Benign essential HTN  -     CBC & Differential; Future  -     Comprehensive Metabolic Panel; Future  -     T4, Free; Future  -     TSH; Future  -     Urinalysis With Culture If Indicated -; Future    3. Mixed hyperlipidemia  -     Lipid Panel; Future    4. Gastroesophageal reflux disease without esophagitis    5. Primary osteoarthritis involving multiple joints    6. COPD, moderate (CMS/HCC)    7. Mantle cell lymphoma of lymph nodes of multiple regions (CMS/HCC)    8. Cancer of upper lobe of left lung (CMS/HCC)    9. Screening for prostate cancer  -     PSA Screen; Future    10. Drug therapy  -     ToxASSURE Select 13 Discrete -; Future    11. Generalized anxiety disorder         Labs are reviewed with patient.  Patient's glucose is slightly elevated at 116.  His hemoglobin A1c shows a nondiabetic state at 5.57.  Patient will continue to watch diet to help maintain control of the glucose.  Patient understands that there is an increased risk of developing diabetes in the future.  His LDL is at goal at 53.  He will continue with Crestor  for the hyperlipidemia.  He will continue with ibuprofen to help with the osteoarthritis.  He will continue with Norco 7.5/325 mg 3 times a day as needed for the arthritic pain and pain related to his lymphoma and lung cancer.  His pain medication is currently being prescribed by his oncologist, so he does not need a refill from me today.  He will continue with Wellbutrin and fluoxetine for treatment of his anxiety and depression.  His blood pressure is doing well, and he will continue with his current blood pressure medication.  He will continue with the Symbicort inhaler for treatment of his COPD.  He will continue with Nexium 20 mg daily for treatment of GERD.  He will continue with fluoxetine 40 mg daily for treatment of anxiety.    Patient understands the risks associated with this controlled medication, including tolerance and addiction.  Patient also agrees to only obtain this medication from me, and not from a another provider, unless that provider is covering for me in my absence.  Patient also agrees to be compliant in dosing, and not self adjust the dose of medication.  A signed controlled substance agreement is on file, and the patient has received a controlled substance education sheet at this a previous visit.  The patient has also signed a consent for treatment with a controlled substance as per [facility] policy. SOFI was obtained.  Urine drug screen is reviewed, and is appropriately positive for hydrocodone.      PHQ-2/PHQ-9 Depression Screening 7/16/2021   Little interest or pleasure in doing things 0   Feeling down, depressed, or hopeless 0   Trouble falling or staying asleep, or sleeping too much -   Feeling tired or having little energy -   Poor appetite or overeating -   Feeling bad about yourself - or that you are a failure or have let yourself or your family down -   Trouble concentrating on things, such as reading the newspaper or watching television -   Moving or speaking so slowly  that other people could have noticed. Or the opposite - being so fidgety or restless that you have been moving around a lot more than usual -   Thoughts that you would be better off dead, or of hurting yourself in some way -   Total Score 0   If you checked off any problems, how difficult have these problems made it for you to do your work, take care of things at home, or get along with other people? -

## 2021-07-16 ENCOUNTER — OFFICE VISIT (OUTPATIENT)
Dept: FAMILY MEDICINE CLINIC | Facility: CLINIC | Age: 61
End: 2021-07-16

## 2021-07-16 VITALS
TEMPERATURE: 97.2 F | DIASTOLIC BLOOD PRESSURE: 80 MMHG | HEART RATE: 51 BPM | BODY MASS INDEX: 24.62 KG/M2 | SYSTOLIC BLOOD PRESSURE: 128 MMHG | WEIGHT: 172 LBS | HEIGHT: 70 IN | OXYGEN SATURATION: 98 %

## 2021-07-16 DIAGNOSIS — R73.02 IMPAIRED GLUCOSE TOLERANCE: Primary | Chronic | ICD-10-CM

## 2021-07-16 DIAGNOSIS — C34.12 CANCER OF UPPER LOBE OF LEFT LUNG (HCC): Chronic | ICD-10-CM

## 2021-07-16 DIAGNOSIS — I10 BENIGN ESSENTIAL HTN: Chronic | ICD-10-CM

## 2021-07-16 DIAGNOSIS — F41.1 GENERALIZED ANXIETY DISORDER: Chronic | ICD-10-CM

## 2021-07-16 DIAGNOSIS — C83.18 MANTLE CELL LYMPHOMA OF LYMPH NODES OF MULTIPLE REGIONS (HCC): Chronic | ICD-10-CM

## 2021-07-16 DIAGNOSIS — K21.9 GASTROESOPHAGEAL REFLUX DISEASE WITHOUT ESOPHAGITIS: Chronic | ICD-10-CM

## 2021-07-16 DIAGNOSIS — E78.2 MIXED HYPERLIPIDEMIA: Chronic | ICD-10-CM

## 2021-07-16 DIAGNOSIS — Z12.5 SCREENING FOR PROSTATE CANCER: ICD-10-CM

## 2021-07-16 DIAGNOSIS — Z79.899 DRUG THERAPY: ICD-10-CM

## 2021-07-16 DIAGNOSIS — J44.9 COPD, MODERATE (HCC): Chronic | ICD-10-CM

## 2021-07-16 DIAGNOSIS — M15.9 PRIMARY OSTEOARTHRITIS INVOLVING MULTIPLE JOINTS: Chronic | ICD-10-CM

## 2021-07-16 PROCEDURE — 99214 OFFICE O/P EST MOD 30 MIN: CPT | Performed by: INTERNAL MEDICINE

## 2021-07-27 DIAGNOSIS — M15.9 PRIMARY OSTEOARTHRITIS INVOLVING MULTIPLE JOINTS: Chronic | ICD-10-CM

## 2021-07-27 RX ORDER — TIZANIDINE 4 MG/1
4 TABLET ORAL EVERY 8 HOURS PRN
Qty: 90 TABLET | Refills: 3 | Status: SHIPPED | OUTPATIENT
Start: 2021-07-27 | End: 2022-06-06

## 2021-07-29 DIAGNOSIS — M15.9 PRIMARY OSTEOARTHRITIS INVOLVING MULTIPLE JOINTS: Chronic | ICD-10-CM

## 2021-07-29 RX ORDER — HYDROCODONE BITARTRATE AND ACETAMINOPHEN 7.5; 325 MG/1; MG/1
1 TABLET ORAL EVERY 8 HOURS PRN
Qty: 90 TABLET | Refills: 0 | Status: SHIPPED | OUTPATIENT
Start: 2021-07-29 | End: 2021-08-26 | Stop reason: SDUPTHER

## 2021-08-04 ENCOUNTER — LAB (OUTPATIENT)
Dept: LAB | Facility: HOSPITAL | Age: 61
End: 2021-08-04

## 2021-08-04 ENCOUNTER — HOSPITAL ENCOUNTER (OUTPATIENT)
Dept: CT IMAGING | Facility: HOSPITAL | Age: 61
Discharge: HOME | End: 2021-08-04

## 2021-08-04 DIAGNOSIS — C34.12 CANCER OF UPPER LOBE OF LEFT LUNG (HCC): Chronic | ICD-10-CM

## 2021-08-04 DIAGNOSIS — C83.18 MANTLE CELL LYMPHOMA OF LYMPH NODES OF MULTIPLE REGIONS (HCC): ICD-10-CM

## 2021-08-04 DIAGNOSIS — C83.18 MANTLE CELL LYMPHOMA OF LYMPH NODES OF MULTIPLE REGIONS (HCC): Chronic | ICD-10-CM

## 2021-08-04 DIAGNOSIS — C34.12 CANCER OF UPPER LOBE OF LEFT LUNG (HCC): ICD-10-CM

## 2021-08-04 LAB
ALBUMIN SERPL-MCNC: 4.1 G/DL (ref 3.5–5.2)
ALBUMIN/GLOB SERPL: 1.2 G/DL
ALP SERPL-CCNC: 109 U/L (ref 39–117)
ALT SERPL W P-5'-P-CCNC: 9 U/L (ref 1–41)
ANION GAP SERPL CALCULATED.3IONS-SCNC: 10 MMOL/L (ref 5–15)
AST SERPL-CCNC: 16 U/L (ref 1–40)
BASOPHILS # BLD AUTO: 0.07 10*3/MM3 (ref 0–0.2)
BASOPHILS NFR BLD AUTO: 1.1 % (ref 0–1.5)
BILIRUB SERPL-MCNC: 0.4 MG/DL (ref 0–1.2)
BUN SERPL-MCNC: 13 MG/DL (ref 8–23)
BUN/CREAT SERPL: 12.4 (ref 7–25)
CALCIUM SPEC-SCNC: 8.7 MG/DL (ref 8.6–10.5)
CHLORIDE SERPL-SCNC: 103 MMOL/L (ref 98–107)
CO2 SERPL-SCNC: 25 MMOL/L (ref 22–29)
CREAT SERPL-MCNC: 1.05 MG/DL (ref 0.76–1.27)
DEPRECATED RDW RBC AUTO: 40.7 FL (ref 37–54)
EOSINOPHIL # BLD AUTO: 0.23 10*3/MM3 (ref 0–0.4)
EOSINOPHIL NFR BLD AUTO: 3.6 % (ref 0.3–6.2)
ERYTHROCYTE [DISTWIDTH] IN BLOOD BY AUTOMATED COUNT: 12.6 % (ref 12.3–15.4)
GFR SERPL CREATININE-BSD FRML MDRD: 71 ML/MIN/1.73
GLOBULIN UR ELPH-MCNC: 3.5 GM/DL
GLUCOSE SERPL-MCNC: 97 MG/DL (ref 65–99)
HCT VFR BLD AUTO: 39.8 % (ref 37.5–51)
HGB BLD-MCNC: 13.6 G/DL (ref 13–17.7)
IMM GRANULOCYTES # BLD AUTO: 0.02 10*3/MM3 (ref 0–0.05)
IMM GRANULOCYTES NFR BLD AUTO: 0.3 % (ref 0–0.5)
LDH SERPL-CCNC: 180 U/L (ref 135–225)
LYMPHOCYTES # BLD AUTO: 1.23 10*3/MM3 (ref 0.7–3.1)
LYMPHOCYTES NFR BLD AUTO: 19.1 % (ref 19.6–45.3)
MCH RBC QN AUTO: 30.2 PG (ref 26.6–33)
MCHC RBC AUTO-ENTMCNC: 34.2 G/DL (ref 31.5–35.7)
MCV RBC AUTO: 88.4 FL (ref 79–97)
MONOCYTES # BLD AUTO: 0.52 10*3/MM3 (ref 0.1–0.9)
MONOCYTES NFR BLD AUTO: 8.1 % (ref 5–12)
NEUTROPHILS NFR BLD AUTO: 4.36 10*3/MM3 (ref 1.7–7)
NEUTROPHILS NFR BLD AUTO: 67.8 % (ref 42.7–76)
NRBC BLD AUTO-RTO: 0 /100 WBC (ref 0–0.2)
PLATELET # BLD AUTO: 180 10*3/MM3 (ref 140–450)
PMV BLD AUTO: 10 FL (ref 6–12)
POTASSIUM SERPL-SCNC: 4.2 MMOL/L (ref 3.5–5.2)
PROT SERPL-MCNC: 7.6 G/DL (ref 6–8.5)
RBC # BLD AUTO: 4.5 10*6/MM3 (ref 4.14–5.8)
SODIUM SERPL-SCNC: 138 MMOL/L (ref 136–145)
WBC # BLD AUTO: 6.43 10*3/MM3 (ref 3.4–10.8)

## 2021-08-04 PROCEDURE — 74177 CT ABD & PELVIS W/CONTRAST: CPT

## 2021-08-04 PROCEDURE — 71260 CT THORAX DX C+: CPT

## 2021-08-04 PROCEDURE — 80053 COMPREHEN METABOLIC PANEL: CPT

## 2021-08-04 PROCEDURE — 85025 COMPLETE CBC W/AUTO DIFF WBC: CPT

## 2021-08-04 PROCEDURE — 83615 LACTATE (LD) (LDH) ENZYME: CPT

## 2021-08-04 PROCEDURE — 25010000002 IOPAMIDOL 61 % SOLUTION: Performed by: INTERNAL MEDICINE

## 2021-08-04 RX ADMIN — IOPAMIDOL 90 ML: 612 INJECTION, SOLUTION INTRAVENOUS at 10:28

## 2021-08-06 ENCOUNTER — OFFICE VISIT (OUTPATIENT)
Dept: ONCOLOGY | Facility: CLINIC | Age: 61
End: 2021-08-06

## 2021-08-06 ENCOUNTER — APPOINTMENT (OUTPATIENT)
Dept: ONCOLOGY | Facility: HOSPITAL | Age: 61
End: 2021-08-06

## 2021-08-06 VITALS
RESPIRATION RATE: 18 BRPM | TEMPERATURE: 97.7 F | DIASTOLIC BLOOD PRESSURE: 69 MMHG | WEIGHT: 171.4 LBS | BODY MASS INDEX: 24.59 KG/M2 | SYSTOLIC BLOOD PRESSURE: 106 MMHG | HEART RATE: 92 BPM | OXYGEN SATURATION: 93 %

## 2021-08-06 DIAGNOSIS — C34.12 CANCER OF UPPER LOBE OF LEFT LUNG (HCC): Primary | Chronic | ICD-10-CM

## 2021-08-06 DIAGNOSIS — M15.9 PRIMARY OSTEOARTHRITIS INVOLVING MULTIPLE JOINTS: Chronic | ICD-10-CM

## 2021-08-06 DIAGNOSIS — C83.18 MANTLE CELL LYMPHOMA OF LYMPH NODES OF MULTIPLE REGIONS (HCC): Chronic | ICD-10-CM

## 2021-08-06 PROCEDURE — G0463 HOSPITAL OUTPT CLINIC VISIT: HCPCS | Performed by: INTERNAL MEDICINE

## 2021-08-06 PROCEDURE — 99214 OFFICE O/P EST MOD 30 MIN: CPT | Performed by: INTERNAL MEDICINE

## 2021-08-06 NOTE — PROGRESS NOTES
Nguyễn Rinaldi was seen in follow-up for lymphoma and lung cancer.  No new health issues.  No new medication.  No new hospitalization.  No B symptoms.    Feels well.  No new infection concerns.    Result of CT scan of chest, abdomen pelvis reviewed.    Past Medical History, Past Surgical History, Social History, Family History have been reviewed and are without significant changes except as mentioned.        Medications:  The current medication list was reviewed in the EMR    ALLERGIES:    Allergies   Allergen Reactions   • Darvon [Propoxyphene] Nausea And Vomiting   • Percocet [Oxycodone-Acetaminophen] Palpitations       Objective      Vitals:    08/06/21 0856   BP: 106/69   Pulse: 92   Resp: 18   Temp: 97.7 °F (36.5 °C)   SpO2: 93%   Weight: 77.7 kg (171 lb 6.4 oz)   PainSc:   4     Current Status 5/7/2021   ECOG score 0       Physical Exam  Vitals and nursing note reviewed.   Constitutional:       Appearance: Normal appearance.   Neurological:      General: No focal deficit present.      Mental Status: He is alert and oriented to person, place, and time. Mental status is at baseline.   Psychiatric:         Mood and Affect: Mood normal.         Behavior: Behavior normal.         Thought Content: Thought content normal.               RECENT LABS:Independently reviewed and summarized  Hematology WBC   Date Value Ref Range Status   08/04/2021 6.43 3.40 - 10.80 10*3/mm3 Final     RBC   Date Value Ref Range Status   08/04/2021 4.50 4.14 - 5.80 10*6/mm3 Final     Hemoglobin   Date Value Ref Range Status   08/04/2021 13.6 13.0 - 17.7 g/dL Final     Hematocrit   Date Value Ref Range Status   08/04/2021 39.8 37.5 - 51.0 % Final     Platelets   Date Value Ref Range Status   08/04/2021 180 140 - 450 10*3/mm3 Final     Lab Results   Component Value Date    GLUCOSE 97 08/04/2021    BUN 13 08/04/2021    CREATININE 1.05 08/04/2021    EGFRIFNONA 71 08/04/2021    BCR 12.4 08/04/2021    K 4.2 08/04/2021    CO2  25.0 08/04/2021    CALCIUM 8.7 08/04/2021    ALBUMIN 4.10 08/04/2021    AST 16 08/04/2021    ALT 9 08/04/2021       Imaging (independently reviewed and summarized):     CT scan of chest, abdomen pelvis from August 4, 2021 reviewed.  This showed stable left apical mass.  There was new rounded nodule measuring 2 cm medial to right apical mass.?  This may be enlarged lymph node.  This was new compared to prior CT scan.    Other lymphadenopathy in the abdomen and chest is stable.         Mitch Rinaldi reports a pain score of 4.  Given his pain assessment as noted, treatment options were discussed and the following options were decided upon as a follow-up plan to address the patient's pain: prescription for opiod analgesics.    Patient screened positive for depression based on a PHQ-9 score of 0 on 8/6/2021. Follow-up recommendations include: Suicide Risk Assessment performed.       Diagnosis:   (1) Left lung cancer, left upper lobe   Squamous cell carcinoma   Stage IB     (2) Mantle cell lymphoma   (3) Osteoarthritis     Assessment/Plan     (1) Left lung cancer, left upper lobe     treated with SBRT.  Result of CT scan reviewed which shows stable left upper lobe mass.  There was a new nodule posteromedial to left upper lobe mass which appears to me as an enlarged lymph node however underlying malignancy cannot be ruled out.  Other lymphadenopathy in the chest remained stable.  Discussed with patient that I will discuss the findings with pulmonary and radiation oncology in [place].    I recommend CT scan of chest in about 6 weeks to see if the enlarging nodule is inflammatory or malignant.    Unfortunately this nodule/node is not easily amenable to biopsy.    (2) Mantle cell lymphoma     Patient with diagnosis of CD5 negative mantle cell lymphoma.  IHC positive for cyclin D1  FISH is positive for t(11,14) translocation.   Cytogenetics are normal.  Ki-67 -10 to 15%.  Bone marrow aspiration biopsy was negative.  He  is overall lymphadenopathy has remained stable.    New enlarged lymph node?  Nodule posterior medial to left upper lung mass could be reactive.  Closely monitor.    (3) Osteoarthritis   chronic, stable on Norco.  New prescription was sent to the pharmacy.        8/6/2021      CC:

## 2021-08-06 NOTE — PATIENT INSTRUCTIONS
Patient Instructions for CT Scan    · Your CT scan is being done without any oral contrast.  Your CT scan may be done with IV contrast, if you have an allergy to iodine--please tell your nurse.    · Do not eat or drink 4 hours prior to scan.     · You may take your medications with sips of water, except DO NOT take your diabetic pill the morning of the test.    Arrive at the Outpatient Surgery entrance at [facility] 20 minutes prior to appointment time.    You will receive a phone call with an appointment for your CT scan.  Please call our office, if someone does not contact you with 3 days.    Deidre Vazquez RN  August 6, 2021  09:12 CDT

## 2021-08-26 DIAGNOSIS — M15.9 PRIMARY OSTEOARTHRITIS INVOLVING MULTIPLE JOINTS: Chronic | ICD-10-CM

## 2021-08-26 RX ORDER — HYDROCODONE BITARTRATE AND ACETAMINOPHEN 7.5; 325 MG/1; MG/1
1 TABLET ORAL EVERY 8 HOURS PRN
Qty: 90 TABLET | Refills: 0 | Status: SHIPPED | OUTPATIENT
Start: 2021-08-26 | End: 2021-09-19 | Stop reason: SDUPTHER

## 2021-08-26 NOTE — TELEPHONE ENCOUNTER
Rx Refill Note  Requested Prescriptions     Pending Prescriptions Disp Refills   • HYDROcodone-acetaminophen (NORCO) 7.5-325 MG per tablet 90 tablet 0     Sig: Take 1 tablet by mouth Every 8 (Eight) Hours As Needed for Moderate Pain .      Last office visit with prescribing clinician: 8/6/2021      Next office visit with prescribing clinician: 9/17/2021            Ankita Gates RN  08/26/21, 09:56 CDT

## 2021-09-15 ENCOUNTER — TRANSCRIBE ORDERS (OUTPATIENT)
Dept: CT IMAGING | Facility: HOSPITAL | Age: 61
End: 2021-09-15

## 2021-09-15 ENCOUNTER — HOSPITAL ENCOUNTER (OUTPATIENT)
Dept: CT IMAGING | Facility: HOSPITAL | Age: 61
Discharge: HOME | End: 2021-09-15

## 2021-09-15 ENCOUNTER — LAB (OUTPATIENT)
Dept: LAB | Facility: HOSPITAL | Age: 61
End: 2021-09-15

## 2021-09-15 DIAGNOSIS — C34.12 CANCER OF UPPER LOBE OF LEFT LUNG (HCC): Primary | ICD-10-CM

## 2021-09-15 DIAGNOSIS — C34.12 CANCER OF UPPER LOBE OF LEFT LUNG (HCC): Chronic | ICD-10-CM

## 2021-09-15 DIAGNOSIS — C34.12 CANCER OF UPPER LOBE OF LEFT LUNG (HCC): ICD-10-CM

## 2021-09-15 LAB
BUN SERPL-MCNC: 15 MG/DL (ref 8–23)
CREAT SERPL-MCNC: 1.1 MG/DL (ref 0.76–1.27)
GFR SERPL CREATININE-BSD FRML MDRD: 68 ML/MIN/1.73

## 2021-09-15 PROCEDURE — 82565 ASSAY OF CREATININE: CPT

## 2021-09-15 PROCEDURE — 36415 COLL VENOUS BLD VENIPUNCTURE: CPT

## 2021-09-15 PROCEDURE — 25010000002 IOPAMIDOL 61 % SOLUTION: Performed by: INTERNAL MEDICINE

## 2021-09-15 PROCEDURE — 84520 ASSAY OF UREA NITROGEN: CPT

## 2021-09-15 PROCEDURE — 71260 CT THORAX DX C+: CPT

## 2021-09-15 RX ADMIN — IOPAMIDOL 90 ML: 612 INJECTION, SOLUTION INTRAVENOUS at 09:59

## 2021-09-17 ENCOUNTER — OFFICE VISIT (OUTPATIENT)
Dept: ONCOLOGY | Facility: CLINIC | Age: 61
End: 2021-09-17

## 2021-09-17 VITALS
SYSTOLIC BLOOD PRESSURE: 114 MMHG | DIASTOLIC BLOOD PRESSURE: 65 MMHG | WEIGHT: 170.8 LBS | RESPIRATION RATE: 18 BRPM | OXYGEN SATURATION: 98 % | BODY MASS INDEX: 24.51 KG/M2 | TEMPERATURE: 97.9 F | HEART RATE: 75 BPM

## 2021-09-17 DIAGNOSIS — C34.12 CANCER OF UPPER LOBE OF LEFT LUNG (HCC): Primary | Chronic | ICD-10-CM

## 2021-09-17 DIAGNOSIS — C83.18 MANTLE CELL LYMPHOMA OF LYMPH NODES OF MULTIPLE REGIONS (HCC): Chronic | ICD-10-CM

## 2021-09-17 DIAGNOSIS — M15.9 PRIMARY OSTEOARTHRITIS INVOLVING MULTIPLE JOINTS: Chronic | ICD-10-CM

## 2021-09-17 PROCEDURE — 99214 OFFICE O/P EST MOD 30 MIN: CPT | Performed by: INTERNAL MEDICINE

## 2021-09-17 PROCEDURE — G0463 HOSPITAL OUTPT CLINIC VISIT: HCPCS | Performed by: INTERNAL MEDICINE

## 2021-09-17 NOTE — PATIENT INSTRUCTIONS
Patient Instructions for CT Scan    · Your CT scan is being done without any oral contrast.  Your CT scan may be done with IV contrast, if you have an allergy to iodine--please tell your nurse.    · Do not eat or drink 4 hours prior to scan.     · You may take your medications with sips of water, except DO NOT take your diabetic pill the morning of the test.    Arrive at the Outpatient Surgery entrance at [facility] 20 minutes prior to appointment time.    You will receive a phone call with an appointment for your CT scan.  Please call our office, if someone does not contact you with 3 days.    Deidre Vazquez RN  September 17, 2021  09:42 CDT

## 2021-09-19 RX ORDER — HYDROCODONE BITARTRATE AND ACETAMINOPHEN 7.5; 325 MG/1; MG/1
1 TABLET ORAL EVERY 6 HOURS PRN
Qty: 120 TABLET | Refills: 0 | Status: SHIPPED | OUTPATIENT
Start: 2021-09-22 | End: 2021-10-21 | Stop reason: SDUPTHER

## 2021-09-19 NOTE — PROGRESS NOTES
Chief Complaint  Follow-up -lung cancer, mantle cell lymphoma    Subjective          Mitch Rinaldi presents to [facility]  History of Present Illness     Mitch Rinaldi was seen in follow-up for lung cancer, mantle cell lymphoma.  He is doing well since his last visit.  No B symptoms.  No new lumps or bumps.  No new medications.  No new hospitalizations.    Objective   Vital Signs:   /65   Pulse 75   Temp 97.9 °F (36.6 °C)   Resp 18   Wt 77.5 kg (170 lb 12.8 oz)   SpO2 98%   BMI 24.51 kg/m²     Physical Exam  Vitals and nursing note reviewed.   Neurological:      General: No focal deficit present.      Mental Status: He is alert and oriented to person, place, and time. Mental status is at baseline.   Psychiatric:         Mood and Affect: Mood normal.         Behavior: Behavior normal.         Thought Content: Thought content normal.        Result Review :   The following data was reviewed by: Atiya Alexis MD on 09/17/2021:  Common labs    Common Labsle 7/13/21 7/13/21 7/13/21 7/13/21 8/4/21 8/4/21 9/15/21 9/15/21    0731 0731 0731 0731 0934 0934 0830 0830   Glucose  116 (A)    97     BUN  12    13 15    Creatinine  1.02    1.05  1.10   eGFR Non  Am  74    71  68   Sodium  141    138     Potassium  3.8    4.2     Chloride  106    103     Calcium  9.6    8.7     Albumin  4.60    4.10     Total Bilirubin  0.6    0.4     Alkaline Phosphatase  140 (A)    109     AST (SGOT)  24    16     ALT (SGPT)  18    9     WBC 7.59    6.43      Hemoglobin 15.4    13.6      Hematocrit 44.7    39.8      Platelets 201    180      LDL Cholesterol    53        Hemoglobin A1C    5.57       (A) Abnormal value            CMP    CMP 7/13/21 8/4/21 9/15/21 9/15/21      0830 0830   Glucose 116 (A) 97     BUN 12 13 15    Creatinine 1.02 1.05  1.10   eGFR Non African Am 74 71  68   Sodium 141 138     Potassium 3.8 4.2     Chloride 106 103     Calcium 9.6 8.7      Albumin 4.60 4.10     Total Bilirubin 0.6 0.4     Alkaline Phosphatase 140 (A) 109     AST (SGOT) 24 16     ALT (SGPT) 18 9     (A) Abnormal value            CBC    CBC 5/7/21 7/13/21 8/4/21   WBC 6.38 7.59 6.43   RBC 4.77 4.89 4.50   Hemoglobin 14.4 15.4 13.6   Hematocrit 42.4 44.7 39.8   MCV 88.9 91.4 88.4   MCH 30.2 31.5 30.2   MCHC 34.0 34.5 34.2   RDW 12.4 12.9 12.6   Platelets 172 201 180           CBC w/diff    CBC w/Diff 5/7/21 7/13/21 8/4/21   WBC 6.38 7.59 6.43   RBC 4.77 4.89 4.50   Hemoglobin 14.4 15.4 13.6   Hematocrit 42.4 44.7 39.8   MCV 88.9 91.4 88.4   MCH 30.2 31.5 30.2   MCHC 34.0 34.5 34.2   RDW 12.4 12.9 12.6   Platelets 172 201 180   Neutrophil Rel % 67.3 74.0 67.8   Immature Granulocyte Rel % 0.3  0.3   Lymphocyte Rel % 19.3 (A) 15.0 (A) 19.1 (A)   Monocyte Rel % 7.7 7.1 8.1   Eosinophil Rel % 3.8 3.2 3.6   Basophil Rel % 1.6 (A) 0.7 1.1   (A) Abnormal value            Data reviewed: Radiologic studies Result of CT scan of chest from September 15, 2021 reviewed.         CT Chest With Contrast Diagnostic    Result Date: 9/16/2021  Impression: Extensive emphysematous changes. Left upper lobe apical mass with adjacent infiltrative or fibrotic changes. No significant interval change in comparison prior exam August 4, 2021. Electronically signed by:  Ganga Ward MD  9/16/2021 10:14 AM CDT Workstation: [id number]     Mitch Rinaldi reports a pain score of 4.  Given his pain assessment as noted, treatment options were discussed and the following options were decided upon as a follow-up plan to address the patient's pain: prescription for opiod analgesics.    Patient screened positive for depression based on a PHQ-9 score of 0 on 9/17/2021. Follow-up recommendations include: Suicide Risk Assessment performed.      Assessment and Plan    Diagnoses and all orders for this visit:    1. Cancer of upper lobe of left lung (CMS/HCC) (Primary)  -     CT chest w contrast; Future       Patient was treated  with SBRT.  Result of recent CT scan of chest reviewed which showed no evidence of progression.  Overall mass remain stable.  We will continue surveillance for left lung cancer.  CT scan of chest in 3 months.      2. Mantle cell lymphoma of lymph nodes of multiple regions (CMS/HCC)  -     CT chest w contrast; Future  -     CT abdomen pelvis w contrast; Future  -     CBC (No Diff); Future  -     Comprehensive Metabolic Panel; Future    Patient with diagnosis of CD5 negative mantle cell lymphoma.  IHC positive for cyclin D1  FISH is positive for t(11,14) translocation.   Cytogenetics are normal.  Ki-67 -10 to 15%.  Bone marrow aspiration biopsy was negative.    His lymphadenopathy has remained stable.  He has no B symptoms.  He remains asymptomatic from his mantle cell lymphoma.  We will continue surveillance.  CBC, CMP, physical exam in 3 months.    3. Primary osteoarthritis involving multiple joints  -     HYDROcodone-acetaminophen (NORCO) 7.5-325 MG per tablet; Take 1 tablet by mouth Every 6 (Six) Hours As Needed for Severe Pain .  Dispense: 120 tablet; Refill: 0    Chronic, stable on [place].  He is requesting new prescription which was sent to the pharmacy.        Follow Up   No follow-ups on file.  Patient was given instructions and counseling regarding his condition or for health maintenance advice. Please see specific information pulled into the AVS if appropriate.

## 2021-10-19 ENCOUNTER — OFFICE VISIT (OUTPATIENT)
Dept: CARDIOLOGY | Facility: CLINIC | Age: 61
End: 2021-10-19

## 2021-10-19 VITALS
HEIGHT: 70 IN | BODY MASS INDEX: 24.48 KG/M2 | HEART RATE: 67 BPM | OXYGEN SATURATION: 95 % | WEIGHT: 171 LBS | SYSTOLIC BLOOD PRESSURE: 120 MMHG | DIASTOLIC BLOOD PRESSURE: 70 MMHG

## 2021-10-19 DIAGNOSIS — I25.10 CORONARY ARTERY DISEASE INVOLVING NATIVE CORONARY ARTERY OF NATIVE HEART WITHOUT ANGINA PECTORIS: Primary | ICD-10-CM

## 2021-10-19 LAB
QT INTERVAL: 372 MS
QTC INTERVAL: 393 MS

## 2021-10-19 PROCEDURE — 93000 ELECTROCARDIOGRAM COMPLETE: CPT | Performed by: INTERNAL MEDICINE

## 2021-10-19 PROCEDURE — 99213 OFFICE O/P EST LOW 20 MIN: CPT | Performed by: INTERNAL MEDICINE

## 2021-10-19 NOTE — PROGRESS NOTES
Mitch Rinaldi  61 y.o. male    10/19/2021  Chief Complaint   Patient presents with   • Coronary Artery Disease     Chief Complaint       History of Present Illness  Patient with a history of coronary disease    -        SUBJECTIVE  Patient overall is doing well.  He is having no chest pain.  He is underwent radiation therapy for his lung cancer as at this time just being monitored.  He has had no therapy for his mantle cell lymphoma.  He feels well.  He has no chest pain or shortness of air.  His blood pressures under good control.  Allergies   Allergen Reactions   • Darvon [Propoxyphene] Nausea And Vomiting   • Percocet [Oxycodone-Acetaminophen] Palpitations         Past Medical History:   Diagnosis Date   • Acute gastroenteritis    • Allergic    • Anxiety    • Asthma     as a  child   • Diverticulitis of colon    • Emphysema lung (HCC)    • Epididymal cyst    • Gastroesophageal reflux disease    • Generalized anxiety disorder    • Hyperlipidemia    • Hypertension    • Impaired glucose tolerance    • Left upper lobe pulmonary nodule 6/1/2020    Added automatically from request for surgery [id number]   • Pain, lumbar region     Pain radiating to lumbar region of back      • Peripheral nervous system disease     Disorder of the peripheral nervous system      • Pleuritic chest pain    • Pneumonia    • Primary osteoarthritis involving multiple joints 8/29/2016   • Shoulder pain, left    • Spermatocele 4/25/2018    Added automatically from request for surgery [id number]   • Surgical aftercare, respiratory system 6/11/2020   • Tachycardia    • Tinnitus    • Wears glasses    • Wears partial dentures          Past Surgical History:   Procedure Laterality Date   • CHOLECYSTECTOMY     • COLONOSCOPY N/A 1/14/2019    Procedure: COLONOSCOPY;  Surgeon: Ronald Rodriguez DO;  Location: [facility] ENDOSCOPY;  Service: Gastroenterology   • DENTAL PROCEDURE     • ENDOSCOPY AND COLONOSCOPY     • EPIDIDYMIS SURGERY Right     cyst   • HERNIA  REPAIR  2017   • INGUINAL HERNIA REPAIR Left 2017    Procedure: OPEN LEFT INGUINAL HERNIA REPAIR WITH MESH;  Surgeon: Miles Gregorio MD;  Location: [facility];  Service:    • INJECTION OF MEDICATION      Depo Medrol (Methylprednisone) 80mg (8)      2015       • INJECTION OF MEDICATION      Kenalog (1)      2012       • LUNG SURGERY       Pneumothorax 1980       • LYMPH NODE BIOPSY N/A 6/3/2020    Procedure: BIOPSY LYMPH NODE;  Surgeon: Bry Salvador MD;  Location: [facility];  Service: Cardiothoracic;  Laterality: N/A;   • MEDIASTINOSCOPY, BRONCHOSCOPY N/A 6/3/2020    Procedure: MEDIASTINOSCOPY, BRONCHOSCOPY cervical  lymph node biopsy;  Surgeon: Bry Salvador MD;  Location: [facility];  Service: Cardiothoracic;  Laterality: N/A;   • SPERMATOCELECTOMY Left 2018    Procedure: LEFT SPERMATOCELECTOMY;  Surgeon: Miles Groves MD;  Location: [facility];  Service: Urology         Family History   Problem Relation Age of Onset   • Dementia Mother    • Hypertension Mother    • Thyroid disease Mother    • Arthritis Mother    • Diabetes Father    • Stomach cancer Father    • Cancer Father         stomach   • Thyroid disease Brother    • Down syndrome Brother    • COPD Maternal Aunt    • Alcohol abuse Paternal Uncle    • Macular degeneration Brother    • Alcohol abuse Brother    • Alcohol abuse Brother    • Thyroid disease Brother    • Alcohol abuse Paternal Uncle    • Birth defects Brother    • COPD Maternal Aunt    • Hearing loss Brother          Social History     Socioeconomic History   • Marital status: Single   Tobacco Use   • Smoking status: Former Smoker     Packs/day: 1.00     Years: 42.00     Pack years: 42.00     Types: Cigarettes     Quit date: 2020     Years since quittin.1   • Smokeless tobacco: Former User     Quit date:    Vaping Use   • Vaping Use: Never used   Substance and Sexual Activity   • Alcohol use: No   • Drug use: No   • Sexual  "activity: Defer         Prior to Admission medications    Medication Sig Start Date End Date Taking? Authorizing Provider   albuterol sulfate  (90 Base) MCG/ACT inhaler Inhale 2 puffs Every 4 (Four) Hours As Needed for Wheezing. 10/8/20  Yes Carmelo Farmer MD   aspirin 81 MG EC tablet Take 81 mg by mouth Daily. 7/16/20  Yes Hannah Nguyen MD   budesonide-formoterol (Symbicort) 160-4.5 MCG/ACT inhaler Inhale 2 puffs 2 (Two) Times a Day. 10/8/20  Yes Carmelo Farmer MD   cetirizine (ZyrTEC) 10 MG tablet Take 10 mg by mouth daily as needed for allergies.   Yes Hannah Nguyen MD   esomeprazole (nexIUM) 20 MG capsule Take 20 mg by mouth Every Morning Before Breakfast.   Yes Hannah Nguyen MD   FLUoxetine (PROzac) 40 MG capsule TAKE 1 CAPSULE BY MOUTH DAILY. 7/1/21  Yes Carmelo Farmer MD   fluticasone (FLONASE) 50 MCG/ACT nasal spray 2 sprays into the nostril(s) as directed by provider Daily As Needed for Rhinitis.   Yes Hannah Nguyen MD   HYDROcodone-acetaminophen (NORCO) 7.5-325 MG per tablet Take 1 tablet by mouth Every 6 (Six) Hours As Needed for Severe Pain . 9/22/21  Yes Atiya Alexis MD   metoprolol tartrate (LOPRESSOR) 50 MG tablet Take 1 tablet by mouth 2 (Two) Times a Day. 4/6/21  Yes Tomi Gill MD   promethazine (PHENERGAN) 25 MG tablet TAKE 1 TABLET BY MOUTH EVERY 6 (SIX) HOURS AS NEEDED FOR NAUSEA OR VOMITING. 6/28/21  Yes Carmelo Farmer MD   rosuvastatin (CRESTOR) 10 MG tablet TAKE 1 TABLET BY MOUTH DAILY. 2/12/21  Yes Tomi Gill MD   tiZANidine (ZANAFLEX) 4 MG tablet TAKE 1 TABLET BY MOUTH EVERY 8 (EIGHT) HOURS AS NEEDED FOR MUSCLE SPASMS. 7/27/21  Yes Carmelo Farmer MD         OBJECTIVE    /70 (BP Location: Left arm, Patient Position: Sitting, Cuff Size: Adult)   Pulse 67   Ht 177.8 cm (70\")   Wt 77.6 kg (171 lb)   SpO2 95%   BMI 24.54 kg/m²         Review of Systems     Constitutional:  Denies recent weight loss, weight " gain, fever or chills, no change in exercise tolerance     HENT:  Denies any hearing loss, epistaxis, hoarseness, or difficulty speaking.     Eyes: Wears eyeglasses or contact lenses     Respiratory:  Denies dyspnea with exertion,no cough, wheezing, or hemoptysis.     Cardiovascular: Negative for palpations, chest pain, orthopnea, PND, peripheral edema, syncope, or claudication.     Gastrointestinal:  Denies change in bowel habits, dyspepsia, ulcer disease, hematochezia, or melena.     Endocrine: Negative for cold intolerance, heat intolerance, polydipsia, polyphagia and polyuria. Denies any history of weight change, heat/cold intolerance, polydipsia, polyuria     Genitourinary: Negative.      Musculoskeletal: Denies any history of arthritic symptoms or back problems     Skin:  Denies any change in hair or nails, rashes, or skin lesions.     Allergic/Immunologic: Negative.  Negative for environmental allergies, food allergies and immunocompromised state.     Neurological:  Denies any history of recurrent headaches, strokes, TIA, or seizure disorder.     Hematological: Denies any food allergies, seasonal allergies, bleeding disorders, or lymphadenopathy.     Psychiatric/Behavioral: Denies any history of depression, substance abuse, or change in cognitive function.         Physical Exam     Constitutional: Cooperative, alert and oriented, well-developed, well-nourished, in no acute distress.     HENT:   Head: Normocephalic, normal hair patterns, no masses or tenderness.  Ears, Nose, and Throat: No gross abnormalities. No pallor or cyanosis. Dentition good.   Eyes: EOMS intact, PERRL, conjunctivae and lids unremarkable. Fundoscopic exam and visual fields not performed.   Neck: No palpable masses or adenopathy, no thyromegaly, no JVD, carotid pulses are full and equal bilaterally and without  Bruits.     Cardiovascular: Regular rhythm, S1 and S2 normal, no S3 or S4. Apical impulse not displaced. No murmurs, gallops, or  rubs detected.     Pulmonary/Chest: Chest: normal symmetry, no tenderness to palpation, normal respiratory excursion, no intercostal retraction, no use of accessory muscles.            Pulmonary: Normal breath sounds. No rales or ronchi.    Abdominal: Abdomen soft, bowel sounds normoactive, no masses, no hepatosplenomegaly, non-tender, no bruits.     Musculoskeletal: No deformities, clubbing, cyanosis, erythema, or edema observed. There are no spinal abnormalities noted. Normal muscle strength and tone. Pulses full and equal in all extremities, no bruits auscultated.     Neurological: No gross motor or sensory deficits noted, affect appropriate, oriented to time, person, place.     Skin: Warm and dry to the touch, no apparent skin lesions or masses noted.     Psychiatric: He has a normal mood and affect. His behavior is normal. Judgment and thought content normal.         Procedures      Lab Results   Component Value Date    WBC 6.43 08/04/2021    HGB 13.6 08/04/2021    HCT 39.8 08/04/2021    MCV 88.4 08/04/2021     08/04/2021     Lab Results   Component Value Date    GLUCOSE 97 08/04/2021    BUN 15 09/15/2021    CREATININE 1.10 09/15/2021    EGFRIFNONA 68 09/15/2021    BCR 12.4 08/04/2021    CO2 25.0 08/04/2021    CALCIUM 8.7 08/04/2021    ALBUMIN 4.10 08/04/2021    AST 16 08/04/2021    ALT 9 08/04/2021     Lab Results   Component Value Date    CHOL 124 (L) 01/04/2021    CHOL 186 10/16/2019    CHOL 161 04/17/2019     Lab Results   Component Value Date    TRIG 116 01/04/2021    TRIG 186 (H) 10/16/2019    TRIG 139 04/17/2019     Lab Results   Component Value Date    HDL 35 (L) 01/04/2021    HDL 30 (L) 10/16/2019    HDL 38 (L) 04/17/2019     No components found for: LDLCALC  Lab Results   Component Value Date    LDL 53 07/13/2021    LDL 68 01/04/2021     (H) 10/16/2019     No results found for: HDLLDLRATIO  No components found for: CHOLHDL  Lab Results   Component Value Date    HGBA1C 5.57 07/13/2021      Lab Results   Component Value Date    TSH 1.410 07/13/2021           ASSESSMENT AND PLAN      Diagnoses and all orders for this visit:    1. Coronary artery disease involving native coronary artery of native heart without angina pectoris (Primary)  -     ECG 12 Lead  2.  Hyperlipidemia.  He is stable from this standpoint.  His lipids were under good control on the last visit.  He is to have them rechecked per Dr. Farmer.  In July they were excellent.    Patient's Body mass index is 24.54 kg/m². indicating that he is within normal range (BMI 18.5-24.9). No BMI management plan needed..  He is doing so well we will see him back in 1 year.  He is to call if there is any problems in between.              Tomi Gill MD  10/19/2021  10:01 CDT

## 2021-10-21 DIAGNOSIS — M15.9 PRIMARY OSTEOARTHRITIS INVOLVING MULTIPLE JOINTS: Chronic | ICD-10-CM

## 2021-10-21 RX ORDER — HYDROCODONE BITARTRATE AND ACETAMINOPHEN 7.5; 325 MG/1; MG/1
1 TABLET ORAL EVERY 6 HOURS PRN
Qty: 120 TABLET | Refills: 0 | Status: SHIPPED | OUTPATIENT
Start: 2021-10-21 | End: 2021-11-18 | Stop reason: SDUPTHER

## 2021-10-21 NOTE — TELEPHONE ENCOUNTER
Rx Refill Note  Requested Prescriptions     Pending Prescriptions Disp Refills   • HYDROcodone-acetaminophen (NORCO) 7.5-325 MG per tablet 120 tablet 0     Sig: Take 1 tablet by mouth Every 6 (Six) Hours As Needed for Severe Pain .      Last office visit with prescribing clinician: 9/17/2021      Next office visit with prescribing clinician: 1/4/2022            Madyson Wolf RN  10/21/21, 10:02 CDT

## 2021-11-08 ENCOUNTER — TELEPHONE (OUTPATIENT)
Dept: ONCOLOGY | Facility: CLINIC | Age: 61
End: 2021-11-08

## 2021-11-08 NOTE — TELEPHONE ENCOUNTER
Received call from Emily Oliver with scheduling regarding patient's CT chest scheduled for 12/29.Patient told her he normally has a lab done (possibly for creatinine) before his CT but she noticed there is no order for labs for CT, the labs ordered are on the day of his follow up in January with Dr aGrcia. Please contact Emily tomorrow at ext# [phone] so she can inform the patient.

## 2021-11-10 NOTE — TELEPHONE ENCOUNTER
Called and message for Emily that pt has labs ordered for follow up visit in January and that those labs can be used.

## 2021-11-12 DIAGNOSIS — K21.9 GASTROESOPHAGEAL REFLUX DISEASE WITHOUT ESOPHAGITIS: Chronic | ICD-10-CM

## 2021-11-12 DIAGNOSIS — J44.9 COPD, MODERATE (HCC): Chronic | ICD-10-CM

## 2021-11-12 RX ORDER — PROMETHAZINE HYDROCHLORIDE 25 MG/1
25 TABLET ORAL EVERY 6 HOURS PRN
Qty: 60 TABLET | Refills: 2 | Status: SHIPPED | OUTPATIENT
Start: 2021-11-12 | End: 2022-03-15

## 2021-11-12 RX ORDER — BUDESONIDE AND FORMOTEROL FUMARATE DIHYDRATE 160; 4.5 UG/1; UG/1
AEROSOL RESPIRATORY (INHALATION)
Qty: 10.2 G | Refills: 12 | Status: SHIPPED | OUTPATIENT
Start: 2021-11-12 | End: 2022-12-06

## 2021-11-18 DIAGNOSIS — M15.9 PRIMARY OSTEOARTHRITIS INVOLVING MULTIPLE JOINTS: Chronic | ICD-10-CM

## 2021-11-18 RX ORDER — HYDROCODONE BITARTRATE AND ACETAMINOPHEN 7.5; 325 MG/1; MG/1
1 TABLET ORAL EVERY 6 HOURS PRN
Qty: 120 TABLET | Refills: 0 | Status: SHIPPED | OUTPATIENT
Start: 2021-11-18 | End: 2021-12-16 | Stop reason: SDUPTHER

## 2021-11-18 NOTE — TELEPHONE ENCOUNTER
Rx Refill Note  Requested Prescriptions     Pending Prescriptions Disp Refills   • HYDROcodone-acetaminophen (NORCO) 7.5-325 MG per tablet 120 tablet 0     Sig: Take 1 tablet by mouth Every 6 (Six) Hours As Needed for Severe Pain .      Last office visit with prescribing clinician: 9/17/2021      Next office visit with prescribing clinician: 1/4/2022            Madyson Wolf RN  11/18/21, 11:22 CST

## 2021-12-16 DIAGNOSIS — M15.9 PRIMARY OSTEOARTHRITIS INVOLVING MULTIPLE JOINTS: Chronic | ICD-10-CM

## 2021-12-16 RX ORDER — HYDROCODONE BITARTRATE AND ACETAMINOPHEN 7.5; 325 MG/1; MG/1
1 TABLET ORAL EVERY 6 HOURS PRN
Qty: 120 TABLET | Refills: 0 | Status: SHIPPED | OUTPATIENT
Start: 2021-12-16 | End: 2022-01-05 | Stop reason: SDUPTHER

## 2021-12-16 NOTE — TELEPHONE ENCOUNTER
Rx Refill Note  Requested Prescriptions     Pending Prescriptions Disp Refills   • HYDROcodone-acetaminophen (NORCO) 7.5-325 MG per tablet 120 tablet 0     Sig: Take 1 tablet by mouth Every 6 (Six) Hours As Needed for Severe Pain .      Last office visit with prescribing clinician: 9/17/2021      Next office visit with prescribing clinician: 1/4/2022            Madyson Wolf RN  12/16/21, 08:57 CST

## 2021-12-29 ENCOUNTER — HOSPITAL ENCOUNTER (OUTPATIENT)
Dept: CT IMAGING | Facility: HOSPITAL | Age: 61
Discharge: HOME | End: 2021-12-29

## 2021-12-29 ENCOUNTER — TRANSCRIBE ORDERS (OUTPATIENT)
Dept: CT IMAGING | Facility: HOSPITAL | Age: 61
End: 2021-12-29

## 2021-12-29 ENCOUNTER — LAB (OUTPATIENT)
Dept: LAB | Facility: HOSPITAL | Age: 61
End: 2021-12-29

## 2021-12-29 DIAGNOSIS — C83.18 MANTLE CELL LYMPHOMA OF LYMPH NODES OF MULTIPLE REGIONS (HCC): Chronic | ICD-10-CM

## 2021-12-29 DIAGNOSIS — C34.12 CANCER OF UPPER LOBE OF LEFT LUNG: ICD-10-CM

## 2021-12-29 DIAGNOSIS — C34.12 CANCER OF UPPER LOBE OF LEFT LUNG: Primary | ICD-10-CM

## 2021-12-29 DIAGNOSIS — C34.12 CANCER OF UPPER LOBE OF LEFT LUNG (HCC): Chronic | ICD-10-CM

## 2021-12-29 LAB
BUN SERPL-MCNC: 14 MG/DL (ref 8–23)
CREAT SERPL-MCNC: 1.09 MG/DL (ref 0.76–1.27)
GFR SERPL CREATININE-BSD FRML MDRD: 68 ML/MIN/1.73

## 2021-12-29 PROCEDURE — 84520 ASSAY OF UREA NITROGEN: CPT

## 2021-12-29 PROCEDURE — 82565 ASSAY OF CREATININE: CPT

## 2021-12-29 PROCEDURE — 0 IOPAMIDOL PER 1 ML: Performed by: INTERNAL MEDICINE

## 2021-12-29 PROCEDURE — 36415 COLL VENOUS BLD VENIPUNCTURE: CPT

## 2021-12-29 PROCEDURE — 74177 CT ABD & PELVIS W/CONTRAST: CPT

## 2021-12-29 PROCEDURE — 71260 CT THORAX DX C+: CPT

## 2021-12-29 RX ADMIN — IOPAMIDOL 90 ML: 755 INJECTION, SOLUTION INTRAVENOUS at 09:25

## 2022-01-04 ENCOUNTER — LAB (OUTPATIENT)
Dept: ONCOLOGY | Facility: HOSPITAL | Age: 62
End: 2022-01-04

## 2022-01-04 ENCOUNTER — OFFICE VISIT (OUTPATIENT)
Dept: ONCOLOGY | Facility: CLINIC | Age: 62
End: 2022-01-04

## 2022-01-04 VITALS
DIASTOLIC BLOOD PRESSURE: 65 MMHG | HEART RATE: 78 BPM | SYSTOLIC BLOOD PRESSURE: 103 MMHG | RESPIRATION RATE: 18 BRPM | BODY MASS INDEX: 24.72 KG/M2 | OXYGEN SATURATION: 98 % | TEMPERATURE: 96.5 F | WEIGHT: 172.3 LBS

## 2022-01-04 DIAGNOSIS — M15.9 PRIMARY OSTEOARTHRITIS INVOLVING MULTIPLE JOINTS: Chronic | ICD-10-CM

## 2022-01-04 DIAGNOSIS — C83.18 MANTLE CELL LYMPHOMA OF LYMPH NODES OF MULTIPLE REGIONS: Chronic | ICD-10-CM

## 2022-01-04 DIAGNOSIS — C34.12 CANCER OF UPPER LOBE OF LEFT LUNG: Primary | Chronic | ICD-10-CM

## 2022-01-04 LAB
ALBUMIN SERPL-MCNC: 4.1 G/DL (ref 3.5–5.2)
ALBUMIN/GLOB SERPL: 1.2 G/DL
ALP SERPL-CCNC: 132 U/L (ref 39–117)
ALT SERPL W P-5'-P-CCNC: 11 U/L (ref 1–41)
ANION GAP SERPL CALCULATED.3IONS-SCNC: 11 MMOL/L (ref 5–15)
AST SERPL-CCNC: 13 U/L (ref 1–40)
BILIRUB SERPL-MCNC: 0.4 MG/DL (ref 0–1.2)
BUN SERPL-MCNC: 12 MG/DL (ref 8–23)
BUN/CREAT SERPL: 10.8 (ref 7–25)
CALCIUM SPEC-SCNC: 8.9 MG/DL (ref 8.6–10.5)
CHLORIDE SERPL-SCNC: 102 MMOL/L (ref 98–107)
CO2 SERPL-SCNC: 24 MMOL/L (ref 22–29)
CREAT SERPL-MCNC: 1.11 MG/DL (ref 0.76–1.27)
DEPRECATED RDW RBC AUTO: 40.2 FL (ref 37–54)
ERYTHROCYTE [DISTWIDTH] IN BLOOD BY AUTOMATED COUNT: 12.5 % (ref 12.3–15.4)
GFR SERPL CREATININE-BSD FRML MDRD: 67 ML/MIN/1.73
GLOBULIN UR ELPH-MCNC: 3.4 GM/DL
GLUCOSE SERPL-MCNC: 106 MG/DL (ref 65–99)
HCT VFR BLD AUTO: 41.4 % (ref 37.5–51)
HGB BLD-MCNC: 14.6 G/DL (ref 13–17.7)
MCH RBC QN AUTO: 30.9 PG (ref 26.6–33)
MCHC RBC AUTO-ENTMCNC: 35.3 G/DL (ref 31.5–35.7)
MCV RBC AUTO: 87.7 FL (ref 79–97)
PLATELET # BLD AUTO: 198 10*3/MM3 (ref 140–450)
PMV BLD AUTO: 9.8 FL (ref 6–12)
POTASSIUM SERPL-SCNC: 3.8 MMOL/L (ref 3.5–5.2)
PROT SERPL-MCNC: 7.5 G/DL (ref 6–8.5)
RBC # BLD AUTO: 4.72 10*6/MM3 (ref 4.14–5.8)
SODIUM SERPL-SCNC: 137 MMOL/L (ref 136–145)
WBC NRBC COR # BLD: 7.94 10*3/MM3 (ref 3.4–10.8)

## 2022-01-04 PROCEDURE — 99214 OFFICE O/P EST MOD 30 MIN: CPT | Performed by: INTERNAL MEDICINE

## 2022-01-04 PROCEDURE — G0463 HOSPITAL OUTPT CLINIC VISIT: HCPCS | Performed by: INTERNAL MEDICINE

## 2022-01-04 PROCEDURE — 80053 COMPREHEN METABOLIC PANEL: CPT

## 2022-01-04 PROCEDURE — 85027 COMPLETE CBC AUTOMATED: CPT

## 2022-01-05 PROBLEM — G89.3 CANCER ASSOCIATED PAIN: Status: RESOLVED | Noted: 2020-07-02 | Resolved: 2022-01-05

## 2022-01-05 RX ORDER — HYDROCODONE BITARTRATE AND ACETAMINOPHEN 7.5; 325 MG/1; MG/1
1 TABLET ORAL EVERY 6 HOURS PRN
Qty: 120 TABLET | Refills: 0 | Status: SHIPPED | OUTPATIENT
Start: 2022-01-15 | End: 2022-02-15 | Stop reason: SDUPTHER

## 2022-01-05 NOTE — PROGRESS NOTES
Chief Complaint  Follow-up - lung cancer, mantle cell lymphoma     Subjective          Mitch Rinaldi presents to [facility]  History of Present Illness     No new health issues since last visit.   No new medications.   No new hospitalization.   Feels well.       Objective   Vital Signs:   /65   Pulse 78   Temp 96.5 °F (35.8 °C)   Resp 18   Wt 78.2 kg (172 lb 4.8 oz)   SpO2 98%   BMI 24.72 kg/m²     Physical Exam  Vitals and nursing note reviewed.   Constitutional:       Appearance: Normal appearance.   Cardiovascular:      Rate and Rhythm: Normal rate and regular rhythm.   Neurological:      General: No focal deficit present.      Mental Status: He is alert and oriented to person, place, and time. Mental status is at baseline.   Psychiatric:         Mood and Affect: Mood normal.         Behavior: Behavior normal.         Thought Content: Thought content normal.        Result Review :   The following data was reviewed by: Atiya Alexis MD on 01/04/2022:  Common labs    Common Labsle 9/15/21 9/15/21 12/29/21 12/29/21 1/4/22 1/4/22    0830 0830 0824 0824 0814 0814   Glucose      106 (A)   BUN 15  14   12   Creatinine  1.10  1.09  1.11   eGFR Non  Am  68  68  67   Sodium      137   Potassium      3.8   Chloride      102   Calcium      8.9   Albumin      4.10   Total Bilirubin      0.4   Alkaline Phosphatase      132 (A)   AST (SGOT)      13   ALT (SGPT)      11   WBC     7.94    Hemoglobin     14.6    Hematocrit     41.4    Platelets     198    (A) Abnormal value            CMP    CMP 9/15/21 9/15/21 12/29/21 12/29/21 1/4/22    0830 0830 0824 0824    Glucose     106 (A)   BUN 15  14  12   Creatinine  1.10  1.09 1.11   eGFR Non  Am  68  68 67   Sodium     137   Potassium     3.8   Chloride     102   Calcium     8.9   Albumin     4.10   Total Bilirubin     0.4   Alkaline Phosphatase     132 (A)   AST (SGOT)     13   ALT (SGPT)     11    (A) Abnormal value            CBC    CBC 7/13/21 8/4/21 1/4/22   WBC 7.59 6.43 7.94   RBC 4.89 4.50 4.72   Hemoglobin 15.4 13.6 14.6   Hematocrit 44.7 39.8 41.4   MCV 91.4 88.4 87.7   MCH 31.5 30.2 30.9   MCHC 34.5 34.2 35.3   RDW 12.9 12.6 12.5   Platelets 201 180 198           CBC w/diff    CBC w/Diff 7/13/21 8/4/21 1/4/22   WBC 7.59 6.43 7.94   RBC 4.89 4.50 4.72   Hemoglobin 15.4 13.6 14.6   Hematocrit 44.7 39.8 41.4   MCV 91.4 88.4 87.7   MCH 31.5 30.2 30.9   MCHC 34.5 34.2 35.3   RDW 12.9 12.6 12.5   Platelets 201 180 198   Neutrophil Rel % 74.0 67.8    Immature Granulocyte Rel %  0.3    Lymphocyte Rel % 15.0 (A) 19.1 (A)    Monocyte Rel % 7.1 8.1    Eosinophil Rel % 3.2 3.6    Basophil Rel % 0.7 1.1    (A) Abnormal value            Data reviewed: Radiologic studies Result of CT scan of chest, abdomen pelvis reviewed.     CT Chest With Contrast Diagnostic    Result Date: 12/30/2021  CONCLUSION: 1. Stable changes as above. 2. No evidence of tumor progression. Electronically signed by:  Tru Martinez MD  12/30/2021 4:51 PM CST Workstation: [id number]    CT Abdomen Pelvis With Contrast    Result Date: 12/30/2021  CONCLUSION: 1. Stable changes as above. 2. No evidence of tumor progression. Electronically signed by:  Tru Martinez MD  12/30/2021 4:51 PM CST Workstation: [id number]    Mitch Rinaldi reports a pain score of 0.  Given his pain assessment as noted, treatment options were discussed and the following options were decided upon as a follow-up plan to address the patient's pain: continuation of current treatment plan for pain.     Patient screened positive for depression based on a PHQ-9 score of 0 on 1/4/2022. Follow-up recommendations include: Suicide Risk Assessment performed.      Assessment and Plan    Diagnoses and all orders for this visit:    1. Cancer of upper lobe of left lung (HCC) (Primary)  -     CT Chest With Contrast; Future  -     CBC & Differential; Future  -     Comprehensive Metabolic  Panel; Future    Chronic, stable  Patient was treated with SBRT.  Result of recent CT scan reviewed which showed no evidence of recurrent disease.  Continue surveillance for lung cancer.  Recommend repeating CT scan in 4 months.    2. Mantle cell lymphoma of lymph nodes of multiple regions (HCC)  -     CBC & Differential; Future  -     Comprehensive Metabolic Panel; Future    Chronic, stable.   Patient with diagnosis of CD5 negative mantle cell lymphoma.  IHC positive for cyclin D1  FISH is positive for t(11,14) translocation.   Cytogenetics are normal.  Ki-67 -10 to 15%.  Bone marrow aspiration biopsy was negative.     His lymphadenopathy has remained stable.  He has no B symptoms.  He remains asymptomatic from his mantle cell lymphoma.  We will continue surveillance.  CBC, CMP, physical exam in 4 months.    3. Primary osteoarthritis involving multiple joints  -     HYDROcodone-acetaminophen (NORCO) 7.5-325 MG per tablet; Take 1 tablet by mouth Every 6 (Six) Hours As Needed for Severe Pain .  Dispense: 120 tablet; Refill: 0      Chronic, stable on [place].  He will continue this as needed.  New prescription sent to the pharmacy.      Follow Up   No follow-ups on file.  Patient was given instructions and counseling regarding his condition or for health maintenance advice. Please see specific information pulled into the AVS if appropriate.

## 2022-01-14 DIAGNOSIS — F41.1 GENERALIZED ANXIETY DISORDER: Primary | ICD-10-CM

## 2022-01-14 RX ORDER — FLUOXETINE HYDROCHLORIDE 40 MG/1
CAPSULE ORAL
Qty: 30 CAPSULE | Refills: 5 | Status: SHIPPED | OUTPATIENT
Start: 2022-01-14 | End: 2022-07-20 | Stop reason: SDUPTHER

## 2022-02-15 DIAGNOSIS — M15.9 PRIMARY OSTEOARTHRITIS INVOLVING MULTIPLE JOINTS: Chronic | ICD-10-CM

## 2022-02-15 RX ORDER — HYDROCODONE BITARTRATE AND ACETAMINOPHEN 7.5; 325 MG/1; MG/1
1 TABLET ORAL EVERY 6 HOURS PRN
Qty: 160 TABLET | Refills: 0 | Status: SHIPPED | OUTPATIENT
Start: 2022-02-15 | End: 2022-03-28 | Stop reason: SDUPTHER

## 2022-02-15 RX ORDER — ROSUVASTATIN CALCIUM 10 MG/1
10 TABLET, COATED ORAL DAILY
Qty: 30 TABLET | Refills: 11 | Status: SHIPPED | OUTPATIENT
Start: 2022-02-15 | End: 2023-03-28 | Stop reason: SDUPTHER

## 2022-02-15 NOTE — TELEPHONE ENCOUNTER
Rx Refill Note  Requested Prescriptions     Pending Prescriptions Disp Refills   • HYDROcodone-acetaminophen (NORCO) 7.5-325 MG per tablet 120 tablet 0     Sig: Take 1 tablet by mouth Every 6 (Six) Hours As Needed for Severe Pain .      Last office visit with prescribing clinician: 1/4/2022      Next office visit with prescribing clinician: 5/6/2022            Madyson Wolf RN  02/15/22, 08:33 CST

## 2022-03-07 ENCOUNTER — LAB (OUTPATIENT)
Dept: LAB | Facility: OTHER | Age: 62
End: 2022-03-07

## 2022-03-07 DIAGNOSIS — R73.02 IMPAIRED GLUCOSE TOLERANCE: Chronic | ICD-10-CM

## 2022-03-07 DIAGNOSIS — Z79.899 DRUG THERAPY: ICD-10-CM

## 2022-03-07 DIAGNOSIS — E78.2 MIXED HYPERLIPIDEMIA: Chronic | ICD-10-CM

## 2022-03-07 DIAGNOSIS — I10 BENIGN ESSENTIAL HTN: ICD-10-CM

## 2022-03-07 DIAGNOSIS — Z12.5 SCREENING FOR PROSTATE CANCER: ICD-10-CM

## 2022-03-07 LAB
BACTERIA UR QL AUTO: ABNORMAL /HPF
BASOPHILS # BLD MANUAL: 0.09 10*3/MM3 (ref 0–0.2)
BASOPHILS NFR BLD MANUAL: 1 % (ref 0–1.5)
BILIRUB UR QL STRIP: NEGATIVE
CHOLEST SERPL-MCNC: 125 MG/DL (ref 150–200)
CLARITY UR: CLEAR
COLOR UR: YELLOW
DEPRECATED RDW RBC AUTO: 41.9 FL (ref 37–54)
EOSINOPHIL # BLD MANUAL: 0.46 10*3/MM3 (ref 0–0.4)
EOSINOPHIL NFR BLD MANUAL: 5 % (ref 0.3–6.2)
ERYTHROCYTE [DISTWIDTH] IN BLOOD BY AUTOMATED COUNT: 12.9 % (ref 12.3–15.4)
GLUCOSE UR STRIP-MCNC: NEGATIVE MG/DL
HBA1C MFR BLD: 5.7 % (ref 4.8–5.6)
HCT VFR BLD AUTO: 45.4 % (ref 37.5–51)
HDLC SERPL-MCNC: 30 MG/DL (ref 40–59)
HGB BLD-MCNC: 15.6 G/DL (ref 13–17.7)
HGB UR QL STRIP.AUTO: ABNORMAL
HYALINE CASTS UR QL AUTO: ABNORMAL /LPF
KETONES UR QL STRIP: NEGATIVE
LDLC SERPL CALC-MCNC: 63 MG/DL
LDLC/HDLC SERPL: 1.88 {RATIO} (ref 0–3.55)
LEUKOCYTE ESTERASE UR QL STRIP.AUTO: NEGATIVE
LYMPHOCYTES # BLD MANUAL: 1.94 10*3/MM3 (ref 0.7–3.1)
LYMPHOCYTES NFR BLD MANUAL: 9 % (ref 5–12)
MCH RBC QN AUTO: 30.8 PG (ref 26.6–33)
MCHC RBC AUTO-ENTMCNC: 34.4 G/DL (ref 31.5–35.7)
MCV RBC AUTO: 89.5 FL (ref 79–97)
MONOCYTES # BLD: 0.83 10*3/MM3 (ref 0.1–0.9)
NEUTROPHILS # BLD AUTO: 5.91 10*3/MM3 (ref 1.7–7)
NEUTROPHILS NFR BLD MANUAL: 64 % (ref 42.7–76)
NITRITE UR QL STRIP: NEGATIVE
PH UR STRIP.AUTO: 5.5 [PH] (ref 5.5–8)
PLATELET # BLD AUTO: 215 10*3/MM3 (ref 140–450)
PMV BLD AUTO: 10.2 FL (ref 6–12)
PROT UR QL STRIP: NEGATIVE
PSA SERPL-MCNC: 0.25 NG/ML (ref 0–4)
RBC # BLD AUTO: 5.07 10*6/MM3 (ref 4.14–5.8)
RBC # UR STRIP: ABNORMAL /HPF
RBC MORPH BLD: NORMAL
REF LAB TEST METHOD: ABNORMAL
SMALL PLATELETS BLD QL SMEAR: ADEQUATE
SP GR UR STRIP: 1.01 (ref 1–1.03)
SQUAMOUS #/AREA URNS HPF: ABNORMAL /HPF
T4 FREE SERPL-MCNC: 1.24 NG/DL (ref 0.93–1.7)
TRIGL SERPL-MCNC: 193 MG/DL
TSH SERPL DL<=0.05 MIU/L-ACNC: 1.77 UIU/ML (ref 0.27–4.2)
UROBILINOGEN UR QL STRIP: ABNORMAL
VARIANT LYMPHS NFR BLD MANUAL: 19 % (ref 19.6–45.3)
VARIANT LYMPHS NFR BLD MANUAL: 2 % (ref 0–5)
VLDLC SERPL-MCNC: 32 MG/DL (ref 5–40)
WBC # UR STRIP: ABNORMAL /HPF
WBC MORPH BLD: NORMAL
WBC NRBC COR # BLD: 9.23 10*3/MM3 (ref 3.4–10.8)

## 2022-03-07 PROCEDURE — 85025 COMPLETE CBC W/AUTO DIFF WBC: CPT | Performed by: INTERNAL MEDICINE

## 2022-03-07 PROCEDURE — 80061 LIPID PANEL: CPT | Performed by: INTERNAL MEDICINE

## 2022-03-07 PROCEDURE — 80307 DRUG TEST PRSMV CHEM ANLYZR: CPT | Performed by: INTERNAL MEDICINE

## 2022-03-07 PROCEDURE — 81001 URINALYSIS AUTO W/SCOPE: CPT | Performed by: INTERNAL MEDICINE

## 2022-03-07 PROCEDURE — 83036 HEMOGLOBIN GLYCOSYLATED A1C: CPT | Performed by: INTERNAL MEDICINE

## 2022-03-07 PROCEDURE — G0103 PSA SCREENING: HCPCS | Performed by: INTERNAL MEDICINE

## 2022-03-07 PROCEDURE — 36415 COLL VENOUS BLD VENIPUNCTURE: CPT | Performed by: INTERNAL MEDICINE

## 2022-03-07 PROCEDURE — G0481 DRUG TEST DEF 8-14 CLASSES: HCPCS | Performed by: INTERNAL MEDICINE

## 2022-03-07 PROCEDURE — 84439 ASSAY OF FREE THYROXINE: CPT | Performed by: INTERNAL MEDICINE

## 2022-03-07 PROCEDURE — 84443 ASSAY THYROID STIM HORMONE: CPT | Performed by: INTERNAL MEDICINE

## 2022-03-14 LAB
6MAM UR QL CFM: NEGATIVE
6MAM/CREAT UR: NOT DETECTED NG/MG CREAT
7AMINOCLONAZEPAM/CREAT UR: NOT DETECTED NG/MG CREAT
A-OH ALPRAZ/CREAT UR: NOT DETECTED NG/MG CREAT
A-OH-TRIAZOLAM/CREAT UR CFM: NOT DETECTED NG/MG CREAT
ALFENTANIL/CREAT UR CFM: NOT DETECTED NG/MG CREAT
ALPHA-HYDROXYMIDAZOLAM, URINE: NOT DETECTED NG/MG CREAT
ALPRAZ/CREAT UR CFM: NOT DETECTED NG/MG CREAT
AMOBARBITAL UR QL CFM: NOT DETECTED
AMPHET/CREAT UR: NOT DETECTED NG/MG CREAT
AMPHETAMINES UR QL CFM: NEGATIVE
BARBITAL UR QL CFM: NOT DETECTED
BARBITURATES UR QL CFM: NEGATIVE
BENZODIAZ UR QL CFM: NEGATIVE
BUPRENORPHINE UR QL CFM: NEGATIVE
BUPRENORPHINE/CREAT UR: NOT DETECTED NG/MG CREAT
BUTABARBITAL UR QL CFM: NOT DETECTED
BUTALBITAL UR QL CFM: NOT DETECTED
BZE/CREAT UR: NOT DETECTED NG/MG CREAT
CANNABINOIDS UR QL CFM: NEGATIVE
CARBOXYTHC/CREAT UR: NOT DETECTED NG/MG CREAT
CLONAZEPAM/CREAT UR CFM: NOT DETECTED NG/MG CREAT
COCAETHYLENE/CREAT UR CFM: NOT DETECTED NG/MG CREAT
COCAINE UR QL CFM: NEGATIVE
COCAINE/CREAT UR CFM: NOT DETECTED NG/MG CREAT
CODEINE/CREAT UR: NOT DETECTED NG/MG CREAT
CREAT UR-MCNC: 137 MG/DL
DESALKYLFLURAZ/CREAT UR: NOT DETECTED NG/MG CREAT
DESMETHYLFLUNITRAZEPAM: NOT DETECTED NG/MG CREAT
DHC/CREAT UR: 227 NG/MG CREAT
DIAZEPAM/CREAT UR: NOT DETECTED NG/MG CREAT
DRUGS UR: NORMAL
EDDP/CREAT UR: NOT DETECTED NG/MG CREAT
ETHANOL UR CFM-MCNC: NOT DETECTED G/DL
ETHANOL UR QL CFM: NEGATIVE
FENTANYL UR QL CFM: NEGATIVE
FENTANYL/CREAT UR: NOT DETECTED NG/MG CREAT
FLUNITRAZEPAM UR QL CFM: NOT DETECTED NG/MG CREAT
HYDROCODONE/CREAT UR: 420 NG/MG CREAT
HYDROMORPHONE/CREAT UR: NOT DETECTED NG/MG CREAT
LEVEL OF DETECTION:: NORMAL
LORAZEPAM/CREAT UR: NOT DETECTED NG/MG CREAT
MDA/CREAT UR: NOT DETECTED NG/MG CREAT
MDMA/CREAT UR: NOT DETECTED NG/MG CREAT
MEPHOBARBITAL UR QL CFM: NOT DETECTED
METHADONE UR QL CFM: NEGATIVE
METHADONE/CREAT UR: NOT DETECTED NG/MG CREAT
METHAMPHET/CREAT UR: NOT DETECTED NG/MG CREAT
MIDAZOLAM/CREAT UR CFM: NOT DETECTED NG/MG CREAT
MORPHINE/CREAT UR: NOT DETECTED NG/MG CREAT
N-NORTRAMADOL/CREAT UR CFM: NOT DETECTED NG/MG CREAT
NARCOTICS UR: NEGATIVE
NORBUPRENORPHINE/CREAT UR: NOT DETECTED NG/MG CREAT
NORCODEINE/CREAT UR CFM: NOT DETECTED NG/MG CREAT
NORDIAZEPAM/CREAT UR: NOT DETECTED NG/MG CREAT
NORFENTANYL/CREAT UR: NOT DETECTED NG/MG CREAT
NORHYDROCODONE/CREAT UR: 709 NG/MG CREAT
NORMORPHINE UR-MCNC: NOT DETECTED NG/MG CREAT
NOROXYCODONE/CREAT UR: NOT DETECTED NG/MG CREAT
NOROXYMORPHONE/CREAT UR CFM: NOT DETECTED NG/MG CREAT
O-NORTRAMADOL UR CFM-MCNC: NOT DETECTED NG/MG CREAT
OPIATES UR QL CFM: NORMAL
OXAZEPAM/CREAT UR: NOT DETECTED NG/MG CREAT
OXYCODONE UR QL CFM: NEGATIVE
OXYCODONE/CREAT UR: NOT DETECTED NG/MG CREAT
OXYMORPHONE/CREAT UR: NOT DETECTED NG/MG CREAT
PENTOBARB UR QL CFM: NOT DETECTED
PHENOBARB UR QL CFM: NOT DETECTED
SECOBARBITAL UR QL CFM: NOT DETECTED
SUFENTANIL/CREAT UR CFM: NOT DETECTED NG/MG CREAT
TAPENTADOL UR QL CFM: NEGATIVE
TAPENTADOL/CREAT UR: NOT DETECTED NG/MG CREAT
TEMAZEPAM/CREAT UR: NOT DETECTED NG/MG CREAT
THIOPENTAL UR QL CFM: NOT DETECTED
TRAMADOL UR QL CFM: NOT DETECTED NG/MG CREAT

## 2022-03-15 DIAGNOSIS — K21.9 GASTROESOPHAGEAL REFLUX DISEASE WITHOUT ESOPHAGITIS: Chronic | ICD-10-CM

## 2022-03-15 RX ORDER — PROMETHAZINE HYDROCHLORIDE 25 MG/1
25 TABLET ORAL EVERY 6 HOURS PRN
Qty: 60 TABLET | Refills: 2 | Status: SHIPPED | OUTPATIENT
Start: 2022-03-15 | End: 2022-07-08

## 2022-03-21 NOTE — PROGRESS NOTES
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 male who presents to the office for follow-up and review of labs.  He has impaired glucose tolerance and monitors his dietary intake of sugar and carbohydrates.  He has hyperlipidemia and takes Crestor 10 mg daily.  He has GERD which is well managed with Nexium 20 mg daily.  He has hypertension and his blood pressure has been doing well.  He has osteoarthritis which causes chronic back and joint pain.  He takes Norco 7.5/325 mg every 6 hours as needed for the pain. This was recently increased by his oncologist, as it is also being used to treat his cancer related pain.  He had a recent diagnosis of lung cancer and mantle cell lymphoma.  He also takes ibuprofen for treatment of the inflammation.  He takes Zanaflex as needed for muscle spasm.  He has neuropathy secondary to the osteoarthritis in his back, but does not currently require any medication for this.  He has anxiety and depression and takes Wellbutrin and fluoxetine.  He has COPD and his breathing has been baseline.  He does have occasional shortness of breath, but this has been stable.  He has anxiety and takes fluoxetine 40 mg daily.    He has mantle cell lymphoma and follows with oncology.  He has been asymptomatic, and no treatment is currently indicated for this.  He was also found to have a mass in the left upper lobe of his lung.  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  He has been seen at the cancer center in [place] and received radiation treatment  "for this.      History of Present Illness has been reviewed and validated on 03/22/2022 and updated with any changes.    The following portions of the patient's history were reviewed and updated as appropriate: allergies, current medications, past family history, past medical history, past social history, past surgical history and problem list.    Review of Systems   Constitutional: Negative for chills, fatigue and fever.   HENT: Negative for congestion, sneezing, sore throat and trouble swallowing.    Eyes: Negative for visual disturbance.   Respiratory: Negative for cough, chest tightness, shortness of breath and wheezing.    Cardiovascular: Negative for chest pain, palpitations and leg swelling.   Gastrointestinal: Negative for abdominal pain, constipation, diarrhea, nausea and vomiting.   Genitourinary: Negative for dysuria, frequency and urgency.   Musculoskeletal: Positive for arthralgias. Negative for neck pain.   Skin: Negative for rash.   Neurological: Negative for dizziness, weakness and headaches.   Psychiatric/Behavioral:        Patient denies any feelings of depression and has not felt down, hopeless or lost interest in any activities.   All other systems reviewed and are negative.  Review of systems has been reviewed and validated on 03/22/2022 and updated with any changes.      Objective   Vitals:    03/22/22 0805   BP: 114/70   BP Location: Left arm   Patient Position: Sitting   Cuff Size: Adult   Pulse: 78  Comment: regular   Temp: 98.2 °F (36.8 °C)   TempSrc: Temporal   SpO2: 98%   Weight: 79.4 kg (175 lb)   Height: 177.8 cm (70\")   PainSc: 0-No pain     Body mass index is 25.11 kg/m².    Physical Exam  Vitals and nursing note reviewed.   Constitutional:       General: He is not in acute distress.     Appearance: He is well-developed.   HENT:      Head: Normocephalic and atraumatic.      Nose: Nose normal.   Eyes:      General: No scleral icterus.     Conjunctiva/sclera: Conjunctivae normal.      " Pupils: Pupils are equal, round, and reactive to light.   Cardiovascular:      Rate and Rhythm: Normal rate and regular rhythm.      Heart sounds: Normal heart sounds. No murmur heard.    No friction rub. No gallop.   Pulmonary:      Effort: Pulmonary effort is normal. No respiratory distress.      Breath sounds: Decreased breath sounds present. No wheezing or rales.   Musculoskeletal:         General: Normal range of motion.      Cervical back: Normal range of motion and neck supple.      Comments: Low back pain with range of motion   Lymphadenopathy:      Cervical: No cervical adenopathy.   Skin:     General: Skin is warm and dry.      Findings: No rash.   Neurological:      Mental Status: He is alert and oriented to person, place, and time.      Cranial Nerves: No cranial nerve deficit.   Psychiatric:         Behavior: Behavior normal.         Thought Content: Thought content normal.         Judgment: Judgment normal.      Physical exam has been reviewed and validated on 03/22/2022 and updated with any changes.      Assessment/Plan             Diagnoses and all orders for this visit:    1. Impaired glucose tolerance (Primary)    2. Benign essential HTN  -     CBC & Differential; Future  -     Comprehensive Metabolic Panel; Future  -     T4, Free; Future  -     TSH; Future  -     Urinalysis With Culture If Indicated -; Future    3. Mixed hyperlipidemia  -     LDL Cholesterol, Direct; Future    4. Gastroesophageal reflux disease without esophagitis    5. Mantle cell lymphoma of lymph nodes of multiple regions (HCC)    6. Cancer of upper lobe of left lung (HCC)    7. Primary osteoarthritis involving multiple joints    8. COPD, moderate (HCC)    9. Mononeuropathy due to underlying disease    10. Generalized anxiety disorder         Labs are reviewed with patient.  Patient's glucose is slightly elevated at 106.  His hemoglobin A1c shows a nondiabetic state at 5.70.  Patient will continue to watch diet to help maintain  control of the glucose.  Patient understands that there is an increased risk of developing diabetes in the future.  His total cholesterol is 125, LDL 63 and triglycerides 193.  His HDL is 30.  He will continue with Crestor for the hyperlipidemia.  He will continue with ibuprofen to help with the osteoarthritis.  He will continue with Norco 7.5/325 mg every 6 hours as needed for the arthritic pain and pain related to his lymphoma and lung cancer.  His pain medication is currently being prescribed by his oncologist, so he does not need a refill from me today.  He will continue with Wellbutrin and fluoxetine for treatment of his anxiety and depression.  His blood pressure is doing well, and he will continue with his current blood pressure medication.  He will continue with the Symbicort inhaler for treatment of his COPD.  He will continue with Nexium 20 mg daily for treatment of GERD.  He will continue with fluoxetine 40 mg daily for treatment of anxiety.  His PSA is normal at 0.251.    Patient understands the risks associated with this controlled medication, including tolerance and addiction.  Patient also agrees to only obtain this medication from me, and not from a another provider, unless that provider is covering for me in my absence.  Patient also agrees to be compliant in dosing, and not self adjust the dose of medication.  A signed controlled substance agreement is on file, and the patient has received a controlled substance education sheet at this a previous visit.  The patient has also signed a consent for treatment with a controlled substance as per [facility] policy. SOFI was obtained.  Urine drug screen is reviewed, and is appropriately positive for hydrocodone.    Patient's Body mass index is 25.11 kg/m². indicating that he is overweight (BMI 25-29.9). Patient's (Body mass index is 25.11 kg/m².) indicates that they are overweight with health conditions that include hypertension, dyslipidemias, GERD  and osteoarthritis . Weight is unchanged. BMI is is above average; BMI management plan is completed. We discussed portion control and increasing exercise. .    PHQ-2/PHQ-9 Depression Screening 3/22/2022   Retired PHQ-9 Total Score -   Retired Total Score -   Little Interest or Pleasure in Doing Things 0-->not at all   Feeling Down, Depressed or Hopeless 0-->not at all   PHQ-9: Brief Depression Severity Measure Score 0

## 2022-03-22 ENCOUNTER — OFFICE VISIT (OUTPATIENT)
Dept: FAMILY MEDICINE CLINIC | Facility: CLINIC | Age: 62
End: 2022-03-22

## 2022-03-22 VITALS
HEIGHT: 70 IN | OXYGEN SATURATION: 98 % | SYSTOLIC BLOOD PRESSURE: 114 MMHG | HEART RATE: 78 BPM | DIASTOLIC BLOOD PRESSURE: 70 MMHG | TEMPERATURE: 98.2 F | WEIGHT: 175 LBS | BODY MASS INDEX: 25.05 KG/M2

## 2022-03-22 DIAGNOSIS — K21.9 GASTROESOPHAGEAL REFLUX DISEASE WITHOUT ESOPHAGITIS: Chronic | ICD-10-CM

## 2022-03-22 DIAGNOSIS — R73.02 IMPAIRED GLUCOSE TOLERANCE: Primary | Chronic | ICD-10-CM

## 2022-03-22 DIAGNOSIS — M15.9 PRIMARY OSTEOARTHRITIS INVOLVING MULTIPLE JOINTS: Chronic | ICD-10-CM

## 2022-03-22 DIAGNOSIS — I10 BENIGN ESSENTIAL HTN: Chronic | ICD-10-CM

## 2022-03-22 DIAGNOSIS — E78.2 MIXED HYPERLIPIDEMIA: Chronic | ICD-10-CM

## 2022-03-22 DIAGNOSIS — F41.1 GENERALIZED ANXIETY DISORDER: Chronic | ICD-10-CM

## 2022-03-22 DIAGNOSIS — J44.9 COPD, MODERATE: Chronic | ICD-10-CM

## 2022-03-22 DIAGNOSIS — C83.18 MANTLE CELL LYMPHOMA OF LYMPH NODES OF MULTIPLE REGIONS: Chronic | ICD-10-CM

## 2022-03-22 DIAGNOSIS — G59 MONONEUROPATHY DUE TO UNDERLYING DISEASE: Chronic | ICD-10-CM

## 2022-03-22 DIAGNOSIS — C34.12 CANCER OF UPPER LOBE OF LEFT LUNG: Chronic | ICD-10-CM

## 2022-03-22 PROCEDURE — 99214 OFFICE O/P EST MOD 30 MIN: CPT | Performed by: INTERNAL MEDICINE

## 2022-03-28 DIAGNOSIS — M15.9 PRIMARY OSTEOARTHRITIS INVOLVING MULTIPLE JOINTS: Chronic | ICD-10-CM

## 2022-03-28 RX ORDER — HYDROCODONE BITARTRATE AND ACETAMINOPHEN 7.5; 325 MG/1; MG/1
1 TABLET ORAL EVERY 6 HOURS PRN
Qty: 160 TABLET | Refills: 0 | Status: SHIPPED | OUTPATIENT
Start: 2022-03-28 | End: 2022-05-06 | Stop reason: SDUPTHER

## 2022-03-28 NOTE — TELEPHONE ENCOUNTER
Rx Refill Note  Requested Prescriptions     Pending Prescriptions Disp Refills   • HYDROcodone-acetaminophen (NORCO) 7.5-325 MG per tablet 160 tablet 0     Sig: Take 1 tablet by mouth Every 6 (Six) Hours As Needed for Severe Pain  for up to 40 days.      Last office visit with prescribing clinician: 1/4/2022      Next office visit with prescribing clinician: 5/6/2022            Madyson Wolf RN  03/28/22, 08:17 CDT

## 2022-04-30 ENCOUNTER — TRANSCRIBE ORDERS (OUTPATIENT)
Dept: CT IMAGING | Facility: HOSPITAL | Age: 62
End: 2022-04-30

## 2022-04-30 DIAGNOSIS — C34.12 CANCER OF UPPER LOBE OF LEFT LUNG: Primary | ICD-10-CM

## 2022-05-04 ENCOUNTER — HOSPITAL ENCOUNTER (OUTPATIENT)
Dept: CT IMAGING | Facility: HOSPITAL | Age: 62
Discharge: HOME | End: 2022-05-04

## 2022-05-04 ENCOUNTER — LAB (OUTPATIENT)
Dept: LAB | Facility: HOSPITAL | Age: 62
End: 2022-05-04

## 2022-05-04 DIAGNOSIS — C34.12 CANCER OF UPPER LOBE OF LEFT LUNG: Chronic | ICD-10-CM

## 2022-05-04 DIAGNOSIS — C83.18 MANTLE CELL LYMPHOMA OF LYMPH NODES OF MULTIPLE REGIONS: ICD-10-CM

## 2022-05-04 DIAGNOSIS — C34.12 CANCER OF UPPER LOBE OF LEFT LUNG: ICD-10-CM

## 2022-05-04 LAB
ALBUMIN SERPL-MCNC: 3.7 G/DL (ref 3.5–5.2)
ALBUMIN/GLOB SERPL: 1 G/DL
ALP SERPL-CCNC: 108 U/L (ref 39–117)
ALT SERPL W P-5'-P-CCNC: 6 U/L (ref 1–41)
ANION GAP SERPL CALCULATED.3IONS-SCNC: 10 MMOL/L (ref 5–15)
AST SERPL-CCNC: 11 U/L (ref 1–40)
BASOPHILS # BLD AUTO: 0.05 10*3/MM3 (ref 0–0.2)
BASOPHILS NFR BLD AUTO: 0.7 % (ref 0–1.5)
BILIRUB SERPL-MCNC: 0.2 MG/DL (ref 0–1.2)
BUN SERPL-MCNC: 16 MG/DL (ref 8–23)
BUN/CREAT SERPL: 14.4 (ref 7–25)
CALCIUM SPEC-SCNC: 8.8 MG/DL (ref 8.6–10.5)
CHLORIDE SERPL-SCNC: 102 MMOL/L (ref 98–107)
CO2 SERPL-SCNC: 24 MMOL/L (ref 22–29)
CREAT SERPL-MCNC: 1.11 MG/DL (ref 0.76–1.27)
DEPRECATED RDW RBC AUTO: 41 FL (ref 37–54)
EGFRCR SERPLBLD CKD-EPI 2021: 74.6 ML/MIN/1.73
EOSINOPHIL # BLD AUTO: 0.27 10*3/MM3 (ref 0–0.4)
EOSINOPHIL NFR BLD AUTO: 3.9 % (ref 0.3–6.2)
ERYTHROCYTE [DISTWIDTH] IN BLOOD BY AUTOMATED COUNT: 12.8 % (ref 12.3–15.4)
GLOBULIN UR ELPH-MCNC: 3.6 GM/DL
GLUCOSE SERPL-MCNC: 96 MG/DL (ref 65–99)
HCT VFR BLD AUTO: 39.2 % (ref 37.5–51)
HGB BLD-MCNC: 13.6 G/DL (ref 13–17.7)
IMM GRANULOCYTES # BLD AUTO: 0.02 10*3/MM3 (ref 0–0.05)
IMM GRANULOCYTES NFR BLD AUTO: 0.3 % (ref 0–0.5)
LYMPHOCYTES # BLD AUTO: 1.27 10*3/MM3 (ref 0.7–3.1)
LYMPHOCYTES NFR BLD AUTO: 18.1 % (ref 19.6–45.3)
MCH RBC QN AUTO: 30.4 PG (ref 26.6–33)
MCHC RBC AUTO-ENTMCNC: 34.7 G/DL (ref 31.5–35.7)
MCV RBC AUTO: 87.5 FL (ref 79–97)
MONOCYTES # BLD AUTO: 0.48 10*3/MM3 (ref 0.1–0.9)
MONOCYTES NFR BLD AUTO: 6.8 % (ref 5–12)
NEUTROPHILS NFR BLD AUTO: 4.92 10*3/MM3 (ref 1.7–7)
NEUTROPHILS NFR BLD AUTO: 70.2 % (ref 42.7–76)
NRBC BLD AUTO-RTO: 0 /100 WBC (ref 0–0.2)
PLATELET # BLD AUTO: 163 10*3/MM3 (ref 140–450)
PMV BLD AUTO: 9.9 FL (ref 6–12)
POTASSIUM SERPL-SCNC: 4.1 MMOL/L (ref 3.5–5.2)
PROT SERPL-MCNC: 7.3 G/DL (ref 6–8.5)
RBC # BLD AUTO: 4.48 10*6/MM3 (ref 4.14–5.8)
SODIUM SERPL-SCNC: 136 MMOL/L (ref 136–145)
WBC NRBC COR # BLD: 7.01 10*3/MM3 (ref 3.4–10.8)

## 2022-05-04 PROCEDURE — 80053 COMPREHEN METABOLIC PANEL: CPT

## 2022-05-04 PROCEDURE — 85025 COMPLETE CBC W/AUTO DIFF WBC: CPT

## 2022-05-04 PROCEDURE — 36415 COLL VENOUS BLD VENIPUNCTURE: CPT

## 2022-05-04 PROCEDURE — 71260 CT THORAX DX C+: CPT

## 2022-05-04 PROCEDURE — 25010000002 IOPAMIDOL 61 % SOLUTION: Performed by: INTERNAL MEDICINE

## 2022-05-04 RX ADMIN — IOPAMIDOL 90 ML: 612 INJECTION, SOLUTION INTRAVENOUS at 08:59

## 2022-05-06 ENCOUNTER — OFFICE VISIT (OUTPATIENT)
Dept: ONCOLOGY | Facility: CLINIC | Age: 62
End: 2022-05-06

## 2022-05-06 ENCOUNTER — LAB (OUTPATIENT)
Dept: ONCOLOGY | Facility: HOSPITAL | Age: 62
End: 2022-05-06

## 2022-05-06 VITALS
HEART RATE: 76 BPM | RESPIRATION RATE: 18 BRPM | WEIGHT: 175 LBS | OXYGEN SATURATION: 95 % | SYSTOLIC BLOOD PRESSURE: 139 MMHG | BODY MASS INDEX: 25.11 KG/M2 | TEMPERATURE: 96.6 F | DIASTOLIC BLOOD PRESSURE: 70 MMHG

## 2022-05-06 DIAGNOSIS — C34.12 CANCER OF UPPER LOBE OF LEFT LUNG: Primary | Chronic | ICD-10-CM

## 2022-05-06 DIAGNOSIS — Z71.6 ENCOUNTER FOR TOBACCO USE CESSATION COUNSELING: ICD-10-CM

## 2022-05-06 DIAGNOSIS — M15.9 PRIMARY OSTEOARTHRITIS INVOLVING MULTIPLE JOINTS: Chronic | ICD-10-CM

## 2022-05-06 DIAGNOSIS — C83.18 MANTLE CELL LYMPHOMA OF LYMPH NODES OF MULTIPLE REGIONS: Chronic | ICD-10-CM

## 2022-05-06 PROCEDURE — G0463 HOSPITAL OUTPT CLINIC VISIT: HCPCS | Performed by: INTERNAL MEDICINE

## 2022-05-06 PROCEDURE — 99406 BEHAV CHNG SMOKING 3-10 MIN: CPT | Performed by: INTERNAL MEDICINE

## 2022-05-06 PROCEDURE — 99214 OFFICE O/P EST MOD 30 MIN: CPT | Performed by: INTERNAL MEDICINE

## 2022-05-06 RX ORDER — BUPROPION HYDROCHLORIDE 150 MG/1
150 TABLET, EXTENDED RELEASE ORAL 2 TIMES DAILY
Qty: 60 TABLET | Refills: 3 | Status: SHIPPED | OUTPATIENT
Start: 2022-05-06 | End: 2023-03-28

## 2022-05-06 RX ORDER — HYDROCODONE BITARTRATE AND ACETAMINOPHEN 7.5; 325 MG/1; MG/1
1 TABLET ORAL EVERY 6 HOURS PRN
Qty: 160 TABLET | Refills: 0 | Status: SHIPPED | OUTPATIENT
Start: 2022-05-06 | End: 2022-06-15 | Stop reason: SDUPTHER

## 2022-05-06 NOTE — PROGRESS NOTES
Chief Complaint  Follow-up - lung cancer, mantle cell lymphoma     Subjective          Mitch Rinaldi presents to [facility]  History of Present Illness     No new health issues since last visit.   No new medications.   No new hospitalization.   Feels well.   He started to smoke back again.  Counseled about smoking cessation.    Objective   Vital Signs:  /70   Pulse 76   Temp 96.6 °F (35.9 °C)   Resp 18   Wt 79.4 kg (175 lb)   SpO2 95%   BMI 25.11 kg/m²           Physical Exam  Vitals and nursing note reviewed.   Constitutional:       Appearance: Normal appearance. He is not ill-appearing.   Abdominal:      General: There is no distension.      Palpations: Abdomen is soft. There is no mass.      Tenderness: There is no abdominal tenderness.   Neurological:      General: No focal deficit present.      Mental Status: He is alert and oriented to person, place, and time. Mental status is at baseline.   Psychiatric:         Mood and Affect: Mood normal.         Behavior: Behavior normal.         Thought Content: Thought content normal.        Result Review :   The following data was reviewed by: Atiya Alexis MD on 05/06/2022:  Common labs    Common Labsle 1/4/22 1/4/22 3/7/22 3/7/22 3/7/22 3/7/22 5/4/22 5/4/22    0814 0814 0745 0745 0745 0745 0820 0820   Glucose  106 (A)      96   BUN  12      16   Creatinine  1.11      1.11   eGFR Non African Am  67         Sodium  137      136   Potassium  3.8      4.1   Chloride  102      102   Calcium  8.9      8.8   Albumin  4.10      3.70   Total Bilirubin  0.4      0.2   Alkaline Phosphatase  132 (A)      108   AST (SGOT)  13      11   ALT (SGPT)  11      6   WBC 7.94   9.23   7.01    Hemoglobin 14.6   15.6   13.6    Hematocrit 41.4   45.4   39.2    Platelets 198   215   163    Total Cholesterol   125 (A)        Triglycerides   193 (A)        HDL Cholesterol   30 (A)        LDL Cholesterol    63         Hemoglobin A1C     5.70 (A)      PSA      0.251     (A) Abnormal value            CMP    CMP 12/29/21 12/29/21 1/4/22 5/4/22    0824 0824     Glucose   106 (A) 96   BUN 14  12 16   Creatinine  1.09 1.11 1.11   eGFR Non African Am  68 67    Sodium   137 136   Potassium   3.8 4.1   Chloride   102 102   Calcium   8.9 8.8   Albumin   4.10 3.70   Total Bilirubin   0.4 0.2   Alkaline Phosphatase   132 (A) 108   AST (SGOT)   13 11   ALT (SGPT)   11 6   (A) Abnormal value            CBC    CBC 1/4/22 3/7/22 5/4/22   WBC 7.94 9.23 7.01   RBC 4.72 5.07 4.48   Hemoglobin 14.6 15.6 13.6   Hematocrit 41.4 45.4 39.2   MCV 87.7 89.5 87.5   MCH 30.9 30.8 30.4   MCHC 35.3 34.4 34.7   RDW 12.5 12.9 12.8   Platelets 198 215 163           CBC w/diff    CBC w/Diff 1/4/22 3/7/22 5/4/22   WBC 7.94 9.23 7.01   RBC 4.72 5.07 4.48   Hemoglobin 14.6 15.6 13.6   Hematocrit 41.4 45.4 39.2   MCV 87.7 89.5 87.5   MCH 30.9 30.8 30.4   MCHC 35.3 34.4 34.7   RDW 12.5 12.9 12.8   Platelets 198 215 163   Neutrophil Rel %   70.2   Immature Granulocyte Rel %   0.3   Lymphocyte Rel %   18.1 (A)   Monocyte Rel %   6.8   Eosinophil Rel %   3.9   Basophil Rel %   0.7   (A) Abnormal value            Data reviewed: Radiologic studies CT scan of chest, abdomen pelvis reviewed.       CT Chest With Contrast Diagnostic    Result Date: 5/5/2022  1. Enlarging subcarinal, inferior anterior pericardial, lesser curvature of stomach, todd hepatis, splenic hilum, and superior left and right para-aortic lymph nodes highly suspicious for progression of metastatic disease. 2.Severe centrilobular emphysematous changes. 3.Superior posterior mediastinal minimally larger irregular-shaped soft tissue focus which measures 1.43 x 1.44 cm. This soft tissue focus becomes indistinct with left upper lobe apical posterior segment irregular shaped mass lesion with spiculated borders which measures 1.96 x 1.14 cm. These findings are highly suspicious for progression of primary lung  neoplasm with associated invasion and/or metastatic involvement of the superior mediastinum. 4.Development of left upper lobe apical posterior segment apex patchy opacity with irregular borders which measures 2.1 x 1.89 x 1.22 cm. This would be suspicious for neoplastic involvement in this region and/or posttherapy changes and/or post obstructive atelectasis and/or pneumonia. 5. Right upper lobe apical segment two small linear opacities suspicious for discoid atelectasis. Electronically signed by:  Carmelo Morales MD  5/5/2022 2:24 PM CDT Workstation: [id number]       Mitch Baltazar Gentry reports a pain score of 0.  Given his pain assessment as noted, treatment options were discussed and the following options were decided upon as a follow-up plan to address the patient's pain: continuation of current treatment plan for pain.    Patient screened negative for depression based on a PHQ-9 score of 0 on 5/6/2022.       Assessment and Plan    Diagnoses and all orders for this visit:    1. Cancer of upper lobe of left lung (HCC) (Primary)  -     CT chest w contrast; Future  -     CT abdomen pelvis w contrast; Future    Chronic, stable.  Patient was treated with SBRT.  Recent CT scan reviewed.  Even though CT report says worsening metastatic disease his left lung lesion looks overall stable.  Lymphadenopathy is worsening which is likely from mantle cell lymphoma.  Recommend continued surveillance for lung cancer.  We will repeat CT scan of chest in 3 months.      2. Mantle cell lymphoma of lymph nodes of multiple regions (HCC)  -     CBC & Differential; Future  -     Comprehensive Metabolic Panel; Future  -     Lactate Dehydrogenase; Future    Chronic issue with exacerbation        Patient with diagnosis of CD5 negative mantle cell lymphoma.  IHC positive for cyclin D1  FISH is positive for t(11,14) translocation.   Cytogenetics are normal.  Ki-67 -10 to 15%.  Bone marrow aspiration biopsy was negative.  He denies any B symptoms.   CT scan showed worsening adenopathy However given he is asymptomatic we will continue to with wait and watch approach for his mantle cell lymphoma.  Discussed with patient that it is possible he might need treatment in near future however given he is asymptomatic currently we will continue to monitor.  I will plan to see him back in 3 months with CBC, CMP, LDH, CT scan of chest, abdomen pelvis.    3. Encounter for tobacco use cessation counseling    Mitch Rinaldi  reports that he has been smoking cigarettes over last few months.. His smoking use included cigarettes. He has a 42.00 pack-year smoking history. He quit smokeless tobacco use about 8 years ago.. I have educated him on the risk of diseases from using tobacco products such as cancer, COPD and heart disease.     I advised him to quit and he is willing to quit. We have discussed the following method/s for tobacco cessation:  Education Material Counseling Prescription Medicaiton.  Together we have set a quit date for 1 week from today.  He will follow up with me in 4 months or sooner to check on his progress.    I spent 4 minutes counseling the patient.           4. Primary osteoarthritis involving multiple joints  -     HYDROcodone-acetaminophen (NORCO) 7.5-325 MG per tablet; Take 1 tablet by mouth Every 6 (Six) Hours As Needed for Severe Pain  for up to 40 days.  Dispense: 160 tablet; Refill: 0    Chronic, stable on Norco.  New prescription of [place] sent to the pharmacy.      Other orders  -     buPROPion SR (Wellbutrin SR) 150 MG 12 hr tablet; Take 1 tablet by mouth 2 (Two) Times a Day.  Dispense: 60 tablet; Refill: 3             Follow Up   No follow-ups on file.  Patient was given instructions and counseling regarding his condition or for health maintenance advice. Please see specific information pulled into the AVS if appropriate.

## 2022-06-06 DIAGNOSIS — M15.9 PRIMARY OSTEOARTHRITIS INVOLVING MULTIPLE JOINTS: Chronic | ICD-10-CM

## 2022-06-06 RX ORDER — TIZANIDINE 4 MG/1
4 TABLET ORAL EVERY 8 HOURS PRN
Qty: 90 TABLET | Refills: 3 | Status: SHIPPED | OUTPATIENT
Start: 2022-06-06 | End: 2023-03-28 | Stop reason: SDUPTHER

## 2022-06-15 DIAGNOSIS — M15.9 PRIMARY OSTEOARTHRITIS INVOLVING MULTIPLE JOINTS: Chronic | ICD-10-CM

## 2022-06-15 RX ORDER — HYDROCODONE BITARTRATE AND ACETAMINOPHEN 7.5; 325 MG/1; MG/1
1 TABLET ORAL EVERY 6 HOURS PRN
Qty: 160 TABLET | Refills: 0 | Status: SHIPPED | OUTPATIENT
Start: 2022-06-15 | End: 2022-07-25 | Stop reason: SDUPTHER

## 2022-06-15 NOTE — TELEPHONE ENCOUNTER
Rx Refill Note  Requested Prescriptions     Pending Prescriptions Disp Refills   • HYDROcodone-acetaminophen (NORCO) 7.5-325 MG per tablet 160 tablet 0     Sig: Take 1 tablet by mouth Every 6 (Six) Hours As Needed for Severe Pain  for up to 40 days.      Last office visit with prescribing clinician: 5/6/2022      Next office visit with prescribing clinician: 8/12/2022            Madyson Wolf RN  06/15/22, 08:22 CDT

## 2022-07-08 DIAGNOSIS — K21.9 GASTROESOPHAGEAL REFLUX DISEASE WITHOUT ESOPHAGITIS: Chronic | ICD-10-CM

## 2022-07-08 RX ORDER — PROMETHAZINE HYDROCHLORIDE 25 MG/1
25 TABLET ORAL EVERY 6 HOURS PRN
Qty: 30 TABLET | Refills: 0 | Status: SHIPPED | OUTPATIENT
Start: 2022-07-08

## 2022-07-20 DIAGNOSIS — F41.1 GENERALIZED ANXIETY DISORDER: ICD-10-CM

## 2022-07-20 RX ORDER — FLUOXETINE HYDROCHLORIDE 40 MG/1
40 CAPSULE ORAL DAILY
Qty: 30 CAPSULE | Refills: 5 | Status: SHIPPED | OUTPATIENT
Start: 2022-07-20 | End: 2023-02-06 | Stop reason: SDUPTHER

## 2022-07-25 DIAGNOSIS — M15.9 PRIMARY OSTEOARTHRITIS INVOLVING MULTIPLE JOINTS: Chronic | ICD-10-CM

## 2022-07-25 RX ORDER — HYDROCODONE BITARTRATE AND ACETAMINOPHEN 7.5; 325 MG/1; MG/1
1 TABLET ORAL EVERY 6 HOURS PRN
Qty: 160 TABLET | Refills: 0 | Status: SHIPPED | OUTPATIENT
Start: 2022-07-25 | End: 2022-08-12 | Stop reason: SDUPTHER

## 2022-07-25 NOTE — TELEPHONE ENCOUNTER
Rx Refill Note  Requested Prescriptions     Pending Prescriptions Disp Refills   • HYDROcodone-acetaminophen (NORCO) 7.5-325 MG per tablet 160 tablet 0     Sig: Take 1 tablet by mouth Every 6 (Six) Hours As Needed for Severe Pain  for up to 40 days.      Last office visit with prescribing clinician: 5/6/2022      Next office visit with prescribing clinician: 8/12/2022            Madyson Wolf RN  07/25/22, 08:09 CDT

## 2022-08-08 ENCOUNTER — LAB (OUTPATIENT)
Dept: LAB | Facility: HOSPITAL | Age: 62
End: 2022-08-08

## 2022-08-08 ENCOUNTER — HOSPITAL ENCOUNTER (OUTPATIENT)
Dept: CT IMAGING | Facility: HOSPITAL | Age: 62
Discharge: HOME | End: 2022-08-08

## 2022-08-08 DIAGNOSIS — C83.18 MANTLE CELL LYMPHOMA OF LYMPH NODES OF MULTIPLE REGIONS: ICD-10-CM

## 2022-08-08 DIAGNOSIS — C34.12 CANCER OF UPPER LOBE OF LEFT LUNG: Chronic | ICD-10-CM

## 2022-08-08 LAB
ALBUMIN SERPL-MCNC: 4.1 G/DL (ref 3.5–5.2)
ALBUMIN/GLOB SERPL: 1.1 G/DL
ALP SERPL-CCNC: 120 U/L (ref 39–117)
ALT SERPL W P-5'-P-CCNC: 10 U/L (ref 1–41)
ANION GAP SERPL CALCULATED.3IONS-SCNC: 8 MMOL/L (ref 5–15)
AST SERPL-CCNC: 17 U/L (ref 1–40)
BASOPHILS # BLD AUTO: 0.08 10*3/MM3 (ref 0–0.2)
BASOPHILS NFR BLD AUTO: 1.1 % (ref 0–1.5)
BILIRUB SERPL-MCNC: 0.3 MG/DL (ref 0–1.2)
BUN SERPL-MCNC: 14 MG/DL (ref 8–23)
BUN/CREAT SERPL: 13.7 (ref 7–25)
CALCIUM SPEC-SCNC: 9.1 MG/DL (ref 8.6–10.5)
CHLORIDE SERPL-SCNC: 103 MMOL/L (ref 98–107)
CO2 SERPL-SCNC: 25 MMOL/L (ref 22–29)
CREAT SERPL-MCNC: 1.02 MG/DL (ref 0.76–1.27)
DEPRECATED RDW RBC AUTO: 42.6 FL (ref 37–54)
EGFRCR SERPLBLD CKD-EPI 2021: 82.1 ML/MIN/1.73
EOSINOPHIL # BLD AUTO: 0.27 10*3/MM3 (ref 0–0.4)
EOSINOPHIL NFR BLD AUTO: 3.8 % (ref 0.3–6.2)
ERYTHROCYTE [DISTWIDTH] IN BLOOD BY AUTOMATED COUNT: 13.2 % (ref 12.3–15.4)
GLOBULIN UR ELPH-MCNC: 3.8 GM/DL
GLUCOSE SERPL-MCNC: 96 MG/DL (ref 65–99)
HCT VFR BLD AUTO: 40.8 % (ref 37.5–51)
HGB BLD-MCNC: 14.1 G/DL (ref 13–17.7)
IMM GRANULOCYTES # BLD AUTO: 0.03 10*3/MM3 (ref 0–0.05)
IMM GRANULOCYTES NFR BLD AUTO: 0.4 % (ref 0–0.5)
LDH SERPL-CCNC: 194 U/L (ref 135–225)
LYMPHOCYTES # BLD AUTO: 1.67 10*3/MM3 (ref 0.7–3.1)
LYMPHOCYTES NFR BLD AUTO: 23.3 % (ref 19.6–45.3)
MCH RBC QN AUTO: 30.3 PG (ref 26.6–33)
MCHC RBC AUTO-ENTMCNC: 34.6 G/DL (ref 31.5–35.7)
MCV RBC AUTO: 87.7 FL (ref 79–97)
MONOCYTES # BLD AUTO: 0.5 10*3/MM3 (ref 0.1–0.9)
MONOCYTES NFR BLD AUTO: 7 % (ref 5–12)
NEUTROPHILS NFR BLD AUTO: 4.62 10*3/MM3 (ref 1.7–7)
NEUTROPHILS NFR BLD AUTO: 64.4 % (ref 42.7–76)
NRBC BLD AUTO-RTO: 0 /100 WBC (ref 0–0.2)
PLATELET # BLD AUTO: 180 10*3/MM3 (ref 140–450)
PMV BLD AUTO: 10 FL (ref 6–12)
POTASSIUM SERPL-SCNC: 4.3 MMOL/L (ref 3.5–5.2)
PROT SERPL-MCNC: 7.9 G/DL (ref 6–8.5)
RBC # BLD AUTO: 4.65 10*6/MM3 (ref 4.14–5.8)
SODIUM SERPL-SCNC: 136 MMOL/L (ref 136–145)
WBC NRBC COR # BLD: 7.17 10*3/MM3 (ref 3.4–10.8)

## 2022-08-08 PROCEDURE — 25010000002 IOPAMIDOL 61 % SOLUTION: Performed by: INTERNAL MEDICINE

## 2022-08-08 PROCEDURE — 83615 LACTATE (LD) (LDH) ENZYME: CPT

## 2022-08-08 PROCEDURE — 85025 COMPLETE CBC W/AUTO DIFF WBC: CPT

## 2022-08-08 PROCEDURE — 36415 COLL VENOUS BLD VENIPUNCTURE: CPT

## 2022-08-08 PROCEDURE — 71260 CT THORAX DX C+: CPT

## 2022-08-08 PROCEDURE — 80053 COMPREHEN METABOLIC PANEL: CPT

## 2022-08-08 PROCEDURE — 74177 CT ABD & PELVIS W/CONTRAST: CPT

## 2022-08-08 RX ADMIN — IOPAMIDOL 81 ML: 612 INJECTION, SOLUTION INTRAVENOUS at 09:38

## 2022-08-12 ENCOUNTER — OFFICE VISIT (OUTPATIENT)
Dept: ONCOLOGY | Facility: CLINIC | Age: 62
End: 2022-08-12

## 2022-08-12 VITALS
DIASTOLIC BLOOD PRESSURE: 62 MMHG | WEIGHT: 165.6 LBS | TEMPERATURE: 97.3 F | OXYGEN SATURATION: 97 % | BODY MASS INDEX: 23.76 KG/M2 | SYSTOLIC BLOOD PRESSURE: 117 MMHG | HEART RATE: 73 BPM | RESPIRATION RATE: 18 BRPM

## 2022-08-12 DIAGNOSIS — C34.12 CANCER OF UPPER LOBE OF LEFT LUNG: Primary | Chronic | ICD-10-CM

## 2022-08-12 DIAGNOSIS — C83.18 MANTLE CELL LYMPHOMA OF LYMPH NODES OF MULTIPLE REGIONS: Chronic | ICD-10-CM

## 2022-08-12 DIAGNOSIS — M15.9 PRIMARY OSTEOARTHRITIS INVOLVING MULTIPLE JOINTS: Chronic | ICD-10-CM

## 2022-08-12 PROCEDURE — G0463 HOSPITAL OUTPT CLINIC VISIT: HCPCS | Performed by: INTERNAL MEDICINE

## 2022-08-12 PROCEDURE — 99214 OFFICE O/P EST MOD 30 MIN: CPT | Performed by: INTERNAL MEDICINE

## 2022-08-12 RX ORDER — HYDROCODONE BITARTRATE AND ACETAMINOPHEN 7.5; 325 MG/1; MG/1
1 TABLET ORAL EVERY 6 HOURS PRN
Qty: 160 TABLET | Refills: 0 | Status: SHIPPED | OUTPATIENT
Start: 2022-08-24 | End: 2022-10-10 | Stop reason: SDUPTHER

## 2022-08-12 NOTE — PROGRESS NOTES
"Chief Complaint  Results - lung cancer, mantle cell lymphoma     Subjective        Mitch Rinaldi presents to [facility]  History of Present Illness     No new health issues since last visit.   No new medications.   No new hospitalization.   Feels well.       Objective   Vital Signs:  /62   Pulse 73   Temp 97.3 °F (36.3 °C)   Resp 18   Wt 75.1 kg (165 lb 9.6 oz)   SpO2 97%   BMI 23.76 kg/m²   Estimated body mass index is 23.76 kg/m² as calculated from the following:    Height as of 3/22/22: 177.8 cm (70\").    Weight as of this encounter: 75.1 kg (165 lb 9.6 oz).    BMI is within normal parameters. No other follow-up for BMI required.      Physical Exam  Vitals and nursing note reviewed.   Constitutional:       Appearance: Normal appearance.   Neurological:      General: No focal deficit present.      Mental Status: He is alert and oriented to person, place, and time. Mental status is at baseline.   Psychiatric:         Mood and Affect: Mood normal.         Behavior: Behavior normal.         Thought Content: Thought content normal.        Result Review :  The following data was reviewed by: Atiya Alexis MD on 08/12/2022:  Common labs    Common Labsle 3/7/22 3/7/22 3/7/22 3/7/22 5/4/22 5/4/22 8/8/22 8/8/22    0745 0745 0745 0745 0820 0820 0834 0835   Glucose      96 96    BUN      16 14    Creatinine      1.11 1.02    Sodium      136 136    Potassium      4.1 4.3    Chloride      102 103    Calcium      8.8 9.1    Albumin      3.70 4.10    Total Bilirubin      0.2 0.3    Alkaline Phosphatase      108 120 (A)    AST (SGOT)      11 17    ALT (SGPT)      6 10    WBC  9.23   7.01   7.17   Hemoglobin  15.6   13.6   14.1   Hematocrit  45.4   39.2   40.8   Platelets  215   163   180   Total Cholesterol 125 (A)          Triglycerides 193 (A)          HDL Cholesterol 30 (A)          LDL Cholesterol  63          Hemoglobin A1C   5.70 (A)        PSA    " 0.251       (A) Abnormal value            CMP    CMP 1/4/22 5/4/22 8/8/22   Glucose 106 (A) 96 96   BUN 12 16 14   Creatinine 1.11 1.11 1.02   eGFR Non African Am 67     Sodium 137 136 136   Potassium 3.8 4.1 4.3   Chloride 102 102 103   Calcium 8.9 8.8 9.1   Albumin 4.10 3.70 4.10   Total Bilirubin 0.4 0.2 0.3   Alkaline Phosphatase 132 (A) 108 120 (A)   AST (SGOT) 13 11 17   ALT (SGPT) 11 6 10   (A) Abnormal value            CBC    CBC 3/7/22 5/4/22 8/8/22   WBC 9.23 7.01 7.17   RBC 5.07 4.48 4.65   Hemoglobin 15.6 13.6 14.1   Hematocrit 45.4 39.2 40.8   MCV 89.5 87.5 87.7   MCH 30.8 30.4 30.3   MCHC 34.4 34.7 34.6   RDW 12.9 12.8 13.2   Platelets 215 163 180           CBC w/diff    CBC w/Diff 3/7/22 5/4/22 8/8/22   WBC 9.23 7.01 7.17   RBC 5.07 4.48 4.65   Hemoglobin 15.6 13.6 14.1   Hematocrit 45.4 39.2 40.8   MCV 89.5 87.5 87.7   MCH 30.8 30.4 30.3   MCHC 34.4 34.7 34.6   RDW 12.9 12.8 13.2   Platelets 215 163 180   Neutrophil Rel %  70.2 64.4   Immature Granulocyte Rel %  0.3 0.4   Lymphocyte Rel %  18.1 (A) 23.3   Monocyte Rel %  6.8 7.0   Eosinophil Rel %  3.9 3.8   Basophil Rel %  0.7 1.1   (A) Abnormal value            Data reviewed: Radiologic studies result of CT C/A/P reviewed.      CT Chest With Contrast Diagnostic    Result Date: 8/9/2022  Probable increase size of left apical masslike lesion. Slight increased adenopathy in the chest abdomen and pelvis as above. Patient appears constipated. See body of report for full details. Electronically signed by:  Danny Lu MD  8/9/2022 8:04 AM CDT Workstation: [id number]    CT Abdomen Pelvis With Contrast    Result Date: 8/9/2022  Probable increase size of left apical masslike lesion. Slight increased adenopathy in the chest abdomen and pelvis as above. Patient appears constipated. See body of report for full details. Electronically signed by:  Danny Lu MD  8/9/2022 8:04 AM CDT Workstation: [id number]        Mitch Baltazar Gentry reports a pain score  of 0.  Given his pain assessment as noted, treatment options were discussed and the following options were decided upon as a follow-up plan to address the patient's pain: continuation of current treatment plan for pain.    Patient screened negative for depression based on a PHQ-9 score of 0 on 8/12/2022.          Assessment and Plan   Diagnoses and all orders for this visit:    1. Cancer of upper lobe of left lung (HCC) (Primary)  -     CT Chest With Contrast; Future    Chronic, stable.  Patient was treated with SBRT.  Result of CT scan of chest reviewed.  Left lung lesion slightly bigger.  Discussed with patient that given there is no definite enlargement I would like to continue serially monitor his left lung mass.  Most likely this is posttreatment changes.  Biopsy would be difficult especially given his history of recurrent pneumothorax.  If continued enlargement I will consider sending him to pulmonary in [place] and rebiopsy him.  Worsening lymphadenopathy likely from mantle cell lymphoma as opposed to lung cancer.  Continue to monitor.  Repeat CT scan of chest in 3 months.    2. 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  IHC positive for cyclin D1  FISH is positive for t(11,14) translocation.   Cytogenetics are normal.  Ki-67 -10 to 15%.  Bone marrow aspiration biopsy was negative.  He denies any B symptoms.  CT scan showed worsening adenopathy However given he is asymptomatic we will continue to with wait and watch approach for his mantle cell lymphoma.  Discussed with patient that it is possible he might need treatment in near future however given he is asymptomatic currently we will continue to monitor.  I will plan to see him back in 3 months with CBC, CMP, LDH, CT scan of chest, abdomen pelvis.    3.  Primary osteoarthritis involving multiple joints  -     HYDROcodone-acetaminophen (NORCO) 7.5-325 MG per tablet; Take 1 tablet by mouth Every 6 (Six) Hours As Needed for Severe Pain  for up to 40 days.  Dispense: 160 tablet; Refill: 0    Chronic, stable on Norco.  New prescription sent to the pharmacy.         Follow Up   No follow-ups on file.  Patient was given instructions and counseling regarding his condition or for health maintenance advice. Please see specific information pulled into the AVS if appropriate.

## 2022-08-19 DIAGNOSIS — E78.2 MIXED HYPERLIPIDEMIA: Primary | ICD-10-CM

## 2022-08-19 DIAGNOSIS — R73.02 IMPAIRED GLUCOSE TOLERANCE: ICD-10-CM

## 2022-08-19 DIAGNOSIS — I10 BENIGN ESSENTIAL HTN: ICD-10-CM

## 2022-09-22 ENCOUNTER — LAB (OUTPATIENT)
Dept: LAB | Facility: OTHER | Age: 62
End: 2022-09-22

## 2022-09-22 DIAGNOSIS — E78.2 MIXED HYPERLIPIDEMIA: ICD-10-CM

## 2022-09-22 DIAGNOSIS — I10 BENIGN ESSENTIAL HTN: ICD-10-CM

## 2022-09-22 LAB
ALBUMIN SERPL-MCNC: 4.4 G/DL (ref 3.5–5)
ALBUMIN/GLOB SERPL: 1.1 G/DL (ref 1.1–1.8)
ALP SERPL-CCNC: 138 U/L (ref 38–126)
ALT SERPL W P-5'-P-CCNC: 13 U/L
ANION GAP SERPL CALCULATED.3IONS-SCNC: 8 MMOL/L (ref 5–15)
ARTICHOKE IGE QN: 56 MG/DL (ref 0–100)
AST SERPL-CCNC: 22 U/L (ref 17–59)
BILIRUB SERPL-MCNC: 0.4 MG/DL (ref 0.2–1.3)
BILIRUB UR QL STRIP: NEGATIVE
BUN SERPL-MCNC: 13 MG/DL (ref 7–23)
BUN/CREAT SERPL: 12.1 (ref 7–25)
CALCIUM SPEC-SCNC: 9 MG/DL (ref 8.4–10.2)
CHLORIDE SERPL-SCNC: 107 MMOL/L (ref 101–112)
CLARITY UR: CLEAR
CO2 SERPL-SCNC: 26 MMOL/L (ref 22–30)
COLOR UR: YELLOW
CREAT SERPL-MCNC: 1.07 MG/DL (ref 0.7–1.3)
DEPRECATED RDW RBC AUTO: 43 FL (ref 37–54)
EGFRCR SERPLBLD CKD-EPI 2021: 77.5 ML/MIN/1.73
EOSINOPHIL NFR BLD MANUAL: 4 % (ref 0.3–6.2)
ERYTHROCYTE [DISTWIDTH] IN BLOOD BY AUTOMATED COUNT: 13.4 % (ref 12.3–15.4)
GLOBULIN UR ELPH-MCNC: 3.9 GM/DL (ref 2.3–3.5)
GLUCOSE SERPL-MCNC: 114 MG/DL (ref 70–99)
GLUCOSE UR STRIP-MCNC: NEGATIVE MG/DL
HCT VFR BLD AUTO: 42.6 % (ref 37.5–51)
HGB BLD-MCNC: 14.3 G/DL (ref 13–17.7)
HGB UR QL STRIP.AUTO: NEGATIVE
KETONES UR QL STRIP: NEGATIVE
LEUKOCYTE ESTERASE UR QL STRIP.AUTO: NEGATIVE
LYMPHOCYTES # BLD MANUAL: NORMAL 10*3/UL
LYMPHOCYTES NFR BLD MANUAL: 9 % (ref 5–12)
MCH RBC QN AUTO: 30 PG (ref 26.6–33)
MCHC RBC AUTO-ENTMCNC: 33.6 G/DL (ref 31.5–35.7)
MCV RBC AUTO: 89.3 FL (ref 79–97)
NEUTROPHILS # BLD AUTO: NORMAL 10*3/UL
NEUTROPHILS NFR BLD MANUAL: 63 % (ref 42.7–76)
NITRITE UR QL STRIP: NEGATIVE
PH UR STRIP.AUTO: 5.5 [PH] (ref 5.5–8)
PLATELET # BLD AUTO: 184 10*3/MM3 (ref 140–450)
PMV BLD AUTO: 10.4 FL (ref 6–12)
POTASSIUM SERPL-SCNC: 3.5 MMOL/L (ref 3.4–5)
PROT SERPL-MCNC: 8.3 G/DL (ref 6.3–8.6)
PROT UR QL STRIP: ABNORMAL
RBC # BLD AUTO: 4.77 10*6/MM3 (ref 4.14–5.8)
RBC MORPH BLD: NORMAL
SMALL PLATELETS BLD QL SMEAR: ADEQUATE
SODIUM SERPL-SCNC: 141 MMOL/L (ref 137–145)
SP GR UR STRIP: 1.02 (ref 1–1.03)
T4 FREE SERPL-MCNC: 1.24 NG/DL (ref 0.93–1.7)
TSH SERPL DL<=0.05 MIU/L-ACNC: 1.04 UIU/ML (ref 0.27–4.2)
UROBILINOGEN UR QL STRIP: ABNORMAL
VARIANT LYMPHS NFR BLD MANUAL: 2 % (ref 0–5)
VARIANT LYMPHS NFR BLD MANUAL: 22 % (ref 19.6–45.3)
WBC MORPH BLD: NORMAL
WBC NRBC COR # BLD: 7.42 10*3/MM3 (ref 3.4–10.8)

## 2022-09-22 PROCEDURE — 83721 ASSAY OF BLOOD LIPOPROTEIN: CPT | Performed by: INTERNAL MEDICINE

## 2022-09-22 PROCEDURE — 85007 BL SMEAR W/DIFF WBC COUNT: CPT | Performed by: INTERNAL MEDICINE

## 2022-09-22 PROCEDURE — 36415 COLL VENOUS BLD VENIPUNCTURE: CPT | Performed by: INTERNAL MEDICINE

## 2022-09-22 PROCEDURE — 84443 ASSAY THYROID STIM HORMONE: CPT | Performed by: INTERNAL MEDICINE

## 2022-09-22 PROCEDURE — 85025 COMPLETE CBC W/AUTO DIFF WBC: CPT | Performed by: INTERNAL MEDICINE

## 2022-09-22 PROCEDURE — 81003 URINALYSIS AUTO W/O SCOPE: CPT | Performed by: INTERNAL MEDICINE

## 2022-09-22 PROCEDURE — 80053 COMPREHEN METABOLIC PANEL: CPT | Performed by: INTERNAL MEDICINE

## 2022-09-22 PROCEDURE — 84439 ASSAY OF FREE THYROXINE: CPT | Performed by: INTERNAL MEDICINE

## 2022-09-27 ENCOUNTER — OFFICE VISIT (OUTPATIENT)
Dept: FAMILY MEDICINE CLINIC | Facility: CLINIC | Age: 62
End: 2022-09-27

## 2022-09-27 VITALS
DIASTOLIC BLOOD PRESSURE: 70 MMHG | HEART RATE: 80 BPM | BODY MASS INDEX: 23.31 KG/M2 | WEIGHT: 162.8 LBS | TEMPERATURE: 96.8 F | SYSTOLIC BLOOD PRESSURE: 118 MMHG | HEIGHT: 70 IN

## 2022-09-27 DIAGNOSIS — I10 BENIGN ESSENTIAL HTN: Chronic | ICD-10-CM

## 2022-09-27 DIAGNOSIS — K21.9 GASTROESOPHAGEAL REFLUX DISEASE WITHOUT ESOPHAGITIS: Chronic | ICD-10-CM

## 2022-09-27 DIAGNOSIS — C34.12 CANCER OF UPPER LOBE OF LEFT LUNG: Chronic | ICD-10-CM

## 2022-09-27 DIAGNOSIS — J44.9 COPD, MODERATE: Chronic | ICD-10-CM

## 2022-09-27 DIAGNOSIS — Z12.5 SPECIAL SCREENING FOR MALIGNANT NEOPLASM OF PROSTATE: ICD-10-CM

## 2022-09-27 DIAGNOSIS — R73.02 IMPAIRED GLUCOSE TOLERANCE: Chronic | ICD-10-CM

## 2022-09-27 DIAGNOSIS — C83.18 MANTLE CELL LYMPHOMA OF LYMPH NODES OF MULTIPLE REGIONS: Chronic | ICD-10-CM

## 2022-09-27 DIAGNOSIS — F33.42 RECURRENT MAJOR DEPRESSIVE DISORDER, IN FULL REMISSION: Chronic | ICD-10-CM

## 2022-09-27 DIAGNOSIS — E78.2 MIXED HYPERLIPIDEMIA: Primary | Chronic | ICD-10-CM

## 2022-09-27 DIAGNOSIS — F41.1 GENERALIZED ANXIETY DISORDER: Chronic | ICD-10-CM

## 2022-09-27 PROCEDURE — 99215 OFFICE O/P EST HI 40 MIN: CPT | Performed by: INTERNAL MEDICINE

## 2022-09-27 RX ORDER — TAMSULOSIN HYDROCHLORIDE 0.4 MG/1
1 CAPSULE ORAL NIGHTLY
Qty: 90 CAPSULE | Refills: 3 | Status: SHIPPED | OUTPATIENT
Start: 2022-09-27

## 2022-09-27 NOTE — PROGRESS NOTES
Chief Complaint  Follow-up (6 month and establish care) and Urinary Frequency (Only at night)    Subjective        History of Present Illness     Mitch Rinaldi presents to the office to establish care.  He is a former patient of Dr. Carmelo Farmer.  He lives in the [facility] area.  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  He worked for [employer] for 15 years as well.         His chronic medical issues include impaired fasting glucose, hyperlipidemia, for which he takes Crestor 10 mg daily, GERD which is well managed with OTC Nexium 20 mg daily, hypertension, and osteoarthritis which causes chronic back and joint pain.  He takes Norco 7.5/325 mg every 6 hours as needed for the pain. Patient has a past history of spontaneous pneumothorax.  He was diagnosed with lung cancer and mantle cell lymphoma over two years ago and continues to follow with Dr. Samuel every 3 months.  He was seen at cancer center in [place] and received radiation treatment for treatment of the lung cancer.  Patient stopped smoking in 2020 with the aid of Wellbutrin.  He has anxiety and depression well controlled with the Wellbutrin and fluoxetine.  He actually feels like he could cut back on the dose of fluoxetine.     He reports a new issue of nocturia 2 to 3 times nightly, although, no other significant BPH symptoms.  He is able to get back to sleep without much difficulty.  We discussed using Flomax nightly for the nocturia.  Patient would like to try the medication.       Patient takes metoprolol 50 mg b.i.d. for rate control.  He has an appointment with Dr. Guadarrama, cardiologist, next month (10/25/2022)       Patient has had four COVID doses with his most recent booster dose 07/23/2022.  The updated COVID vaccine is available since he is 60 days out from most recent vaccine.  Recommended flu shot in 6 to 8 weeks.  I did recommend he take the vaccines at least a 2 week interval.  We will  "discuss Prevnar 20 next year when we confirm insurance coverage.        Weight is down 13 pounds in the past six months, which patient reports as intentional weight loss achieved with diet.  He started walking a 20-minute mile daily about a month ago. He has COPD and normally uses his albuterol inhaler when he gets finished with his walk.  I recommended he try premedicating prior to his walks.     The patient's relevant past medical, surgical, and social history was reviewed in Epic.   Lab results are reviewed with the patient today.  CBC unremarkable.  Fasting glucose 114. A1c last spring was 5.7.   Normal renal and liver function.   LDL 56 with Crestor.  Normal thyroid screen.         Objective   Vital Signs:  /70   Pulse 80   Temp 96.8 °F (36 °C) (Tympanic)   Ht 177.8 cm (70\")   Wt 73.8 kg (162 lb 12.8 oz)   BMI 23.36 kg/m²   Estimated body mass index is 23.36 kg/m² as calculated from the following:    Height as of this encounter: 177.8 cm (70\").    Weight as of this encounter: 73.8 kg (162 lb 12.8 oz).          Physical Exam  Vitals reviewed.   Constitutional:       General: He is not in acute distress.     Appearance: He is well-developed.      Comments: Pleasant male.    HENT:      Head: Normocephalic and atraumatic.      Nose:      Right Sinus: No maxillary sinus tenderness or frontal sinus tenderness.      Left Sinus: No maxillary sinus tenderness or frontal sinus tenderness.      Mouth/Throat:      Mouth: No oral lesions.      Pharynx: Uvula midline.      Tonsils: No tonsillar exudate.   Eyes:      Conjunctiva/sclera: Conjunctivae normal.      Pupils: Pupils are equal, round, and reactive to light.   Neck:      Thyroid: No thyroid mass or thyromegaly.      Vascular: No carotid bruit or JVD.      Trachea: Trachea normal. No tracheal deviation.   Cardiovascular:      Rate and Rhythm: Normal rate and regular rhythm.  No extrasystoles are present.     Chest Wall: PMI is not displaced.      Heart " sounds: Normal heart sounds. No murmur heard.  Pulmonary:      Effort: Pulmonary effort is normal. No accessory muscle usage or respiratory distress.      Breath sounds: Normal breath sounds. No decreased breath sounds, wheezing, rhonchi or rales.   Abdominal:      General: Bowel sounds are normal. There is no distension.      Palpations: Abdomen is soft.      Tenderness: There is no abdominal tenderness.   Musculoskeletal:      Cervical back: Neck supple.   Lymphadenopathy:      Cervical: No cervical adenopathy.   Skin:     General: Skin is warm and dry.      Findings: No rash.      Nails: There is no clubbing.   Neurological:      Mental Status: He is alert and oriented to person, place, and time.      Cranial Nerves: No cranial nerve deficit.      Coordination: Coordination normal.   Psychiatric:         Speech: Speech normal.         Behavior: Behavior normal.         Thought Content: Thought content normal.         Judgment: Judgment normal.            Result Review :    CMP    CMP 5/4/22 8/8/22 9/22/22   Glucose 96 96 114 (A)   BUN 16 14 13   Creatinine 1.11 1.02 1.07   Sodium 136 136 141   Potassium 4.1 4.3 3.5   Chloride 102 103 107   Calcium 8.8 9.1 9.0   Albumin 3.70 4.10 4.40   Total Bilirubin 0.2 0.3 0.4   Alkaline Phosphatase 108 120 (A) 138 (A)   AST (SGOT) 11 17 22   ALT (SGPT) 6 10 13   (A) Abnormal value            CBC w/diff    CBC w/Diff 5/4/22 8/8/22 9/22/22   WBC 7.01 7.17 7.42   RBC 4.48 4.65 4.77   Hemoglobin 13.6 14.1 14.3   Hematocrit 39.2 40.8 42.6   MCV 87.5 87.7 89.3   MCH 30.4 30.3 30.0   MCHC 34.7 34.6 33.6   RDW 12.8 13.2 13.4   Platelets 163 180 184   Neutrophil Rel % 70.2 64.4    Immature Granulocyte Rel % 0.3 0.4    Lymphocyte Rel % 18.1 (A) 23.3    Monocyte Rel % 6.8 7.0    Eosinophil Rel % 3.9 3.8    Basophil Rel % 0.7 1.1    (A) Abnormal value            Lipid Panel    Lipid Panel 3/7/22 9/22/22   Total Cholesterol 125 (A)    Triglycerides 193 (A)    HDL Cholesterol 30 (A)     VLDL Cholesterol 32    LDL Cholesterol  63 56   LDL/HDL Ratio 1.88    (A) Abnormal value            TSH    TSH 3/7/22 9/22/22   TSH 1.770 1.040           A1C Last 3 Results    HGBA1C Last 3 Results 3/7/22   Hemoglobin A1C 5.70 (A)   (A) Abnormal value            PSA    PSA 3/7/22   PSA 0.251           Data reviewed: Consultant notes Oncology          Assessment and Plan   Diagnoses and all orders for this visit:    1. Mixed hyperlipidemia (Primary)  -     Comprehensive Metabolic Panel; Future  -     Lipid Panel; Future    2. Impaired glucose tolerance  -     Comprehensive Metabolic Panel; Future  -     Hemoglobin A1c; Future    3. Gastroesophageal reflux disease without esophagitis  -     CBC Auto Differential; Future    4. Mantle cell lymphoma of lymph nodes of multiple regions (HCC)  -     CBC Auto Differential; Future    5. Generalized anxiety disorder    6. COPD, moderate (HCC)  -     CBC Auto Differential; Future  -     Comprehensive Metabolic Panel; Future    7. Benign essential HTN  -     Comprehensive Metabolic Panel; Future    8. Special screening for malignant neoplasm of prostate  -     PSA Screen; Future    9. Cancer of upper lobe of left lung (HCC)    10. Recurrent major depressive disorder, in full remission (HCC)    Other orders  -     tamsulosin (FLOMAX) 0.4 MG capsule 24 hr capsule; Take 1 capsule by mouth Every Night.  Dispense: 90 capsule; Refill: 3           I spent 42 minutes caring for Mitch on this date of service. This time includes time spent by me in the following activities:preparing for the visit, reviewing tests, obtaining and/or reviewing a separately obtained history, performing a medically appropriate examination and/or evaluation , counseling and educating the patient/family/caregiver, ordering medications, tests, or procedures and documenting information in the medical record     To address new problem of BPH/nocturia, a prescription is sent for Flomax to take one q.h.s.      CHARLES/depression well controlled with Wellbutrin and fluoxetine.  He is considering titrating down on his dose of fluoxetine.  He understands not to abruptly stop the medication, but needs to titrate down the dose.      Continue use of his albuterol inhaler to help manage COPD.  I recommended he try premedicating with use of his inhaler prior to his walks.  I want to reassess his respiratory status when he returns, as he might benefit from Trelegy or Anoro, etc.    Continue with his walking for exercise and dietary efforts to maintain the intentional weight loss and help delay progression of IFG.    Continue OTC Nexium, which has GERD adequately controlled.      Recommended flu vaccine in next 4 to 6 weeks.  Updated COVID booster available.  I recommended he schedule the vaccines at least 2 weeks apart.  We will discuss Prevnar 20 next year when we can confirm insurance coverage.      Continue cardiology visits with Dr. Guadarrama and continue Crestor for hyperlipidemia.  LDL at goal.      Continue visits with Dr. Samuel, oncology, to follow lung cancer and mantle cell lymphoma.       Return in 6 months for routine follow up with fasting labs one week prior or sooner if needed.     Scribed for Dr. Richey by Kristie Cantrell [facility].     Follow Up   Return in about 6 months (around 3/27/2023) for Follow up in six months with labs one week prior..  Patient was given instructions and counseling regarding his condition or for health maintenance advice. Please see specific information pulled into the AVS if appropriate.

## 2022-10-10 DIAGNOSIS — M15.9 PRIMARY OSTEOARTHRITIS INVOLVING MULTIPLE JOINTS: Chronic | ICD-10-CM

## 2022-10-10 RX ORDER — HYDROCODONE BITARTRATE AND ACETAMINOPHEN 7.5; 325 MG/1; MG/1
1 TABLET ORAL EVERY 6 HOURS PRN
Qty: 160 TABLET | Refills: 0 | Status: SHIPPED | OUTPATIENT
Start: 2022-10-10 | End: 2022-11-18 | Stop reason: SDUPTHER

## 2022-10-10 NOTE — TELEPHONE ENCOUNTER
Rx Refill Note  Requested Prescriptions     Pending Prescriptions Disp Refills   • HYDROcodone-acetaminophen (NORCO) 7.5-325 MG per tablet 160 tablet 0     Sig: Take 1 tablet by mouth Every 6 (Six) Hours As Needed for Severe Pain for up to 40 days.      Last office visit with prescribing clinician: 8/12/2022      Next office visit with prescribing clinician: 11/18/2022            Madyson Wolf RN  10/10/22, 08:06 CDT

## 2022-11-11 ENCOUNTER — LAB (OUTPATIENT)
Dept: LAB | Facility: HOSPITAL | Age: 62
End: 2022-11-11

## 2022-11-11 ENCOUNTER — HOSPITAL ENCOUNTER (OUTPATIENT)
Dept: CT IMAGING | Facility: HOSPITAL | Age: 62
Discharge: HOME | End: 2022-11-11

## 2022-11-11 DIAGNOSIS — C34.12 CANCER OF UPPER LOBE OF LEFT LUNG: Chronic | ICD-10-CM

## 2022-11-11 DIAGNOSIS — C83.18 MANTLE CELL LYMPHOMA OF LYMPH NODES OF MULTIPLE REGIONS: Chronic | ICD-10-CM

## 2022-11-11 LAB
ALBUMIN SERPL-MCNC: 4 G/DL (ref 3.5–5.2)
ALBUMIN/GLOB SERPL: 1.1 G/DL
ALP SERPL-CCNC: 103 U/L (ref 39–117)
ALT SERPL W P-5'-P-CCNC: 5 U/L (ref 1–41)
ANION GAP SERPL CALCULATED.3IONS-SCNC: 11 MMOL/L (ref 5–15)
AST SERPL-CCNC: 13 U/L (ref 1–40)
BILIRUB SERPL-MCNC: 0.3 MG/DL (ref 0–1.2)
BUN SERPL-MCNC: 11 MG/DL (ref 8–23)
BUN/CREAT SERPL: 10.7 (ref 7–25)
CALCIUM SPEC-SCNC: 9.2 MG/DL (ref 8.6–10.5)
CHLORIDE SERPL-SCNC: 103 MMOL/L (ref 98–107)
CO2 SERPL-SCNC: 24 MMOL/L (ref 22–29)
CREAT SERPL-MCNC: 1.03 MG/DL (ref 0.76–1.27)
DEPRECATED RDW RBC AUTO: 39.8 FL (ref 37–54)
EGFRCR SERPLBLD CKD-EPI 2021: 81.1 ML/MIN/1.73
ERYTHROCYTE [DISTWIDTH] IN BLOOD BY AUTOMATED COUNT: 12.7 % (ref 12.3–15.4)
GLOBULIN UR ELPH-MCNC: 3.7 GM/DL
GLUCOSE SERPL-MCNC: 99 MG/DL (ref 65–99)
HCT VFR BLD AUTO: 40 % (ref 37.5–51)
HGB BLD-MCNC: 13.9 G/DL (ref 13–17.7)
MCH RBC QN AUTO: 30.2 PG (ref 26.6–33)
MCHC RBC AUTO-ENTMCNC: 34.8 G/DL (ref 31.5–35.7)
MCV RBC AUTO: 86.8 FL (ref 79–97)
PLATELET # BLD AUTO: 147 10*3/MM3 (ref 140–450)
PMV BLD AUTO: 10.1 FL (ref 6–12)
POTASSIUM SERPL-SCNC: 3.8 MMOL/L (ref 3.5–5.2)
PROT SERPL-MCNC: 7.7 G/DL (ref 6–8.5)
RBC # BLD AUTO: 4.61 10*6/MM3 (ref 4.14–5.8)
SODIUM SERPL-SCNC: 138 MMOL/L (ref 136–145)
WBC NRBC COR # BLD: 6.76 10*3/MM3 (ref 3.4–10.8)

## 2022-11-11 PROCEDURE — 80053 COMPREHEN METABOLIC PANEL: CPT

## 2022-11-11 PROCEDURE — 85027 COMPLETE CBC AUTOMATED: CPT

## 2022-11-11 PROCEDURE — 25010000002 IOPAMIDOL 61 % SOLUTION: Performed by: INTERNAL MEDICINE

## 2022-11-11 PROCEDURE — 71260 CT THORAX DX C+: CPT

## 2022-11-11 RX ADMIN — IOPAMIDOL 90 ML: 612 INJECTION, SOLUTION INTRAVENOUS at 09:35

## 2022-11-18 ENCOUNTER — OFFICE VISIT (OUTPATIENT)
Dept: ONCOLOGY | Facility: CLINIC | Age: 62
End: 2022-11-18

## 2022-11-18 VITALS
SYSTOLIC BLOOD PRESSURE: 117 MMHG | OXYGEN SATURATION: 94 % | RESPIRATION RATE: 18 BRPM | WEIGHT: 165 LBS | HEART RATE: 85 BPM | DIASTOLIC BLOOD PRESSURE: 70 MMHG | BODY MASS INDEX: 23.68 KG/M2

## 2022-11-18 DIAGNOSIS — N48.9 PENILE LESION: ICD-10-CM

## 2022-11-18 DIAGNOSIS — M15.9 PRIMARY OSTEOARTHRITIS INVOLVING MULTIPLE JOINTS: Chronic | ICD-10-CM

## 2022-11-18 DIAGNOSIS — C34.12 CANCER OF UPPER LOBE OF LEFT LUNG: Primary | Chronic | ICD-10-CM

## 2022-11-18 DIAGNOSIS — C83.18 MANTLE CELL LYMPHOMA OF LYMPH NODES OF MULTIPLE REGIONS: Chronic | ICD-10-CM

## 2022-11-18 PROCEDURE — G0463 HOSPITAL OUTPT CLINIC VISIT: HCPCS | Performed by: INTERNAL MEDICINE

## 2022-11-18 PROCEDURE — 99214 OFFICE O/P EST MOD 30 MIN: CPT | Performed by: INTERNAL MEDICINE

## 2022-11-18 RX ORDER — HYDROCODONE BITARTRATE AND ACETAMINOPHEN 7.5; 325 MG/1; MG/1
1 TABLET ORAL EVERY 6 HOURS PRN
Qty: 160 TABLET | Refills: 0 | Status: SHIPPED | OUTPATIENT
Start: 2022-11-18 | End: 2022-12-21 | Stop reason: SDUPTHER

## 2022-11-18 NOTE — PROGRESS NOTES
"Chief Complaint  Follow-up -lung cancer, mantle cell lymphoma    Subjective        Mitch Rinaldi presents to [facility] HEMATOLOGY [facility] ONCOLOGY  History of Present Illness     No new health issues since last visit.   No new medications.   No new hospitalization.   Fatigue has been present however overall stable.  Reports new skin lesion on penis.  No urinary symptoms      Objective   Vital Signs:  /70   Pulse 85   Resp 18   Wt 74.8 kg (165 lb)   SpO2 94%   BMI 23.68 kg/m²   Estimated body mass index is 23.68 kg/m² as calculated from the following:    Height as of 9/27/22: 177.8 cm (70\").    Weight as of this encounter: 74.8 kg (165 lb).    BMI is within normal parameters. No other follow-up for BMI required.      Physical Exam  Vitals and nursing note reviewed.   Constitutional:       Appearance: Normal appearance.   Abdominal:      General: There is no distension.      Palpations: Abdomen is soft. There is no mass.      Tenderness: There is no abdominal tenderness.   Neurological:      General: No focal deficit present.      Mental Status: He is alert and oriented to person, place, and time. Mental status is at baseline.   Psychiatric:         Mood and Affect: Mood normal.         Behavior: Behavior normal.         Thought Content: Thought content normal.        Result Review :  The following data was reviewed by: Atiya Alexis MD on 11/18/2022:  Common labs    Common Labs 8/8/22 8/8/22 9/22/22 9/22/22 9/22/22 11/11/22 11/11/22    0834 0835 0746 0746 0746 0800 0800   Glucose 96  114 (A)    99   BUN 14  13    11   Creatinine 1.02  1.07    1.03   Sodium 136  141    138   Potassium 4.3  3.5    3.8   Chloride 103  107    103   Calcium 9.1  9.0    9.2   Albumin 4.10  4.40    4.00   Total Bilirubin 0.3  0.4    0.3   Alkaline Phosphatase 120 (A)  138 (A)    103   AST (SGOT) 17  22    13   ALT (SGPT) 10  13    5   WBC  7.17  7.42  6.76    Hemoglobin  14.1  14.3  13.9  "   Hematocrit  40.8  42.6  40.0    Platelets  180  184  147    LDL Cholesterol      56     (A) Abnormal value            CMP    CMP 8/8/22 9/22/22 11/11/22   Glucose 96 114 (A) 99   BUN 14 13 11   Creatinine 1.02 1.07 1.03   Sodium 136 141 138   Potassium 4.3 3.5 3.8   Chloride 103 107 103   Calcium 9.1 9.0 9.2   Albumin 4.10 4.40 4.00   Total Bilirubin 0.3 0.4 0.3   Alkaline Phosphatase 120 (A) 138 (A) 103   AST (SGOT) 17 22 13   ALT (SGPT) 10 13 5   (A) Abnormal value            CBC    CBC 8/8/22 9/22/22 11/11/22   WBC 7.17 7.42 6.76   RBC 4.65 4.77 4.61   Hemoglobin 14.1 14.3 13.9   Hematocrit 40.8 42.6 40.0   MCV 87.7 89.3 86.8   MCH 30.3 30.0 30.2   MCHC 34.6 33.6 34.8   RDW 13.2 13.4 12.7   Platelets 180 184 147           CBC w/diff    CBC w/Diff 8/8/22 9/22/22 11/11/22   WBC 7.17 7.42 6.76   RBC 4.65 4.77 4.61   Hemoglobin 14.1 14.3 13.9   Hematocrit 40.8 42.6 40.0   MCV 87.7 89.3 86.8   MCH 30.3 30.0 30.2   MCHC 34.6 33.6 34.8   RDW 13.2 13.4 12.7   Platelets 180 184 147   Neutrophil Rel % 64.4     Immature Granulocyte Rel % 0.4     Lymphocyte Rel % 23.3     Monocyte Rel % 7.0     Eosinophil Rel % 3.8     Basophil Rel % 1.1             Data reviewed: Radiologic studies Result of CT scan of chest reviewed.           CT Chest With Contrast Diagnostic    Result Date: 11/14/2022  1.  Lymphadenopathy in the  chest is slightly larger and also unchanged compared to August 8, 2022. 2.  Left apical masslike thickening or spiculated consolidation extending to the left hilum is similar in appearance to the prior. 3.  Moderate to severe centrilobular emphysema. Electronically signed by:  Benito Santana MD  11/14/2022 11:23 AM [facility] Workstation: [id number]      Mitch Rinaldi reports a pain score of 0.  Given his pain assessment as noted, treatment options were discussed and the following options were decided upon as a follow-up plan to address the patient's pain: continuation of current treatment plan for  pain.    Patient screened negative for depression based on a PHQ-9 score of 0 on 11/18/2022.       Assessment and Plan   Diagnoses and all orders for this visit:    1. Cancer of upper lobe of left lung (HCC) (Primary)  -     CT chest w contrast; Future      Chronic, stable.  Patient was treated with SBRT.  Result of CT chest reviewed.  Overall stable lung lesion.  No definite evidence of progression.  Recommend continue monitoring.  Repeat CT scan of chest in 3 months.      2. Mantle cell lymphoma of lymph nodes of multiple regions (HCC)  -     CBC & Differential; Future  -     Comprehensive Metabolic Panel; Future  -     CT abdomen pelvis w contrast; Future    Chronic, stable    Patient with diagnosis of CD5 negative mantle cell lymphoma.  IHC positive for cyclin D1  FISH is positive for t(11,14) translocation.   Cytogenetics are normal.  Ki-67 -10 to 15%.  Bone marrow aspiration biopsy was negative.  He denies any B symptoms.  CT scan showed worsening adenopathy However given he is asymptomatic we will continue to with wait and watch approach for his mantle cell lymphoma.  Discussed with patient that it is possible he might need treatment in near future however given he is asymptomatic currently we will continue to monitor.  I will plan to see him back in 3 months with CBC, CMP, LDH, CT scan of chest, abdomen pelvis.    3. Penile lesion  -     Ambulatory Referral to Urology     recommend urology referral.        4. Primary osteoarthritis involving multiple joints  -     HYDROcodone-acetaminophen (NORCO) 7.5-325 MG per tablet; Take 1 tablet by mouth Every 6 (Six) Hours As Needed for Severe Pain for up to 40 days.  Dispense: 160 tablet; Refill: 0      Chronic, stable on [place].  He will continue this.  New prescription sent to the pharmacy.           Follow Up   No follow-ups on file.  Patient was given instructions and counseling regarding his condition or for health maintenance advice. Please see specific  information pulled into the AVS if appropriate.

## 2022-12-05 DIAGNOSIS — J44.9 COPD, MODERATE: Chronic | ICD-10-CM

## 2022-12-06 RX ORDER — BUDESONIDE AND FORMOTEROL FUMARATE DIHYDRATE 160; 4.5 UG/1; UG/1
AEROSOL RESPIRATORY (INHALATION)
Qty: 10.2 G | Refills: 12 | Status: SHIPPED | OUTPATIENT
Start: 2022-12-06 | End: 2023-03-28 | Stop reason: ALTCHOICE

## 2022-12-21 DIAGNOSIS — M15.9 PRIMARY OSTEOARTHRITIS INVOLVING MULTIPLE JOINTS: Chronic | ICD-10-CM

## 2022-12-21 RX ORDER — HYDROCODONE BITARTRATE AND ACETAMINOPHEN 7.5; 325 MG/1; MG/1
1 TABLET ORAL EVERY 6 HOURS PRN
Qty: 160 TABLET | Refills: 0 | Status: SHIPPED | OUTPATIENT
Start: 2022-12-21 | End: 2023-02-03 | Stop reason: SDUPTHER

## 2022-12-21 NOTE — TELEPHONE ENCOUNTER
Rx Refill Note  Requested Prescriptions     Pending Prescriptions Disp Refills   • HYDROcodone-acetaminophen (NORCO) 7.5-325 MG per tablet 160 tablet 0     Sig: Take 1 tablet by mouth Every 6 (Six) Hours As Needed for Severe Pain for up to 40 days.      Last office visit with prescribing clinician: 11/18/2022   Last telemedicine visit with prescribing clinician: 2/22/2023   Next office visit with prescribing clinician: 2/22/2023                         Would you like a call back once the refill request has been completed: [] Yes [] No    If the office needs to give you a call back, can they leave a voicemail: [] Yes [] No    Argelia Ayoub  12/21/22, 11:07 CST

## 2023-02-03 DIAGNOSIS — F41.1 GENERALIZED ANXIETY DISORDER: ICD-10-CM

## 2023-02-03 DIAGNOSIS — M15.9 PRIMARY OSTEOARTHRITIS INVOLVING MULTIPLE JOINTS: Chronic | ICD-10-CM

## 2023-02-03 RX ORDER — HYDROCODONE BITARTRATE AND ACETAMINOPHEN 7.5; 325 MG/1; MG/1
1 TABLET ORAL EVERY 6 HOURS PRN
Qty: 160 TABLET | Refills: 0 | Status: SHIPPED | OUTPATIENT
Start: 2023-02-03 | End: 2023-02-22 | Stop reason: SDUPTHER

## 2023-02-03 NOTE — TELEPHONE ENCOUNTER
Rx Refill Note  Requested Prescriptions     Pending Prescriptions Disp Refills   • HYDROcodone-acetaminophen (NORCO) 7.5-325 MG per tablet 160 tablet 0     Sig: Take 1 tablet by mouth Every 6 (Six) Hours As Needed for Severe Pain for up to 40 days.      Last office visit with prescribing clinician: 11/18/2022   Last telemedicine visit with prescribing clinician: 2/22/2023   Next office visit with prescribing clinician: 2/22/2023                         Would you like a call back once the refill request has been completed: [] Yes [] No    If the office needs to give you a call back, can they leave a voicemail: [] Yes [] No    Jameel Zhou Rep  02/03/23, 08:18 CST

## 2023-02-06 DIAGNOSIS — F41.1 GENERALIZED ANXIETY DISORDER: ICD-10-CM

## 2023-02-06 RX ORDER — FLUOXETINE HYDROCHLORIDE 40 MG/1
40 CAPSULE ORAL DAILY
Qty: 30 CAPSULE | Refills: 5 | OUTPATIENT
Start: 2023-02-06

## 2023-02-06 RX ORDER — FLUOXETINE HYDROCHLORIDE 40 MG/1
40 CAPSULE ORAL DAILY
Qty: 30 CAPSULE | Refills: 5 | Status: SHIPPED | OUTPATIENT
Start: 2023-02-06

## 2023-02-17 ENCOUNTER — HOSPITAL ENCOUNTER (OUTPATIENT)
Dept: CT IMAGING | Facility: HOSPITAL | Age: 63
Discharge: HOME | End: 2023-02-17
Payer: COMMERCIAL

## 2023-02-17 ENCOUNTER — LAB (OUTPATIENT)
Dept: LAB | Facility: HOSPITAL | Age: 63
End: 2023-02-17
Payer: COMMERCIAL

## 2023-02-17 DIAGNOSIS — C83.18 MANTLE CELL LYMPHOMA OF LYMPH NODES OF MULTIPLE REGIONS: ICD-10-CM

## 2023-02-17 DIAGNOSIS — C83.18 MANTLE CELL LYMPHOMA OF LYMPH NODES OF MULTIPLE REGIONS: Chronic | ICD-10-CM

## 2023-02-17 DIAGNOSIS — C34.12 CANCER OF UPPER LOBE OF LEFT LUNG: Chronic | ICD-10-CM

## 2023-02-17 LAB
ALBUMIN SERPL-MCNC: 4.2 G/DL (ref 3.5–5.2)
ALBUMIN/GLOB SERPL: 1 G/DL
ALP SERPL-CCNC: 105 U/L (ref 39–117)
ALT SERPL W P-5'-P-CCNC: 6 U/L (ref 1–41)
ANION GAP SERPL CALCULATED.3IONS-SCNC: 9 MMOL/L (ref 5–15)
AST SERPL-CCNC: 16 U/L (ref 1–40)
BASOPHILS # BLD AUTO: 0.08 10*3/MM3 (ref 0–0.2)
BASOPHILS NFR BLD AUTO: 1 % (ref 0–1.5)
BILIRUB SERPL-MCNC: 0.3 MG/DL (ref 0–1.2)
BUN SERPL-MCNC: 17 MG/DL (ref 8–23)
BUN/CREAT SERPL: 14.9 (ref 7–25)
CALCIUM SPEC-SCNC: 9.2 MG/DL (ref 8.6–10.5)
CHLORIDE SERPL-SCNC: 103 MMOL/L (ref 98–107)
CO2 SERPL-SCNC: 24 MMOL/L (ref 22–29)
CREAT SERPL-MCNC: 1.14 MG/DL (ref 0.76–1.27)
DEPRECATED RDW RBC AUTO: 42.2 FL (ref 37–54)
EGFRCR SERPLBLD CKD-EPI 2021: 71.8 ML/MIN/1.73
EOSINOPHIL # BLD AUTO: 0.37 10*3/MM3 (ref 0–0.4)
EOSINOPHIL NFR BLD AUTO: 4.8 % (ref 0.3–6.2)
ERYTHROCYTE [DISTWIDTH] IN BLOOD BY AUTOMATED COUNT: 13 % (ref 12.3–15.4)
GLOBULIN UR ELPH-MCNC: 4.1 GM/DL
GLUCOSE SERPL-MCNC: 105 MG/DL (ref 65–99)
HCT VFR BLD AUTO: 40.8 % (ref 37.5–51)
HGB BLD-MCNC: 13.6 G/DL (ref 13–17.7)
IMM GRANULOCYTES # BLD AUTO: 0.02 10*3/MM3 (ref 0–0.05)
IMM GRANULOCYTES NFR BLD AUTO: 0.3 % (ref 0–0.5)
LYMPHOCYTES # BLD AUTO: 1.93 10*3/MM3 (ref 0.7–3.1)
LYMPHOCYTES NFR BLD AUTO: 25 % (ref 19.6–45.3)
MCH RBC QN AUTO: 29.8 PG (ref 26.6–33)
MCHC RBC AUTO-ENTMCNC: 33.3 G/DL (ref 31.5–35.7)
MCV RBC AUTO: 89.5 FL (ref 79–97)
MONOCYTES # BLD AUTO: 0.56 10*3/MM3 (ref 0.1–0.9)
MONOCYTES NFR BLD AUTO: 7.3 % (ref 5–12)
NEUTROPHILS NFR BLD AUTO: 4.75 10*3/MM3 (ref 1.7–7)
NEUTROPHILS NFR BLD AUTO: 61.6 % (ref 42.7–76)
NRBC BLD AUTO-RTO: 0 /100 WBC (ref 0–0.2)
PLATELET # BLD AUTO: 172 10*3/MM3 (ref 140–450)
PMV BLD AUTO: 10.2 FL (ref 6–12)
POTASSIUM SERPL-SCNC: 4.7 MMOL/L (ref 3.5–5.2)
PROT SERPL-MCNC: 8.3 G/DL (ref 6–8.5)
RBC # BLD AUTO: 4.56 10*6/MM3 (ref 4.14–5.8)
SODIUM SERPL-SCNC: 136 MMOL/L (ref 136–145)
WBC NRBC COR # BLD: 7.71 10*3/MM3 (ref 3.4–10.8)

## 2023-02-17 PROCEDURE — 74177 CT ABD & PELVIS W/CONTRAST: CPT

## 2023-02-17 PROCEDURE — 71260 CT THORAX DX C+: CPT

## 2023-02-17 PROCEDURE — 25010000002 IOPAMIDOL 61 % SOLUTION: Performed by: INTERNAL MEDICINE

## 2023-02-17 PROCEDURE — 85025 COMPLETE CBC W/AUTO DIFF WBC: CPT

## 2023-02-17 PROCEDURE — 80053 COMPREHEN METABOLIC PANEL: CPT

## 2023-02-17 PROCEDURE — 36415 COLL VENOUS BLD VENIPUNCTURE: CPT

## 2023-02-17 RX ADMIN — IOPAMIDOL 90 ML: 612 INJECTION, SOLUTION INTRAVENOUS at 09:45

## 2023-02-22 ENCOUNTER — OFFICE VISIT (OUTPATIENT)
Dept: ONCOLOGY | Facility: CLINIC | Age: 63
End: 2023-02-22
Payer: COMMERCIAL

## 2023-02-22 VITALS
HEART RATE: 105 BPM | WEIGHT: 155.8 LBS | BODY MASS INDEX: 22.35 KG/M2 | RESPIRATION RATE: 18 BRPM | SYSTOLIC BLOOD PRESSURE: 107 MMHG | DIASTOLIC BLOOD PRESSURE: 71 MMHG | OXYGEN SATURATION: 96 %

## 2023-02-22 DIAGNOSIS — C34.12 CANCER OF UPPER LOBE OF LEFT LUNG: Primary | Chronic | ICD-10-CM

## 2023-02-22 DIAGNOSIS — C83.18 MANTLE CELL LYMPHOMA OF LYMPH NODES OF MULTIPLE REGIONS: Chronic | ICD-10-CM

## 2023-02-22 DIAGNOSIS — M15.9 PRIMARY OSTEOARTHRITIS INVOLVING MULTIPLE JOINTS: Chronic | ICD-10-CM

## 2023-02-22 PROCEDURE — G0463 HOSPITAL OUTPT CLINIC VISIT: HCPCS | Performed by: INTERNAL MEDICINE

## 2023-02-22 PROCEDURE — 99214 OFFICE O/P EST MOD 30 MIN: CPT | Performed by: INTERNAL MEDICINE

## 2023-02-22 RX ORDER — HYDROCODONE BITARTRATE AND ACETAMINOPHEN 7.5; 325 MG/1; MG/1
1 TABLET ORAL EVERY 6 HOURS PRN
Qty: 160 TABLET | Refills: 0 | Status: SHIPPED | OUTPATIENT
Start: 2023-03-07 | End: 2023-04-16

## 2023-02-22 NOTE — PROGRESS NOTES
"Chief Complaint  Follow-up -lung cancer, mantle cell lymphoma    Subjective        Mitch Rinaldi presents to [facility]EMATOLOGY [facility] ONCOL[facility]Y  History of Present Illness     No new health issues since last visit.   No new medications.   No new hospitalization.   Recently had stomach bug which she recovered from.  Chronic shortness of breath is stable.      Objective   Vital Signs:  /71   Pulse 105   Resp 18   Wt 70.7 kg (155 lb 12.8 oz)   SpO2 96%   BMI 22.35 kg/m²   Estimated body mass index is 22.35 kg/m² as calculated from the following:    Height as of 9/27/22: 177.8 cm (70\").    Weight as of this encounter: 70.7 kg (155 lb 12.8 oz).       BMI is within normal parameters. No other follow-up for BMI required.      Physical Exam  Vitals and nursing note reviewed.   Constitutional:       Appearance: Normal appearance.   Neurological:      General: No focal deficit present.      Mental Status: He is alert and oriented to person, place, and time. Mental status is at baseline.   Psychiatric:         Mood and Affect: Mood normal.         Behavior: Behavior normal.         Thought Content: Thought content normal.        Result Review :  The following data was reviewed by: Atiya Alexis MD on 02/22/2023:  Common labs    Common Labs 9/22/22 9/22/22 9/22/22 11/11/22 11/11/22 2/17/23 2/17/23    0746 0746 0746 0800 0800 0902 0902   Glucose 114 (A)    99  105 (A)   BUN 13    11  17   Creatinine 1.07    1.03  1.14   Sodium 141    138  136   Potassium 3.5    3.8  4.7   Chloride 107    103  103   Calcium 9.0    9.2  9.2   Albumin 4.40    4.00  4.2   Total Bilirubin 0.4    0.3  0.3   Alkaline Phosphatase 138 (A)    103  105   AST (SGOT) 22    13  16   ALT (SGPT) 13    5  6   WBC  7.42  6.76  7.71    Hemoglobin  14.3  13.9  13.6    Hematocrit  42.6  40.0  40.8    Platelets  184  147  172    LDL Cholesterol    56       (A) Abnormal value            CMP    CMP 9/22/22 11/11/22 " 2/17/23   Glucose 114 (A) 99 105 (A)   BUN 13 11 17   Creatinine 1.07 1.03 1.14   eGFR 77.5 81.1 71.8   Sodium 141 138 136   Potassium 3.5 3.8 4.7   Chloride 107 103 103   Calcium 9.0 9.2 9.2   Total Protein 8.3 7.7 8.3   Albumin 4.40 4.00 4.2   Globulin 3.9 (A) 3.7 4.1   Total Bilirubin 0.4 0.3 0.3   Alkaline Phosphatase 138 (A) 103 105   AST (SGOT) 22 13 16   ALT (SGPT) 13 5 6   Albumin/Globulin Ratio 1.1 1.1 1.0   BUN/Creatinine Ratio 12.1 10.7 14.9   Anion Gap 8.0 11.0 9.0   (A) Abnormal value       Comments are available for some flowsheets but are not being displayed.           CBC    CBC 9/22/22 11/11/22 2/17/23   WBC 7.42 6.76 7.71   RBC 4.77 4.61 4.56   Hemoglobin 14.3 13.9 13.6   Hematocrit 42.6 40.0 40.8   MCV 89.3 86.8 89.5   MCH 30.0 30.2 29.8   MCHC 33.6 34.8 33.3   RDW 13.4 12.7 13.0   Platelets 184 147 172           CBC w/diff    CBC w/Diff 9/22/22 11/11/22 2/17/23   WBC 7.42 6.76 7.71   RBC 4.77 4.61 4.56   Hemoglobin 14.3 13.9 13.6   Hematocrit 42.6 40.0 40.8   MCV 89.3 86.8 89.5   MCH 30.0 30.2 29.8   MCHC 33.6 34.8 33.3   RDW 13.4 12.7 13.0   Platelets 184 147 172   Neutrophil Rel %   61.6   Immature Granulocyte Rel %   0.3   Lymphocyte Rel %   25.0   Monocyte Rel %   7.3   Eosinophil Rel %   4.8   Basophil Rel %   1.0           Data reviewed: Radiologic studies result of CT reviewed.     CT Chest With Contrast Diagnostic    Result Date: 2/21/2023  1. Infiltrative appearing soft tissue in the suprahilar and apical left lung is very similar to the prior exam but has mildly increased in size. 2. Continued increased size of lymphadenopathy throughout the chest, abdomen, and pelvis, as above. 3. Stable splenomegaly. 4. Emphysema. Electronically signed by:  Jhonny Cazares MD  2/21/2023 10:29 AM CST Workstation: [id number]    CT Abdomen Pelvis With Contrast    Result Date: 2/21/2023  1. Infiltrative appearing soft tissue in the suprahilar and apical left lung is very similar to the prior exam but has  mildly increased in size. 2. Continued increased size of lymphadenopathy throughout the chest, abdomen, and pelvis, as above. 3. Stable splenomegaly. 4. Emphysema. Electronically signed by:  Jhonny Cazares MD  2/21/2023 10:29 AM [facility] Workstation: [id number]        Mitch Rinaldi reports a pain score of 0.  Given his pain assessment as noted, treatment options were discussed and the following options were decided upon as a follow-up plan to address the patient's pain: continuation of current treatment plan for pain.    Patient screened negative for depression based on a PHQ-9 score of 0 on 2/22/2023.       Assessment and Plan   Diagnoses and all orders for this visit:    1. Cancer of upper lobe of left lung (HCC) (Primary)  -     CT chest w contrast; Future    Chronic, stable.  He was treated with SBRT.  Result of CT chest reviewed.  Overall stable lung lesion.  No definite evidence of cancer progression.  Recommend continue monitoring.  Repeat CT chest in 3 months.    2. Mantle cell lymphoma of lymph nodes of multiple regions (HCC)  -     CBC & Differential; Future  -     Comprehensive Metabolic Panel; Future  -     CT abdomen pelvis w contrast; Future    Chronic, stable.  Patient with diagnosis of CD5 negative mantle cell lymphoma.  IHC positive for cyclin D1  FISH is positive for t(11,14) translocation.   Cytogenetics are normal.  Ki-67 -10 to 15%.  Bone marrow aspiration biopsy was negative.    He does not have any B symptoms.  CT showed minimal worsening adenopathy however given he is asymptomatic we will continue to wait and watch approach for his mantle cell lymphoma.  Discussed with patient that it is quite possible he will need treatment in the near future however given he is asymptomatic we will continue to monitor.  I will order CT chest abdomen pelvis in 3 months.  CBC, CMP in 3 months.    3. Primary osteoarthritis involving multiple joints  -     HYDROcodone-acetaminophen (NORCO) 7.5-325 MG per tablet;  Take 1 tablet by mouth Every 6 (Six) Hours As Needed for Severe Pain for up to 40 days.  Dispense: 160 tablet; Refill: 0    Chronic, stable.  He is on Norco.  He will continue this.  New prescription sent         Follow Up   No follow-ups on file.  Patient was given instructions and counseling regarding his condition or for health maintenance advice. Please see specific information pulled into the AVS if appropriate.

## 2023-03-24 ENCOUNTER — LAB (OUTPATIENT)
Dept: LAB | Facility: OTHER | Age: 63
End: 2023-03-24
Payer: COMMERCIAL

## 2023-03-24 DIAGNOSIS — R73.02 IMPAIRED GLUCOSE TOLERANCE: Chronic | ICD-10-CM

## 2023-03-24 DIAGNOSIS — E78.2 MIXED HYPERLIPIDEMIA: Chronic | ICD-10-CM

## 2023-03-24 DIAGNOSIS — Z12.5 SPECIAL SCREENING FOR MALIGNANT NEOPLASM OF PROSTATE: ICD-10-CM

## 2023-03-24 DIAGNOSIS — C83.18 MANTLE CELL LYMPHOMA OF LYMPH NODES OF MULTIPLE REGIONS: Chronic | ICD-10-CM

## 2023-03-24 DIAGNOSIS — K21.9 GASTROESOPHAGEAL REFLUX DISEASE WITHOUT ESOPHAGITIS: Chronic | ICD-10-CM

## 2023-03-24 DIAGNOSIS — J44.9 COPD, MODERATE: Chronic | ICD-10-CM

## 2023-03-24 DIAGNOSIS — I10 BENIGN ESSENTIAL HTN: Chronic | ICD-10-CM

## 2023-03-24 LAB
ALBUMIN SERPL-MCNC: 4.3 G/DL (ref 3.5–5)
ALBUMIN/GLOB SERPL: 1 G/DL (ref 1.1–1.8)
ALP SERPL-CCNC: 114 U/L (ref 38–126)
ALT SERPL W P-5'-P-CCNC: 14 U/L
ANION GAP SERPL CALCULATED.3IONS-SCNC: 9 MMOL/L (ref 5–15)
AST SERPL-CCNC: 20 U/L (ref 17–59)
BASOPHILS # BLD AUTO: 0.08 10*3/MM3 (ref 0–0.2)
BASOPHILS NFR BLD AUTO: 1 % (ref 0–1.5)
BILIRUB SERPL-MCNC: 0.6 MG/DL (ref 0.2–1.3)
BUN SERPL-MCNC: 20 MG/DL (ref 7–23)
BUN/CREAT SERPL: 14.9 (ref 7–25)
CALCIUM SPEC-SCNC: 8.9 MG/DL (ref 8.4–10.2)
CHLORIDE SERPL-SCNC: 100 MMOL/L (ref 101–112)
CHOLEST SERPL-MCNC: 161 MG/DL (ref 150–200)
CO2 SERPL-SCNC: 27 MMOL/L (ref 22–30)
CREAT SERPL-MCNC: 1.34 MG/DL (ref 0.7–1.3)
DEPRECATED RDW RBC AUTO: 44.3 FL (ref 37–54)
EGFRCR SERPLBLD CKD-EPI 2021: 59.2 ML/MIN/1.73
EOSINOPHIL # BLD AUTO: 0.46 10*3/MM3 (ref 0–0.4)
EOSINOPHIL NFR BLD AUTO: 5.8 % (ref 0.3–6.2)
ERYTHROCYTE [DISTWIDTH] IN BLOOD BY AUTOMATED COUNT: 13.4 % (ref 12.3–15.4)
GLOBULIN UR ELPH-MCNC: 4.2 GM/DL (ref 2.3–3.5)
GLUCOSE SERPL-MCNC: 105 MG/DL (ref 70–99)
HBA1C MFR BLD: 5.3 % (ref 4.8–5.6)
HCT VFR BLD AUTO: 40.2 % (ref 37.5–51)
HDLC SERPL-MCNC: 27 MG/DL (ref 40–59)
HGB BLD-MCNC: 13.5 G/DL (ref 13–17.7)
LDLC SERPL CALC-MCNC: 92 MG/DL
LDLC/HDLC SERPL: 3.14 {RATIO} (ref 0–3.55)
LYMPHOCYTES # BLD AUTO: 2.09 10*3/MM3 (ref 0.7–3.1)
LYMPHOCYTES NFR BLD AUTO: 26.6 % (ref 19.6–45.3)
MCH RBC QN AUTO: 30.9 PG (ref 26.6–33)
MCHC RBC AUTO-ENTMCNC: 33.6 G/DL (ref 31.5–35.7)
MCV RBC AUTO: 92 FL (ref 79–97)
MONOCYTES # BLD AUTO: 0.68 10*3/MM3 (ref 0.1–0.9)
MONOCYTES NFR BLD AUTO: 8.6 % (ref 5–12)
NEUTROPHILS NFR BLD AUTO: 4.56 10*3/MM3 (ref 1.7–7)
NEUTROPHILS NFR BLD AUTO: 58 % (ref 42.7–76)
PLATELET # BLD AUTO: 164 10*3/MM3 (ref 140–450)
PMV BLD AUTO: 9.6 FL (ref 6–12)
POTASSIUM SERPL-SCNC: 4.5 MMOL/L (ref 3.4–5)
PROT SERPL-MCNC: 8.5 G/DL (ref 6.3–8.6)
PSA SERPL-MCNC: 0.19 NG/ML (ref 0–4)
RBC # BLD AUTO: 4.37 10*6/MM3 (ref 4.14–5.8)
SODIUM SERPL-SCNC: 136 MMOL/L (ref 137–145)
TRIGL SERPL-MCNC: 246 MG/DL
VLDLC SERPL-MCNC: 42 MG/DL (ref 5–40)
WBC NRBC COR # BLD: 7.87 10*3/MM3 (ref 3.4–10.8)

## 2023-03-24 PROCEDURE — G0103 PSA SCREENING: HCPCS | Performed by: INTERNAL MEDICINE

## 2023-03-24 PROCEDURE — 36415 COLL VENOUS BLD VENIPUNCTURE: CPT | Performed by: INTERNAL MEDICINE

## 2023-03-24 PROCEDURE — 85025 COMPLETE CBC W/AUTO DIFF WBC: CPT | Performed by: INTERNAL MEDICINE

## 2023-03-24 PROCEDURE — 80061 LIPID PANEL: CPT | Performed by: INTERNAL MEDICINE

## 2023-03-24 PROCEDURE — 83036 HEMOGLOBIN GLYCOSYLATED A1C: CPT | Performed by: INTERNAL MEDICINE

## 2023-03-24 PROCEDURE — 80053 COMPREHEN METABOLIC PANEL: CPT | Performed by: INTERNAL MEDICINE

## 2023-03-28 ENCOUNTER — OFFICE VISIT (OUTPATIENT)
Dept: FAMILY MEDICINE CLINIC | Facility: CLINIC | Age: 63
End: 2023-03-28
Payer: COMMERCIAL

## 2023-03-28 VITALS
SYSTOLIC BLOOD PRESSURE: 116 MMHG | HEIGHT: 70 IN | DIASTOLIC BLOOD PRESSURE: 70 MMHG | WEIGHT: 155 LBS | BODY MASS INDEX: 22.19 KG/M2 | TEMPERATURE: 97.4 F | HEART RATE: 79 BPM | OXYGEN SATURATION: 99 %

## 2023-03-28 DIAGNOSIS — F33.42 RECURRENT MAJOR DEPRESSIVE DISORDER, IN FULL REMISSION: Chronic | ICD-10-CM

## 2023-03-28 DIAGNOSIS — J44.9 COPD, MODERATE: Chronic | ICD-10-CM

## 2023-03-28 DIAGNOSIS — I10 BENIGN ESSENTIAL HTN: Chronic | ICD-10-CM

## 2023-03-28 DIAGNOSIS — K21.9 GASTROESOPHAGEAL REFLUX DISEASE WITHOUT ESOPHAGITIS: Chronic | ICD-10-CM

## 2023-03-28 DIAGNOSIS — E78.2 MIXED HYPERLIPIDEMIA: Primary | Chronic | ICD-10-CM

## 2023-03-28 DIAGNOSIS — M15.9 PRIMARY OSTEOARTHRITIS INVOLVING MULTIPLE JOINTS: Chronic | ICD-10-CM

## 2023-03-28 DIAGNOSIS — C83.18 MANTLE CELL LYMPHOMA OF LYMPH NODES OF MULTIPLE REGIONS: Chronic | ICD-10-CM

## 2023-03-28 DIAGNOSIS — R73.02 IMPAIRED GLUCOSE TOLERANCE: Chronic | ICD-10-CM

## 2023-03-28 DIAGNOSIS — F17.218 NICOTINE DEPENDENCE, CIGARETTES, WITH OTHER NICOTINE-INDUCED DISORDERS: ICD-10-CM

## 2023-03-28 PROCEDURE — 99214 OFFICE O/P EST MOD 30 MIN: CPT | Performed by: INTERNAL MEDICINE

## 2023-03-28 RX ORDER — TIZANIDINE 4 MG/1
4 TABLET ORAL EVERY 8 HOURS PRN
Qty: 90 TABLET | Refills: 3 | Status: SHIPPED | OUTPATIENT
Start: 2023-03-28

## 2023-03-28 RX ORDER — FLUTICASONE FUROATE, UMECLIDINIUM BROMIDE AND VILANTEROL TRIFENATATE 100; 62.5; 25 UG/1; UG/1; UG/1
1 POWDER RESPIRATORY (INHALATION)
Qty: 1 EACH | Refills: 11 | Status: SHIPPED | OUTPATIENT
Start: 2023-03-28

## 2023-03-28 RX ORDER — ROSUVASTATIN CALCIUM 10 MG/1
10 TABLET, COATED ORAL DAILY
Qty: 90 TABLET | Refills: 3 | Status: SHIPPED | OUTPATIENT
Start: 2023-03-28

## 2023-03-28 RX ORDER — METOPROLOL SUCCINATE 25 MG/1
25 TABLET, EXTENDED RELEASE ORAL DAILY
Qty: 90 TABLET | Refills: 3 | Status: SHIPPED | OUTPATIENT
Start: 2023-03-28

## 2023-03-28 NOTE — PROGRESS NOTES
Chief Complaint  Mixed hyperlipidemia; Benign essential HTN - h/o tachycardia; Impaired glucose tolerance; Gastroesophageal reflux disease without esophagitis; Mantle cell lymphoma of lymph nodes of multiple regions (HC; Recurrent major depressive disorder, in full remission (HCC); and COPD, moderate (HCC)    Subjective        History of Present Illness     Mitch Rinaldi established care in our practice last fall and comes in today for 6-month follow up and review of labs. He is a former patient of Dr. Carmelo Farmer.  He lives in the [facility].  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  He worked for [employer] for 15 years as well.         His chronic medical issues include impaired fasting glucose, hyperlipidemia, GERD, hypertension, and osteoarthritis which causes chronic back and joint pain.  He takes Norco 7.5/325 mg every 6 hours as needed for the pain.  He was diagnosed with lung cancer and mantle cell lymphoma in 2020 and continues to follow with Dr. Samuel every 3 months. Patient states with his most recent visit with Dr. Samuel, his mantle cell lymphoma is progressing and anticipates he most likely will begin chemotherapy in June.      Patient stopped smoking in 2020 with the aid of Wellbutrin helping with the cravings after cessation, which he no longer takes.  Unfortunately, he admits he smokes a few cigarettes now and then and I continue to encourage getting completely off of the tobacco products. He continues to use albuterol rescue inhaler and has Symbicort to help manage COPD and reports noticing worsening dyspnea on exertion.  We discussed stepping up therapy by replacing his Symbicort maintenance inhaler with Trelegy.     Weight is down 7 pounds in the past six months.   Labs continue to reveal IFG, not progressed to Type 2 diabetes.  A1c improved from 5.7 to 5.3 with weight loss.     Lab results are reviewed with the patient today.  Fasting  "glucose 105.  Renal function has declined with creatinine 1.3 increased from 1.1. he denies NSAID use.  He admits he doesn't drink a lot of water during winter months.   Cholesterol acceptable with Crestor 10 mg daily, although, HDL low at 27.  Triglycerides above goal at 246.  He ran out of the Crestor a week ago and states he has noticed body aches are not nearly as bad with being off.  We discussed adding Co Enzyme Q-10 when resuming the Crestor to help improve tolerability.  PSA reveals no evidence of prostate cancer.         Objective   Vital Signs:  /70 (BP Location: Left arm, Patient Position: Sitting, Cuff Size: Large Adult)   Pulse 79   Temp 97.4 °F (36.3 °C) (Tympanic)   Ht 177.8 cm (70\")   Wt 70.3 kg (155 lb)   SpO2 99%   BMI 22.24 kg/m²   Estimated body mass index is 22.24 kg/m² as calculated from the following:    Height as of this encounter: 177.8 cm (70\").    Weight as of this encounter: 70.3 kg (155 lb).       BMI is within normal parameters. No other follow-up for BMI required.      Physical Exam  Vitals reviewed.   Constitutional:       General: He is not in acute distress.     Appearance: He is well-developed.      Comments: Pleasant male.    HENT:      Head: Normocephalic and atraumatic.      Nose:      Right Sinus: No maxillary sinus tenderness or frontal sinus tenderness.      Left Sinus: No maxillary sinus tenderness or frontal sinus tenderness.      Mouth/Throat:      Mouth: No oral lesions.      Pharynx: Uvula midline.      Tonsils: No tonsillar exudate.   Eyes:      Conjunctiva/sclera: Conjunctivae normal.      Pupils: Pupils are equal, round, and reactive to light.   Neck:      Thyroid: No thyroid mass or thyromegaly.      Vascular: No carotid bruit or JVD.      Trachea: Trachea normal. No tracheal deviation.   Cardiovascular:      Rate and Rhythm: Normal rate and regular rhythm.  No extrasystoles are present.     Chest Wall: PMI is not displaced.      Heart sounds: Normal " heart sounds. No murmur heard.  Pulmonary:      Effort: Pulmonary effort is normal. No accessory muscle usage or respiratory distress.      Breath sounds: Normal breath sounds. No decreased breath sounds, wheezing, rhonchi or rales.      Comments: Chronic lung sounds and moderate air movement.   Abdominal:      General: Bowel sounds are normal. There is no distension.      Palpations: Abdomen is soft.      Tenderness: There is no abdominal tenderness.   Musculoskeletal:      Cervical back: Neck supple.   Feet:      Comments: Pulses 1+ bilateral ankles.    Lymphadenopathy:      Cervical: No cervical adenopathy.   Skin:     General: Skin is warm and dry.      Findings: No rash.      Nails: There is no clubbing.   Neurological:      Mental Status: He is alert and oriented to person, place, and time.      Cranial Nerves: No cranial nerve deficit.      Coordination: Coordination normal.   Psychiatric:         Speech: Speech normal.         Behavior: Behavior normal.         Thought Content: Thought content normal.         Judgment: Judgment normal.            Result Review :    CMP    CMP 11/11/22 2/17/23 3/24/23   Glucose 99 105 (A) 105 (A)   BUN 11 17 20   Creatinine 1.03 1.14 1.34 (A)   eGFR 81.1 71.8 59.2 (A)   Sodium 138 136 136 (A)   Potassium 3.8 4.7 4.5   Chloride 103 103 100 (A)   Calcium 9.2 9.2 8.9   Total Protein 7.7 8.3 8.5   Albumin 4.00 4.2 4.3   Globulin 3.7 4.1 4.2 (A)   Total Bilirubin 0.3 0.3 0.6   Alkaline Phosphatase 103 105 114   AST (SGOT) 13 16 20   ALT (SGPT) 5 6 14   Albumin/Globulin Ratio 1.1 1.0 1.0 (A)   BUN/Creatinine Ratio 10.7 14.9 14.9   Anion Gap 11.0 9.0 9.0   (A) Abnormal value       Comments are available for some flowsheets but are not being displayed.           CBC w/diff    CBC w/Diff 11/11/22 2/17/23 3/24/23   WBC 6.76 7.71 7.87   RBC 4.61 4.56 4.37   Hemoglobin 13.9 13.6 13.5   Hematocrit 40.0 40.8 40.2   MCV 86.8 89.5 92.0   MCH 30.2 29.8 30.9   MCHC 34.8 33.3 33.6   RDW 12.7  13.0 13.4   Platelets 147 172 164   Neutrophil Rel %  61.6 58.0   Immature Granulocyte Rel %  0.3    Lymphocyte Rel %  25.0 26.6   Monocyte Rel %  7.3 8.6   Eosinophil Rel %  4.8 5.8   Basophil Rel %  1.0 1.0           Lipid Panel    Lipid Panel 9/22/22 3/24/23   Total Cholesterol  161   Triglycerides  246 (A)   HDL Cholesterol  27 (A)   VLDL Cholesterol  42 (A)   LDL Cholesterol  56 92   LDL/HDL Ratio  3.14   (A) Abnormal value            TSH    TSH 9/22/22   TSH 1.040           A1C Last 3 Results    HGBA1C Last 3 Results 3/24/23   Hemoglobin A1C 5.30           PSA    PSA 3/24/23   PSA 0.187           Data reviewed: Consultant notes Dr. Samuel, oncology             Assessment and Plan   Diagnoses and all orders for this visit:    1. Mixed hyperlipidemia (Primary)  -     CBC Auto Differential; Future  -     Comprehensive Metabolic Panel; Future  -     LDL Cholesterol, Direct; Future  -     TSH; Future    2. Primary osteoarthritis involving multiple joints  -     tiZANidine (ZANAFLEX) 4 MG tablet; Take 1 tablet by mouth Every 8 (Eight) Hours As Needed for Muscle Spasms.  Dispense: 90 tablet; Refill: 3    3. Benign essential HTN - h/o tachycardia  -     Comprehensive Metabolic Panel; Future    4. Impaired glucose tolerance  -     Comprehensive Metabolic Panel; Future  -     Hemoglobin A1c; Future    5. Gastroesophageal reflux disease without esophagitis  -     CBC Auto Differential; Future    6. Mantle cell lymphoma of lymph nodes of multiple regions (HCC)  -     CBC Auto Differential; Future    7. Recurrent major depressive disorder, in full remission (HCC)  -     Comprehensive Metabolic Panel; Future  -     TSH; Future    8. COPD, moderate (HCC)  -     CBC Auto Differential; Future  -     Comprehensive Metabolic Panel; Future    9. Nicotine dependence, cigarettes, with other nicotine-induced disorders    Other orders  -     rosuvastatin (CRESTOR) 10 MG tablet; Take 1 tablet by mouth Daily.  Dispense: 90 tablet;  Refill: 3  -     metoprolol succinate XL (Toprol XL) 25 MG 24 hr tablet; Take 1 tablet by mouth Daily.  Dispense: 90 tablet; Refill: 3  -     Fluticasone-Umeclidin-Vilant (Trelegy Ellipta) 100-62.5-25 MCG/ACT inhaler; Inhale 1 puff Daily.  Dispense: 1 each; Refill: 11                Resume Crestor and 81 mg aspirin.  Recommended he start daily Co Enzyme Q-10 to help improve tolerability. Patient is currently taking 1/2 of metoprolol 50 mg, which was started for HR control.  We will switch to extended release metoprolol since he is only taking once daily.  BP and HR at goal.     To help address worsening COPD, prescription sent for Trelegy to replace Symbicort maintenance inhaler.  He can continue using albuterol rescue inhaler.        Renal function declined.  Avoid frequent use of NSAIDs.  Hydrate well.  We will continue to monitor.     Continue Prozac for CHARLES/depression.  He took Wellbutrin for a while for smoking cessation cravings, but is no longer taking.  He does admit to smoking a few cigarettes from time to time.  I advised the patient of the risks in continuing to use tobacco products.  Patient is urged to stop smoking and never start again.       Continue dietary efforts to maintain the weight loss and help delay progression of IFG, improved with weight loss.     Continue OTC Nexium for GERD.    Continue visits with Dr. Samuel, oncology, to follow lung cancer and mantle cell lymphoma.  He anticipates he will be starting chemotherapy this summer to address progressing lymphoma.       Return in 6 months for routine follow up with fasting labs one week prior or sooner if needed.     Scribed for Dr. Richey by Kristie Cantrell [facility].     Follow Up   Return in about 6 months (around 9/28/2023) for Follow up in six months with labs one week prior., Next scheduled follow up - labs 1 week prior.  Patient was given instructions and counseling regarding his condition or for health maintenance advice. Please see  specific information pulled into the AVS if appropriate.

## 2023-04-13 DIAGNOSIS — M15.9 PRIMARY OSTEOARTHRITIS INVOLVING MULTIPLE JOINTS: Chronic | ICD-10-CM

## 2023-04-13 RX ORDER — HYDROCODONE BITARTRATE AND ACETAMINOPHEN 7.5; 325 MG/1; MG/1
1 TABLET ORAL EVERY 6 HOURS PRN
Qty: 160 TABLET | Refills: 0 | Status: SHIPPED | OUTPATIENT
Start: 2023-04-13 | End: 2023-05-23

## 2023-04-13 NOTE — TELEPHONE ENCOUNTER
Rx Refill Note  Requested Prescriptions     Pending Prescriptions Disp Refills   • HYDROcodone-acetaminophen (NORCO) 7.5-325 MG per tablet 160 tablet 0     Sig: Take 1 tablet by mouth Every 6 (Six) Hours As Needed for Severe Pain for up to 40 days.      Last office visit with prescribing clinician: 2/22/2023   Last telemedicine visit with prescribing clinician: 5/22/2023   Next office visit with prescribing clinician: 5/22/2023                         Would you like a call back once the refill request has been completed: [] Yes [] No    If the office needs to give you a call back, can they leave a voicemail: [] Yes [] No    Delfina El, [facility]  04/13/23, 14:28 CDT

## 2023-05-19 ENCOUNTER — HOSPITAL ENCOUNTER (OUTPATIENT)
Dept: CT IMAGING | Facility: HOSPITAL | Age: 63
Discharge: HOME | End: 2023-05-19
Payer: COMMERCIAL

## 2023-05-19 ENCOUNTER — LAB (OUTPATIENT)
Dept: LAB | Facility: HOSPITAL | Age: 63
End: 2023-05-19
Payer: COMMERCIAL

## 2023-05-19 ENCOUNTER — TRANSCRIBE ORDERS (OUTPATIENT)
Dept: CT IMAGING | Facility: HOSPITAL | Age: 63
End: 2023-05-19
Payer: COMMERCIAL

## 2023-05-19 DIAGNOSIS — C83.18 MANTLE CELL LYMPHOMA OF LYMPH NODES OF MULTIPLE REGIONS: Chronic | ICD-10-CM

## 2023-05-19 DIAGNOSIS — C34.12 CANCER OF UPPER LOBE OF LEFT LUNG: Chronic | ICD-10-CM

## 2023-05-19 DIAGNOSIS — C34.12 PANCOASTS SYNDROME, LEFT: Primary | ICD-10-CM

## 2023-05-19 DIAGNOSIS — C83.18 MANTLE CELL LYMPHOMA OF LYMPH NODES OF MULTIPLE REGIONS: ICD-10-CM

## 2023-05-19 DIAGNOSIS — C34.12 CANCER OF UPPER LOBE OF LEFT LUNG: ICD-10-CM

## 2023-05-19 LAB
ALBUMIN SERPL-MCNC: 3.7 G/DL (ref 3.5–5.2)
ALBUMIN/GLOB SERPL: 0.9 G/DL
ALP SERPL-CCNC: 105 U/L (ref 39–117)
ALT SERPL W P-5'-P-CCNC: 5 U/L (ref 1–41)
ANION GAP SERPL CALCULATED.3IONS-SCNC: 12 MMOL/L (ref 5–15)
AST SERPL-CCNC: 12 U/L (ref 1–40)
BASOPHILS # BLD AUTO: 0.09 10*3/MM3 (ref 0–0.2)
BASOPHILS NFR BLD AUTO: 1.3 % (ref 0–1.5)
BILIRUB SERPL-MCNC: 0.3 MG/DL (ref 0–1.2)
BUN SERPL-MCNC: 18 MG/DL (ref 8–23)
BUN/CREAT SERPL: 17 (ref 7–25)
CALCIUM SPEC-SCNC: 8.9 MG/DL (ref 8.6–10.5)
CHLORIDE SERPL-SCNC: 101 MMOL/L (ref 98–107)
CO2 SERPL-SCNC: 24 MMOL/L (ref 22–29)
CREAT SERPL-MCNC: 1.06 MG/DL (ref 0.76–1.27)
DEPRECATED RDW RBC AUTO: 40.9 FL (ref 37–54)
EGFRCR SERPLBLD CKD-EPI 2021: 78.4 ML/MIN/1.73
EOSINOPHIL # BLD AUTO: 0.54 10*3/MM3 (ref 0–0.4)
EOSINOPHIL NFR BLD AUTO: 7.5 % (ref 0.3–6.2)
ERYTHROCYTE [DISTWIDTH] IN BLOOD BY AUTOMATED COUNT: 12.9 % (ref 12.3–15.4)
GLOBULIN UR ELPH-MCNC: 4.1 GM/DL
GLUCOSE SERPL-MCNC: 98 MG/DL (ref 65–99)
HCT VFR BLD AUTO: 39.1 % (ref 37.5–51)
HGB BLD-MCNC: 13.5 G/DL (ref 13–17.7)
IMM GRANULOCYTES # BLD AUTO: 0.04 10*3/MM3 (ref 0–0.05)
IMM GRANULOCYTES NFR BLD AUTO: 0.6 % (ref 0–0.5)
LYMPHOCYTES # BLD AUTO: 1.7 10*3/MM3 (ref 0.7–3.1)
LYMPHOCYTES NFR BLD AUTO: 23.7 % (ref 19.6–45.3)
MCH RBC QN AUTO: 30 PG (ref 26.6–33)
MCHC RBC AUTO-ENTMCNC: 34.5 G/DL (ref 31.5–35.7)
MCV RBC AUTO: 86.9 FL (ref 79–97)
MONOCYTES # BLD AUTO: 0.57 10*3/MM3 (ref 0.1–0.9)
MONOCYTES NFR BLD AUTO: 7.9 % (ref 5–12)
NEUTROPHILS NFR BLD AUTO: 4.23 10*3/MM3 (ref 1.7–7)
NEUTROPHILS NFR BLD AUTO: 59 % (ref 42.7–76)
NRBC BLD AUTO-RTO: 0 /100 WBC (ref 0–0.2)
PLATELET # BLD AUTO: 159 10*3/MM3 (ref 140–450)
PMV BLD AUTO: 10.1 FL (ref 6–12)
POTASSIUM SERPL-SCNC: 3.9 MMOL/L (ref 3.5–5.2)
PROT SERPL-MCNC: 7.8 G/DL (ref 6–8.5)
RBC # BLD AUTO: 4.5 10*6/MM3 (ref 4.14–5.8)
SODIUM SERPL-SCNC: 137 MMOL/L (ref 136–145)
WBC NRBC COR # BLD: 7.17 10*3/MM3 (ref 3.4–10.8)

## 2023-05-19 PROCEDURE — 85025 COMPLETE CBC W/AUTO DIFF WBC: CPT

## 2023-05-19 PROCEDURE — 25510000001 IOPAMIDOL 61 % SOLUTION: Performed by: INTERNAL MEDICINE

## 2023-05-19 PROCEDURE — 71260 CT THORAX DX C+: CPT

## 2023-05-19 PROCEDURE — 74177 CT ABD & PELVIS W/CONTRAST: CPT

## 2023-05-19 PROCEDURE — 80053 COMPREHEN METABOLIC PANEL: CPT

## 2023-05-19 PROCEDURE — 36415 COLL VENOUS BLD VENIPUNCTURE: CPT

## 2023-05-19 RX ADMIN — IOPAMIDOL 90 ML: 612 INJECTION, SOLUTION INTRAVENOUS at 09:00

## 2023-05-21 RX ORDER — HYDROCODONE BITARTRATE AND ACETAMINOPHEN 7.5; 325 MG/1; MG/1
1 TABLET ORAL EVERY 6 HOURS PRN
Qty: 160 TABLET | Refills: 0 | Status: SHIPPED | OUTPATIENT
Start: 2023-05-21 | End: 2023-06-30

## 2023-05-21 NOTE — PROGRESS NOTES
"Chief Complaint  Lung cancer, mantle cell lymphoma     Subjective        Mitch Rinaldi presents to [facility]  History of Present Illness     No new health issues since last visit.   No new medications.   No new hospitalization.   Feels well.  Chronic fatigue +       Objective   Vital Signs:  There were no vitals taken for this visit.  Estimated body mass index is 22.24 kg/m² as calculated from the following:    Height as of 3/28/23: 177.8 cm (70\").    Weight as of 3/28/23: 70.3 kg (155 lb).       BMI is within normal parameters. No other follow-up for BMI required.      Physical Exam  Vitals and nursing note reviewed.   Constitutional:       Appearance: Normal appearance.   Lymphadenopathy:      Cervical: Cervical adenopathy present.      Upper Body:      Right upper body: Axillary adenopathy present.      Left upper body: Axillary adenopathy present.   Neurological:      General: No focal deficit present.      Mental Status: He is alert and oriented to person, place, and time. Mental status is at baseline.   Psychiatric:         Mood and Affect: Mood normal.         Behavior: Behavior normal.         Thought Content: Thought content normal.        Result Review :  The following data was reviewed by: Atiya Alexis MD on 05/22/2023:  Common labs        2/17/2023    09:02 3/24/2023    08:08 5/19/2023    08:10   Common Labs   Glucose 105   105   98     BUN 17   20   18     Creatinine 1.14   1.34   1.06     Sodium 136   136   137     Potassium 4.7   4.5   3.9     Chloride 103   100   101     Calcium 9.2   8.9   8.9     Albumin 4.2   4.3   3.7     Total Bilirubin 0.3   0.6   0.3     Alkaline Phosphatase 105   114   105     AST (SGOT) 16   20   12     ALT (SGPT) 6   14   5     WBC 7.71   7.87   7.17     Hemoglobin 13.6   13.5   13.5     Hematocrit 40.8   40.2   39.1     Platelets 172   164   159     Total Cholesterol  161      Triglycerides  246      HDL " Cholesterol  27      LDL Cholesterol   92      Hemoglobin A1C  5.30      PSA  0.187        CMP        2/17/2023    09:02 3/24/2023    08:08 5/19/2023    08:10   CMP   Glucose 105   105   98     BUN 17   20   18     Creatinine 1.14   1.34   1.06     EGFR 71.8   59.2   78.4     Sodium 136   136   137     Potassium 4.7   4.5   3.9     Chloride 103   100   101     Calcium 9.2   8.9   8.9     Total Protein 8.3   8.5   7.8     Albumin 4.2   4.3   3.7     Globulin 4.1   4.2   4.1     Total Bilirubin 0.3   0.6   0.3     Alkaline Phosphatase 105   114   105     AST (SGOT) 16   20   12     ALT (SGPT) 6   14   5     Albumin/Globulin Ratio 1.0   1.0   0.9     BUN/Creatinine Ratio 14.9   14.9   17.0     Anion Gap 9.0   9.0   12.0       CBC        2/17/2023    09:02 3/24/2023    08:08 5/19/2023    08:10   CBC   WBC 7.71   7.87   7.17     RBC 4.56   4.37   4.50     Hemoglobin 13.6   13.5   13.5     Hematocrit 40.8   40.2   39.1     MCV 89.5   92.0   86.9     MCH 29.8   30.9   30.0     MCHC 33.3   33.6   34.5     RDW 13.0   13.4   12.9     Platelets 172   164   159       CBC w/diff        2/17/2023    09:02 3/24/2023    08:08 5/19/2023    08:10   CBC w/Diff   WBC 7.71   7.87   7.17     RBC 4.56   4.37   4.50     Hemoglobin 13.6   13.5   13.5     Hematocrit 40.8   40.2   39.1     MCV 89.5   92.0   86.9     MCH 29.8   30.9   30.0     MCHC 33.3   33.6   34.5     RDW 13.0   13.4   12.9     Platelets 172   164   159     Neutrophil Rel % 61.6   58.0   59.0     Immature Granulocyte Rel % 0.3    0.6     Lymphocyte Rel % 25.0   26.6   23.7     Monocyte Rel % 7.3   8.6   7.9     Eosinophil Rel % 4.8   5.8   7.5     Basophil Rel % 1.0   1.0   1.3       Data reviewed: Radiologic studies result of CT C/A/P reviewed.     CT Chest With Contrast Diagnostic    Result Date: 5/19/2023  1.  Mediastinal, bilateral hilar, bilateral axillary and upper abdominal lymphadenopathy appears stable. 2.  Splenomegaly is again seen. 3.  A spiculated nodule/mass  of the left upper lobe extending contiguously into the hilum also appears stable.     Mitch Rinaldi reports a pain score of 0.  Given his pain assessment as noted, treatment options were discussed and the following options were decided upon as a follow-up plan to address the patient's pain: continuation of current treatment plan for pain.    Patient screened negative for depression based on a PHQ-9 score of 0 on 5/22/2023.     Assessment and Plan   Diagnoses and all orders for this visit:    1. Cancer of upper lobe of left lung (Primary)  -     CT chest w contrast; Future      Chronic, stable.   Diagnosed on 8/2020. Biopsy was non-diagnostic. Serial imaging showed increase in size.   Repeat biopsy showed moderate to poorly diffrentiated SCC of lung.   S/p SBRT in 2/2021.   He remains under surveillance.   Recent CT showed no evidence of progressive disease. Mediastinal/hilar adenopathy is from mantle cell lymphoma.   Continue surveillance for lung cancer.       2. Mantle cell lymphoma of lymph nodes of multiple regions  -     CBC & Differential; Future  -     Comprehensive Metabolic Panel; Future  -     CT abdomen pelvis w contrast; Future    Chronic, stable.     Patient with diagnosis of CD5 negative mantle cell lymphoma.  IHC positive for cyclin D1  FISH is positive for t(11,14) translocation.   Cytogenetics are normal.  Ki-67 -10 to 15%.  Bone marrow aspiration biopsy was negative.    He does not have any B symptoms.   CT showed stable adenopathy.   Give he is asymptomatic, we will continue with wait and watch for indolent mantle cell lymphoma.   He might need treatment in near future however given he is asymptomatic continue wait and watch.   I will order CT PET in 3 months for surveillance of lung cancer.   CBC, CMP , PET in 3 months.     3. Primary osteoarthritis involving multiple joints  -     HYDROcodone-acetaminophen (NORCO) 7.5-325 MG per tablet; Take 1 tablet by mouth Every 6 (Six) Hours As Needed for  Severe Pain for up to 40 days.  Dispense: 160 tablet; Refill: 0    Chronic, stable.   He is on norco.   He will continue this.   New prescription sent.                Follow Up   No follow-ups on file.  Patient was given instructions and counseling regarding his condition or for health maintenance advice. Please see specific information pulled into the AVS if appropriate.

## 2023-05-22 ENCOUNTER — OFFICE VISIT (OUTPATIENT)
Dept: ONCOLOGY | Facility: CLINIC | Age: 63
End: 2023-05-22
Payer: COMMERCIAL

## 2023-05-22 VITALS
OXYGEN SATURATION: 92 % | HEART RATE: 88 BPM | BODY MASS INDEX: 22.81 KG/M2 | DIASTOLIC BLOOD PRESSURE: 71 MMHG | WEIGHT: 159 LBS | RESPIRATION RATE: 18 BRPM | SYSTOLIC BLOOD PRESSURE: 119 MMHG

## 2023-05-22 DIAGNOSIS — M15.9 PRIMARY OSTEOARTHRITIS INVOLVING MULTIPLE JOINTS: Chronic | ICD-10-CM

## 2023-05-22 DIAGNOSIS — C34.12 CANCER OF UPPER LOBE OF LEFT LUNG: Primary | Chronic | ICD-10-CM

## 2023-05-22 DIAGNOSIS — C83.18 MANTLE CELL LYMPHOMA OF LYMPH NODES OF MULTIPLE REGIONS: Chronic | ICD-10-CM

## 2023-08-04 DIAGNOSIS — M15.9 PRIMARY OSTEOARTHRITIS INVOLVING MULTIPLE JOINTS: Chronic | ICD-10-CM

## 2023-08-04 NOTE — TELEPHONE ENCOUNTER
Rx Refill Note  Requested Prescriptions     Pending Prescriptions Disp Refills    HYDROcodone-acetaminophen (NORCO) 7.5-325 MG per tablet 160 tablet 0     Sig: Take 1 tablet by mouth Every 6 (Six) Hours As Needed for Severe Pain for up to 40 days.      Last office visit with prescribing clinician: 5/22/2023   Last telemedicine visit with prescribing clinician: Visit date not found   Next office visit with prescribing clinician: 8/23/2023                         Would you like a call back once the refill request has been completed: [] Yes [] No    If the office needs to give you a call back, can they leave a voicemail: [] Yes [] No    Christen Oneill, [facility]  08/04/23, 13:51 CDT

## 2023-08-06 RX ORDER — HYDROCODONE BITARTRATE AND ACETAMINOPHEN 7.5; 325 MG/1; MG/1
1 TABLET ORAL EVERY 6 HOURS PRN
Qty: 160 TABLET | Refills: 0 | Status: SHIPPED | OUTPATIENT
Start: 2023-08-06 | End: 2023-09-15

## 2023-08-25 ENCOUNTER — TELEPHONE (OUTPATIENT)
Dept: ONCOLOGY | Facility: CLINIC | Age: 63
End: 2023-08-25

## 2023-08-25 NOTE — TELEPHONE ENCOUNTER
Reason for call: Itzel Gutierrez from [facility] [employer] called due to insurance has denied his Pet Scan due to [employer].  Dr Garcia was going to be consulted and get back to her.  Needs an answer from Dr Garcia by noon to know what to do.    The reason is related to: other      Best phone number to return call: [phone]

## 2023-08-26 ENCOUNTER — LAB (OUTPATIENT)
Dept: LAB | Facility: HOSPITAL | Age: 63
End: 2023-08-26
Payer: MEDICARE

## 2023-08-26 ENCOUNTER — HOSPITAL ENCOUNTER (OUTPATIENT)
Dept: PET IMAGING | Facility: HOSPITAL | Age: 63
Discharge: HOME | End: 2023-08-26
Payer: MEDICARE

## 2023-08-26 DIAGNOSIS — C83.18 MANTLE CELL LYMPHOMA OF LYMPH NODES OF MULTIPLE REGIONS: Chronic | ICD-10-CM

## 2023-08-26 LAB
BUN SERPL-MCNC: 15 MG/DL (ref 8–23)
CREAT SERPL-MCNC: 1.21 MG/DL (ref 0.76–1.27)
EGFRCR SERPLBLD CKD-EPI 2021: 66.4 ML/MIN/1.73

## 2023-08-26 PROCEDURE — 84520 ASSAY OF UREA NITROGEN: CPT | Performed by: INTERNAL MEDICINE

## 2023-08-26 PROCEDURE — 78815 PET IMAGE W/CT SKULL-THIGH: CPT

## 2023-08-26 PROCEDURE — A9552 F18 FDG: HCPCS | Performed by: INTERNAL MEDICINE

## 2023-08-26 PROCEDURE — 82565 ASSAY OF CREATININE: CPT | Performed by: INTERNAL MEDICINE

## 2023-08-26 PROCEDURE — 0 FLUDEOXYGLUCOSE F18 SOLUTION: Performed by: INTERNAL MEDICINE

## 2023-08-26 RX ADMIN — SODIUM FLUORIDE F 18 1 DOSE: 200 INJECTION, SOLUTION INTRAVENOUS at 09:04

## 2023-08-30 ENCOUNTER — OFFICE VISIT (OUTPATIENT)
Dept: SURGERY | Facility: CLINIC | Age: 63
End: 2023-08-30
Payer: COMMERCIAL

## 2023-08-30 ENCOUNTER — OFFICE VISIT (OUTPATIENT)
Dept: ONCOLOGY | Facility: CLINIC | Age: 63
End: 2023-08-30
Payer: MEDICARE

## 2023-08-30 VITALS
TEMPERATURE: 98.2 F | SYSTOLIC BLOOD PRESSURE: 100 MMHG | DIASTOLIC BLOOD PRESSURE: 64 MMHG | HEART RATE: 105 BPM | HEIGHT: 70 IN | BODY MASS INDEX: 21.64 KG/M2 | WEIGHT: 151.2 LBS | OXYGEN SATURATION: 98 %

## 2023-08-30 VITALS
RESPIRATION RATE: 18 BRPM | DIASTOLIC BLOOD PRESSURE: 65 MMHG | BODY MASS INDEX: 21.67 KG/M2 | HEART RATE: 91 BPM | WEIGHT: 151 LBS | OXYGEN SATURATION: 97 % | SYSTOLIC BLOOD PRESSURE: 116 MMHG

## 2023-08-30 DIAGNOSIS — C83.18 MANTLE CELL LYMPHOMA OF LYMPH NODES OF MULTIPLE REGIONS: Primary | Chronic | ICD-10-CM

## 2023-08-30 DIAGNOSIS — C34.12 CANCER OF UPPER LOBE OF LEFT LUNG: Chronic | ICD-10-CM

## 2023-08-30 PROCEDURE — 99204 OFFICE O/P NEW MOD 45 MIN: CPT | Performed by: SURGERY

## 2023-08-30 RX ORDER — BUPIVACAINE HCL/0.9 % NACL/PF 0.1 %
2000 PLASTIC BAG, INJECTION (ML) EPIDURAL ONCE
OUTPATIENT
Start: 2023-09-05 | End: 2023-08-30

## 2023-08-30 NOTE — H&P (VIEW-ONLY)
Chief Complaint   Patient presents with    Consult     Ref: herman Alexis        63-year-old male with a history of mantle cell lymphoma.  Due to progression, oncology has recommended chemotherapy.  He is here today to be evaluated for port placement.  He states that he broke his left collarbone as a child and had radiation to his left lung for lung cancer.  He has never had a central line placed.      Past Medical History:   Diagnosis Date    Acute gastroenteritis     Allergic     Anxiety     Asthma     as a  child    Broken femur     Diverticulitis of colon     Emphysema lung     Epididymal cyst     Gastroesophageal reflux disease     Generalized anxiety disorder     Hyperlipidemia     Hypertension     Impaired glucose tolerance     Left upper lobe pulmonary nodule 06/01/2020    Added automatically from request for surgery [id number]    Lymphoma     Pain, lumbar region     Pain radiating to lumbar region of back       Peripheral nervous system disease     Disorder of the peripheral nervous system       Pleuritic chest pain     Pneumonia     Pneumothorax on left     4    Primary osteoarthritis involving multiple joints 08/29/2016    Shoulder pain, left     Spermatocele 04/25/2018    Added automatically from request for surgery [id number]    Surgical aftercare, respiratory system 06/11/2020    Tachycardia     Tinnitus     Wears glasses     Wears partial dentures        Past Surgical History:   Procedure Laterality Date    CHOLECYSTECTOMY      COLONOSCOPY N/A 01/14/2019    Procedure: COLONOSCOPY;  Surgeon: Ronald Rodriguez DO;  Location: [facility] ENDOSCOPY;  Service: Gastroenterology    DENTAL PROCEDURE      ENDOSCOPY AND COLONOSCOPY      EPIDIDYMIS SURGERY Right     cyst    HERNIA REPAIR  02/2017    INGUINAL HERNIA REPAIR Left 02/06/2017    Procedure: OPEN LEFT INGUINAL HERNIA REPAIR WITH MESH;  Surgeon: Miles Gregorio MD;  Location: [facility] OR;  Service:     INJECTION OF MEDICATION      Depo Medrol  (Methylprednisone) 80mg (8)      09/01/2015        INJECTION OF MEDICATION      Kenalog (1)      12/11/2012        LUNG SURGERY       Pneumothorax 1980        LYMPH NODE BIOPSY N/A 06/03/2020    Procedure: BIOPSY LYMPH NODE;  Surgeon: Bry Salvador MD;  Location: [facility];  Service: Cardiothoracic;  Laterality: N/A;    MEDIASTINOSCOPY, BRONCHOSCOPY N/A 06/03/2020    Procedure: MEDIASTINOSCOPY, BRONCHOSCOPY cervical  lymph node biopsy;  Surgeon: Bry Salvador MD;  Location: [facility] OR;  Service: Cardiothoracic;  Laterality: N/A;    SPERMATOCELECTOMY Left 05/11/2018    Procedure: LEFT SPERMATOCELECTOMY;  Surgeon: Miles Groves MD;  Location: [facility] OR;  Service: Urology    WISDOM TOOTH EXTRACTION      4         Current Outpatient Medications:     albuterol sulfate  (90 Base) MCG/ACT inhaler, Inhale 2 puffs Every 4 (Four) Hours As Needed for Wheezing., Disp: 18 g, Rfl: 3    aspirin 81 MG EC tablet, Take 1 tablet by mouth Daily., Disp: , Rfl:     cetirizine (ZyrTEC) 10 MG tablet, Take 1 tablet by mouth Daily As Needed for Allergies., Disp: , Rfl:     esomeprazole (nexIUM) 20 MG capsule, Take 1 capsule by mouth Every Morning Before Breakfast., Disp: , Rfl:     FLUoxetine (PROzac) 40 MG capsule, Take 1 capsule by mouth Daily., Disp: 30 capsule, Rfl: 5    fluticasone (FLONASE) 50 MCG/ACT nasal spray, 2 sprays into the nostril(s) as directed by provider Daily As Needed for Rhinitis., Disp: , Rfl:     Fluticasone-Umeclidin-Vilant (Trelegy Ellipta) 100-62.5-25 MCG/ACT inhaler, Inhale 1 puff Daily., Disp: 1 each, Rfl: 11    HYDROcodone-acetaminophen (NORCO) 7.5-325 MG per tablet, Take 1 tablet by mouth Every 6 (Six) Hours As Needed for Severe Pain for up to 40 days., Disp: 160 tablet, Rfl: 0    metoprolol succinate XL (Toprol XL) 25 MG 24 hr tablet, Take 1 tablet by mouth Daily., Disp: 90 tablet, Rfl: 3    promethazine (PHENERGAN) 25 MG tablet, TAKE 1 TABLET BY MOUTH EVERY 6 (SIX) HOURS AS NEEDED  FOR NAUSEA OR VOMITING., Disp: 30 tablet, Rfl: 0    rosuvastatin (CRESTOR) 10 MG tablet, Take 1 tablet by mouth Daily., Disp: 90 tablet, Rfl: 3    tamsulosin (FLOMAX) 0.4 MG capsule 24 hr capsule, Take 1 capsule by mouth Every Night., Disp: 90 capsule, Rfl: 3    tiZANidine (ZANAFLEX) 4 MG tablet, Take 1 tablet by mouth Every 8 (Eight) Hours As Needed for Muscle Spasms., Disp: 90 tablet, Rfl: 3    Allergies   Allergen Reactions    Darvon [Propoxyphene] Nausea And Vomiting    Percocet [Oxycodone-Acetaminophen] Palpitations       Immunization History   Administered Date(s) Administered    COVID-19 (PFIZER) Purple Cap Monovalent 03/02/2021, 03/23/2021, 09/22/2021    Covid-19 (Pfizer) Gray Cap Monovalent 07/23/2022    Flu Vaccine Quad PF >36MO 10/09/2018    FluMist 2-49yrs 10/20/2017    Fluzone >6mos 09/25/2020    Influenza, Unspecified 10/07/2022    Pneumococcal Polysaccharide (PPSV23) 11/02/2018    Shingrix 01/31/2019    Tdap 05/31/2016    flucelvax quad pfs =>4 YRS 12/03/2019    influenza Split 01/01/2006, 12/12/2007, 10/01/2009, 10/01/2011, 10/14/2013, 10/15/2014, 10/13/2015, 10/31/2016        Family History   Problem Relation Age of Onset    Dementia Mother     Hypertension Mother     Thyroid disease Mother     Arthritis Mother     Diabetes Father     Stomach cancer Father     Cancer Father         stomach    Alcohol abuse Brother     Thyroid disease Brother     Heart valve disorder Brother     Heart block Brother     Hearing loss Brother     Down syndrome Brother     Scoliosis Brother     No Known Problems Daughter     No Known Problems Son     COPD Maternal Aunt     COPD Maternal Aunt     Alcohol abuse Paternal Uncle     Alcohol abuse Paternal Uncle     Heart attack Maternal Grandmother     Cancer Maternal Grandfather        Social History     Socioeconomic History    Marital status: Single   Tobacco Use    Smoking status: Some Days     Packs/day: 0.25     Years: 42.00     Pack years: 10.50     Types: Cigarettes      Last attempt to quit: 2020     Years since quittin.9     Passive exposure: Past    Smokeless tobacco: Former     Types: Snuff     Quit date:     Tobacco comments:     Passive for 20 + years   Vaping Use    Vaping Use: Never used   Substance and Sexual Activity    Alcohol use: Not Currently     Comment: none since 1 drink in 2023    Drug use: Not Currently     Types: Marijuana     Comment: none in 40 years    Sexual activity: Defer       Review of Systems   Respiratory:  Positive for shortness of breath.    Gastrointestinal:  Positive for abdominal distention.   Musculoskeletal:  Positive for arthralgias and back pain.   Hematological:  Bruises/bleeds easily.   All other systems reviewed and are negative.    Vitals:    23 1041   BP: 100/64   Pulse: 105   Temp: 98.2 °F (36.8 °C)   SpO2: 98%       Physical Exam  Constitutional:       Appearance: Normal appearance.   HENT:      Head: Normocephalic and atraumatic.   Cardiovascular:      Rate and Rhythm: Normal rate and regular rhythm.   Pulmonary:      Effort: Pulmonary effort is normal. No respiratory distress.   Neurological:      General: No focal deficit present.      Mental Status: He is alert and oriented to person, place, and time.   Psychiatric:         Mood and Affect: Mood normal.         Behavior: Behavior normal.         ASSESSMENT    Diagnoses and all orders for this visit:    1. Mantle cell lymphoma of lymph nodes of multiple regions (Primary)        PLAN    I counseled the patient on port placement.  We discussed the risks of procedure including bleeding, infection, and pneumothorax.  He expressed understanding and wishes to proceed with surgery.              This document has been electronically signed by Elton Godfrey MD on 2023 11:08 CDT

## 2023-08-30 NOTE — PROGRESS NOTES
Chief Complaint   Patient presents with    Consult     Ref: herman Alexis        63-year-old male with a history of mantle cell lymphoma.  Due to progression, oncology has recommended chemotherapy.  He is here today to be evaluated for port placement.  He states that he broke his left collarbone as a child and had radiation to his left lung for lung cancer.  He has never had a central line placed.      Past Medical History:   Diagnosis Date    Acute gastroenteritis     Allergic     Anxiety     Asthma     as a  child    Broken femur     Diverticulitis of colon     Emphysema lung     Epididymal cyst     Gastroesophageal reflux disease     Generalized anxiety disorder     Hyperlipidemia     Hypertension     Impaired glucose tolerance     Left upper lobe pulmonary nodule 06/01/2020    Added automatically from request for surgery [id number]    Lymphoma     Pain, lumbar region     Pain radiating to lumbar region of back       Peripheral nervous system disease     Disorder of the peripheral nervous system       Pleuritic chest pain     Pneumonia     Pneumothorax on left     4    Primary osteoarthritis involving multiple joints 08/29/2016    Shoulder pain, left     Spermatocele 04/25/2018    Added automatically from request for surgery [id number]    Surgical aftercare, respiratory system 06/11/2020    Tachycardia     Tinnitus     Wears glasses     Wears partial dentures        Past Surgical History:   Procedure Laterality Date    CHOLECYSTECTOMY      COLONOSCOPY N/A 01/14/2019    Procedure: COLONOSCOPY;  Surgeon: Ronald Rodriguez DO;  Location: [facility] ENDOSCOPY;  Service: Gastroenterology    DENTAL PROCEDURE      ENDOSCOPY AND COLONOSCOPY      EPIDIDYMIS SURGERY Right     cyst    HERNIA REPAIR  02/2017    INGUINAL HERNIA REPAIR Left 02/06/2017    Procedure: OPEN LEFT INGUINAL HERNIA REPAIR WITH MESH;  Surgeon: Miles Gregorio MD;  Location: [facility] OR;  Service:     INJECTION OF MEDICATION      Depo Medrol  (Methylprednisone) 80mg (8)      09/01/2015        INJECTION OF MEDICATION      Kenalog (1)      12/11/2012        LUNG SURGERY       Pneumothorax 1980        LYMPH NODE BIOPSY N/A 06/03/2020    Procedure: BIOPSY LYMPH NODE;  Surgeon: Bry Salvador MD;  Location: [facility];  Service: Cardiothoracic;  Laterality: N/A;    MEDIASTINOSCOPY, BRONCHOSCOPY N/A 06/03/2020    Procedure: MEDIASTINOSCOPY, BRONCHOSCOPY cervical  lymph node biopsy;  Surgeon: Bry Salvador MD;  Location: [facility] OR;  Service: Cardiothoracic;  Laterality: N/A;    SPERMATOCELECTOMY Left 05/11/2018    Procedure: LEFT SPERMATOCELECTOMY;  Surgeon: Miles Groves MD;  Location: [facility] OR;  Service: Urology    WISDOM TOOTH EXTRACTION      4         Current Outpatient Medications:     albuterol sulfate  (90 Base) MCG/ACT inhaler, Inhale 2 puffs Every 4 (Four) Hours As Needed for Wheezing., Disp: 18 g, Rfl: 3    aspirin 81 MG EC tablet, Take 1 tablet by mouth Daily., Disp: , Rfl:     cetirizine (ZyrTEC) 10 MG tablet, Take 1 tablet by mouth Daily As Needed for Allergies., Disp: , Rfl:     esomeprazole (nexIUM) 20 MG capsule, Take 1 capsule by mouth Every Morning Before Breakfast., Disp: , Rfl:     FLUoxetine (PROzac) 40 MG capsule, Take 1 capsule by mouth Daily., Disp: 30 capsule, Rfl: 5    fluticasone (FLONASE) 50 MCG/ACT nasal spray, 2 sprays into the nostril(s) as directed by provider Daily As Needed for Rhinitis., Disp: , Rfl:     Fluticasone-Umeclidin-Vilant (Trelegy Ellipta) 100-62.5-25 MCG/ACT inhaler, Inhale 1 puff Daily., Disp: 1 each, Rfl: 11    HYDROcodone-acetaminophen (NORCO) 7.5-325 MG per tablet, Take 1 tablet by mouth Every 6 (Six) Hours As Needed for Severe Pain for up to 40 days., Disp: 160 tablet, Rfl: 0    metoprolol succinate XL (Toprol XL) 25 MG 24 hr tablet, Take 1 tablet by mouth Daily., Disp: 90 tablet, Rfl: 3    promethazine (PHENERGAN) 25 MG tablet, TAKE 1 TABLET BY MOUTH EVERY 6 (SIX) HOURS AS NEEDED  FOR NAUSEA OR VOMITING., Disp: 30 tablet, Rfl: 0    rosuvastatin (CRESTOR) 10 MG tablet, Take 1 tablet by mouth Daily., Disp: 90 tablet, Rfl: 3    tamsulosin (FLOMAX) 0.4 MG capsule 24 hr capsule, Take 1 capsule by mouth Every Night., Disp: 90 capsule, Rfl: 3    tiZANidine (ZANAFLEX) 4 MG tablet, Take 1 tablet by mouth Every 8 (Eight) Hours As Needed for Muscle Spasms., Disp: 90 tablet, Rfl: 3    Allergies   Allergen Reactions    Darvon [Propoxyphene] Nausea And Vomiting    Percocet [Oxycodone-Acetaminophen] Palpitations       Immunization History   Administered Date(s) Administered    COVID-19 (PFIZER) Purple Cap Monovalent 03/02/2021, 03/23/2021, 09/22/2021    Covid-19 (Pfizer) Gray Cap Monovalent 07/23/2022    Flu Vaccine Quad PF >36MO 10/09/2018    FluMist 2-49yrs 10/20/2017    Fluzone >6mos 09/25/2020    Influenza, Unspecified 10/07/2022    Pneumococcal Polysaccharide (PPSV23) 11/02/2018    Shingrix 01/31/2019    Tdap 05/31/2016    flucelvax quad pfs =>4 YRS 12/03/2019    influenza Split 01/01/2006, 12/12/2007, 10/01/2009, 10/01/2011, 10/14/2013, 10/15/2014, 10/13/2015, 10/31/2016        Family History   Problem Relation Age of Onset    Dementia Mother     Hypertension Mother     Thyroid disease Mother     Arthritis Mother     Diabetes Father     Stomach cancer Father     Cancer Father         stomach    Alcohol abuse Brother     Thyroid disease Brother     Heart valve disorder Brother     Heart block Brother     Hearing loss Brother     Down syndrome Brother     Scoliosis Brother     No Known Problems Daughter     No Known Problems Son     COPD Maternal Aunt     COPD Maternal Aunt     Alcohol abuse Paternal Uncle     Alcohol abuse Paternal Uncle     Heart attack Maternal Grandmother     Cancer Maternal Grandfather        Social History     Socioeconomic History    Marital status: Single   Tobacco Use    Smoking status: Some Days     Packs/day: 0.25     Years: 42.00     Pack years: 10.50     Types: Cigarettes      Last attempt to quit: 2020     Years since quittin.9     Passive exposure: Past    Smokeless tobacco: Former     Types: Snuff     Quit date:     Tobacco comments:     Passive for 20 + years   Vaping Use    Vaping Use: Never used   Substance and Sexual Activity    Alcohol use: Not Currently     Comment: none since 1 drink in 2023    Drug use: Not Currently     Types: Marijuana     Comment: none in 40 years    Sexual activity: Defer       Review of Systems   Respiratory:  Positive for shortness of breath.    Gastrointestinal:  Positive for abdominal distention.   Musculoskeletal:  Positive for arthralgias and back pain.   Hematological:  Bruises/bleeds easily.   All other systems reviewed and are negative.    Vitals:    23 1041   BP: 100/64   Pulse: 105   Temp: 98.2 øF (36.8 øC)   SpO2: 98%       Physical Exam  Constitutional:       Appearance: Normal appearance.   HENT:      Head: Normocephalic and atraumatic.   Cardiovascular:      Rate and Rhythm: Normal rate and regular rhythm.   Pulmonary:      Effort: Pulmonary effort is normal. No respiratory distress.   Neurological:      General: No focal deficit present.      Mental Status: He is alert and oriented to person, place, and time.   Psychiatric:         Mood and Affect: Mood normal.         Behavior: Behavior normal.         ASSESSMENT    Diagnoses and all orders for this visit:    1. Mantle cell lymphoma of lymph nodes of multiple regions (Primary)        PLAN    I counseled the patient on port placement.  We discussed the risks of procedure including bleeding, infection, and pneumothorax.  He expressed understanding and wishes to proceed with surgery.              This document has been electronically signed by Elton Godfrey MD on 2023 11:08 CDT

## 2023-09-01 ENCOUNTER — PRE-ADMISSION TESTING (OUTPATIENT)
Dept: PREADMISSION TESTING | Facility: HOSPITAL | Age: 63
End: 2023-09-01
Payer: COMMERCIAL

## 2023-09-01 VITALS
OXYGEN SATURATION: 95 % | DIASTOLIC BLOOD PRESSURE: 62 MMHG | SYSTOLIC BLOOD PRESSURE: 112 MMHG | HEIGHT: 70 IN | WEIGHT: 151 LBS | RESPIRATION RATE: 18 BRPM | HEART RATE: 63 BPM | BODY MASS INDEX: 21.62 KG/M2

## 2023-09-01 LAB
QT INTERVAL: 370 MS
QTC INTERVAL: 426 MS

## 2023-09-01 PROCEDURE — 93005 ELECTROCARDIOGRAM TRACING: CPT

## 2023-09-01 PROCEDURE — 93010 ELECTROCARDIOGRAM REPORT: CPT | Performed by: INTERNAL MEDICINE

## 2023-09-01 RX ORDER — SODIUM CHLORIDE, SODIUM GLUCONATE, SODIUM ACETATE, POTASSIUM CHLORIDE AND MAGNESIUM CHLORIDE 526; 502; 368; 37; 30 MG/100ML; MG/100ML; MG/100ML; MG/100ML; MG/100ML
1000 INJECTION, SOLUTION INTRAVENOUS CONTINUOUS PRN
Status: CANCELLED | OUTPATIENT
Start: 2023-09-05

## 2023-09-01 NOTE — PAT
Chlorhexidine scrub given with instruction sheet. Instructions reviewed in PAT, understanding verbalized.    Dr. Nick here to review EKG and speak with patient. No orders received, ok to proceed.

## 2023-09-04 ENCOUNTER — ANESTHESIA EVENT (OUTPATIENT)
Dept: PERIOP | Facility: HOSPITAL | Age: 63
End: 2023-09-04
Payer: MEDICARE

## 2023-09-04 NOTE — PROGRESS NOTES
Subjective     Mitch Rinaldi was seen in follow up for mantle cell lymphoma and lung cancer.     No new health issues since last visit.   No new medications.   No new hospitalization.   Feels well.     Result of PET scan reviewed.       Past Medical History, Past Surgical History, Social History, Family History have been reviewed and are without significant changes except as mentioned.      Medications:  The current medication list was reviewed in the EMR    ALLERGIES:    Allergies   Allergen Reactions    Darvon [Propoxyphene] Nausea And Vomiting    Percocet [Oxycodone-Acetaminophen] Palpitations       Objective      Vitals:    08/30/23 1007   BP: 116/65   Pulse: 91   Resp: 18   SpO2: 97%   Weight: 68.5 kg (151 lb)   PainSc: 3  Comment: lower back         5/7/2021     8:10 AM   Current Status   ECOG score 0       Physical Exam  Vitals and nursing note reviewed.   Constitutional:       Appearance: Normal appearance.   Lymphadenopathy:      Upper Body:      Left upper body: Axillary adenopathy present.   Neurological:      General: No focal deficit present.      Mental Status: He is alert and oriented to person, place, and time. Mental status is at baseline.   Psychiatric:         Mood and Affect: Mood normal.         Behavior: Behavior normal.         Thought Content: Thought content normal.         RECENT LABS:Independently reviewed and summarized  Hematology WBC   Date Value Ref Range Status   05/19/2023 7.17 3.40 - 10.80 10*3/mm3 Final     RBC   Date Value Ref Range Status   05/19/2023 4.50 4.14 - 5.80 10*6/mm3 Final     Hemoglobin   Date Value Ref Range Status   05/19/2023 13.5 13.0 - 17.7 g/dL Final     Hematocrit   Date Value Ref Range Status   05/19/2023 39.1 37.5 - 51.0 % Final     Platelets   Date Value Ref Range Status   05/19/2023 159 140 - 450 10*3/mm3 Final     Lab Results   Component Value Date    GLUCOSE 98 05/19/2023    BUN 15 08/26/2023    CREATININE 1.21 08/26/2023    EGFR 66.4 08/26/2023     BCR 17.0 05/19/2023    K 3.9 05/19/2023    CO2 24.0 05/19/2023    CALCIUM 8.9 05/19/2023    ALBUMIN 3.7 05/19/2023    BILITOT 0.3 05/19/2023    AST 12 05/19/2023    ALT 5 05/19/2023            Imaging (independently reviewed and summarized):     NM PET/CT Skull Base to Mid Thigh    Result Date: 8/28/2023  Worsening lymphoproliferative disorder. No significant FDG avidity spiculated nodule left upper lobe most consistent with scarring.      Mitch Rinaldi reports a pain score of 3.  Given his pain assessment as noted, treatment options were discussed and the following options were decided upon as a follow-up plan to address the patient's pain: prescription for opiod analgesics.    Patient screened negative for depression based on a PHQ-9 score of 0 on 8/30/2023.     Advance Care Planning   ACP discussion was declined by the patient. Patient does not have an advance directive, declines further assistance.       Diagnosis:   (1) Left lung cancer, left upper lobe   Squamous cell carcinoma   Stage IB     (2) Mantle cell lymphoma   (3) Osteoarthritis     Assessment & Plan       (1) Left lung cancer, left upper lobe      Chronic, stable.     Diagnosed on 8/2020. Biopsy was non-diagnostic. Serial imaging showed increase in size.   Repeat biopsy showed moderate to poorly diffrentiated SCC of lung.   S/p SBRT in 2/2021    PET scan showed no evidence of residual malignancy.   Continue to monitor.     (2) Mantle cell lymphoma     Chronic issue with exacerbation     Patient with diagnosis of CD5 negative mantle cell lymphoma.  IHC positive for cyclin D1  FISH is positive for t(11,14) translocation.   Cytogenetics are normal.  Ki-67 -10 to 15%.  Bone marrow aspiration biopsy was negative.    He was under observation however PET scan in 8/2023 showed worsening adenopaty.     We discussed his diagnosis and treatment options.     Given worsening adenopathy and abdominal pain I recommend palliative systemic therapy.   I recommend  BR X 6 cycles.   Side effects associated with chemotherapy discussed at length.   Risks versus benefits discussed.   Alternative options discussed.     He understood and was agreeable.   I will send referral to surgery for chemo port placement.   We will plan for BR in 2 weeks.           (3) Osteoarthritis     Chronic, stable.  Continue norco as needed.         9/3/2023      CC:

## 2023-09-05 ENCOUNTER — ANESTHESIA (OUTPATIENT)
Dept: PERIOP | Facility: HOSPITAL | Age: 63
End: 2023-09-05
Payer: MEDICARE

## 2023-09-05 ENCOUNTER — APPOINTMENT (OUTPATIENT)
Dept: GENERAL RADIOLOGY | Facility: HOSPITAL | Age: 63
End: 2023-09-05
Payer: MEDICARE

## 2023-09-05 ENCOUNTER — HOSPITAL ENCOUNTER (OUTPATIENT)
Facility: HOSPITAL | Age: 63
Setting detail: HOSPITAL OUTPATIENT SURGERY
Discharge: HOME | End: 2023-09-05
Attending: SURGERY | Admitting: SURGERY
Payer: MEDICARE

## 2023-09-05 VITALS
RESPIRATION RATE: 18 BRPM | SYSTOLIC BLOOD PRESSURE: 126 MMHG | TEMPERATURE: 99.1 F | WEIGHT: 148.59 LBS | OXYGEN SATURATION: 94 % | HEART RATE: 84 BPM | HEIGHT: 70 IN | DIASTOLIC BLOOD PRESSURE: 71 MMHG | BODY MASS INDEX: 21.27 KG/M2

## 2023-09-05 DIAGNOSIS — C83.18 MANTLE CELL LYMPHOMA OF LYMPH NODES OF MULTIPLE REGIONS: Chronic | ICD-10-CM

## 2023-09-05 PROCEDURE — C1788 PORT, INDWELLING, IMP: HCPCS | Performed by: SURGERY

## 2023-09-05 PROCEDURE — 76937 US GUIDE VASCULAR ACCESS: CPT | Performed by: SURGERY

## 2023-09-05 PROCEDURE — 25010000002 FENTANYL CITRATE (PF) 100 MCG/2ML SOLUTION: Performed by: NURSE ANESTHETIST, CERTIFIED REGISTERED

## 2023-09-05 PROCEDURE — 25010000002 PROPOFOL 200 MG/20ML EMULSION: Performed by: NURSE ANESTHETIST, CERTIFIED REGISTERED

## 2023-09-05 PROCEDURE — 77001 FLUOROGUIDE FOR VEIN DEVICE: CPT | Performed by: SURGERY

## 2023-09-05 PROCEDURE — 25010000002 MIDAZOLAM PER 1 MG: Performed by: NURSE ANESTHETIST, CERTIFIED REGISTERED

## 2023-09-05 PROCEDURE — 25010000002 CEFAZOLIN PER 500 MG: Performed by: SURGERY

## 2023-09-05 PROCEDURE — 76000 FLUOROSCOPY <1 HR PHYS/QHP: CPT

## 2023-09-05 PROCEDURE — 25010000002 HEPARIN LOCK FLUSH PER 10 UNITS: Performed by: SURGERY

## 2023-09-05 PROCEDURE — 36561 INSERT TUNNELED CV CATH: CPT | Performed by: SURGERY

## 2023-09-05 RX ORDER — MIDAZOLAM HYDROCHLORIDE 1 MG/ML
INJECTION INTRAMUSCULAR; INTRAVENOUS AS NEEDED
Status: DISCONTINUED | OUTPATIENT
Start: 2023-09-05 | End: 2023-09-05 | Stop reason: SURG

## 2023-09-05 RX ORDER — ACETAMINOPHEN 325 MG/1
650 TABLET ORAL ONCE AS NEEDED
Status: DISCONTINUED | OUTPATIENT
Start: 2023-09-05 | End: 2023-09-05 | Stop reason: HOSPADM

## 2023-09-05 RX ORDER — SODIUM CHLORIDE, SODIUM GLUCONATE, SODIUM ACETATE, POTASSIUM CHLORIDE AND MAGNESIUM CHLORIDE 526; 502; 368; 37; 30 MG/100ML; MG/100ML; MG/100ML; MG/100ML; MG/100ML
1000 INJECTION, SOLUTION INTRAVENOUS CONTINUOUS PRN
Status: DISCONTINUED | OUTPATIENT
Start: 2023-09-05 | End: 2023-09-05 | Stop reason: HOSPADM

## 2023-09-05 RX ORDER — BUPIVACAINE HCL/0.9 % NACL/PF 0.1 %
2000 PLASTIC BAG, INJECTION (ML) EPIDURAL ONCE
Status: COMPLETED | OUTPATIENT
Start: 2023-09-05 | End: 2023-09-05

## 2023-09-05 RX ORDER — PROPOFOL 10 MG/ML
INJECTION, EMULSION INTRAVENOUS AS NEEDED
Status: DISCONTINUED | OUTPATIENT
Start: 2023-09-05 | End: 2023-09-05 | Stop reason: SURG

## 2023-09-05 RX ORDER — PROMETHAZINE HYDROCHLORIDE 25 MG/1
25 TABLET ORAL ONCE AS NEEDED
Status: DISCONTINUED | OUTPATIENT
Start: 2023-09-05 | End: 2023-09-05 | Stop reason: HOSPADM

## 2023-09-05 RX ORDER — PROMETHAZINE HYDROCHLORIDE 25 MG/1
25 SUPPOSITORY RECTAL ONCE AS NEEDED
Status: DISCONTINUED | OUTPATIENT
Start: 2023-09-05 | End: 2023-09-05 | Stop reason: HOSPADM

## 2023-09-05 RX ORDER — FLUMAZENIL 0.1 MG/ML
0.2 INJECTION INTRAVENOUS AS NEEDED
Status: DISCONTINUED | OUTPATIENT
Start: 2023-09-05 | End: 2023-09-05 | Stop reason: HOSPADM

## 2023-09-05 RX ORDER — LIDOCAINE HCL/EPINEPHRINE/PF 2%-1:200K
VIAL (ML) INJECTION AS NEEDED
Status: DISCONTINUED | OUTPATIENT
Start: 2023-09-05 | End: 2023-09-05 | Stop reason: HOSPADM

## 2023-09-05 RX ORDER — FENTANYL CITRATE 50 UG/ML
INJECTION, SOLUTION INTRAMUSCULAR; INTRAVENOUS AS NEEDED
Status: DISCONTINUED | OUTPATIENT
Start: 2023-09-05 | End: 2023-09-05 | Stop reason: SURG

## 2023-09-05 RX ORDER — DIPHENHYDRAMINE HYDROCHLORIDE 50 MG/ML
12.5 INJECTION INTRAMUSCULAR; INTRAVENOUS
Status: DISCONTINUED | OUTPATIENT
Start: 2023-09-05 | End: 2023-09-05 | Stop reason: HOSPADM

## 2023-09-05 RX ORDER — HEPARIN SODIUM (PORCINE) LOCK FLUSH IV SOLN 100 UNIT/ML 100 UNIT/ML
SOLUTION INTRAVENOUS AS NEEDED
Status: DISCONTINUED | OUTPATIENT
Start: 2023-09-05 | End: 2023-09-05 | Stop reason: HOSPADM

## 2023-09-05 RX ORDER — EPHEDRINE SULFATE 50 MG/ML
5 INJECTION, SOLUTION INTRAVENOUS ONCE AS NEEDED
Status: DISCONTINUED | OUTPATIENT
Start: 2023-09-05 | End: 2023-09-05 | Stop reason: HOSPADM

## 2023-09-05 RX ORDER — NALOXONE HCL 0.4 MG/ML
0.4 VIAL (ML) INJECTION AS NEEDED
Status: DISCONTINUED | OUTPATIENT
Start: 2023-09-05 | End: 2023-09-05 | Stop reason: HOSPADM

## 2023-09-05 RX ORDER — ONDANSETRON 2 MG/ML
4 INJECTION INTRAMUSCULAR; INTRAVENOUS ONCE AS NEEDED
Status: DISCONTINUED | OUTPATIENT
Start: 2023-09-05 | End: 2023-09-05 | Stop reason: HOSPADM

## 2023-09-05 RX ADMIN — FENTANYL CITRATE 50 MCG: 50 INJECTION, SOLUTION INTRAMUSCULAR; INTRAVENOUS at 07:09

## 2023-09-05 RX ADMIN — SODIUM CHLORIDE, SODIUM GLUCONATE, SODIUM ACETATE, POTASSIUM CHLORIDE AND MAGNESIUM CHLORIDE 1000 ML: 526; 502; 368; 37; 30 INJECTION, SOLUTION INTRAVENOUS at 06:09

## 2023-09-05 RX ADMIN — MIDAZOLAM HYDROCHLORIDE 1 MG: 1 INJECTION, SOLUTION INTRAMUSCULAR; INTRAVENOUS at 07:16

## 2023-09-05 RX ADMIN — FENTANYL CITRATE 50 MCG: 50 INJECTION, SOLUTION INTRAMUSCULAR; INTRAVENOUS at 07:16

## 2023-09-05 RX ADMIN — PROPOFOL 10 MG: 10 INJECTION, EMULSION INTRAVENOUS at 07:36

## 2023-09-05 RX ADMIN — MIDAZOLAM HYDROCHLORIDE 2 MG: 1 INJECTION, SOLUTION INTRAMUSCULAR; INTRAVENOUS at 07:09

## 2023-09-05 RX ADMIN — MIDAZOLAM HYDROCHLORIDE 1 MG: 1 INJECTION, SOLUTION INTRAMUSCULAR; INTRAVENOUS at 07:19

## 2023-09-05 RX ADMIN — PROPOFOL 20 MG: 10 INJECTION, EMULSION INTRAVENOUS at 07:25

## 2023-09-05 RX ADMIN — PROPOFOL 20 MG: 10 INJECTION, EMULSION INTRAVENOUS at 07:34

## 2023-09-05 RX ADMIN — PROPOFOL 20 MG: 10 INJECTION, EMULSION INTRAVENOUS at 07:30

## 2023-09-05 RX ADMIN — Medication 2000 MG: at 07:21

## 2023-09-05 NOTE — DISCHARGE INSTRUCTIONS
Okay to shower tomorrow.  No driving while narcotic pain medication.  Follow-up with me as needed.

## 2023-09-05 NOTE — ANESTHESIA PREPROCEDURE EVALUATION
Anesthesia Evaluation     Patient summary reviewed and Nursing notes reviewed   no history of anesthetic complications:   NPO Solid Status: > 8 hours  NPO Liquid Status: > 2 hours           Airway   Mallampati: II  TM distance: >3 FB  Neck ROM: full  possible difficult intubation  Comment: General anesthesia 17,18 LMA #4.    Final Airway Details  Final airway type: endotracheal airway        Successful airway: ETT  Cuffed: yes   Successful intubation technique: video laryngoscopy  Facilitating devices/methods: intubating stylet  Endotracheal tube insertion site: oral  Blade: Marshall  Blade size: 4  ETT size (mm): 8.5  Cormack-Lehane Classification: grade IIa - partial view of glottis  Placement verified by: chest auscultation and capnometry   Cuff volume (mL): 10  Measured from: lips  ETT/EBT  to lips (cm): 21  Number of attempts at approach: 1  Assessment: lips, teeth, and gum same as pre-op and atraumatic intubation       Dental    (+) poor dentation and upper dentures    Comment: Upper dentures with lower remaining dentition in poor repair.    Pulmonary     breath sounds clear to auscultation  (+) a smoker Current Abstained day of surgery, lung cancer, COPD severe, asthma,decreased breath sounds  (-) shortness of breath, sleep apnea    PE comment: Albuterol treatment ordered pre-op.  Cardiovascular     ECG reviewed  Rhythm: regular  Rate: normal    (+) hypertension less than 2 medications, dysrhythmias Tachycardia, hyperlipidemia  (-) past MI, angina, murmur, carotid bruits, cardiac stents, CABG, DVT    ROS comment: EK23  Normal sinus rhythm  Cannot rule out Anterior infarct , age undetermined  Abnormal ECG  When compared with ECG of 19-OCT-2021 09:27,  No significant change was found        Left Ventricle Normal left ventricular cavity size and wall thickness noted. All left ventricular wall segments contract normally.  Right Ventricle Normal right ventricular cavity size, wall thickness,  systolic function and septal motion noted.  Left Atrium Normal left atrial size and volume noted.  Right Atrium Normal right atrial size noted.  Aortic Valve The aortic valve is structurally normal. No aortic valve regurgitation is present. No aortic valve stenosis is present.  Mitral Valve The mitral valve is normal in structure. Trace mitral valve regurgitation is present. No significant mitral valve stenosis is present.  Tricuspid Valve The tricuspid valve is normal. No evidence of tricuspid valve stenosis is present. Trace to mild tricuspid valve regurgitation is present.  Pulmonic Valve The pulmonic valve is structurally normal. There is no significant pulmonic valve stenosis present. There is no pulmonic valve regurgitation present.  Greater Vessels No dilation of the aortic root is present.  Pericardium The pericardium is normal. There is no evidence of pericardial effusion.        Neuro/Psych  (+) psychiatric history Anxiety  (-) seizures, TIA, CVA  GI/Hepatic/Renal/Endo    (+) obesity, GERD well controlled  (-) liver disease, no renal disease, diabetes, no thyroid disorder    Musculoskeletal     Abdominal   (-) obese   Substance History   (-) alcohol use, drug use     OB/GYN negative ob/gyn ROS         Other   arthritis,   history of cancer    ROS/Med Hx Other: Hx: Asthma/COPD: Uses Trelegy once a day. Last used this morning. Uses albuterol inhaler about once a week last used 3 days ago.     Hx:GERD: controlled on Nexium     5/19/23 CT abdomen:  FINDINGS:  Lung bases show some chronic scarring particularly on the left.     There is massive adenopathy within the retroperitoneum and the mesentery.  Overall appearance is fairly similar compared to prior examination.  There is no  free air or free fluid.  There is a moderate volume of colonic stool.  Mild  splenomegaly.  The liver is not enlarged.  Both kidneys are unremarkable.  Osseous structures are intact.     IMPRESSIONS:  1.  Largely unchanged very bulky  adenopathy throughout the mesentery and the  retroperitoneum.  Some adenopathy is also seen within the iliac chains, pelvic  sidewalls and the inguinal regions.     2.  No acute process within the abdomen or pelvis                      Anesthesia Plan    ASA 3     general and MAC   total IV anesthesia  intravenous induction     Anesthetic plan, risks, benefits, and alternatives have been provided, discussed and informed consent has been obtained with: patient.

## 2023-09-05 NOTE — ANESTHESIA POSTPROCEDURE EVALUATION
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          Post Anesthesia Care and Evaluation    Patient location during evaluation: bedside  Patient participation: complete - patient participated  Level of consciousness: awake and alert  Pain score: 0  Pain management: adequate    Airway patency: patent  Anesthetic complications: No anesthetic complications  PONV Status: none  Cardiovascular status: acceptable  Respiratory status: acceptable  Hydration status: acceptable  Post Neuraxial Block status: Motor and sensory function returned to baseline  Comments: /66   Pulse 77   Temp 98.2 °F (36.8 °C) (Temporal)   Resp 18   Ht 177.8 cm (70\")   Wt 67.4 kg (148 lb 9.4 oz)   SpO2 95%   BMI 21.32 kg/m²     No anesthesia care post op    "

## 2023-09-05 NOTE — OP NOTE
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  Patency of the right IJ vein was confirmed with ultrasound.  The patient's chest and neck were prepped and draped in usual sterile fashion.  A timeout was performed.  An 18-gauge needle was inserted into the right IJ under direct visualization of the ultrasound.  A guidewire was inserted through the needle and placement was confirmed with fluoroscopy.  A pocket was created approximately 2 fingerbreadths below the clavicle with a combination of electrocautery and blunt dissection.  A skin incision was made at the wire in the neck.  The catheter was tunneled from the pocket to neck incision.  The dilator and sheath were advanced over the wire under fluoroscopy.  The catheter was inserted into the sheath and then withdrawn under fluoroscopy until it was at the cavoatrial junction.  The port was attached to the catheter and secured in place in the pocket with two 3-0 Prolene sutures.  The port was noted to withdraw and flush well.  The pocket was closed with interrupted 3-0 Vicryl, running 4-0 Monocryl, and skin glue.  The neck incision was closed  with a 4-0 Monocryl and skin glue.  The patient tolerated this procedure well without any immediate complication.    Complications: None    Assistant: Azul Almendarez CSA  was responsible for performing the following activities: Retraction and Closing and their skilled assistance was necessary for the success of this case.    Elton Godfrey MD     Date: 9/5/2023  Time: 07:54 CDT

## 2023-09-07 ENCOUNTER — TELEPHONE (OUTPATIENT)
Dept: NUTRITION | Facility: HOSPITAL | Age: 63
End: 2023-09-07
Payer: COMMERCIAL

## 2023-09-07 NOTE — PROGRESS NOTES
"Adult Outpatient Nutrition  Assessment    Patient Name:  Mitch Rinaldi  YOB: 1960  MRN: [id number]    Assessment Date:  9/7/2023    CHART REVIEW  Mitch Rinaldi  1960  63 y.o.  Wt: 148.9 lb  Ht: 70 in  BMI: 21.3  Dx: lung cancer + lymphoma  Tx: chemo starting in near future      PATIENT/FAMILY COMMENTS (pt)  Usual Body Weight: slow trend down over past 2 years  Weight Comments: \"always lose in Summer\"  Diet: regular  Hydration: water/coffee/soda  Food Allergies: nkfa  Appetite/Intake: good  Supplements: na  Meals: 3  Food Preparation: family + eats out  Nutrition Concerns:  No problems with chewing/swallowing/nausea/vomiting/constipation/diarrhea.      GOAL(s)  Optimize nutrition in attempt to prevent further loss of LBM      EDUCATION  -High calorie high protein diet  -Supplementation        * Offered samples of commercial supplements--recipes          for homemade supplements.  -Snack ideas  -Importance of staying hydrated      To reinforce education, written information with RD's name & number mailed.  Pt receptive to suggestions, and agreed to call on dietitian as needed.                    Electronically signed by:  Soraya Goldstein RD  09/07/23 15:43 CDT     "

## 2023-09-11 ENCOUNTER — OFFICE VISIT (OUTPATIENT)
Dept: ONCOLOGY | Facility: CLINIC | Age: 63
End: 2023-09-11
Payer: MEDICARE

## 2023-09-11 VITALS
DIASTOLIC BLOOD PRESSURE: 71 MMHG | OXYGEN SATURATION: 96 % | SYSTOLIC BLOOD PRESSURE: 139 MMHG | RESPIRATION RATE: 18 BRPM | BODY MASS INDEX: 21.95 KG/M2 | WEIGHT: 153 LBS | HEART RATE: 63 BPM

## 2023-09-11 DIAGNOSIS — C83.18 MANTLE CELL LYMPHOMA OF LYMPH NODES OF MULTIPLE REGIONS: Chronic | ICD-10-CM

## 2023-09-11 DIAGNOSIS — C34.12 CANCER OF UPPER LOBE OF LEFT LUNG: Primary | Chronic | ICD-10-CM

## 2023-09-11 PROCEDURE — G0463 HOSPITAL OUTPT CLINIC VISIT: HCPCS | Performed by: NURSE PRACTITIONER

## 2023-09-11 RX ORDER — ACYCLOVIR 400 MG/1
400 TABLET ORAL 2 TIMES DAILY
Qty: 60 TABLET | Refills: 7 | Status: SHIPPED | OUTPATIENT
Start: 2023-09-11

## 2023-09-11 RX ORDER — ONDANSETRON HYDROCHLORIDE 8 MG/1
8 TABLET, FILM COATED ORAL 3 TIMES DAILY
Qty: 30 TABLET | Refills: 5 | Status: SHIPPED | OUTPATIENT
Start: 2023-09-11

## 2023-09-11 RX ORDER — SULFAMETHOXAZOLE AND TRIMETHOPRIM 800; 160 MG/1; MG/1
1 TABLET ORAL 3 TIMES WEEKLY
Qty: 12 TABLET | Refills: 7 | Status: SHIPPED | OUTPATIENT
Start: 2023-09-11 | End: 2023-10-11

## 2023-09-12 NOTE — PROGRESS NOTES
CHEMOTHERAPY PREPARATION    Mitch Rinaldi  [id number]  1960    Chief Complaint: chemo education     History of present illness:  Mitch Rinadli is a 63 y.o. year old male who is here today for chemotherapy preparation and needs assessment. The patient has been diagnosed with Bendamustine and Rituxan and is scheduled to begin treatment with mantle cell lymphoma.     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 (CMS/HCC)     7/2/2020 Cancer Staged    Staging form: Hodgkin And Non-Hodgkin Lymphoma, AJCC 8th Edition  - Clinical stage from 7/2/2020: Stage IV - Signed by Atiya Alexis MD on 7/2/2020 9/13/2023 -  Chemotherapy    OP LYMPHOMA (CLL) Bendamustine 70 mg/m2 / RiTUXimab 500 mg/m2        Cancer of upper lobe of left lung   1/1/2021 Initial Diagnosis    Cancer of upper lobe of left lung (CMS/HCC)     1/1/2021 Cancer Staged    Staging form: Lung, AJCC 8th Edition  - Clinical stage from 1/1/2021: Stage IB (cT2a, cN0, cM0) - Signed by Atiya Alexis MD on 1/1/2021         Past Medical History:   Diagnosis Date    Acute gastroenteritis     Allergic     Anxiety     Asthma     as a  child    Broken femur     Diverticulitis of colon     Elevated cholesterol     Emphysema lung     Epididymal cyst     Gastroesophageal reflux disease     Generalized anxiety disorder     Hyperlipidemia     Hypertension     Impaired glucose tolerance     Left upper lobe pulmonary nodule 06/01/2020    Added automatically from request for surgery [id number]    Lung cancer     Lymphoma     Pain, lumbar region     Pain radiating to lumbar region of back       Peripheral nervous system disease     Disorder of the peripheral nervous system       Pleuritic chest pain     Pneumonia     Pneumothorax on left     4    Primary osteoarthritis involving multiple joints  LOV 9-  Last mammo 11-  Order pended for NK approval.  Please advise 08/29/2016    Shoulder pain, left     Spermatocele 04/25/2018    Added automatically from request for surgery [id number]    Surgical aftercare, respiratory system 06/11/2020    Tachycardia     Tinnitus     Wears glasses     Wears partial dentures        Past Surgical History:   Procedure Laterality Date    CHOLECYSTECTOMY      COLONOSCOPY N/A 01/14/2019    Procedure: COLONOSCOPY;  Surgeon: Ronald Rodriguez DO;  Location: [facility] ENDOSCOPY;  Service: Gastroenterology    DENTAL PROCEDURE      extraction    ENDOSCOPY AND COLONOSCOPY      EPIDIDYMIS SURGERY Right     cyst    HERNIA REPAIR  02/2017    INGUINAL HERNIA REPAIR Left 02/06/2017    Procedure: OPEN LEFT INGUINAL HERNIA REPAIR WITH MESH;  Surgeon: Miles Gregorio MD;  Location: [facility] OR;  Service:     INJECTION OF MEDICATION      Depo Medrol (Methylprednisone) 80mg (8)      09/01/2015        INJECTION OF MEDICATION      Kenalog (1)      12/11/2012        LUNG BIOPSY      LUNG SURGERY Left      Pneumothorax 1980        LYMPH NODE BIOPSY N/A 06/03/2020    Procedure: BIOPSY LYMPH NODE;  Surgeon: Bry Salvador MD;  Location: [facility] OR;  Service: Cardiothoracic;  Laterality: N/A;    MEDIASTINOSCOPY, BRONCHOSCOPY N/A 06/03/2020    Procedure: MEDIASTINOSCOPY, BRONCHOSCOPY cervical  lymph node biopsy;  Surgeon: Bry Salvador MD;  Location: [facility] OR;  Service: Cardiothoracic;  Laterality: N/A;    SPERMATOCELECTOMY Left 05/11/2018    Procedure: LEFT SPERMATOCELECTOMY;  Surgeon: Miles Groves MD;  Location: [facility] OR;  Service: Urology    WISDOM TOOTH EXTRACTION      4       MEDICATIONS: The current medication list was reviewed and reconciled.     Allergies:  is allergic to darvon [propoxyphene] and percocet [oxycodone-acetaminophen].    Family History   Problem Relation Age of Onset    Dementia Mother     Hypertension Mother     Thyroid disease Mother     Arthritis Mother     Diabetes Father     Stomach cancer Father     Cancer Father          stomach    Alcohol abuse Brother     Thyroid disease Brother     Heart valve disorder Brother     Heart block Brother     Hearing loss Brother     Down syndrome Brother     Scoliosis Brother     No Known Problems Daughter     No Known Problems Son     COPD Maternal Aunt     COPD Maternal Aunt     Alcohol abuse Paternal Uncle     Alcohol abuse Paternal Uncle     Heart attack Maternal Grandmother     Cancer Maternal Grandfather          Review of Systems    Physical Exam  Vital Signs: /71   Pulse 63   Resp 18   Wt 69.4 kg (153 lb)   SpO2 96%   BMI 21.95 kg/m²    General Appearance:  alert, cooperative, no apparent distress, appears stated age, and normal weight   Neurologic/Psychiatric: A&O x 3, gait steady, appropriate affect   HEENT:  Normocephalic, without obvious abnormality, mucous membranes moist   Lungs:   Clear to auscultation bilaterally; respirations regular, even, and unlabored bilaterally   Heart:  Regular rate and rhythm, no murmurs appreciated   Extremities: Normal, atraumatic; no clubbing, cyanosis, or edema    Skin: No rashes, lesions, or abnormal coloration noted     ECOG Performance Status: (0) Fully Active - Able to Carry On All Pre-disease Performance Without Restriction          NEEDS ASSESSMENTS    Genetics  The patient's new diagnosis and family history have been reviewed for genetic counseling needs. A genetic referral is not recommended.     Psychosocial  The patient has completed a PHQ-2 Depression Screening and the Distress Thermometer (DT) today.   PHQ-2 results show 0 (No Depression).   Copies of patient's questionnaires will be scanned into EMR for details and further reference.      VAD Assessment  The patient has medi port in place.     Advanced Care Planning  The patient does have an up-to-date advanced directive. The patient is not interested in an appointment with one of our facilitators to create or update their advanced directives.        Additional Referral  needs  none      CHEMOTHERAPY EDUCATION    Booklets Given: Chemo cares education sheets on Bendamustine and Rituxan and [facility] chemotherapy binder. Consent form signed today.      Chemotherapy/Biotherapy Education Sheets: (list all that apply)  nausea management, acid reflux management, diarrhea management, Cancer resourse contacts information, and skin and mouth care                                                                                                                                                                 Chemotherapy Regimen:   Treatment Plans       Name Type Plan Dates Plan Provider         Active    OP LYMPHOMA (CLL) Bendamustine 70 mg/m2 / RiTUXimab 500 mg/m2  ONCOLOGY TREATMENT  9/12/2023 - Present Atiya Alexis MD                      TOPICS EDUCATION PROVIDED COMMENTS   ANEMIA:  role of RBC, cause, s/s, ways to manage, role of transfusion [x]    THROMBOCYTOPENIA:  role of platelet, cause, s/s, ways to prevent bleeding, things to avoid, when to seek help [x]    NEUTROPENIA:  role of WBC, cause, infection precautions, s/s of infection, when to call MD [x]    NUTRITION & APPETITE CHANGES:  importance of maintaining healthy diet & weight, ways to manage to improve intake, dietary consult, exercise regimen [x]    DIARRHEA:  causes, s/s of dehydration, ways to manage, dietary changes, when to call MD [x]    CONSTIPATION:  causes, ways to manage, dietary changes, when to call MD [x]    NAUSEA & VOMITING:  cause, use of antiemetics, dietary changes, when to call MD [x]    MOUTH SORES:  causes, oral care, ways to manage [x]    ALOPECIA:  cause, ways to manage, resources [x]    INFERTILITY & SEXUALITY:  causes, fertility preservation options, sexuality changes, ways to manage, importance of birth control [x]    NERVOUS SYSTEM CHANGES:  causes, s/s, neuropathies, cognitive changes, ways to manage [x]    PAIN:  causes, ways to manage [x]    SKIN & NAIL CHANGES:  cause, s/s, ways  to manage [x]    ORGAN TOXICITIES:  cause, s/s, need for diagnostic tests, labs, when to notify MD [x]    SURVIVORSHIP:  distress, distress assessment, secondary malignancies, early/late effects, follow-up, social issues, social support [x]    HOME CARE:  use of spill kits, storing of PO chemo, how to manage bodily fluids [x]    MISCELLANEOUS:  drug interactions, administration, vesicant, et [x]        Assessment and Plan:    Diagnoses and all orders for this visit:    1. Cancer of upper lobe of left lung (Primary)    2. 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   The patient and I have reviewed their new cancer diagnosis and scheduled treatment plan. Needs assessment was completed including genetics, psychosocial needs, barriers to care, VAD evaluation, advanced care planning, and palliative care services. Referrals have been ordered as appropriate based upon our evaluation and patient desires.     Chemotherapy teaching was also completed today as documented above. Adequate time was given to answer all questions to his satisfaction. Patient and family are aware of their care team members and contact information if they have questions or problems throughout the treatment course. Needs assessments and education has been completed. The patient is adequately prepared to begin treatment as scheduled.     Mitch Baltazar Gentry reports a pain score of 0.  Given his pain assessment as noted, treatment options were discussed and the following options were decided upon as a follow-up plan to address the patient's pain:  no pain today .   CIRILO Carlos

## 2023-09-13 ENCOUNTER — INFUSION (OUTPATIENT)
Dept: ONCOLOGY | Facility: HOSPITAL | Age: 63
End: 2023-09-13
Payer: COMMERCIAL

## 2023-09-13 ENCOUNTER — INFUSION (OUTPATIENT)
Dept: ONCOLOGY | Facility: HOSPITAL | Age: 63
End: 2023-09-13
Payer: MEDICARE

## 2023-09-13 ENCOUNTER — OFFICE VISIT (OUTPATIENT)
Dept: ONCOLOGY | Facility: CLINIC | Age: 63
End: 2023-09-13
Payer: COMMERCIAL

## 2023-09-13 VITALS
TEMPERATURE: 97.8 F | SYSTOLIC BLOOD PRESSURE: 143 MMHG | HEART RATE: 76 BPM | RESPIRATION RATE: 18 BRPM | DIASTOLIC BLOOD PRESSURE: 66 MMHG

## 2023-09-13 DIAGNOSIS — C83.18 MANTLE CELL LYMPHOMA OF LYMPH NODES OF MULTIPLE REGIONS: Primary | Chronic | ICD-10-CM

## 2023-09-13 DIAGNOSIS — Z45.2 ENCOUNTER FOR VENOUS ACCESS DEVICE CARE: ICD-10-CM

## 2023-09-13 DIAGNOSIS — C83.18 MANTLE CELL LYMPHOMA OF LYMPH NODES OF MULTIPLE REGIONS: Primary | ICD-10-CM

## 2023-09-13 DIAGNOSIS — C34.12 CANCER OF UPPER LOBE OF LEFT LUNG: Chronic | ICD-10-CM

## 2023-09-13 DIAGNOSIS — M15.9 PRIMARY OSTEOARTHRITIS INVOLVING MULTIPLE JOINTS: Chronic | ICD-10-CM

## 2023-09-13 DIAGNOSIS — R11.0 NAUSEA IN ADULT: ICD-10-CM

## 2023-09-13 DIAGNOSIS — C83.18 MANTLE CELL LYMPHOMA OF LYMPH NODES OF MULTIPLE REGIONS: ICD-10-CM

## 2023-09-13 LAB
ALBUMIN SERPL-MCNC: 3.7 G/DL (ref 3.5–5.2)
ALBUMIN/GLOB SERPL: 0.8 G/DL
ALP SERPL-CCNC: 99 U/L (ref 39–117)
ALT SERPL W P-5'-P-CCNC: <5 U/L (ref 1–41)
ANION GAP SERPL CALCULATED.3IONS-SCNC: 10 MMOL/L (ref 5–15)
AST SERPL-CCNC: 13 U/L (ref 1–40)
BASOPHILS # BLD AUTO: 0.1 10*3/MM3 (ref 0–0.2)
BASOPHILS NFR BLD AUTO: 1.3 % (ref 0–1.5)
BILIRUB SERPL-MCNC: 0.3 MG/DL (ref 0–1.2)
BUN SERPL-MCNC: 16 MG/DL (ref 8–23)
BUN/CREAT SERPL: 14 (ref 7–25)
CALCIUM SPEC-SCNC: 8.9 MG/DL (ref 8.6–10.5)
CHLORIDE SERPL-SCNC: 101 MMOL/L (ref 98–107)
CO2 SERPL-SCNC: 24 MMOL/L (ref 22–29)
CREAT SERPL-MCNC: 1.14 MG/DL (ref 0.76–1.27)
DEPRECATED RDW RBC AUTO: 41.4 FL (ref 37–54)
EGFRCR SERPLBLD CKD-EPI 2021: 71.4 ML/MIN/1.73
EOSINOPHIL # BLD AUTO: 0.82 10*3/MM3 (ref 0–0.4)
EOSINOPHIL NFR BLD AUTO: 11 % (ref 0.3–6.2)
ERYTHROCYTE [DISTWIDTH] IN BLOOD BY AUTOMATED COUNT: 13.3 % (ref 12.3–15.4)
GLOBULIN UR ELPH-MCNC: 4.7 GM/DL
GLUCOSE SERPL-MCNC: 92 MG/DL (ref 65–99)
HBV SURFACE AB SER RIA-ACNC: NORMAL
HBV SURFACE AG SERPL QL IA: NORMAL
HCT VFR BLD AUTO: 36.6 % (ref 37.5–51)
HGB BLD-MCNC: 12.5 G/DL (ref 13–17.7)
IMM GRANULOCYTES # BLD AUTO: 0.03 10*3/MM3 (ref 0–0.05)
IMM GRANULOCYTES NFR BLD AUTO: 0.4 % (ref 0–0.5)
LYMPHOCYTES # BLD AUTO: 1.91 10*3/MM3 (ref 0.7–3.1)
LYMPHOCYTES NFR BLD AUTO: 25.6 % (ref 19.6–45.3)
MCH RBC QN AUTO: 29.7 PG (ref 26.6–33)
MCHC RBC AUTO-ENTMCNC: 34.2 G/DL (ref 31.5–35.7)
MCV RBC AUTO: 86.9 FL (ref 79–97)
MONOCYTES # BLD AUTO: 0.56 10*3/MM3 (ref 0.1–0.9)
MONOCYTES NFR BLD AUTO: 7.5 % (ref 5–12)
NEUTROPHILS NFR BLD AUTO: 4.03 10*3/MM3 (ref 1.7–7)
NEUTROPHILS NFR BLD AUTO: 54.2 % (ref 42.7–76)
NRBC BLD AUTO-RTO: 0 /100 WBC (ref 0–0.2)
PLATELET # BLD AUTO: 138 10*3/MM3 (ref 140–450)
PMV BLD AUTO: 10.3 FL (ref 6–12)
POTASSIUM SERPL-SCNC: 3.8 MMOL/L (ref 3.5–5.2)
PROT SERPL-MCNC: 8.4 G/DL (ref 6–8.5)
RBC # BLD AUTO: 4.21 10*6/MM3 (ref 4.14–5.8)
SODIUM SERPL-SCNC: 135 MMOL/L (ref 136–145)
WBC NRBC COR # BLD: 7.45 10*3/MM3 (ref 3.4–10.8)

## 2023-09-13 PROCEDURE — 25010000002 DEXAMETHASONE SODIUM PHOSPHATE 100 MG/10ML SOLUTION: Performed by: INTERNAL MEDICINE

## 2023-09-13 PROCEDURE — 25010000002 RITUXIMAB 100 MG/10ML SOLUTION 10 ML VIAL: Performed by: INTERNAL MEDICINE

## 2023-09-13 PROCEDURE — 25010000002 RITUXIMAB 500 MG/50ML SOLUTION 50 ML VIAL: Performed by: INTERNAL MEDICINE

## 2023-09-13 PROCEDURE — 96417 CHEMO IV INFUS EACH ADDL SEQ: CPT | Performed by: INTERNAL MEDICINE

## 2023-09-13 PROCEDURE — 25010000002 ONDANSETRON PER 1 MG: Performed by: INTERNAL MEDICINE

## 2023-09-13 PROCEDURE — 86704 HEP B CORE ANTIBODY TOTAL: CPT

## 2023-09-13 PROCEDURE — 80053 COMPREHEN METABOLIC PANEL: CPT

## 2023-09-13 PROCEDURE — 86706 HEP B SURFACE ANTIBODY: CPT

## 2023-09-13 PROCEDURE — 25010000002 PALONOSETRON PER 25 MCG: Performed by: INTERNAL MEDICINE

## 2023-09-13 PROCEDURE — 96375 TX/PRO/DX INJ NEW DRUG ADDON: CPT | Performed by: INTERNAL MEDICINE

## 2023-09-13 PROCEDURE — 87340 HEPATITIS B SURFACE AG IA: CPT

## 2023-09-13 PROCEDURE — 96413 CHEMO IV INFUSION 1 HR: CPT | Performed by: INTERNAL MEDICINE

## 2023-09-13 PROCEDURE — 96415 CHEMO IV INFUSION ADDL HR: CPT | Performed by: INTERNAL MEDICINE

## 2023-09-13 PROCEDURE — 25010000002 DIPHENHYDRAMINE PER 50 MG: Performed by: INTERNAL MEDICINE

## 2023-09-13 PROCEDURE — 25010000002 HEPARIN LOCK FLUSH PER 10 UNITS: Performed by: INTERNAL MEDICINE

## 2023-09-13 PROCEDURE — 25010000002 BENDAMUSTINE HCL 100 MG/4ML SOLUTION 4 ML VIAL: Performed by: INTERNAL MEDICINE

## 2023-09-13 PROCEDURE — 85025 COMPLETE CBC W/AUTO DIFF WBC: CPT

## 2023-09-13 RX ORDER — ACETAMINOPHEN 325 MG/1
650 TABLET ORAL ONCE
Status: CANCELLED | OUTPATIENT
Start: 2023-09-13

## 2023-09-13 RX ORDER — ACETAMINOPHEN 325 MG/1
650 TABLET ORAL ONCE
Status: COMPLETED | OUTPATIENT
Start: 2023-09-13 | End: 2023-09-13

## 2023-09-13 RX ORDER — SODIUM CHLORIDE 0.9 % (FLUSH) 0.9 %
10 SYRINGE (ML) INJECTION AS NEEDED
Status: DISCONTINUED | OUTPATIENT
Start: 2023-09-13 | End: 2023-09-13 | Stop reason: HOSPADM

## 2023-09-13 RX ORDER — HYDROCODONE BITARTRATE AND ACETAMINOPHEN 7.5; 325 MG/1; MG/1
1 TABLET ORAL EVERY 6 HOURS PRN
Qty: 160 TABLET | Refills: 0 | Status: SHIPPED | OUTPATIENT
Start: 2023-09-13 | End: 2023-10-23

## 2023-09-13 RX ORDER — PALONOSETRON 0.05 MG/ML
0.25 INJECTION, SOLUTION INTRAVENOUS ONCE
Status: CANCELLED | OUTPATIENT
Start: 2023-09-13

## 2023-09-13 RX ORDER — SODIUM CHLORIDE 0.9 % (FLUSH) 0.9 %
10 SYRINGE (ML) INJECTION AS NEEDED
Status: CANCELLED | OUTPATIENT
Start: 2023-09-13

## 2023-09-13 RX ORDER — MEPERIDINE HYDROCHLORIDE 25 MG/ML
25 INJECTION INTRAMUSCULAR; INTRAVENOUS; SUBCUTANEOUS
Status: CANCELLED | OUTPATIENT
Start: 2023-09-13

## 2023-09-13 RX ORDER — ONDANSETRON 2 MG/ML
4 INJECTION INTRAMUSCULAR; INTRAVENOUS ONCE
Status: COMPLETED | OUTPATIENT
Start: 2023-09-13 | End: 2023-09-13

## 2023-09-13 RX ORDER — SODIUM CHLORIDE 9 MG/ML
250 INJECTION, SOLUTION INTRAVENOUS ONCE
Status: COMPLETED | OUTPATIENT
Start: 2023-09-13 | End: 2023-09-13

## 2023-09-13 RX ORDER — ONDANSETRON HYDROCHLORIDE 8 MG/1
8 TABLET, FILM COATED ORAL 3 TIMES DAILY PRN
Qty: 30 TABLET | Refills: 5 | Status: SHIPPED | OUTPATIENT
Start: 2023-09-13

## 2023-09-13 RX ORDER — SODIUM CHLORIDE 9 MG/ML
250 INJECTION, SOLUTION INTRAVENOUS ONCE
Status: CANCELLED | OUTPATIENT
Start: 2023-09-13

## 2023-09-13 RX ORDER — DIPHENHYDRAMINE HYDROCHLORIDE 50 MG/ML
50 INJECTION INTRAMUSCULAR; INTRAVENOUS AS NEEDED
Status: CANCELLED | OUTPATIENT
Start: 2023-09-13

## 2023-09-13 RX ORDER — FAMOTIDINE 10 MG/ML
20 INJECTION, SOLUTION INTRAVENOUS AS NEEDED
Status: CANCELLED | OUTPATIENT
Start: 2023-09-13

## 2023-09-13 RX ORDER — ACYCLOVIR 400 MG/1
400 TABLET ORAL 2 TIMES DAILY
Qty: 60 TABLET | Refills: 7 | Status: SHIPPED | OUTPATIENT
Start: 2023-09-13

## 2023-09-13 RX ORDER — MEPERIDINE HYDROCHLORIDE 25 MG/ML
25 INJECTION INTRAMUSCULAR; INTRAVENOUS; SUBCUTANEOUS
Status: DISCONTINUED | OUTPATIENT
Start: 2023-09-13 | End: 2023-09-13 | Stop reason: HOSPADM

## 2023-09-13 RX ORDER — SODIUM CHLORIDE 9 MG/ML
250 INJECTION, SOLUTION INTRAVENOUS ONCE
Status: CANCELLED | OUTPATIENT
Start: 2023-09-14

## 2023-09-13 RX ORDER — HEPARIN SODIUM (PORCINE) LOCK FLUSH IV SOLN 100 UNIT/ML 100 UNIT/ML
500 SOLUTION INTRAVENOUS AS NEEDED
Status: DISCONTINUED | OUTPATIENT
Start: 2023-09-13 | End: 2023-09-13 | Stop reason: HOSPADM

## 2023-09-13 RX ORDER — SULFAMETHOXAZOLE AND TRIMETHOPRIM 800; 160 MG/1; MG/1
1 TABLET ORAL 3 TIMES WEEKLY
Qty: 12 TABLET | Refills: 7 | Status: SHIPPED | OUTPATIENT
Start: 2023-09-13

## 2023-09-13 RX ORDER — ONDANSETRON 2 MG/ML
4 INJECTION INTRAMUSCULAR; INTRAVENOUS ONCE
Status: CANCELLED
Start: 2023-09-13 | End: 2023-09-13

## 2023-09-13 RX ORDER — DIPHENHYDRAMINE HYDROCHLORIDE 50 MG/ML
50 INJECTION INTRAMUSCULAR; INTRAVENOUS AS NEEDED
Status: DISCONTINUED | OUTPATIENT
Start: 2023-09-13 | End: 2023-09-13 | Stop reason: HOSPADM

## 2023-09-13 RX ORDER — HEPARIN SODIUM (PORCINE) LOCK FLUSH IV SOLN 100 UNIT/ML 100 UNIT/ML
500 SOLUTION INTRAVENOUS AS NEEDED
Status: CANCELLED | OUTPATIENT
Start: 2023-09-13

## 2023-09-13 RX ORDER — FAMOTIDINE 10 MG/ML
20 INJECTION, SOLUTION INTRAVENOUS AS NEEDED
Status: DISCONTINUED | OUTPATIENT
Start: 2023-09-13 | End: 2023-09-13 | Stop reason: HOSPADM

## 2023-09-13 RX ORDER — PALONOSETRON 0.05 MG/ML
0.25 INJECTION, SOLUTION INTRAVENOUS ONCE
Status: COMPLETED | OUTPATIENT
Start: 2023-09-13 | End: 2023-09-13

## 2023-09-13 RX ADMIN — DEXAMETHASONE SODIUM PHOSPHATE 12 MG: 10 INJECTION, SOLUTION INTRAMUSCULAR; INTRAVENOUS at 13:32

## 2023-09-13 RX ADMIN — PALONOSETRON 0.25 MG: 0.05 INJECTION, SOLUTION INTRAVENOUS at 13:26

## 2023-09-13 RX ADMIN — DIPHENHYDRAMINE HYDROCHLORIDE 50 MG: 50 INJECTION, SOLUTION INTRAMUSCULAR; INTRAVENOUS at 08:05

## 2023-09-13 RX ADMIN — RITUXIMAB 690 MG: 10 INJECTION, SOLUTION INTRAVENOUS at 08:50

## 2023-09-13 RX ADMIN — Medication 10 ML: at 14:50

## 2023-09-13 RX ADMIN — ACETAMINOPHEN 650 MG: 325 TABLET, FILM COATED ORAL at 08:05

## 2023-09-13 RX ADMIN — SODIUM CHLORIDE 250 ML: 9 INJECTION, SOLUTION INTRAVENOUS at 08:05

## 2023-09-13 RX ADMIN — BENDAMUSTINE HYDROCHLORIDE 130 MG: 100 INJECTION INTRAVENOUS at 14:06

## 2023-09-13 RX ADMIN — HEPARIN 500 UNITS: 100 SYRINGE at 14:50

## 2023-09-13 RX ADMIN — ONDANSETRON 4 MG: 2 INJECTION INTRAMUSCULAR; INTRAVENOUS at 11:05

## 2023-09-13 NOTE — PROGRESS NOTES
Subjective     Mitch Rinaldi was seen in follow-up for mantle cell lymphoma and lung cancer.  He is scheduled to start his chemotherapy today.  Overall feeling well.  Risk versus benefit of chemotherapy discussed at length.  He understood and was agreeable to proceed.    Past Medical History, Past Surgical History, Social History, Family History have been reviewed and are without significant changes except as mentioned.        Medications:  The current medication list was reviewed in the EMR    ALLERGIES:    Allergies   Allergen Reactions    Darvon [Propoxyphene] Nausea And Vomiting    Percocet [Oxycodone-Acetaminophen] Palpitations       Objective              5/7/2021     8:10 AM   Current Status   ECOG score 0       Physical Exam  Vitals and nursing note reviewed.   Constitutional:       Appearance: Normal appearance.   Lymphadenopathy:      Upper Body:      Right upper body: Axillary adenopathy present.      Left upper body: Axillary adenopathy present.   Neurological:      General: No focal deficit present.      Mental Status: He is alert and oriented to person, place, and time. Mental status is at baseline.   Psychiatric:         Mood and Affect: Mood normal.         Behavior: Behavior normal.         Thought Content: Thought content normal.             RECENT LABS:Independently reviewed and summarized  Hematology WBC   Date Value Ref Range Status   05/19/2023 7.17 3.40 - 10.80 10*3/mm3 Final     RBC   Date Value Ref Range Status   05/19/2023 4.50 4.14 - 5.80 10*6/mm3 Final     Hemoglobin   Date Value Ref Range Status   05/19/2023 13.5 13.0 - 17.7 g/dL Final     Hematocrit   Date Value Ref Range Status   05/19/2023 39.1 37.5 - 51.0 % Final     Platelets   Date Value Ref Range Status   05/19/2023 159 140 - 450 10*3/mm3 Final       WBC   Date Value Ref Range Status   05/19/2023 7.17 3.40 - 10.80 10*3/mm3 Final     RBC   Date Value Ref Range Status   05/19/2023 4.50 4.14 - 5.80 10*6/mm3 Final     Hemoglobin    Date Value Ref Range Status   05/19/2023 13.5 13.0 - 17.7 g/dL Final     Hematocrit   Date Value Ref Range Status   05/19/2023 39.1 37.5 - 51.0 % Final     MCV   Date Value Ref Range Status   05/19/2023 86.9 79.0 - 97.0 fL Final     MCH   Date Value Ref Range Status   05/19/2023 30.0 26.6 - 33.0 pg Final     MCHC   Date Value Ref Range Status   05/19/2023 34.5 31.5 - 35.7 g/dL Final     RDW   Date Value Ref Range Status   05/19/2023 12.9 12.3 - 15.4 % Final     RDW-SD   Date Value Ref Range Status   05/19/2023 40.9 37.0 - 54.0 fl Final     MPV   Date Value Ref Range Status   05/19/2023 10.1 6.0 - 12.0 fL Final     Platelets   Date Value Ref Range Status   05/19/2023 159 140 - 450 10*3/mm3 Final     Neutrophil %   Date Value Ref Range Status   05/19/2023 59.0 42.7 - 76.0 % Final     Lymphocyte %   Date Value Ref Range Status   05/19/2023 23.7 19.6 - 45.3 % Final     Monocyte %   Date Value Ref Range Status   05/19/2023 7.9 5.0 - 12.0 % Final     Eosinophil %   Date Value Ref Range Status   05/19/2023 7.5 (H) 0.3 - 6.2 % Final     Basophil %   Date Value Ref Range Status   05/19/2023 1.3 0.0 - 1.5 % Final     Immature Grans %   Date Value Ref Range Status   05/19/2023 0.6 (H) 0.0 - 0.5 % Final     Neutrophils, Absolute   Date Value Ref Range Status   05/19/2023 4.23 1.70 - 7.00 10*3/mm3 Final     Lymphocytes, Absolute   Date Value Ref Range Status   05/19/2023 1.70 0.70 - 3.10 10*3/mm3 Final     Monocytes, Absolute   Date Value Ref Range Status   05/19/2023 0.57 0.10 - 0.90 10*3/mm3 Final     Eosinophils, Absolute   Date Value Ref Range Status   05/19/2023 0.54 (H) 0.00 - 0.40 10*3/mm3 Final     Basophils, Absolute   Date Value Ref Range Status   05/19/2023 0.09 0.00 - 0.20 10*3/mm3 Final     Immature Grans, Absolute   Date Value Ref Range Status   05/19/2023 0.04 0.00 - 0.05 10*3/mm3 Final     nRBC   Date Value Ref Range Status   05/19/2023 0.0 0.0 - 0.2 /100 WBC Final              Advance Care Planning   ACP  discussion was declined by the patient. Patient does not have an advance directive, declines further assistance.         Diagnosis:   (1) Left lung cancer, left upper lobe   Squamous cell carcinoma   Stage IB     (2) Mantle cell lymphoma     Stage III     Current therapy:   BR   Cycle 1: 9/13/23     (3) Osteoarthritis     Assessment & Plan       (1) Left lung cancer, left upper lobe     Chronic, stable.    Diagnosed in August 2020.  Initial biopsy was nondiagnostic.  Serial imaging showed increase in the size and subsequent repeat biopsy showed moderate to poorly differentiated squamous cell carcinoma of lung.  He underwent SBRT in February 2021.  PET scan in August 2023 showed no residual evidence of lung cancer.  Mediastinal adenopathy is from mantle cell lymphoma.      (2) Mantle cell lymphoma       Chronic issue with exacerbation.  Patient with diagnosis of CD5 negative mantle cell lymphoma.  IHC positive for cyclin D1  FISH is positive for t(11,14) translocation.   Cytogenetics are normal.  Ki-67 -10 to 15%.  Bone marrow aspiration biopsy was negative.     He was under observation however PET scan in 8/2023 showed worsening adenopaty.      We discussed his diagnosis and treatment options.     After lengthy discussion I recommended Bendamustine and rituximab x6 cycles.  Side effects associated with chemotherapy discussed at length.  Risk versus benefit discussed.  Alternative options were discussed as well.  His labs look stable.  Cycle 1 Bendamustine rituximab was signed on today's visit.  We will see him back in 5 weeks with CBC, CMP prior to cycle 2.    (3) Osteoarthritis     Chronic, stable.  Continue Norco as needed.  New prescription sent.    9/13/2023      CC:

## 2023-09-14 ENCOUNTER — INFUSION (OUTPATIENT)
Dept: ONCOLOGY | Facility: HOSPITAL | Age: 63
End: 2023-09-14
Payer: MEDICARE

## 2023-09-14 ENCOUNTER — TELEPHONE (OUTPATIENT)
Dept: ONCOLOGY | Facility: HOSPITAL | Age: 63
End: 2023-09-14
Payer: COMMERCIAL

## 2023-09-14 DIAGNOSIS — Z45.2 ENCOUNTER FOR VENOUS ACCESS DEVICE CARE: ICD-10-CM

## 2023-09-14 DIAGNOSIS — C83.18 MANTLE CELL LYMPHOMA OF LYMPH NODES OF MULTIPLE REGIONS: Primary | ICD-10-CM

## 2023-09-14 LAB — HBV CORE AB SERPL QL IA: NEGATIVE

## 2023-09-14 PROCEDURE — 96413 CHEMO IV INFUSION 1 HR: CPT | Performed by: INTERNAL MEDICINE

## 2023-09-14 PROCEDURE — 25010000002 HEPARIN LOCK FLUSH PER 10 UNITS: Performed by: INTERNAL MEDICINE

## 2023-09-14 PROCEDURE — 25010000002 BENDAMUSTINE HCL 100 MG/4ML SOLUTION 4 ML VIAL: Performed by: INTERNAL MEDICINE

## 2023-09-14 PROCEDURE — 96375 TX/PRO/DX INJ NEW DRUG ADDON: CPT | Performed by: INTERNAL MEDICINE

## 2023-09-14 PROCEDURE — 25010000002 DEXAMETHASONE SODIUM PHOSPHATE 100 MG/10ML SOLUTION: Performed by: INTERNAL MEDICINE

## 2023-09-14 RX ORDER — HEPARIN SODIUM (PORCINE) LOCK FLUSH IV SOLN 100 UNIT/ML 100 UNIT/ML
500 SOLUTION INTRAVENOUS AS NEEDED
Status: DISCONTINUED | OUTPATIENT
Start: 2023-09-14 | End: 2023-09-14 | Stop reason: HOSPADM

## 2023-09-14 RX ORDER — SODIUM CHLORIDE 0.9 % (FLUSH) 0.9 %
10 SYRINGE (ML) INJECTION AS NEEDED
OUTPATIENT
Start: 2023-09-14

## 2023-09-14 RX ORDER — SODIUM CHLORIDE 0.9 % (FLUSH) 0.9 %
10 SYRINGE (ML) INJECTION AS NEEDED
Status: DISCONTINUED | OUTPATIENT
Start: 2023-09-14 | End: 2023-09-14 | Stop reason: HOSPADM

## 2023-09-14 RX ORDER — HEPARIN SODIUM (PORCINE) LOCK FLUSH IV SOLN 100 UNIT/ML 100 UNIT/ML
500 SOLUTION INTRAVENOUS AS NEEDED
OUTPATIENT
Start: 2023-09-14

## 2023-09-14 RX ORDER — SODIUM CHLORIDE 9 MG/ML
250 INJECTION, SOLUTION INTRAVENOUS ONCE
Status: COMPLETED | OUTPATIENT
Start: 2023-09-14 | End: 2023-09-14

## 2023-09-14 RX ADMIN — Medication 10 ML: at 12:21

## 2023-09-14 RX ADMIN — DEXAMETHASONE SODIUM PHOSPHATE 12 MG: 10 INJECTION, SOLUTION INTRAMUSCULAR; INTRAVENOUS at 10:35

## 2023-09-14 RX ADMIN — BENDAMUSTINE HYDROCHLORIDE 130 MG: 100 INJECTION INTRAVENOUS at 11:18

## 2023-09-14 RX ADMIN — SODIUM CHLORIDE 250 ML: 9 INJECTION, SOLUTION INTRAVENOUS at 10:35

## 2023-09-14 RX ADMIN — HEPARIN 500 UNITS: 100 SYRINGE at 12:21

## 2023-09-14 NOTE — TELEPHONE ENCOUNTER
Follow up to assess patient after receiving chemotherapy/biotherapy on 09/13/2023.   Patient states no symptoms other than nausea relieved by Zofran and insomnia. Provided pt educations that insomnia potentially r/t to Decadron.    Reinforced to notify us for any of the following: fever of 100.4 or higher, shaking chills, any signs of infection such as sore throat, new cough, burning when urinating, mouth sores or pain that makes it hard to eat or difficulty swallowing , nausea not relived or controlled by medication, , diarrhea (loose watery stool) 4 or more times a day in 24 hrs, or have not had a  bowel movement or passed gas for more than 3 days, or blood in urine, stool, vomit, or cough.     12:47 CDT   Deidre Vazquez RN

## 2023-09-22 DIAGNOSIS — F41.1 GENERALIZED ANXIETY DISORDER: ICD-10-CM

## 2023-09-22 RX ORDER — FLUOXETINE HYDROCHLORIDE 40 MG/1
40 CAPSULE ORAL DAILY
Qty: 30 CAPSULE | Refills: 5 | Status: SHIPPED | OUTPATIENT
Start: 2023-09-22

## 2023-09-28 RX ORDER — METHYLPREDNISOLONE 4 MG/1
TABLET ORAL
Qty: 21 TABLET | Refills: 0 | Status: SHIPPED | OUTPATIENT
Start: 2023-09-28

## (undated) DEVICE — DRSNG SURESITE WNDW 4X4.5

## (undated) DEVICE — CONTAINER,SPECIMEN,OR STERILE,4OZ: Brand: MEDLINE

## (undated) DEVICE — STPLR SKIN VISISTAT WD 35CT

## (undated) DEVICE — SKIN AFFIX SURG ADHESIVE 72/CS 0.55ML: Brand: MEDLINE

## (undated) DEVICE — SUCTION COAGULATOR, 1 PER POUCH: Brand: A&E MEDICAL / DISPOSABLE SUCTION COAGULATOR

## (undated) DEVICE — TOWEL,OR,DSP,ST,BLUE,DLX,4/PK,20PK/CS: Brand: MEDLINE

## (undated) DEVICE — GOWN,AURORA,NOREINF,RAGLAN,XL,STERILE: Brand: MEDLINE

## (undated) DEVICE — 3M™ IOBAN™ 2 ANTIMICROBIAL INCISE DRAPE 6651EZ: Brand: IOBAN™ 2

## (undated) DEVICE — SUT VIC 3/0 SH 27IN J416H

## (undated) DEVICE — GAUZE,SPONGE,4"X4",16PLY,XRAY,STRL,LF: Brand: MEDLINE

## (undated) DEVICE — GLV SURG SENSICARE GREEN W/ALOE PF LF 6.5 STRL

## (undated) DEVICE — SUT MONOCRYL 4/0 PS2 27IN Y426H ETY426H

## (undated) DEVICE — PK MAJ PROC LF 60

## (undated) DEVICE — SOL IRR NACL 0.9PCT BT 1000ML

## (undated) DEVICE — SYR SLP TP 10ML DISP

## (undated) DEVICE — GLV SURG TRIUMPH PF LTX 6.5 STRL

## (undated) DEVICE — GLV SURG SENSICARE GREEN W/ALOE PF LF 7 STRL

## (undated) DEVICE — SINGLE-USE BIOPSY FORCEPS: Brand: RADIAL JAW 4

## (undated) DEVICE — SYS SKIN CLS DERMABOND PRINEO W/22CM MESH TP

## (undated) DEVICE — GLV SURG TRIUMPH LT PF LTX 6.5 STRL

## (undated) DEVICE — ATHLETIC SUPPORTER LATEX FREE, LARGE

## (undated) DEVICE — GLV SURG SENSICARE GREEN W/ALOE PF LF 8 STRL

## (undated) DEVICE — SUT VIC 2/0 SH 27IN

## (undated) DEVICE — BNDG COBAN S/ADHR WRP LF 1IN 5YD TN PK/5

## (undated) DEVICE — STERILE POLYISOPRENE POWDER-FREE SURGICAL GLOVES WITH EMOLLIENT COATING: Brand: PROTEXIS

## (undated) DEVICE — ELECTRD BLD EZ CLN MOD 2.5IN

## (undated) DEVICE — DRN PENRS 1/2X18IN LTX

## (undated) DEVICE — ANTIBACTERIAL UNDYED BRAIDED (POLYGLACTIN 910), SYNTHETIC ABSORBABLE SUTURE: Brand: COATED VICRYL

## (undated) DEVICE — STERILE POLYISOPRENE POWDER-FREE SURGICAL GLOVES: Brand: PROTEXIS

## (undated) DEVICE — SUT VICRYL 3-0 SH-1 PO 18IN J772D

## (undated) DEVICE — TUBING, SUCTION, 3/16" X 6', STRAIGHT: Brand: MEDLINE

## (undated) DEVICE — SUT VIC 4/0 RB1 27IN J214H

## (undated) DEVICE — CANN SMPL SOFTECH BIFLO ETCO2 A/M 7FT

## (undated) DEVICE — DRSNG TELFA PAD NONADH STR 1S 3X4IN

## (undated) DEVICE — DRSNG TELFA PAD NONADH STR 1S 3X8IN

## (undated) DEVICE — GLV SURG TRIUMPH LT PF LTX 7.5 STRL

## (undated) DEVICE — SUT VIC 3/0 TIES 18IN J110T

## (undated) DEVICE — SPNG DISSCT SECTO KTTNER PK/5

## (undated) DEVICE — SUT PDS 0 CT2 27IN DYED Z334H

## (undated) DEVICE — SPNG GZ WOVN 4X4IN 12PLY 10/BX STRL

## (undated) DEVICE — GLV SURG TRIUMPH ORTHO W/ALOE PF LTX 7.5 STRL